# Patient Record
Sex: MALE | Race: BLACK OR AFRICAN AMERICAN | Employment: UNEMPLOYED | URBAN - METROPOLITAN AREA
[De-identification: names, ages, dates, MRNs, and addresses within clinical notes are randomized per-mention and may not be internally consistent; named-entity substitution may affect disease eponyms.]

---

## 2017-01-06 ENCOUNTER — HOSPITAL ENCOUNTER (EMERGENCY)
Age: 82
Discharge: HOME | End: 2017-01-06
Attending: EMERGENCY MEDICINE
Payer: MEDICARE

## 2017-01-06 ENCOUNTER — APPOINTMENT (OUTPATIENT)
Dept: GENERAL RADIOLOGY | Age: 82
End: 2017-01-06
Attending: EMERGENCY MEDICINE
Payer: MEDICARE

## 2017-01-06 ENCOUNTER — HOSPITAL ENCOUNTER (EMERGENCY)
Age: 82
Discharge: STILL ADMITTED OR ADMINISTRATIVE | End: 2017-01-06
Attending: EMERGENCY MEDICINE

## 2017-01-06 VITALS
SYSTOLIC BLOOD PRESSURE: 133 MMHG | HEART RATE: 101 BPM | HEIGHT: 61 IN | WEIGHT: 149.2 LBS | BODY MASS INDEX: 28.17 KG/M2 | DIASTOLIC BLOOD PRESSURE: 74 MMHG | TEMPERATURE: 98.4 F | OXYGEN SATURATION: 100 % | RESPIRATION RATE: 16 BRPM

## 2017-01-06 DIAGNOSIS — K59.00 CONSTIPATION, UNSPECIFIED CONSTIPATION TYPE: Primary | ICD-10-CM

## 2017-01-06 LAB
ALBUMIN SERPL BCP-MCNC: 3.5 G/DL (ref 3.5–5)
ALBUMIN/GLOB SERPL: 1 {RATIO} (ref 1.1–2.2)
ALP SERPL-CCNC: 51 U/L (ref 45–117)
ALT SERPL-CCNC: 26 U/L (ref 12–78)
AMPHET UR QL SCN: NEGATIVE
ANION GAP BLD CALC-SCNC: 9 MMOL/L (ref 5–15)
APPEARANCE UR: CLEAR
AST SERPL W P-5'-P-CCNC: 22 U/L (ref 15–37)
BACTERIA URNS QL MICRO: NEGATIVE /HPF
BARBITURATES UR QL SCN: NEGATIVE
BASOPHILS # BLD AUTO: 0.1 K/UL (ref 0–0.1)
BASOPHILS # BLD: 1 % (ref 0–1)
BENZODIAZ UR QL: NEGATIVE
BILIRUB SERPL-MCNC: 0.2 MG/DL (ref 0.2–1)
BILIRUB UR QL: NEGATIVE
BUN SERPL-MCNC: 34 MG/DL (ref 6–20)
BUN/CREAT SERPL: 33 (ref 12–20)
CALCIUM SERPL-MCNC: 8.7 MG/DL (ref 8.5–10.1)
CANNABINOIDS UR QL SCN: NEGATIVE
CHLORIDE SERPL-SCNC: 101 MMOL/L (ref 97–108)
CK MB CFR SERPL CALC: 2.8 % (ref 0–2.5)
CK MB SERPL-MCNC: 1.2 NG/ML (ref 5–25)
CK SERPL-CCNC: 43 U/L (ref 39–308)
CO2 SERPL-SCNC: 27 MMOL/L (ref 21–32)
COCAINE UR QL SCN: NEGATIVE
COLOR UR: ABNORMAL
CREAT SERPL-MCNC: 1.04 MG/DL (ref 0.7–1.3)
DRUG SCRN COMMENT,DRGCM: NORMAL
EOSINOPHIL # BLD: 0.4 K/UL (ref 0–0.4)
EOSINOPHIL NFR BLD: 7 % (ref 0–7)
EPITH CASTS URNS QL MICRO: ABNORMAL /LPF
ERYTHROCYTE [DISTWIDTH] IN BLOOD BY AUTOMATED COUNT: 13.1 % (ref 11.5–14.5)
ETHANOL SERPL-MCNC: 46 MG/DL
GLOBULIN SER CALC-MCNC: 3.6 G/DL (ref 2–4)
GLUCOSE BLD STRIP.AUTO-MCNC: 83 MG/DL (ref 65–100)
GLUCOSE SERPL-MCNC: 92 MG/DL (ref 65–100)
GLUCOSE UR STRIP.AUTO-MCNC: NEGATIVE MG/DL
HCT VFR BLD AUTO: 35.9 % (ref 36.6–50.3)
HGB BLD-MCNC: 12.1 G/DL (ref 12.1–17)
HGB UR QL STRIP: ABNORMAL
KETONES UR QL STRIP.AUTO: NEGATIVE MG/DL
LEUKOCYTE ESTERASE UR QL STRIP.AUTO: ABNORMAL
LYMPHOCYTES # BLD AUTO: 29 % (ref 12–49)
LYMPHOCYTES # BLD: 1.4 K/UL (ref 0.8–3.5)
MCH RBC QN AUTO: 30.4 PG (ref 26–34)
MCHC RBC AUTO-ENTMCNC: 33.7 G/DL (ref 30–36.5)
MCV RBC AUTO: 90.2 FL (ref 80–99)
METHADONE UR QL: NEGATIVE
MONOCYTES # BLD: 0.2 K/UL (ref 0–1)
MONOCYTES NFR BLD AUTO: 5 % (ref 5–13)
NEUTS SEG # BLD: 2.8 K/UL (ref 1.8–8)
NEUTS SEG NFR BLD AUTO: 58 % (ref 32–75)
NITRITE UR QL STRIP.AUTO: NEGATIVE
OPIATES UR QL: NEGATIVE
PCP UR QL: NEGATIVE
PH UR STRIP: 6.5 [PH] (ref 5–8)
PLATELET # BLD AUTO: 263 K/UL (ref 150–400)
POTASSIUM SERPL-SCNC: 5.2 MMOL/L (ref 3.5–5.1)
PROT SERPL-MCNC: 7.1 G/DL (ref 6.4–8.2)
PROT UR STRIP-MCNC: NEGATIVE MG/DL
RBC # BLD AUTO: 3.98 M/UL (ref 4.1–5.7)
RBC #/AREA URNS HPF: ABNORMAL /HPF (ref 0–5)
SERVICE CMNT-IMP: NORMAL
SODIUM SERPL-SCNC: 137 MMOL/L (ref 136–145)
SP GR UR REFRACTOMETRY: 1.01 (ref 1–1.03)
TROPONIN I SERPL-MCNC: <0.04 NG/ML
UA: UC IF INDICATED,UAUC: ABNORMAL
UROBILINOGEN UR QL STRIP.AUTO: 0.2 EU/DL (ref 0.2–1)
WBC # BLD AUTO: 4.8 K/UL (ref 4.1–11.1)
WBC URNS QL MICRO: ABNORMAL /HPF (ref 0–4)

## 2017-01-06 PROCEDURE — 82962 GLUCOSE BLOOD TEST: CPT

## 2017-01-06 PROCEDURE — 80307 DRUG TEST PRSMV CHEM ANLYZR: CPT | Performed by: EMERGENCY MEDICINE

## 2017-01-06 PROCEDURE — 85025 COMPLETE CBC W/AUTO DIFF WBC: CPT | Performed by: EMERGENCY MEDICINE

## 2017-01-06 PROCEDURE — 82550 ASSAY OF CK (CPK): CPT | Performed by: EMERGENCY MEDICINE

## 2017-01-06 PROCEDURE — 84484 ASSAY OF TROPONIN QUANT: CPT | Performed by: EMERGENCY MEDICINE

## 2017-01-06 PROCEDURE — 80053 COMPREHEN METABOLIC PANEL: CPT | Performed by: EMERGENCY MEDICINE

## 2017-01-06 PROCEDURE — 96361 HYDRATE IV INFUSION ADD-ON: CPT

## 2017-01-06 PROCEDURE — 96360 HYDRATION IV INFUSION INIT: CPT

## 2017-01-06 PROCEDURE — 81001 URINALYSIS AUTO W/SCOPE: CPT | Performed by: EMERGENCY MEDICINE

## 2017-01-06 PROCEDURE — 74011250636 HC RX REV CODE- 250/636: Performed by: EMERGENCY MEDICINE

## 2017-01-06 PROCEDURE — 93005 ELECTROCARDIOGRAM TRACING: CPT

## 2017-01-06 PROCEDURE — 74022 RADEX COMPL AQT ABD SERIES: CPT

## 2017-01-06 PROCEDURE — 99285 EMERGENCY DEPT VISIT HI MDM: CPT

## 2017-01-06 PROCEDURE — 36415 COLL VENOUS BLD VENIPUNCTURE: CPT | Performed by: EMERGENCY MEDICINE

## 2017-01-06 RX ORDER — POLYETHYLENE GLYCOL 3350 17 G/17G
17 POWDER, FOR SOLUTION ORAL DAILY
Qty: 289 G | Refills: 0 | Status: SHIPPED | OUTPATIENT
Start: 2017-01-06 | End: 2017-10-11

## 2017-01-06 RX ORDER — SODIUM CHLORIDE 0.9 % (FLUSH) 0.9 %
5-10 SYRINGE (ML) INJECTION EVERY 8 HOURS
Status: DISCONTINUED | OUTPATIENT
Start: 2017-01-06 | End: 2017-01-06 | Stop reason: HOSPADM

## 2017-01-06 RX ORDER — FACIAL-BODY WIPES
10 EACH TOPICAL DAILY
Qty: 12 SUPPOSITORY | Refills: 0 | Status: SHIPPED | OUTPATIENT
Start: 2017-01-06 | End: 2018-03-23

## 2017-01-06 RX ORDER — SODIUM CHLORIDE 0.9 % (FLUSH) 0.9 %
5-10 SYRINGE (ML) INJECTION AS NEEDED
Status: DISCONTINUED | OUTPATIENT
Start: 2017-01-06 | End: 2017-01-06 | Stop reason: HOSPADM

## 2017-01-06 RX ADMIN — SODIUM CHLORIDE 1000 ML: 900 INJECTION, SOLUTION INTRAVENOUS at 12:58

## 2017-01-06 NOTE — ED PROVIDER NOTES
Patient is a 80 y.o. male presenting with altered mental status and constipation. The history is provided by the patient and the EMS personnel. No  was used. Altered mental status      Constipation    Associated symptoms include constipation. Pertinent negatives include no dysuria, no abdominal distention, no fever, no vomiting and no diarrhea. Pt with no EHR in our system, brought in by EMS for complaints of constipation, felt to be altered. Tachycardic. History reviewed. No pertinent past medical history. History reviewed. No pertinent past surgical history. History reviewed. No pertinent family history. Social History     Social History    Marital status: N/A     Spouse name: N/A    Number of children: N/A    Years of education: N/A     Occupational History    Not on file. 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 Review of Systems   Unable to perform ROS: Mental status change   Constitutional: Negative for fever. Gastrointestinal: Positive for constipation. Negative for abdominal distention, diarrhea and vomiting. Genitourinary: Negative for dysuria. Vitals:    01/06/17 1228   BP: 124/62   Pulse: (!) 126   Resp: 19   Temp: 98.9 °F (37.2 °C)   Weight: 67.7 kg (149 lb 3.2 oz)   Height: 5' 1.32\" (1.558 m)            Physical Exam   Constitutional: He appears well-developed. No distress. Thin male confused, unknown baseline   HENT:   Head: Normocephalic and atraumatic. Nose: Nose normal.   Mouth/Throat: Oropharynx is clear and moist. No oropharyngeal exudate.    Eyes: Conjunctivae and EOM are normal. Right eye exhibits no discharge. Left eye exhibits no discharge. No scleral icterus. 2 mm   Neck: Normal range of motion. Neck supple. No JVD present. No tracheal deviation present. No thyromegaly present. Cardiovascular: Normal rate, regular rhythm and normal heart sounds. No murmur heard. Pulmonary/Chest: Effort normal and breath sounds normal. No stridor. No respiratory distress. He has no wheezes. He has no rales. Abdominal: Soft. Bowel sounds are normal. He exhibits no distension. There is tenderness. There is no rebound and no guarding. Active bowel sounds, tender LLQ/suprapubic; L inguinal hernia \"a long time\" per pt, nontender, nonreducible   Genitourinary: Rectum normal.   Genitourinary Comments: Hard stool in vault, no impaction   Musculoskeletal: Normal range of motion. Lymphadenopathy:     He has no cervical adenopathy. Neurological: He is alert. Skin: Skin is warm and dry. No rash noted. He is not diaphoretic. No erythema. No pallor. Psychiatric:   Awake, confused   Nursing note and vitals reviewed.        [facility]  ED Course       Procedures

## 2017-01-06 NOTE — DISCHARGE INSTRUCTIONS
Constipation: Care Instructions  Your Care Instructions  Constipation means that you have a hard time passing stools (bowel movements). People pass stools from 3 times a day to once every 3 days. What is normal for you may be different. Constipation may occur with pain in the rectum and cramping. The pain may get worse when you try to pass stools. Sometimes there are small amounts of bright red blood on toilet paper or the surface of stools. This is because of enlarged veins near the rectum (hemorrhoids). A few changes in your diet and lifestyle may help you avoid ongoing constipation. Your doctor may also prescribe medicine to help loosen your stool. Some medicines can cause constipation. These include pain medicines and antidepressants. Tell your doctor about all the medicines you take. Your doctor may want to make a medicine change to ease your symptoms. Follow-up care is a key part of your treatment and safety. Be sure to make and go to all appointments, and call your doctor if you are having problems. It's also a good idea to know your test results and keep a list of the medicines you take. How can you care for yourself at home? · Drink plenty of fluids, enough so that your urine is light yellow or clear like water. If you have kidney, heart, or liver disease and have to limit fluids, talk with your doctor before you increase the amount of fluids you drink. · Include high-fiber foods in your diet each day. These include fruits, vegetables, beans, and whole grains. · Get at least 30 minutes of exercise on most days of the week. Walking is a good choice. You also may want to do other activities, such as running, swimming, cycling, or playing tennis or team sports. · Take a fiber supplement, such as Citrucel or Metamucil, every day. Read and follow all instructions on the label. · Schedule time each day for a bowel movement. A daily routine may help.  Take your time having your bowel movement. · Support your feet with a small step stool when you sit on the toilet. This helps flex your hips and places your pelvis in a squatting position. · Your doctor may recommend an over-the-counter laxative to relieve your constipation. Examples are Milk of Magnesia and MiraLax. Read and follow all instructions on the label. Do not use laxatives on a long-term basis. When should you call for help? Call your doctor now or seek immediate medical care if:  · You have new or worse belly pain. · You have new or worse nausea or vomiting. · You have blood in your stools. Watch closely for changes in your health, and be sure to contact your doctor if:  · Your constipation is getting worse. · You do not get better as expected. Where can you learn more? Go to http://marta-kuldeep.info/. Enter [id number] in the search box to learn more about \"Constipation: Care Instructions. \"  Current as of: May 27, 2016  Content Version: 11.1  © 4272-8350 [employer]. Care instructions adapted under license by [email or web address] (which disclaims liability or warranty for this information). If you have questions about a medical condition or this instruction, always ask your healthcare professional. [address] any warranty or liability for your use of this information.

## 2017-01-06 NOTE — ED NOTES
Reviewed discharge instructions and follow up information with patient. Confirmed patient's return address after speaking with representative at [facility]. Arranged for patient to get a ride home with courtesy Ammy Booker.

## 2017-01-06 NOTE — ED NOTES
Patient (s)  given copy of dc instructions and 2 script(s). Patient(s)  verbalized understanding of instructions and script (s). Patient given a current medication reconciliation form and verbalized understanding of their medications. Patient (s) verbalized understanding of the importance of discussing medications with  his or her physician or clinic when they follow up. Patient alert and oriented and in no acute distress. Pt verbalizes pain scale of 4 out of 10. Patient discharged home ambulatory.

## 2017-01-08 LAB
ATRIAL RATE: 122 BPM
CALCULATED R AXIS, ECG10: 62 DEGREES
CALCULATED T AXIS, ECG11: 77 DEGREES
DIAGNOSIS, 93000: NORMAL
P-R INTERVAL, ECG05: 168 MS
Q-T INTERVAL, ECG07: 270 MS
QRS DURATION, ECG06: 68 MS
QTC CALCULATION (BEZET), ECG08: 384 MS
VENTRICULAR RATE, ECG03: 122 BPM

## 2017-01-15 RX ORDER — TAMSULOSIN HYDROCHLORIDE 0.4 MG/1
CAPSULE ORAL
Qty: 90 CAP | Refills: 0 | Status: SHIPPED | OUTPATIENT
Start: 2017-01-15 | End: 2017-04-13 | Stop reason: SDUPTHER

## 2017-04-11 ENCOUNTER — OFFICE VISIT (OUTPATIENT)
Dept: INTERNAL MEDICINE CLINIC | Age: 82
End: 2017-04-11

## 2017-04-11 VITALS
DIASTOLIC BLOOD PRESSURE: 67 MMHG | HEIGHT: 67 IN | HEART RATE: 58 BPM | SYSTOLIC BLOOD PRESSURE: 97 MMHG | BODY MASS INDEX: 19.32 KG/M2 | WEIGHT: 123.1 LBS | TEMPERATURE: 98.2 F | OXYGEN SATURATION: 91 % | RESPIRATION RATE: 18 BRPM

## 2017-04-11 DIAGNOSIS — K40.90 INGUINAL HERNIA OF LEFT SIDE WITHOUT OBSTRUCTION OR GANGRENE: Primary | ICD-10-CM

## 2017-04-11 DIAGNOSIS — F02.80 ALZHEIMER'S DISEASE OF OTHER ONSET: ICD-10-CM

## 2017-04-11 DIAGNOSIS — G30.8 ALZHEIMER'S DISEASE OF OTHER ONSET: ICD-10-CM

## 2017-04-11 DIAGNOSIS — R63.4 WEIGHT LOSS: ICD-10-CM

## 2017-04-11 RX ORDER — POTASSIUM CHLORIDE 750 MG/1
20 TABLET, FILM COATED, EXTENDED RELEASE ORAL 2 TIMES DAILY
Qty: 360 TAB | Refills: 3 | Status: SHIPPED | OUTPATIENT
Start: 2017-04-11 | End: 2018-11-29

## 2017-04-11 RX ORDER — ALBUTEROL SULFATE 0.83 MG/ML
2.5 SOLUTION RESPIRATORY (INHALATION)
Qty: 4 PACKAGE | Refills: 11 | Status: SHIPPED | OUTPATIENT
Start: 2017-04-11 | End: 2018-07-24 | Stop reason: SDUPTHER

## 2017-04-11 RX ORDER — LISINOPRIL 10 MG/1
10 TABLET ORAL DAILY
Qty: 90 TAB | Refills: 3 | Status: SHIPPED | OUTPATIENT
Start: 2017-04-11 | End: 2018-04-13 | Stop reason: SDUPTHER

## 2017-04-11 NOTE — PATIENT INSTRUCTIONS
Preventing Falls: Care Instructions  Your Care Instructions  Getting around your home safely can be a challenge if you have injuries or health problems that make it easy for you to fall. Loose rugs and furniture in walkways are among the dangers for many older people who have problems walking or who have poor eyesight. People who have conditions such as arthritis, osteoporosis, or dementia also have to be careful not to fall. You can make your home safer with a few simple measures. Follow-up care is a key part of your treatment and safety. Be sure to make and go to all appointments, and call your doctor if you are having problems. It's also a good idea to know your test results and keep a list of the medicines you take. How can you care for yourself at home? Taking care of yourself  · You may get dizzy if you do not drink enough water. To prevent dehydration, drink plenty of fluids, enough so that your urine is light yellow or clear like water. Choose water and other caffeine-free clear liquids. If you have kidney, heart, or liver disease and have to limit fluids, talk with your doctor before you increase the amount of fluids you drink. · Exercise regularly to improve your strength, muscle tone, and balance. Walk if you can. Swimming may be a good choice if you cannot walk easily. · Have your vision and hearing checked each year or any time you notice a change. If you have trouble seeing and hearing, you might not be able to avoid objects and could lose your balance. · Know the side effects of the medicines you take. Ask your doctor or pharmacist whether the medicines you take can affect your balance. Sleeping pills or sedatives can affect your balance. · Limit the amount of alcohol you drink. Alcohol can impair your balance and other senses. · Ask your doctor whether calluses or corns on your feet need to be removed.  If you wear loose-fitting shoes because of calluses or corns, you can lose your balance and fall. · Talk to your doctor if you have numbness in your feet. Preventing falls at home  · Remove raised doorway thresholds, throw rugs, and clutter. Repair loose carpet or raised areas in the floor. · Move furniture and electrical cords to keep them out of walking paths. · Use nonskid floor wax, and wipe up spills right away, especially on ceramic tile floors. · If you use a walker or cane, put rubber tips on it. If you use crutches, clean the bottoms of them regularly with an abrasive pad, such as steel wool. · Keep your house well lit, especially [place], and outside walkways. Use night-lights in areas such as hallways and bathrooms. Add extra light switches or use remote switches (such as switches that go on or off when you clap your hands) to make it easier to turn lights on if you have to get up during the night. · Install sturdy handrails on stairways. · Move items in your cabinets so that the things you use a lot are on the lower shelves (about waist level). · Keep a cordless phone and a flashlight with new batteries by your bed. If possible, put a phone in each of the main rooms of your house, or carry a cell phone in case you fall and cannot reach a phone. Or, you can wear a device around your neck or wrist. You push a button that sends a signal for help. · Wear low-heeled shoes that fit well and give your feet good support. Use footwear with nonskid soles. Check the heels and soles of your shoes for wear. Repair or replace worn heels or soles. · Do not wear socks without shoes on wood floors. · Walk on the grass when the sidewalks are slippery. If you live in an area that gets snow and ice in the winter, sprinkle salt on slippery steps and sidewalks. Preventing falls in the bath  · Install grab bars and nonskid mats inside and outside your shower or tub and near the toilet and sinks. · Use shower chairs and bath benches.   · Use a hand-held shower head that will allow you to sit while showering. · Get into a tub or shower by putting the weaker leg in first. Get out of a tub or shower with your strong side first.  · Repair loose toilet seats and consider installing a raised toilet seat to make getting on and off the toilet easier. · Keep your bathroom door unlocked while you are in the shower. Where can you learn more? Go to http://marta-kuldeep.info/. Enter [id number] [phone] in the search box to learn more about \"Preventing Falls: Care Instructions. \"  Current as of: August 4, 2016  Content Version: 11.2  © 7567-4427 [employer]. Care instructions adapted under license by [employer] (which disclaims liability or warranty for this information). If you have questions about a medical condition or this instruction, always ask your healthcare professional. [place] [address] any warranty or liability for your use of this information. Preventing Outdoor Falls: Care Instructions  Your Care Instructions  Worries about falls don't need to keep you indoors. Outdoor activities like walking have big benefits for your health. You will need to watch your step and learn a few safety measures. If you are worried about having a fall outdoors, ask your doctor about exercises, classes, or physical therapy that may help. You can learn ways to gain strength, flexibility, and balance. Ask if it might help to use a cane or walker. You can make your time outdoors safer with a few simple measures. Follow-up care is a key part of your treatment and safety. Be sure to make and go to all appointments, and call your doctor if you are having problems. It's also a good idea to know your test results and keep a list of the medicines you take. How can you prevent falls outdoors? · Wear shoes with firm soles and low heels. If you have to walk on an icy surface, use grippers that can be worn over your shoes in bad weather.   · Be extra careful if weather is bad. Walk on the grass when the sidewalks are slick. If you live in a place that gets snow and ice in the winter, sprinkle salt on slippery stairs and sidewalks. · Be careful getting on or off buses and trains or getting in and out of cars. If handrails are available, use them. · Be careful when you cross the street. Look for crosswalks or places where curb cuts or ramps are present. · Try not to hurry, especially if you are carrying something. · Be cautious in parking lots or garages. There may be curbs or changes in pavement, or the height of the pavement may vary. · Make sure to wear the correct eyeglasses, if you need them. Reading glasses or bifocals can make it harder to see hazards that might be in your way. · If you are walking outdoors for exercise, try to:  ¨ Walk in well-lighted, well-maintained areas. These include high school or college tracks, shopping malls, and public spaces. ¨ Walk with a partner. ¨ Watch out for cracked sidewalks, curbs, changes in the height of the pavement, exposed tree roots, and debris such as fallen leaves or branches. Where can you learn more? Go to http://marta-kuldeep.info/. Enter S926 in the search box to learn more about \"Preventing Outdoor Falls: Care Instructions. \"  Current as of: August 4, 2016  Content Version: 11.2  © 0716-6464 [employer]. Care instructions adapted under license by [employer] (which disclaims liability or warranty for this information). If you have questions about a medical condition or this instruction, always ask your healthcare professional. [place] [address] any warranty or liability for your use of this information. How to Get Up Safely After a Fall: Care Instructions  Your Care Instructions  If you have injuries, health problems, or other reasons that may make it easy for you to fall at home, it is a good idea to learn how to get up safely after a fall.  Learning how to get up correctly can help you avoid making an injury worse. Also, knowing what to do if you cannot get up can help you stay safe until help arrives. Follow-up care is a key part of your treatment and safety. Be sure to make and go to all appointments, and call your doctor if you are having problems. It's also a good idea to know your test results and keep a list of the medicines you take. How can you care for yourself after a fall? If you think you can get up  First lie still for a few minutes and think about how you feel. If your body feels okay and you think you can get up safely, follow the rest of the steps below:  1. Look for a chair or other piece of furniture that is close to you. 2. Roll onto your side and rest. Roll by turning your head in the direction you want to roll, move your shoulder and arm, then hip and leg in the same direction. 3. Lie still for a moment to let your blood pressure adjust.  4. Slowly push your upper body up, lift your head, and take a moment to rest.  5. Slowly get up on your hands and knees, and crawl to the chair or other stable piece of furniture. 6. Put your hands on the chair. 7. Move one foot forward, and place it flat on the floor. Your other leg should be bent with the knee on the floor. 8. Rise slowly, turn your body, and sit in the chair. Stay seated for a bit and think about how you feel. Call for help. Even if you feel okay, let someone know what happened to you. You might not know that you have a serious injury. If you cannot get up  1. If you think you are injured after a fall or you cannot get up, try not to panic. 2. Call out for help. 3. If you have a phone within reach or you have an emergency call device, use it to call for help. 4. If you do not have a phone within reach, try to slide yourself toward it. If you cannot get to the phone, try to slide toward a door or window or a place where you think you can be heard.   5. [place] or use an object to make noise so someone might hear you. 6. If you can reach something that you can use for a pillow, place it under your head. Try to stay warm by covering yourself with a blanket or clothing while you wait for help. When should you call for help? Call 911 anytime you think you may need emergency care. For example, call if:  · You passed out (lost consciousness). · You cannot get up after a fall. · You believe you have serious or life-threatening injuries. Call your doctor now or seek immediate medical care if:  · You have severe pain. · You think you may have passed out but are not sure. · You hit your head or think you may have hit your head but are not sure. · You think your medicines may have caused you to fall. Watch closely for changes in your health, and be sure to contact your doctor if:  · You have fallen, even if you think you are not hurt. Do not feel embarrassed to let your doctor know you have fallen. Your doctor may be able to adjust your medicines or provide other help so you can prevent future falls. Where can you learn more? Go to http://marta-kuldeep.info/. Enter H007 in the search box to learn more about \"How to Get Up Safely After a Fall: Care Instructions. \"  Current as of: August 4, 2016  Content Version: 11.2  © 4309-5787 [employer]. Care instructions adapted under license by [employer] (which disclaims liability or warranty for this information). If you have questions about a medical condition or this instruction, always ask your healthcare professional. [place] [address] any warranty or liability for your use of this information.

## 2017-04-11 NOTE — MR AVS SNAPSHOT
Visit Information Date & Time Provider Department Dept. Phone Encounter #  
 4/11/2017 10:30 AM Ismael Stanford, [address] [id number] Upcoming Health Maintenance Date Due  
 GLAUCOMA SCREENING Q2Y 12/5/1995 Pneumococcal 65+ Low/Medium Risk (2 of 2 - PCV13) 10/10/2017 MEDICARE YEARLY EXAM 10/11/2017 DTaP/Tdap/Td series (2 - Td) 10/10/2026 Allergies as of 4/11/2017  Review Complete On: 4/11/2017 By: Ismael Stanford MD  
 No Known Allergies Current Immunizations  Reviewed on 10/10/2016 Name Date Influenza High Dose Vaccine PF 10/10/2016, 11/5/2014 Pneumococcal Polysaccharide (PPSV-23) 10/10/2016 Tdap 10/10/2016 Not reviewed this visit You Were Diagnosed With   
  
 Codes Comments Inguinal hernia of left side without obstruction or gangrene    -  Primary ICD-10-CM: K40.90 ICD-9-CM: 550.90 Weight loss     ICD-10-CM: R63.4 ICD-9-CM: 783.21 Alzheimer's disease of other onset     ICD-10-CM: G30.8, F02.80 ICD-9-CM: 331.0 Vitals BP Pulse Temp Resp Height(growth percentile) Weight(growth percentile) 97/67 (BP 1 Location: Right arm, BP Patient Position: Sitting) (!) 58 98.2 °F (36.8 °C) (Oral) 18 5' 7\" (1.702 m) 123 lb 1.6 oz (55.8 kg) SpO2 BMI Smoking Status 91% 19.28 kg/m2 Unknown If Ever Smoked BMI and BSA Data Body Mass Index Body Surface Area  
 19.28 kg/m 2 1.62 m 2 Preferred Pharmacy Pharmacy Name Phone [facility] [address]  [place] [phone] Your Updated Medication List  
  
   
This list is accurate as of: 4/11/17 12:19 PM.  Always use your most recent med list.  
  
  
  
  
 albuterol 2.5 mg /3 mL (0.083 %) nebulizer solution Commonly known as:  PROVENTIL VENTOLIN  
3 mL by Nebulization route every four (4) hours as needed for Wheezing or Shortness of Breath. aspirin 81 mg tablet Take 81 mg by mouth.  
  
 bisacodyl 10 mg suppository Commonly known as:  DULCOLAX (BISACODYL) Insert 10 mg into rectum daily. ferrous gluconate 325 mg (37 mg iron) Tab Take 1 Tab by mouth two (2) times a day. lisinopril 10 mg tablet Commonly known as:  [place] Neve Take 1 Tab by mouth daily. polyethylene glycol 17 gram/dose powder Commonly known as:  Manuelito Mcfarlane Take 17 g by mouth daily. 1 tablespoon with 8 oz of water daily  
  
 potassium chloride SR 10 mEq tablet Commonly known as:  K-TAB Take 2 Tabs by mouth two (2) times a day. pravastatin 40 mg tablet Commonly known as:  PRAVACHOL  
TAKE 1 TABLET BY MOUTH EVERY NIGHT  
  
 tamsulosin 0.4 mg capsule Commonly known as:  FLOMAX TAKE ONE CAPSULE BY MOUTH EVERY DAY Prescriptions Sent to Pharmacy Refills  
 lisinopril (PRINIVIL, ZESTRIL) 10 mg tablet 3 Sig: Take 1 Tab by mouth daily. Class: Normal  
 Pharmacy: [address] Ph #: [phone] Route: Oral  
 potassium chloride SR (K-TAB) 10 mEq tablet 3 Sig: Take 2 Tabs by mouth two (2) times a day. Class: Normal  
 Pharmacy: [address] Ph #: [phone] Route: Oral  
 albuterol (PROVENTIL VENTOLIN) 2.5 mg /3 mL (0.083 %) nebulizer solution 11 Sig: 3 mL by Nebulization route every four (4) hours as needed for Wheezing or Shortness of Breath. Class: Normal  
 Pharmacy: [address] Ph #: [phone] Route: Nebulization Patient Instructions Preventing Falls: Care Instructions Your Care Instructions Getting around your home safely can be a challenge if you have injuries or health problems that make it easy for you to fall. Loose rugs and furniture in walkways are among the dangers for many older people who have problems walking or who have poor eyesight. People who have conditions such as arthritis, osteoporosis, or dementia also have to be careful not to fall. You can make your home safer with a few simple measures. Follow-up care is a key part of your treatment and safety. Be sure to make and go to all appointments, and call your doctor if you are having problems. It's also a good idea to know your test results and keep a list of the medicines you take. How can you care for yourself at home? Taking care of yourself · You may get dizzy if you do not drink enough water. To prevent dehydration, drink plenty of fluids, enough so that your urine is light yellow or clear like water. Choose water and other caffeine-free clear liquids. If you have kidney, heart, or liver disease and have to limit fluids, talk with your doctor before you increase the amount of fluids you drink. · Exercise regularly to improve your strength, muscle tone, and balance. Walk if you can. Swimming may be a good choice if you cannot walk easily. · Have your vision and hearing checked each year or any time you notice a change. If you have trouble seeing and hearing, you might not be able to avoid objects and could lose your balance. · Know the side effects of the medicines you take. Ask your doctor or pharmacist whether the medicines you take can affect your balance. Sleeping pills or sedatives can affect your balance. · Limit the amount of alcohol you drink. Alcohol can impair your balance and other senses. · Ask your doctor whether calluses or corns on your feet need to be removed. If you wear loose-fitting shoes because of calluses or corns, you can lose your balance and fall. · Talk to your doctor if you have numbness in your feet. Preventing falls at home · Remove raised doorway thresholds, throw rugs, and clutter. Repair loose carpet or raised areas in the floor. · Move furniture and electrical cords to keep them out of walking paths. · Use nonskid floor wax, and wipe up spills right away, especially on ceramic tile floors. · If you use a walker or cane, put rubber tips on it. If you use crutches, clean the bottoms of them regularly with an abrasive pad, such as steel wool. · Keep your house well lit, especially [place], and outside walkways. Use night-lights in areas such as hallways and bathrooms. Add extra light switches or use remote switches (such as switches that go on or off when you clap your hands) to make it easier to turn lights on if you have to get up during the night. · Install sturdy handrails on stairways. · Move items in your cabinets so that the things you use a lot are on the lower shelves (about waist level). · Keep a cordless phone and a flashlight with new batteries by your bed. If possible, put a phone in each of the main rooms of your house, or carry a cell phone in case you fall and cannot reach a phone. Or, you can wear a device around your neck or wrist. You push a button that sends a signal for help. · Wear low-heeled shoes that fit well and give your feet good support. Use footwear with nonskid soles. Check the heels and soles of your shoes for wear. Repair or replace worn heels or soles. · Do not wear socks without shoes on wood floors. · Walk on the grass when the sidewalks are slippery. If you live in an area that gets snow and ice in the winter, sprinkle salt on slippery steps and sidewalks. Preventing falls in the bath · Install grab bars and nonskid mats inside and outside your shower or tub and near the toilet and sinks. · Use shower chairs and bath benches. · Use a hand-held shower head that will allow you to sit while showering.  
· Get into a tub or shower by putting the weaker leg in first. Get out of a tub or shower with your strong side first. 
· Repair loose toilet seats and consider installing a raised toilet seat to make getting on and off the toilet easier. · Keep your bathroom door unlocked while you are in the shower. Where can you learn more? Go to http://marta-kuldeep.info/. Enter [id number] [id number] in the search box to learn more about \"Preventing Falls: Care Instructions. \" Current as of: August 4, 2016 Content Version: 11.2 © 2586-5114 [employer]. Care instructions adapted under license by [employer] (which disclaims liability or warranty for this information). If you have questions about a medical condition or this instruction, always ask your healthcare professional. [address] any warranty or liability for your use of this information. Preventing Outdoor Falls: Care Instructions Your Care Instructions Worries about falls don't need to keep you indoors. Outdoor activities like walking have big benefits for your health. You will need to watch your step and learn a few safety measures. If you are worried about having a fall outdoors, ask your doctor about exercises, classes, or physical therapy that may help. You can learn ways to gain strength, flexibility, and balance. Ask if it might help to use a cane or walker. You can make your time outdoors safer with a few simple measures. Follow-up care is a key part of your treatment and safety. Be sure to make and go to all appointments, and call your doctor if you are having problems. It's also a good idea to know your test results and keep a list of the medicines you take. How can you prevent falls outdoors? · Wear shoes with firm soles and low heels. If you have to walk on an icy surface, use grippers that can be worn over your shoes in bad weather. · Be extra careful if weather is bad.  Walk on the grass when the sidewalks are slick. If you live in a place that gets snow and ice in the winter, sprinkle salt on slippery stairs and sidewalks. · Be careful getting on or off buses and trains or getting in and out of cars. If handrails are available, use them. · Be careful when you cross the street. Look for crosswalks or places where curb cuts or ramps are present. · Try not to hurry, especially if you are carrying something. · Be cautious in parking lots or garages. There may be curbs or changes in pavement, or the height of the pavement may vary. · Make sure to wear the correct eyeglasses, if you need them. Reading glasses or bifocals can make it harder to see hazards that might be in your way. · If you are walking outdoors for exercise, try to: 
¨ Walk in well-lighted, well-maintained areas. These include high school or college tracks, shopping malls, and public spaces. ¨ Walk with a partner. ¨ Watch out for cracked sidewalks, curbs, changes in the height of the pavement, exposed tree roots, and debris such as fallen leaves or branches. Where can you learn more? Go to http://marta-kuldeep.info/. Enter C930 in the search box to learn more about \"Preventing Outdoor Falls: Care Instructions. \" Current as of: August 4, 2016 Content Version: 11.2 © 5003-8993 [employer]. Care instructions adapted under license by [employer] (which disclaims liability or warranty for this information). If you have questions about a medical condition or this instruction, always ask your healthcare professional. [place] [address] any warranty or liability for your use of this information. How to Get Up Safely After a Fall: Care Instructions Your Care Instructions If you have injuries, health problems, or other reasons that may make it easy for you to fall at home, it is a good idea to learn how to get up safely after a fall.  Learning how to get up correctly can help you avoid making an injury worse. Also, knowing what to do if you cannot get up can help you stay safe until help arrives. Follow-up care is a key part of your treatment and safety. Be sure to make and go to all appointments, and call your doctor if you are having problems. It's also a good idea to know your test results and keep a list of the medicines you take. How can you care for yourself after a fall? If you think you can get up First lie still for a few minutes and think about how you feel. If your body feels okay and you think you can get up safely, follow the rest of the steps below: 1. Look for a chair or other piece of furniture that is close to you. 2. Roll onto your side and rest. Roll by turning your head in the direction you want to roll, move your shoulder and arm, then hip and leg in the same direction. 3. Lie still for a moment to let your blood pressure adjust. 
4. Slowly push your upper body up, lift your head, and take a moment to rest. 
5. Slowly get up on your hands and knees, and crawl to the chair or other stable piece of furniture. 6. Put your hands on the chair. 7. Move one foot forward, and place it flat on the floor. Your other leg should be bent with the knee on the floor. 8. Rise slowly, turn your body, and sit in the chair. Stay seated for a bit and think about how you feel. Call for help. Even if you feel okay, let someone know what happened to you. You might not know that you have a serious injury. If you cannot get up 1. If you think you are injured after a fall or you cannot get up, try not to panic. 2. Call out for help. 3. If you have a phone within reach or you have an emergency call device, use it to call for help. 4. If you do not have a phone within reach, try to slide yourself toward it. If you cannot get to the phone, try to slide toward a door or window or a place where you think you can be heard. 5. [place] or use an object to make noise so someone might hear you. 6. If you can reach something that you can use for a pillow, place it under your head. Try to stay warm by covering yourself with a blanket or clothing while you wait for help. When should you call for help? Call 911 anytime you think you may need emergency care. For example, call if: 
· You passed out (lost consciousness). · You cannot get up after a fall. · You believe you have serious or life-threatening injuries. Call your doctor now or seek immediate medical care if: 
· You have severe pain. · You think you may have passed out but are not sure. · You hit your head or think you may have hit your head but are not sure. · You think your medicines may have caused you to fall. Watch closely for changes in your health, and be sure to contact your doctor if: 
· You have fallen, even if you think you are not hurt. Do not feel embarrassed to let your doctor know you have fallen. Your doctor may be able to adjust your medicines or provide other help so you can prevent future falls. Where can you learn more? Go to http://marta-kuldeep.info/. Enter S872 in the search box to learn more about \"How to Get Up Safely After a Fall: Care Instructions. \" Current as of: August 4, 2016 Content Version: 11.2 © 7103-3525 [employer]wise[employer]. Care instructions adapted under license by [employer] Editorial[employer] (which disclaims liability or warranty for this information). If you have questions about a medical condition or this instruction, always ask your healthcare professional. [place] [address] any warranty or liability for your use of this information. Introducing [facility]! Maco Ryder introduces [employer] patient portal. Now you can access parts of your medical record, email your doctor's office, and request medication refills online. 1. In your internet browser, go to [email or web address] 2. Click on the First Time User? Click Here link in the Sign In box. You will see the New Member Sign Up page. 3. Enter your Mitokyne Access Code exactly as it appears below. You will not need to use this code after youve completed the sign-up process. If you do not sign up before the expiration date, you must request a new code. · Mitokyne Access Code: [facility] Expires: 7/10/2017 12:19 PM 
 
4. Enter the last four digits of your Social Security Number (xxxx) and Date of Birth (mm/dd/yyyy) as indicated and click Submit. You will be taken to the next sign-up page. 5. Create a Mitokyne ID. This will be your Mitokyne login ID and cannot be changed, so think of one that is secure and easy to remember. 6. Create a What's in My Handbagt password. You can change your password at any time. 7. Enter your Password Reset Question and Answer. This can be used at a later time if you forget your password. 8. Enter your e-mail address. You will receive e-mail notification when new information is available in [address] 9. Click Sign Up. You can now view and download portions of your medical record. 10. Click the Download Summary menu link to download a portable copy of your medical information. If you have questions, please visit the Frequently Asked Questions section of the Mitokyne website. Remember, Mitokyne is NOT to be used for urgent needs. For medical emergencies, dial 911. Now available from your iPhone and Android! Please provide this summary of care documentation to your next provider. Your primary care clinician is listed as NONE. If you have any questions after today's visit, please call [phone].

## 2017-04-11 NOTE — PROGRESS NOTES
Jay Azul is a 80 y.o. male and presents with Follow-up (6 month follow-up) and Weight Loss (Only wants to drink)  . Family c/o decreased appetite. Pt just wants to drink juice, water, and ETOH. Family is unsure of the amount but pt has a h/o alcoholism. No pain to swallow or choking. No CP, SOB, or edema. Pt c/o knot in LLQ x 15 yrs but he has been c/o it lately. Dx'd w/ inguinal hernia but family thinks its size is unchanged. No nausea, vomiting, diarrhea, or constipation. Review of Systems  Constitutional: negative for fevers, chills, anorexia and weight loss  Eyes:   negative for visual disturbance and irritation  ENT:   negative for tinnitus,sore throat,nasal congestion,ear pains. hoarseness  Respiratory:  negative for cough, hemoptysis, dyspnea,wheezing  CV:   negative for chest pain, palpitations, lower extremity edema  GI:   negative for nausea, vomiting, diarrhea, abdominal pain,melena  Endo:               negative for polyuria,polydipsia,polyphagia,heat intolerance  Genitourinary: negative for frequency, dysuria and hematuria  Integument:  negative for rash and pruritus  Hematologic:  negative for easy bruising and gum/nose bleeding  Musculoskel: negative for myalgias, arthralgias, back pain, muscle weakness, joint pain  Neurological:  negative for headaches, dizziness, vertigo, memory problems and gait   Behavl/Psych: negative for feelings of anxiety, depression, mood changes    Past Medical History:   Diagnosis Date    Anemia     BPH (benign prostatic hypertrophy)     DEMENTIA     Gout     High cholesterol     Hypertension      Past Surgical History:   Procedure Laterality Date    HX HERNIA REPAIR  9-10-13    [facility] with [facility]-Dr. 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 90 Tab 3    potassium chloride SR (K-TAB) 10 mEq tablet Take 2 Tabs by mouth two (2) times a day. 360 Tab 3    albuterol (PROVENTIL VENTOLIN) 2.5 mg /3 mL (0.083 %) nebulizer solution 3 mL by Nebulization route every four (4) hours as needed for Wheezing or Shortness of Breath. 4 Package 11    polyethylene glycol (MIRALAX) 17 gram/dose powder Take 17 g by mouth daily. 1 tablespoon with 8 oz of water daily 289 g 0    pravastatin (PRAVACHOL) 40 mg tablet TAKE 1 TABLET BY MOUTH EVERY NIGHT 30 Tab 11    ferrous gluconate 325 mg (37 mg iron) tab Take 1 Tab by mouth two (2) times a day. 180 Tab 3    aspirin 81 mg tablet Take 81 mg by mouth.  tamsulosin (FLOMAX) 0.4 mg capsule TAKE ONE CAPSULE BY MOUTH EVERY DAY 90 Cap 1    bisacodyl (DULCOLAX, BISACODYL,) 10 mg suppository Insert 10 mg into rectum daily. 12 Suppository 0     No Known Allergies    Objective:  Visit Vitals    BP 97/67 (BP 1 Location: Right arm, BP Patient Position: Sitting)    Pulse (!) 58    Temp 98.2 °F (36.8 °C) (Oral)    Resp 18    Ht 5' 7\" (1.702 m)    Wt 123 lb 1.6 oz (55.8 kg)    SpO2 91%    BMI 19.28 kg/m2     Physical Exam:   General appearance - alert, well appearing, and in no distress  Mental status - alert, oriented to person, place, and time  Chest - clear to auscultation, no wheezes, rales or rhonchi, symmetric air entry   Heart - normal rate, regular rhythm, normal S1, S2, no murmurs, rubs, clicks or gallops   Abdomen - soft, nontender, nondistended  - L inguinal hernia. +reducible and nontender.    Lymph- no adenopathy palpable  Ext-peripheral pulses normal, no pedal edema, no clubbing or cyanosis  Skin-Warm and dry. no hyperpigmentation, vitiligo, or suspicious lesions  Neuro -alert, oriented, normal speech, no focal findings or movement disorder noted    Assessment/Plan:    ICD-10-CM ICD-9-CM    1. Inguinal hernia of left side without obstruction or gangrene K40.90 550.90    2. Weight loss R63.4 783.21    3. Alzheimer's disease of other onset G30.8 331.0     F02.80       Orders Placed This Encounter    lisinopril (PRINIVIL, ZESTRIL) 10 mg tablet     Sig: Take 1 Tab by mouth daily. Dispense:  90 Tab     Refill:  3    potassium chloride SR (K-TAB) 10 mEq tablet     Sig: Take 2 Tabs by mouth two (2) times a day. Dispense:  360 Tab     Refill:  3    albuterol (PROVENTIL VENTOLIN) 2.5 mg /3 mL (0.083 %) nebulizer solution     Sig: 3 mL by Nebulization route every four (4) hours as needed for Wheezing or Shortness of Breath. Dispense:  4 Package     Refill:  11     Inguinal hernia- upon further discussion it is clear the hernia has been evaluated before and due to pt's age and medical conditions it was decided not to pursue surgery for this.  I still concur with this decision  Dementia- stable  ETOH abuse- ongoing  Weight loss very mild    Follow-up Disposition: Not on File

## 2017-04-11 NOTE — PROGRESS NOTES
Pt here for   Chief Complaint   Patient presents with    Follow-up     6 month follow-up    Weight Loss     Only wants to drink     1. Have you been to the ER, urgent care clinic since your last visit? Hospitalized since your last visit? Yes When: [facility] Constipation 2 month    2. Have you seen or consulted any other health care providers outside of the [address] [place] [address] since your last visit? Include any pap smears or colon screening.  Yes When: [facility]    Pt C/o pain 10 of 10, denies taking any pain meds

## 2017-05-23 DIAGNOSIS — E78.00 PURE HYPERCHOLESTEROLEMIA: ICD-10-CM

## 2017-05-23 RX ORDER — PRAVASTATIN SODIUM 40 MG/1
TABLET ORAL
Qty: 90 TAB | Refills: 11 | Status: SHIPPED | OUTPATIENT
Start: 2017-05-23 | End: 2018-06-25

## 2017-07-20 ENCOUNTER — TELEPHONE (OUTPATIENT)
Dept: INTERNAL MEDICINE CLINIC | Age: 82
End: 2017-07-20

## 2017-07-20 RX ORDER — LANOLIN ALCOHOL/MO/W.PET/CERES
325 CREAM (GRAM) TOPICAL 2 TIMES DAILY WITH MEALS
Qty: 60 TAB | Refills: 11 | Status: SHIPPED | OUTPATIENT
Start: 2017-07-20

## 2017-07-20 NOTE — TELEPHONE ENCOUNTER
Pt's sister, MS. Beltrán Gearing stating pt needs refill on his iron pills. He has appt to see you in October.  Ms. Wild Done # [phone]

## 2017-10-11 ENCOUNTER — OFFICE VISIT (OUTPATIENT)
Dept: INTERNAL MEDICINE CLINIC | Age: 82
End: 2017-10-11

## 2017-10-11 VITALS
HEART RATE: 95 BPM | BODY MASS INDEX: 19.81 KG/M2 | HEIGHT: 67 IN | SYSTOLIC BLOOD PRESSURE: 106 MMHG | TEMPERATURE: 97 F | DIASTOLIC BLOOD PRESSURE: 67 MMHG | RESPIRATION RATE: 18 BRPM | WEIGHT: 126.2 LBS | OXYGEN SATURATION: 97 %

## 2017-10-11 DIAGNOSIS — Z13.228 SCREENING FOR ENDOCRINE, NUTRITIONAL, METABOLIC AND IMMUNITY DISORDER: ICD-10-CM

## 2017-10-11 DIAGNOSIS — K40.30 INCARCERATED INGUINAL HERNIA: ICD-10-CM

## 2017-10-11 DIAGNOSIS — N40.0 BENIGN PROSTATIC HYPERPLASIA WITHOUT LOWER URINARY TRACT SYMPTOMS: ICD-10-CM

## 2017-10-11 DIAGNOSIS — Z13.0 SCREENING FOR ENDOCRINE, NUTRITIONAL, METABOLIC AND IMMUNITY DISORDER: ICD-10-CM

## 2017-10-11 DIAGNOSIS — I10 ESSENTIAL HYPERTENSION: Primary | ICD-10-CM

## 2017-10-11 DIAGNOSIS — Z00.00 MEDICARE ANNUAL WELLNESS VISIT, SUBSEQUENT: ICD-10-CM

## 2017-10-11 DIAGNOSIS — Z13.21 SCREENING FOR ENDOCRINE, NUTRITIONAL, METABOLIC AND IMMUNITY DISORDER: ICD-10-CM

## 2017-10-11 DIAGNOSIS — Z13.5 GLAUCOMA SCREENING: ICD-10-CM

## 2017-10-11 DIAGNOSIS — Z13.31 DEPRESSION SCREENING: ICD-10-CM

## 2017-10-11 DIAGNOSIS — Z23 ENCOUNTER FOR IMMUNIZATION: ICD-10-CM

## 2017-10-11 DIAGNOSIS — E78.5 HYPERLIPIDEMIA LDL GOAL <100: ICD-10-CM

## 2017-10-11 DIAGNOSIS — Z13.29 SCREENING FOR ENDOCRINE, NUTRITIONAL, METABOLIC AND IMMUNITY DISORDER: ICD-10-CM

## 2017-10-11 DIAGNOSIS — Z71.89 ADVANCE CARE PLANNING: ICD-10-CM

## 2017-10-11 NOTE — ACP (ADVANCE CARE PLANNING)
Advanced care planning- discussed Advance directive, Medical POA, and life sustaining options. Given/Mailing honoring [employer] paper work and contact info for [facility] to complete paperwork in office. Advance Care Planning (ACP) Provider Conversation Snapshot    Date of ACP Conversation: 10/11/17  Persons included in Conversation:  Patient/family  Length of ACP Conversation in minutes:  5-10 minutes    Authorized Decision Maker (if patient is incapable of making informed decisions): This person is:   Healthcare Agent/Medical Power of  under Advance Directive  Steph Ambrose, sister  Nephew          For Patients with Decision Making Capacity:   Values/Goals: Exploration of values, goals, and preferences if recovery is not expected, even with continued medical treatment in the event of:  Severe, permanent brain injury  \"In these circumstances, what matters most to you? \"  Care focused more on comfort or quality of life.     Conversation Outcomes / Follow-Up Plan:   Recommended completion of Advance Directive form after review of ACP materials and conversation with prospective healthcare agent   Referral made for ACP follow-up assistance to:  Nurse navigator

## 2017-10-11 NOTE — PROGRESS NOTES
Benjie Mohamud is a 80 y.o. male and presents with Annual Wellness Visit (medicare) and Hernia (Non Specific) (pt states he has a hernia that is causing him a little pain)    Subjective:  Pt here to establish care with this provider, former patient of Dr. Dulce Maria Pandya. Also concerned for hernia in left groin. Worse today after walking and sitting for awhile. Usually helped by sitting at home with feet very elevated. Told in past unable to operate due to age and suspected tolerance of anesthesia. BPH Review:  He has no burning on urination,dysuria or frequency or dribbling reported. Awakens 2-3 times. Taking meds as prescribed. Hypertension Review:  The patient has hypertension  Diet and Lifestyle: generally does try to follow a  low sodium diet, exercises rarely  Home BP Monitoring: is not measured at home. Pertinent ROS: taking medications as instructed, no medication side effects noted. No TIA's, chest pain on exertion, dyspnea on exertion, or swelling of ankles. BP Readings from Last 3 Encounters:   10/11/17 106/67   04/11/17 97/67   01/06/17 133/74     Dyslipidemia Review:  Patient presents for evaluation of lipids. Compliance with treatment thus far has been good. Denies myalgias, slurring speech, unilateral weakness, and chest pain. A repeat fasting lipid profile was done/ordered. The patient does NOT use medications that may worsen dyslipidemias (corticosteroids, progestins, anabolic steroids, diuretics, beta-blockers, amiodarone, cyclosporine, olanzapine). The patient does NOT exercise regularly. The patient is not known to have coexisting coronary artery disease.  Taking Aspirin daily as prescribed/advised    Review of Systems  Constitutional: negative for fevers, chills, anorexia and weight loss  Respiratory:  negative for cough, hemoptysis, dyspnea, and wheezing  CV:   negative for chest pain, palpitations, and lower extremity edema  GI:   negative for nausea, vomiting, diarrhea, abdominal pain, and melena  Endo:               negative for polyuria,polydipsia,polyphagia, and heat intolerance  Genitourinary: negative for frequency, urgency, dysuria, retention, and hematuria  Integument:  negative for rash, ulcerations, and pruritus  Hematologic:  negative for easy bruising and bleeding  Musculoskel: negative for arthralgias, muscle weakness,and joint pain/swelling  Neurological:  negative for headaches, dizziness, vertigo,and gait problems  Behavl/Psych: negative for feelings of anxiety, depression, suicide, and mood changes    Past Medical History:   Diagnosis Date    Anemia     BPH (benign prostatic hypertrophy)     DEMENTIA     Gout     High cholesterol     Hypertension      Past Surgical History:   Procedure Laterality Date    HX HERNIA REPAIR  9-10-13    [facility]Dr. Holly Hassan     Social History     Social History    Marital status: SINGLE     Spouse name: N/A    Number of children: N/A    Years of education: N/A     Social History Main Topics    Smoking status: Unknown If Ever Smoked    Smokeless tobacco: Never Used    Alcohol use Yes      Comment: DIOR    Drug use: No    Sexual activity: No     Other Topics Concern    None     Social History Narrative    ** Merged History Encounter **          History reviewed. No pertinent family history. Current Outpatient Prescriptions   Medication Sig Dispense Refill    ferrous sulfate 325 mg (65 mg iron) tablet Take 1 Tab by mouth two (2) times daily (with meals). On an empty stomach with Vitamin C (like OJ) 60 Tab 11    pravastatin (PRAVACHOL) 40 mg tablet TAKE 1 TABLET BY MOUTH EVERY NIGHT 90 Tab 11    tamsulosin (FLOMAX) 0.4 mg capsule TAKE ONE CAPSULE BY MOUTH EVERY DAY 90 Cap 1    lisinopril (PRINIVIL, ZESTRIL) 10 mg tablet Take 1 Tab by mouth daily. 90 Tab 3    potassium chloride SR (K-TAB) 10 mEq tablet Take 2 Tabs by mouth two (2) times a day.  360 Tab 3    albuterol (PROVENTIL VENTOLIN) 2.5 mg /3 mL (0.083 %) nebulizer solution 3 mL by Nebulization route every four (4) hours as needed for Wheezing or Shortness of Breath. 4 Package 11    bisacodyl (DULCOLAX, BISACODYL,) 10 mg suppository Insert 10 mg into rectum daily. 12 Suppository 0    aspirin 81 mg tablet Take 81 mg by mouth.  polyethylene glycol (MIRALAX) 17 gram/dose powder Take 17 g by mouth daily. 1 tablespoon with 8 oz of water daily 289 g 0     No Known Allergies    Objective:  Visit Vitals    /67 (BP 1 Location: Right arm, BP Patient Position: Sitting)    Pulse 95    Temp 97 °F (36.1 °C) (Oral)    Resp 18    Ht 5' 7\" (1.702 m)    Wt 126 lb 3.2 oz (57.2 kg)    SpO2 97%    BMI 19.77 kg/m2     Wt Readings from Last 3 Encounters:   10/11/17 126 lb 3.2 oz (57.2 kg)   04/11/17 123 lb 1.6 oz (55.8 kg)   01/06/17 149 lb 3.2 oz (67.7 kg)     Physical Exam:   General appearance - alert, well appearing, and in no distress. Mental status - A/O x 2 (person and place), normal mood and affect. Requires repetition for commands. Neck -Supple ,normal CSP. FROM, non-tender. No significant adenopathy/thyromegaly. No JVD. Chest - CTA. Symmetric chest rise. No wheezing, rales or rhonchi. Heart - Normal rate, regular rhythm. Normal S1, S2. No MGR or clicks. Abdomen - Soft,non-distended. Normoactive BS in all quadrants. NT, no mass or HSM. Left groin incarcerated inguinal hernia, non-reducible. Fist sized. No compression shorts today. Ext- Radial, DP pulses, 2+ bilaterally. No pedal edema, clubbing, or cyanosis. Skin-Warm and dry. No hyperpigmentation, ulcerations, or suspicious lesions. Neuro - Normal speech, no focal findings or movement disorder. Normal strength, gait, and muscle tone.      Assessment of cognitive impairment: Alert and oriented x 4    Depression Screen:   PHQ over the last two weeks 10/11/2017   Little interest or pleasure in doing things Not at all   Feeling down, depressed or hopeless Not at all   Total Score PHQ 2 0       Fall Risk Assessment:    Fall Risk Assessment, last 12 mths 10/11/2017   Able to walk? Yes   Fall in past 12 months? No   Fall with injury? -   Number of falls in past 12 months -   Fall Risk Score -       Abuse Assessment: Patient NOT domesticly abuse (verbal, physical, and financial), lives ALONE with regular check in by nephew    Current Alcohol use: RARELY, but will partake if alcohol provided   reports that he drinks alcohol. Functional Ability:   Does the patient exhibit a steady gait? Yes   How long did it take the patient to get up and walk from a sitting position? 4 seconds   Is the patient self reliant?  (ie can do own laundry, meals, household chores) yes, but has assist by nephew at times     Does the patient handle his/her own medications? No, nephew ensures meds are taken     Does the patient handle his/her own money? No, nephew and sister manage mostly     Is the patients home safe (ie good lighting, handrails on stairs and bath, etc.)? Yes   Did you notice or did patient express any hearing difficulties? no   Did you notice of did patient express any vision difficulties?  no   Were distance and reading eye charts used? n/a     Advance Care Planning:   Patient was offered the opportunity to discuss advance care planning:  Yes   Does patient have an Advance Directive: No   If no, did you provide information and forms? yes     Health Maintenance   Topic Date Due    GLAUCOMA SCREENING Q2Y  12/05/1995    INFLUENZA AGE 9 TO ADULT  08/01/2017    Pneumococcal 65+ Low/Medium Risk (2 of 2 - PCV13) 10/10/2017    MEDICARE YEARLY EXAM  10/11/2017    DTaP/Tdap/Td series (2 - Td) 10/10/2026    ZOSTER VACCINE AGE 60>  Completed     Assessment/Plan:  The current medical regimen is effective;  continue present plan and medications. Deferred inguinal referral to due reported of inability to operate r/t age. No acute pain with hernia reported today.   Medication Side Effects and Warnings were discussed with patient: yes   Patient Labs were reviewed: yes  Patient Past Records were reviewed: yes    See below for other orders   Follow-up Disposition: Not on File    Pt has given consent verbally while in office for [employer]. ICD-10-CM ICD-9-CM    1. Essential hypertension I10 401.9    2. Incarcerated inguinal hernia K40.30 550.10    3. Hyperlipidemia LDL goal <100 E78.5 272.4 LIPID PANEL   4. Benign prostatic hyperplasia without lower urinary tract symptoms N40.0 600.00    5. Screening for endocrine, nutritional, metabolic and immunity disorder O94.46 H95.74 METABOLIC PANEL, COMPREHENSIVE    Z13.21  CBC WITH AUTOMATED DIFF    Z13.228  TSH 3RD GENERATION    Z13.0       Orders Placed This Encounter    METABOLIC PANEL, COMPREHENSIVE    LIPID PANEL    CBC WITH AUTOMATED DIFF    TSH 3RD GENERATION       Berenice Line expressed understanding of plan. An After Visit Summary was offered/printed and given to the patient.

## 2017-10-11 NOTE — PATIENT INSTRUCTIONS
Learning About Durable Power of  for Health Care  What is a durable power of  for health care? A durable power of  for health care is one type of the legal forms called advance directives. It lets you decide who you want to make treatment decisions for you if you cannot speak or decide for yourself. The person you choose is called your health care agent. Another type of advance directive is a living will. It lets you write down what kinds of treatment or life support you want or do not want. What should you think about when choosing a health care agent? Choose your health care agent carefully. This person may or may not be a family member. Talk to the person before you make your final decision. Make sure he or she is comfortable with this responsibility. It's a good idea to choose someone who:  · Is at least 25years old. · Knows you well and understands what makes life meaningful for you. · Understands your Caodaism and moral values. · Will do what you want, not what he or she wants. · Will be able to make difficult choices at a stressful time. · Will be able to refuse or stop treatment, if that is what you would want, even if you could die. · Will be firm and confident with health professionals if needed. · Will ask questions to get necessary information. · Lives near you or agrees to travel to you if needed. Your family may help you make medical decisions while you can still be part of that process. But it is important to choose one person to be your health care agent in case you are not able to make decisions for yourself. If you don't fill out the legal form and name a health care agent, the decisions your family can make may be limited. Who will make decisions for you if you do not have a health care agent? If you don't have a health care agent or a living will, your family members may disagree about your medical care.  And then some medical professionals who may not know you as well might have to make decisions for you. In some cases, a  makes the decisions. When you name a health care agent, it is very clear who has the power to make health decisions for you. How do you name a health care agent? You name your health care agent on a legal form. It is usually called a durable power of  for health care. Ask your hospital, state bar association, or office on aging where to find these forms. You must sign the form to make it legal. Some states require you to get the form notarized. This means that a person called a  watches you sign the form and then he or she signs the form. Some states also require that two or more witnesses sign the form. Be sure to tell your family members and doctors who your health care agent is. Keep your forms in a safe place. But make sure that your loved ones know where the forms are. This could be in your desk where you keep other important papers. Make sure your doctor has a copy of your forms. Where can you learn more? Go to http://marta-kuldeep.info/. Enter [id number] in the search box to learn more about \"Learning About Durable Power of  for [facility]. \"  Current as of: November 17, 2016  Content Version: 11.3  © 2515-0691 [employer]. Care instructions adapted under license by [employer] (which disclaims liability or warranty for this information). If you have questions about a medical condition or this instruction, always ask your healthcare professional. [place] [address] any warranty or liability for your use of this information. Advance Directives: Care Instructions  Your Care Instructions  An advance directive is a legal way to state your wishes at the end of your life. It tells your family and your doctor what to do if you can no longer say what you want. There are two main types of advance directives.  You can change them any time that your wishes change. · A living will tells your family and your doctor your wishes about life support and other treatment. · A durable power of  for health care lets you name a person to make treatment decisions for you when you can't speak for yourself. This person is called a health care agent. If you do not have an advance directive, decisions about your medical care may be made by a doctor or a  who doesn't know you. It may help to think of an advance directive as a gift to the people who care for you. If you have one, they won't have to make tough decisions by themselves. Follow-up care is a key part of your treatment and safety. Be sure to make and go to all appointments, and call your doctor if you are having problems. It's also a good idea to know your test results and keep a list of the medicines you take. How can you care for yourself at home? · Discuss your wishes with your loved ones and your doctor. This way, there are no surprises. · Many states have a unique form. Or you might use a universal form that has been approved by many states. This kind of form can sometimes be completed and stored online. Your electronic copy will then be available wherever you have a connection to the Internet. In most cases, doctors will respect your wishes even if you have a form from a different state. · You don't need a  to do an advance directive. But you may want to get legal advice. · Think about these questions when you prepare an advance directive:  ¨ Who do you want to make decisions about your medical care if you are not able to? Many people choose a family member or close friend. ¨ Do you know enough about life support methods that might be used? If not, talk to your doctor so you understand. ¨ What are you most afraid of that might happen? You might be afraid of having pain, losing your independence, or being kept alive by machines. ¨ Where would you prefer to die?  Choices include your home, a hospital, or a nursing home. ¨ Would you like to have information about hospice care to support you and your family? ¨ Do you want to donate organs when you die? ¨ Do you want certain Faith practices performed before you die? If so, put your wishes in the advance directive. · Read your advance directive every year, and make changes as needed. When should you call for help? Be sure to contact your doctor if you have any questions. Where can you learn more? Go to http://marta-kuldeep.info/. Enter R264 in the search box to learn more about \"Advance Directives: Care Instructions. \"  Current as of: November 17, 2016  Content Version: 11.3  © 2385-3605 [email or web address]. Care instructions adapted under license by [employer] (which disclaims liability or warranty for this information). If you have questions about a medical condition or this instruction, always ask your healthcare professional. [address] any warranty or liability for your use of this information.

## 2017-10-11 NOTE — PROGRESS NOTES
1. Have you been to the ER, urgent care clinic since your last visit? Hospitalized since your last visit? No    2. Have you seen or consulted any other health care providers outside of the [facility] since your last visit? Include any pap smears or colon screening.  No     Pt is here for   Chief Complaint   Patient presents with    Annual Wellness Visit     medicare    Hernia (Non Specific)     pt states he has a hernia that is causing him a little pain     Pt states pain level is a 2 hernia

## 2017-10-11 NOTE — MR AVS SNAPSHOT
Visit Information Date & Time Provider Department Dept. Phone Encounter #  
 10/11/2017 10:40 AM Mahad Parmar NP [address] [facility] [phone] [id number] Follow-up Instructions Return in about 6 months (around 4/11/2018) for 6 month f/u. Upcoming Health Maintenance Date Due Pneumococcal 65+ Low/Medium Risk (2 of 2 - PCV13) 11/10/2017* GLAUCOMA SCREENING Q2Y 12/10/2017* MEDICARE YEARLY EXAM 10/12/2018 DTaP/Tdap/Td series (2 - Td) 10/10/2026 *Topic was postponed. The date shown is not the original due date. Allergies as of 10/11/2017  Review Complete On: 10/11/2017 By: Austin Ballard. Bosher, LPN No Known Allergies Current Immunizations  Reviewed on 10/10/2016 Name Date Influenza High Dose Vaccine PF  Incomplete, 10/10/2016, 11/5/2014 Pneumococcal Polysaccharide (PPSV-23) 10/10/2016 Tdap 10/10/2016 Not reviewed this visit You Were Diagnosed With   
  
 Codes Comments Essential hypertension    -  Primary ICD-10-CM: I10 
ICD-9-CM: 401.9 Incarcerated inguinal hernia     ICD-10-CM: K40.30 ICD-9-CM: 550.10 Hyperlipidemia LDL goal <100     ICD-10-CM: E78.5 ICD-9-CM: 272.4 Benign prostatic hyperplasia without lower urinary tract symptoms     ICD-10-CM: N40.0 ICD-9-CM: 600.00 Screening for endocrine, nutritional, metabolic and immunity disorder     ICD-10-CM: Z13.29, Z13.21, Z13.228, Z13.0 ICD-9-CM: V77.99 Encounter for immunization     ICD-10-CM: Q77 ICD-9-CM: V03.89 Glaucoma screening     ICD-10-CM: Z13.5 ICD-9-CM: V80.1 Vitals BP Pulse Temp Resp Height(growth percentile) Weight(growth percentile) 106/67 (BP 1 Location: Right arm, BP Patient Position: Sitting) 95 97 °F (36.1 °C) (Oral) 18 5' 7\" (1.702 m) 126 lb 3.2 oz (57.2 kg) SpO2 BMI Smoking Status 97% 19.77 kg/m2 Unknown If Ever Smoked Vitals History BMI and BSA Data Body Mass Index Body Surface Area 19.77 kg/m 2 1.64 m 2 Preferred Pharmacy Pharmacy Name Phone [facility]  [place] [phone] Your Updated Medication List  
  
   
This list is accurate as of: 10/11/17 12:19 PM.  Always use your most recent med list.  
  
  
  
  
 albuterol 2.5 mg /3 mL (0.083 %) nebulizer solution Commonly known as:  PROVENTIL VENTOLIN  
3 mL by Nebulization route every four (4) hours as needed for Wheezing or Shortness of Breath. aspirin 81 mg tablet Take 81 mg by mouth.  
  
 bisacodyl 10 mg suppository Commonly known as:  DULCOLAX (BISACODYL) Insert 10 mg into rectum daily. ferrous sulfate 325 mg (65 mg iron) tablet Take 1 Tab by mouth two (2) times daily (with meals). On an empty stomach with Vitamin C (like OJ)  
  
 lisinopril 10 mg tablet Commonly known as:  Marge Opal Take 1 Tab by mouth daily. polyethylene glycol 17 gram/dose powder Commonly known as:  Dena Yadi Take 17 g by mouth daily. 1 tablespoon with 8 oz of water daily  
  
 potassium chloride SR 10 mEq tablet Commonly known as:  K-TAB Take 2 Tabs by mouth two (2) times a day. pravastatin 40 mg tablet Commonly known as:  PRAVACHOL  
TAKE 1 TABLET BY MOUTH EVERY NIGHT  
  
 tamsulosin 0.4 mg capsule Commonly known as:  FLOMAX TAKE ONE CAPSULE BY MOUTH EVERY DAY We Performed the Following CBC WITH AUTOMATED DIFF [07633 CPT(R)] INFLUENZA VIRUS VACCINE, HIGH DOSE SEASONAL, PRESERVATIVE FREE [02119 CPT(R)] LIPID PANEL [32243 CPT(R)] METABOLIC PANEL, COMPREHENSIVE [47363 CPT(R)] REFERRAL TO OPHTHALMOLOGY [REF57 Custom] TSH 3RD GENERATION [11382 CPT(R)] Follow-up Instructions Return in about 6 months (around 4/11/2018) for 6 month f/u. Referral Information  Referral ID Referred By Referred To  
  
 [id number] Roberto Ramos M.D.   
 [address] [facility], [address] Visits Status Start Date End Date 1 New Request 10/11/17 10/11/18 If your referral has a status of pending review or denied, additional information will be sent to support the outcome of this decision. Patient Instructions Learning About Durable Power of  for Health Care What is a durable power of  for health care? A durable power of  for health care is one type of the legal forms called advance directives. It lets you decide who you want to make treatment decisions for you if you cannot speak or decide for yourself. The person you choose is called your health care agent. Another type of advance directive is a living will. It lets you write down what kinds of treatment or life support you want or do not want. What should you think about when choosing a health care agent? Choose your health care agent carefully. This person may or may not be a family member. Talk to the person before you make your final decision. Make sure he or she is comfortable with this responsibility. It's a good idea to choose someone who: · Is at least 25years old. · Knows you well and understands what makes life meaningful for you. · Understands your [place] and moral values. · Will do what you want, not what he or she wants. · Will be able to make difficult choices at a stressful time. · Will be able to refuse or stop treatment, if that is what you would want, even if you could die. · Will be firm and confident with health professionals if needed. · Will ask questions to get necessary information. · Lives near you or agrees to travel to you if needed. Your family may help you make medical decisions while you can still be part of that process. But it is important to choose one person to be your health care agent in case you are not able to make decisions for yourself. If you don't fill out the legal form and name a health care agent, the decisions your family can make may be limited. Who will make decisions for you if you do not have a health care agent? If you don't have a health care agent or a living will, your family members may disagree about your medical care. And then some medical professionals who may not know you as well might have to make decisions for you. In some cases, a  makes the decisions. When you name a health care agent, it is very clear who has the power to make health decisions for you. How do you name a health care agent? You name your health care agent on a legal form. It is usually called a durable power of  for health care. Ask your hospital, state bar association, or office on aging where to find these forms. You must sign the form to make it legal. Some states require you to get the form notarized. This means that a person called a  watches you sign the form and then he or she signs the form. Some states also require that two or more witnesses sign the form. Be sure to tell your family members and doctors who your health care agent is. Keep your forms in a safe place. But make sure that your loved ones know where the forms are. This could be in your desk where you keep other important papers. Make sure your doctor has a copy of your forms. Where can you learn more? Go to http://marta-kuldeep.info/. Enter [id number] in the search box to learn more about \"Learning About Durable Power of  for [facility]. \" Current as of: November 17, 2016 Content Version: 11.3 © 3537-8182 [employer]. Care instructions adapted under license by [employer] (which disclaims liability or warranty for this information).  If you have questions about a medical condition or this instruction, always ask your healthcare professional. [employer] disclaims any warranty or liability for your use of this information. Advance Directives: Care Instructions Your Care Instructions An advance directive is a legal way to state your wishes at the end of your life. It tells your family and your doctor what to do if you can no longer say what you want. There are two main types of advance directives. You can change them any time that your wishes change. · A living will tells your family and your doctor your wishes about life support and other treatment. · A durable power of  for health care lets you name a person to make treatment decisions for you when you can't speak for yourself. This person is called a health care agent. If you do not have an advance directive, decisions about your medical care may be made by a doctor or a  who doesn't know you. It may help to think of an advance directive as a gift to the people who care for you. If you have one, they won't have to make tough decisions by themselves. Follow-up care is a key part of your treatment and safety. Be sure to make and go to all appointments, and call your doctor if you are having problems. It's also a good idea to know your test results and keep a list of the medicines you take. How can you care for yourself at home? · Discuss your wishes with your loved ones and your doctor. This way, there are no surprises. · Many states have a unique form. Or you might use a universal form that has been approved by many states. This kind of form can sometimes be completed and stored online. Your electronic copy will then be available wherever you have a connection to the Internet. In most cases, doctors will respect your wishes even if you have a form from a different state. · You don't need a  to do an advance directive. But you may want to get legal advice. · Think about these questions when you prepare an advance directive: ¨ Who do you want to make decisions about your medical care if you are not able to? Many people choose a family member or close friend. ¨ Do you know enough about life support methods that might be used? If not, talk to your doctor so you understand. ¨ What are you most afraid of that might happen? You might be afraid of having pain, losing your independence, or being kept alive by machines. ¨ Where would you prefer to die? Choices include your home, a hospital, or a nursing home. ¨ Would you like to have information about hospice care to support you and your family? ¨ Do you want to donate organs when you die? ¨ Do you want certain Ep[employer] practices performed before you die? If so, put your wishes in the advance directive. · Read your advance directive every year, and make changes as needed. When should you call for help? Be sure to contact your doctor if you have any questions. Where can you learn more? Go to http://marta-kuldeep.info/. Enter R264 in the search box to learn more about \"Advance Directives: Care Instructions. \" Current as of: November 17, 2016 Content Version: 11.3 © 4529-5585 [employer]. Care instructions adapted under license by [employer] (which disclaims liability or warranty for this information). If you have questions about a medical condition or this instruction, always ask your healthcare professional. [address] any warranty or liability for your use of this information. Introducing [facility]! [employer] introduces MtoV patient portal. Now you can access parts of your medical record, email your doctor's office, and request medication refills online. 1. In your internet browser, go to [email or web address] 2. Click on the First Time User? Click Here link in the Sign In box. You will see the New Member Sign Up page. 3. Enter your [employer] Access Code exactly as it appears below. You will not need to use this code after youve completed the sign-up process. If you do not sign up before the expiration date, you must request a new code. · [employer] Access Code: [email or web address] Expires: 12/5/2017 12:28 PM 
 
4. Enter the last four digits of your Social Security Number (xxxx) and Date of Birth (mm/dd/yyyy) as indicated and click Submit. You will be taken to the next sign-up page. 5. Create a [employer] ID. This will be your [employer] login ID and cannot be changed, so think of one that is secure and easy to remember. 6. Create a [employer] password. You can change your password at any time. 7. Enter your Password Reset Question and Answer. This can be used at a later time if you forget your password. 8. Enter your e-mail address. You will receive e-mail notification when new information is available in [address]. 9. Click Sign Up. You can now view and download portions of your medical record. 10. Click the Download Summary menu link to download a portable copy of your medical information. If you have questions, please visit the Frequently Asked Questions section of the [employer] website. Remember, [employer] is NOT to be used for urgent needs. For medical emergencies, dial 911. Now available from your iPhone and Android! Please provide this summary of care documentation to your next provider. Your primary care clinician is listed as JOE Chacon. If you have any questions after today's visit, please call [phone].

## 2017-10-12 LAB
ALBUMIN SERPL-MCNC: 4.3 G/DL (ref 3.5–4.7)
ALBUMIN/GLOB SERPL: 1.7 {RATIO} (ref 1.2–2.2)
ALP SERPL-CCNC: 56 IU/L (ref 39–117)
ALT SERPL-CCNC: 14 IU/L (ref 0–44)
AST SERPL-CCNC: 19 IU/L (ref 0–40)
BASOPHILS # BLD AUTO: 0 X10E3/UL (ref 0–0.2)
BASOPHILS NFR BLD AUTO: 1 %
BILIRUB SERPL-MCNC: 0.2 MG/DL (ref 0–1.2)
BUN SERPL-MCNC: 18 MG/DL (ref 8–27)
BUN/CREAT SERPL: 27 (ref 10–24)
CALCIUM SERPL-MCNC: 9.8 MG/DL (ref 8.6–10.2)
CHLORIDE SERPL-SCNC: 101 MMOL/L (ref 96–106)
CHOLEST SERPL-MCNC: 149 MG/DL (ref 100–199)
CO2 SERPL-SCNC: 25 MMOL/L (ref 18–29)
CREAT SERPL-MCNC: 0.66 MG/DL (ref 0.76–1.27)
EOSINOPHIL # BLD AUTO: 0.5 X10E3/UL (ref 0–0.4)
EOSINOPHIL NFR BLD AUTO: 8 %
ERYTHROCYTE [DISTWIDTH] IN BLOOD BY AUTOMATED COUNT: 14.4 % (ref 12.3–15.4)
GLOBULIN SER CALC-MCNC: 2.5 G/DL (ref 1.5–4.5)
GLUCOSE SERPL-MCNC: 101 MG/DL (ref 65–99)
HCT VFR BLD AUTO: 36.4 % (ref 37.5–51)
HDLC SERPL-MCNC: 53 MG/DL
HGB BLD-MCNC: 11.6 G/DL (ref 12.6–17.7)
IMM GRANULOCYTES # BLD: 0 X10E3/UL (ref 0–0.1)
IMM GRANULOCYTES NFR BLD: 0 %
INTERPRETATION, 910389: NORMAL
LDLC SERPL CALC-MCNC: 63 MG/DL (ref 0–99)
LYMPHOCYTES # BLD AUTO: 1.3 X10E3/UL (ref 0.7–3.1)
LYMPHOCYTES NFR BLD AUTO: 21 %
MCH RBC QN AUTO: 29.4 PG (ref 26.6–33)
MCHC RBC AUTO-ENTMCNC: 31.9 G/DL (ref 31.5–35.7)
MCV RBC AUTO: 92 FL (ref 79–97)
MONOCYTES # BLD AUTO: 0.6 X10E3/UL (ref 0.1–0.9)
MONOCYTES NFR BLD AUTO: 9 %
NEUTROPHILS # BLD AUTO: 3.7 X10E3/UL (ref 1.4–7)
NEUTROPHILS NFR BLD AUTO: 61 %
PLATELET # BLD AUTO: 266 X10E3/UL (ref 150–379)
POTASSIUM SERPL-SCNC: 4.9 MMOL/L (ref 3.5–5.2)
PROT SERPL-MCNC: 6.8 G/DL (ref 6–8.5)
RBC # BLD AUTO: 3.94 X10E6/UL (ref 4.14–5.8)
SODIUM SERPL-SCNC: 138 MMOL/L (ref 134–144)
TRIGL SERPL-MCNC: 167 MG/DL (ref 0–149)
TSH SERPL DL<=0.005 MIU/L-ACNC: 1.3 UIU/ML (ref 0.45–4.5)
VLDLC SERPL CALC-MCNC: 33 MG/DL (ref 5–40)
WBC # BLD AUTO: 6 X10E3/UL (ref 3.4–10.8)

## 2017-11-17 RX ORDER — TAMSULOSIN HYDROCHLORIDE 0.4 MG/1
CAPSULE ORAL
Qty: 90 CAP | Refills: 1 | Status: SHIPPED | OUTPATIENT
Start: 2017-11-17 | End: 2018-05-21 | Stop reason: SDUPTHER

## 2017-11-21 ENCOUNTER — TELEPHONE (OUTPATIENT)
Dept: INTERNAL MEDICINE CLINIC | Age: 82
End: 2017-11-21

## 2017-11-21 NOTE — TELEPHONE ENCOUNTER
Pt has appt to see Dr. Oracio Melgar and needs  [facility] ins referral.pt # [phone]

## 2017-12-01 ENCOUNTER — HOSPITAL ENCOUNTER (EMERGENCY)
Age: 82
Discharge: HOME | End: 2017-12-01
Attending: EMERGENCY MEDICINE
Payer: MEDICARE

## 2017-12-01 VITALS
SYSTOLIC BLOOD PRESSURE: 126 MMHG | OXYGEN SATURATION: 99 % | WEIGHT: 138.8 LBS | RESPIRATION RATE: 18 BRPM | DIASTOLIC BLOOD PRESSURE: 90 MMHG | HEART RATE: 105 BPM | TEMPERATURE: 97.4 F | HEIGHT: 66 IN | BODY MASS INDEX: 22.31 KG/M2

## 2017-12-01 DIAGNOSIS — K40.90 INGUINAL HERNIA OF LEFT SIDE WITHOUT OBSTRUCTION OR GANGRENE: Primary | ICD-10-CM

## 2017-12-01 LAB
ANION GAP SERPL CALC-SCNC: 7 MMOL/L (ref 5–15)
APPEARANCE UR: CLEAR
BACTERIA URNS QL MICRO: NEGATIVE /HPF
BASOPHILS # BLD: 0.1 K/UL (ref 0–0.1)
BASOPHILS NFR BLD: 2 % (ref 0–1)
BILIRUB UR QL: NEGATIVE
BUN SERPL-MCNC: 20 MG/DL (ref 6–20)
BUN/CREAT SERPL: 20 (ref 12–20)
CALCIUM SERPL-MCNC: 9.1 MG/DL (ref 8.5–10.1)
CHLORIDE SERPL-SCNC: 99 MMOL/L (ref 97–108)
CO2 SERPL-SCNC: 29 MMOL/L (ref 21–32)
COLOR UR: ABNORMAL
CREAT SERPL-MCNC: 1 MG/DL (ref 0.7–1.3)
EOSINOPHIL # BLD: 0.6 K/UL (ref 0–0.4)
EOSINOPHIL NFR BLD: 10 % (ref 0–7)
EPITH CASTS URNS QL MICRO: ABNORMAL /LPF
ERYTHROCYTE [DISTWIDTH] IN BLOOD BY AUTOMATED COUNT: 13 % (ref 11.5–14.5)
GLUCOSE SERPL-MCNC: 133 MG/DL (ref 65–100)
GLUCOSE UR STRIP.AUTO-MCNC: NEGATIVE MG/DL
HCT VFR BLD AUTO: 37.8 % (ref 36.6–50.3)
HGB BLD-MCNC: 12.6 G/DL (ref 12.1–17)
HGB UR QL STRIP: NEGATIVE
KETONES UR QL STRIP.AUTO: NEGATIVE MG/DL
LEUKOCYTE ESTERASE UR QL STRIP.AUTO: ABNORMAL
LYMPHOCYTES # BLD: 1.4 K/UL (ref 0.8–3.5)
LYMPHOCYTES NFR BLD: 22 % (ref 12–49)
MCH RBC QN AUTO: 30 PG (ref 26–34)
MCHC RBC AUTO-ENTMCNC: 33.3 G/DL (ref 30–36.5)
MCV RBC AUTO: 90 FL (ref 80–99)
MONOCYTES # BLD: 0.4 K/UL (ref 0–1)
MONOCYTES NFR BLD: 7 % (ref 5–13)
NEUTS SEG # BLD: 3.8 K/UL (ref 1.8–8)
NEUTS SEG NFR BLD: 59 % (ref 32–75)
NITRITE UR QL STRIP.AUTO: NEGATIVE
PH UR STRIP: 7.5 [PH] (ref 5–8)
PLATELET # BLD AUTO: 227 K/UL (ref 150–400)
POTASSIUM SERPL-SCNC: 4.2 MMOL/L (ref 3.5–5.1)
PROT UR STRIP-MCNC: NEGATIVE MG/DL
RBC # BLD AUTO: 4.2 M/UL (ref 4.1–5.7)
RBC #/AREA URNS HPF: ABNORMAL /HPF (ref 0–5)
SODIUM SERPL-SCNC: 135 MMOL/L (ref 136–145)
SP GR UR REFRACTOMETRY: 1.01 (ref 1–1.03)
UA: UC IF INDICATED,UAUC: ABNORMAL
UROBILINOGEN UR QL STRIP.AUTO: 0.2 EU/DL (ref 0.2–1)
WBC # BLD AUTO: 6.3 K/UL (ref 4.1–11.1)
WBC URNS QL MICRO: ABNORMAL /HPF (ref 0–4)

## 2017-12-01 PROCEDURE — 96361 HYDRATE IV INFUSION ADD-ON: CPT

## 2017-12-01 PROCEDURE — 99284 EMERGENCY DEPT VISIT MOD MDM: CPT

## 2017-12-01 PROCEDURE — 74011250636 HC RX REV CODE- 250/636: Performed by: EMERGENCY MEDICINE

## 2017-12-01 PROCEDURE — 85025 COMPLETE CBC W/AUTO DIFF WBC: CPT | Performed by: EMERGENCY MEDICINE

## 2017-12-01 PROCEDURE — 81001 URINALYSIS AUTO W/SCOPE: CPT | Performed by: EMERGENCY MEDICINE

## 2017-12-01 PROCEDURE — 96360 HYDRATION IV INFUSION INIT: CPT

## 2017-12-01 PROCEDURE — 36415 COLL VENOUS BLD VENIPUNCTURE: CPT | Performed by: EMERGENCY MEDICINE

## 2017-12-01 PROCEDURE — 80048 BASIC METABOLIC PNL TOTAL CA: CPT | Performed by: EMERGENCY MEDICINE

## 2017-12-01 RX ORDER — SODIUM CHLORIDE 0.9 % (FLUSH) 0.9 %
5-10 SYRINGE (ML) INJECTION EVERY 8 HOURS
Status: DISCONTINUED | OUTPATIENT
Start: 2017-12-01 | End: 2017-12-01 | Stop reason: HOSPADM

## 2017-12-01 RX ORDER — SODIUM CHLORIDE 0.9 % (FLUSH) 0.9 %
5-10 SYRINGE (ML) INJECTION AS NEEDED
Status: DISCONTINUED | OUTPATIENT
Start: 2017-12-01 | End: 2017-12-01 | Stop reason: HOSPADM

## 2017-12-01 RX ORDER — SODIUM CHLORIDE 9 MG/ML
150 INJECTION, SOLUTION INTRAVENOUS CONTINUOUS
Status: DISCONTINUED | OUTPATIENT
Start: 2017-12-01 | End: 2017-12-01 | Stop reason: HOSPADM

## 2017-12-01 RX ADMIN — SODIUM CHLORIDE 150 ML/HR: 900 INJECTION, SOLUTION INTRAVENOUS at 11:40

## 2017-12-01 NOTE — CONSULTS
Patient seen at request of Dr. Cardell Spurling. Shweta Brown is an 80 y.o. male who presents with a 2 day h/o left scrotal swelling. Mr. Emy Winters is known to have an incarcerated left inguinal hernia. (CT scan abdomen/pelvis without contrast - 7/2014) He reports no nausea or vomitting. No fevers or chills. Denies constipation, melena or diarrhea. Of note, Mr. Emy Winters is s/p [facility] repair with mesh in 9/2013 and reports no right groin pain or swelling. He has otherwise been in his usual state of health. Allergies - Review of patient's allergies indicates no known allergies. Meds - Reviewed. PMH -   Past Medical History:   Diagnosis Date    Anemia     BPH (benign prostatic hypertrophy)     DEMENTIA     Gout     High cholesterol     Hypertension      PSH -   Past Surgical History:   Procedure Laterality Date    HX HERNIA REPAIR  9-10-13    RIHR with mesh-[facility]-Dr. Brian Gongora     Fam Hx - No family history on file. 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 Visit Vitals    /90    Pulse (!) 105    Temp 97.4 °F (36.3 °C)    Resp 18    Ht 5' 6\" (1.676 m)    Wt 138 lb 12.8 oz (63 kg)    SpO2 99%    BMI 22.4 kg/m2     HEENT: Anicteric. Neck: Supple without Lymphadenopathy. Cor: RRR. Lungs: Clear to auscultation bilaterally. Abd: Soft. Non distended. Non tender. No associated rebound or guarding. Incarcerated indirect left inguinal hernia. No apparent recurrent direct or indirect RIH. Ext: No edema. Neuro: Grossly Non focal.   Skin: Well healed incision in right groin. Labs -   Recent Results (from the past 24 hour(s))   CBC WITH AUTOMATED DIFF    Collection Time: 12/01/17 11:17 AM   Result Value Ref Range    WBC 6.3 4.1 - 11.1 K/uL    RBC 4.20 4. 10 - 5.70 M/uL    HGB 12.6 12.1 - 17.0 g/dL    HCT 37.8 36.6 - 50.3 %    MCV 90.0 80.0 - 99.0 FL    MCH 30.0 26.0 - 34.0 PG    MCHC 33.3 30.0 - 36.5 g/dL    RDW 13.0 11.5 - 14.5 %    PLATELET 968 838 - 864 K/uL    NEUTROPHILS 59 32 - 75 %    LYMPHOCYTES 22 12 - 49 %    MONOCYTES 7 5 - 13 %    EOSINOPHILS 10 (H) 0 - 7 %    BASOPHILS 2 (H) 0 - 1 %    ABS. NEUTROPHILS 3.8 1.8 - 8.0 K/UL    ABS. LYMPHOCYTES 1.4 0.8 - 3.5 K/UL    ABS. MONOCYTES 0.4 0.0 - 1.0 K/UL    ABS. EOSINOPHILS 0.6 (H) 0.0 - 0.4 K/UL    ABS. BASOPHILS 0.1 0.0 - 0.1 K/UL   METABOLIC PANEL, BASIC    Collection Time: 12/01/17 11:17 AM   Result Value Ref Range    Sodium 135 (L) 136 - 145 mmol/L    Potassium 4.2 3.5 - 5.1 mmol/L    Chloride 99 97 - 108 mmol/L    CO2 29 21 - 32 mmol/L    Anion gap 7 5 - 15 mmol/L    Glucose 133 (H) 65 - 100 mg/dL    BUN 20 6 - 20 MG/DL    Creatinine 1.00 0.70 - 1.30 MG/DL    BUN/Creatinine ratio 20 12 - 20      GFR est AA >60 >60 ml/min/1.73m2    GFR est non-AA >60 >60 ml/min/1.73m2    Calcium 9.1 8.5 - 10.1 MG/DL   URINALYSIS W/ REFLEX CULTURE    Collection Time: 12/01/17  2:18 PM   Result Value Ref Range    Color YELLOW/STRAW      Appearance CLEAR CLEAR      Specific gravity 1.015 1.003 - 1.030      pH (UA) 7.5 5.0 - 8.0      Protein NEGATIVE  NEG mg/dL    Glucose NEGATIVE  NEG mg/dL    Ketone NEGATIVE  NEG mg/dL    Bilirubin NEGATIVE  NEG      Blood NEGATIVE  NEG      Urobilinogen 0.2 0.2 - 1.0 EU/dL    Nitrites NEGATIVE  NEG      Leukocyte Esterase SMALL (A) NEG      WBC 0-4 0 - 4 /hpf    RBC 0-5 0 - 5 /hpf    Epithelial cells FEW FEW /lpf    Bacteria NEGATIVE  NEG /hpf    UA:UC IF INDICATED CULTURE NOT INDICATED BY UA RESULT CNI       CT Scan from 7/2014 - Reviewed. Imp: Pt. Is a 80 y.o. male with an incarcerated LIH. Plan: 1. At this point in time, do not believe that there is an indication for urgent surgical intervention as there is no evidence of obstruction. 2. Will see as an outpatient to discuss left inguinal hernia repair.    3. Pain medication and anti-emetics as needed. 4. Can discharge from ER. 5. Return to ER if symptoms progress.

## 2017-12-01 NOTE — ED NOTES
Patient given copy of discharge instructions and 0 script(s). Patient given a current medication reconciliation form and verbalized understanding of their medications and importance of discussing medications at follow-up. Patient stable at discharge. Ambulatory out of ED with family member.

## 2017-12-01 NOTE — ED NOTES
Enmanuel Diop contact number - [phone]; visitor reports needing to \"step out\" but will return

## 2017-12-01 NOTE — DISCHARGE INSTRUCTIONS
Hernia: Care Instructions  Your Care Instructions    A hernia develops when tissue bulges through a weak spot in the wall of your belly. The groin area and the navel are common areas for a hernia. A hernia can also develop near the area of a surgery you had before. Pressure from lifting, straining, or coughing can tear the weak area, causing the hernia to bulge and be painful. If you cannot push a hernia back into place, the tissue may become trapped outside the belly wall. If the hernia gets twisted and loses its blood supply, it will swell and die. This is called a strangulated hernia. It usually causes a lot of pain. It needs treatment right away. Some hernias need to be repaired to prevent a strangulated hernia. If your hernia causes symptoms or is large, you may need surgery. Follow-up care is a key part of your treatment and safety. Be sure to make and go to all appointments, and call your doctor if you are having problems. It's also a good idea to know your test results and keep a list of the medicines you take. How can you care for yourself at home? · Take care when lifting heavy objects. · Stay at a healthy weight. · Do not smoke. Smoking can cause coughing, which can cause your hernia to bulge. If you need help quitting, talk to your doctor about stop-smoking programs and medicines. These can increase your chances of quitting for good. · Talk with your doctor before wearing a corset or truss for a hernia. These devices are not recommended for treating hernias and sometimes can do more harm than good. There may be certain situations when your doctor thinks a truss would work, but these are rare. When should you call for help? Call your doctor now or seek immediate medical care if:  ? · You have new or worse belly pain. ? · You are vomiting. ? · You cannot pass stools or gas. ? · You cannot push the hernia back into place with gentle pressure when you are lying down.    ? · The area over the hernia turns red or becomes tender. ? Watch closely for changes in your health, and be sure to contact your doctor if you have any problems. Where can you learn more? Go to http://marta-kuldeep.info/. Enter C129 in the search box to learn more about \"Hernia: Care Instructions. \"  Current as of: May 12, 2017  Content Version: 11.4  © 7161-3614 [employer]. Care instructions adapted under license by [facility] (which disclaims liability or warranty for this information). If you have questions about a medical condition or this instruction, always ask your healthcare professional. [address] any warranty or liability for your use of this information.

## 2017-12-01 NOTE — ED NOTES
Pt c/o swelling on left groin x 2 days,denies difficulty in voiding,hx of hernia. Emergency Department Nursing Plan of Care       The Nursing Plan of Care is developed from the Nursing assessment and Emergency Department Attending provider initial evaluation. The plan of care may be reviewed in the ED Provider note.     The Plan of Care was developed with the following considerations:   Patient / Family readiness to learn indicated by:verbalized understanding  Persons(s) to be included in education: patient  Barriers to Learning/Limitations:No    Signed     Rigo Barrios RN    12/1/2017   11:10 AM

## 2017-12-01 NOTE — ED PROVIDER NOTES
EMERGENCY DEPARTMENT HISTORY AND PHYSICAL EXAM      Date: 12/1/2017  Patient Name: Olivia Lunsford    History of Presenting Illness     Chief Complaint   Patient presents with    Swelling       History Provided By: Patient and Caregiver    HPI: Olivia Lunsford, 80 y.o. male with PMHx significant for inguinal hernia, presents ambulatory to the ED with cc of left inguinal swelling x 2 days. Pt explains that he has a Hx of a left inguinal hernia which has been unable to be repaired in the past. Pt reports that he is able to urinate without difficulty, and his last normal BM was yesterday evening. Pt specifically denies N/V.    PCP: Rama Wilkins NP    There are no other complaints, changes, or physical findings at this time. Current Facility-Administered Medications   Medication Dose Route Frequency Provider Last Rate Last Dose    sodium chloride (NS) flush 5-10 mL  5-10 mL IntraVENous Q8H Diane Smith MD        sodium chloride (NS) flush 5-10 mL  5-10 mL IntraVENous PRN Diane Smith MD        0.9% sodium chloride infusion  150 mL/hr IntraVENous CONTINUOUS Diane Smith  mL/hr at 12/01/17 1140 150 mL/hr at 12/01/17 1140     Current Outpatient Prescriptions   Medication Sig Dispense Refill    tamsulosin (FLOMAX) 0.4 mg capsule TAKE ONE CAPSULE BY MOUTH EVERY DAY 90 Cap 1    ferrous sulfate 325 mg (65 mg iron) tablet Take 1 Tab by mouth two (2) times daily (with meals). On an empty stomach with Vitamin C (like OJ) 60 Tab 11    pravastatin (PRAVACHOL) 40 mg tablet TAKE 1 TABLET BY MOUTH EVERY NIGHT 90 Tab 11    lisinopril (PRINIVIL, ZESTRIL) 10 mg tablet Take 1 Tab by mouth daily. 90 Tab 3    potassium chloride SR (K-TAB) 10 mEq tablet Take 2 Tabs by mouth two (2) times a day. 360 Tab 3    albuterol (PROVENTIL VENTOLIN) 2.5 mg /3 mL (0.083 %) nebulizer solution 3 mL by Nebulization route every four (4) hours as needed for Wheezing or Shortness of Breath.  4 Package 11    bisacodyl (DULCOLAX, BISACODYL,) 10 mg suppository Insert 10 mg into rectum daily. 12 Suppository 0    aspirin 81 mg tablet Take 81 mg by mouth. Past History     Past Medical History:  Past Medical History:   Diagnosis Date    Anemia     BPH (benign prostatic hypertrophy)     DEMENTIA     Gout     High cholesterol     Hypertension        Past Surgical History:  Past Surgical History:   Procedure Laterality Date    HX HERNIA REPAIR  9-10-13    [facility]-Dr. Emily Parks       Family History:  No family history on file. 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 HENT: Negative for sore throat and trouble swallowing. Eyes: Negative for photophobia and redness. Respiratory: Negative for cough and shortness of breath. Cardiovascular: Negative for chest pain and leg swelling. Gastrointestinal: Negative for abdominal pain, constipation, diarrhea, nausea and vomiting. Endocrine: Negative for polydipsia and polyuria. Genitourinary: Negative for dysuria and hematuria.        + inguinal swelling   Musculoskeletal: Negative for back pain and joint swelling. Skin: Negative for rash. Neurological: Negative for dizziness, syncope, weakness and headaches. Psychiatric/Behavioral: Negative for suicidal ideas. All other systems reviewed and are negative. Physical Exam   Physical Exam   Constitutional: He is oriented to person, place, and time. He appears well-developed and well-nourished. No distress. HENT:   Head: Normocephalic and atraumatic. Mouth/Throat: Oropharynx is clear and moist. No oropharyngeal exudate. Eyes: Conjunctivae and EOM are normal. Pupils are equal, round, and reactive to light. Left eye exhibits no discharge. Neck: Normal range of motion. Neck supple. No JVD present. Cardiovascular: Normal rate, regular rhythm, normal heart sounds and intact distal pulses. Pulmonary/Chest: Effort normal and breath sounds normal. No respiratory distress. He has no wheezes. Abdominal: Soft. Bowel sounds are normal. He exhibits no distension. There is no tenderness. There is no rebound and no guarding. Left inguinal hernia that is bulging and unable to be reduced   Musculoskeletal: Normal range of motion. He exhibits no edema or tenderness. Lymphadenopathy:     He has no cervical adenopathy. Neurological: He is alert and oriented to person, place, and time. He has normal reflexes. No cranial nerve deficit. Skin: Skin is warm and dry. No rash noted. Psychiatric: He has a normal mood and affect. His behavior is normal.   Nursing note and vitals reviewed. Diagnostic Study Results     Labs -     Recent Results (from the past 12 hour(s))   CBC WITH AUTOMATED DIFF    Collection Time: 12/01/17 11:17 AM   Result Value Ref Range    WBC 6.3 4.1 - 11.1 K/uL    RBC 4.20 4. 10 - 5.70 M/uL    HGB 12.6 12.1 - 17.0 g/dL    HCT 37.8 36.6 - 50.3 %    MCV 90.0 80.0 - 99.0 FL    MCH 30.0 26.0 - 34.0 PG    MCHC 33.3 30.0 - 36.5 g/dL    RDW 13.0 11.5 - 14.5 %    PLATELET 834 313 - 511 K/uL    NEUTROPHILS 59 32 - 75 %    LYMPHOCYTES 22 12 - 49 %    MONOCYTES 7 5 - 13 %    EOSINOPHILS 10 (H) 0 - 7 %    BASOPHILS 2 (H) 0 - 1 %    ABS. NEUTROPHILS 3.8 1.8 - 8.0 K/UL    ABS. LYMPHOCYTES 1.4 0.8 - 3.5 K/UL    ABS. MONOCYTES 0.4 0.0 - 1.0 K/UL    ABS. EOSINOPHILS 0.6 (H) 0.0 - 0.4 K/UL    ABS.  BASOPHILS 0.1 0.0 - 0.1 K/UL   METABOLIC PANEL, BASIC    Collection Time: 12/01/17 11:17 AM   Result Value Ref Range    Sodium 135 (L) 136 - 145 mmol/L    Potassium 4.2 3.5 - 5.1 mmol/L    Chloride 99 97 - 108 mmol/L    CO2 29 21 - 32 mmol/L    Anion gap 7 5 - 15 mmol/L    Glucose 133 (H) 65 - 100 mg/dL    BUN 20 6 - 20 MG/DL    Creatinine 1.00 0.70 - 1.30 MG/DL    BUN/Creatinine ratio 20 12 - 20      GFR est AA >60 >60 ml/min/1.73m2    GFR est non-AA >60 >60 ml/min/1.73m2    Calcium 9.1 8.5 - 10.1 MG/DL   URINALYSIS W/ REFLEX CULTURE    Collection Time: 12/01/17  2:18 PM   Result Value Ref Range    Color YELLOW/STRAW      Appearance CLEAR CLEAR      Specific gravity 1.015 1.003 - 1.030      pH (UA) 7.5 5.0 - 8.0      Protein NEGATIVE  NEG mg/dL    Glucose NEGATIVE  NEG mg/dL    Ketone NEGATIVE  NEG mg/dL    Bilirubin NEGATIVE  NEG      Blood NEGATIVE  NEG      Urobilinogen 0.2 0.2 - 1.0 EU/dL    Nitrites NEGATIVE  NEG      Leukocyte Esterase SMALL (A) NEG      WBC 0-4 0 - 4 /hpf    RBC 0-5 0 - 5 /hpf    Epithelial cells FEW FEW /lpf    Bacteria NEGATIVE  NEG /hpf    UA:UC IF INDICATED CULTURE NOT INDICATED BY UA RESULT CNI       Medical Decision Making   I am the first provider for this patient. I reviewed the vital signs, available nursing notes, past medical history, past surgical history, family history and social history. Vital Signs-Reviewed the patient's vital signs. Patient Vitals for the past 12 hrs:   Temp Pulse Resp BP SpO2   12/01/17 1500 - - - 126/90 99 %   12/01/17 1418 - - - 134/78 -   12/01/17 1102 97.4 °F (36.3 °C) (!) 105 18 135/80 96 %       Pulse Oximetry Analysis - 97% on RA    Records Reviewed: Nursing Notes and Old Medical Records    Provider Notes (Medical Decision Making):   DDx: incarcerated left inguinal hernia, SBO, ileus    ED Course:   Initial assessment performed. The patients presenting problems have been discussed, and they are in agreement with the care plan formulated and outlined with them. I have encouraged them to ask questions as they arise throughout their visit. CONSULT NOTE:  Lino De La Vega MD spoke with Cristian Marie MD  Specialty: General Surgery  Discussed patient's hx, disposition, and available diagnostic and imaging results. Reviewed care plans. Consultant agrees with plans as outlined.  Dr. Adri Miller agrees to come and evaluate the pt. Written by BRITNEY Verdugo, as dictated by Sherren Dyke, MD.    PROGRESS NOTE:  2:40 PM  Still waiting for Dr. Jaz Jarvis to evaluate the pt. Written by BRITNEY Verdugo, as dictated by Sherren Dyke, MD.  PROGRESS NOTE:  4:12 PM  Dr. Jaz Jarvis is evaluating the pt. Written by BRITNEY Verdugo, as dictated by Sherren Dyke, MD.  PROGRESS NOTE:  4:18 PM  Dr. Jaz Jarvis reports that the pt is stable for discharge. Written by BRITNEY Verdugo, as dictated by Sherren Dyke, MD.    Disposition:  4:19 PM  The patient has been re-evaluated and is ready for discharge. Reviewed available results with patient. Counseled patient on diagnosis and care plan. Patient has expressed understanding, and all questions have been answered. Patient agrees with plan and agrees to follow up as recommended, or return to the ED if their symptoms worsen. Discharge instructions have been provided and explained to the patient, along with reasons to return to the ED. PLAN:  1. Follow-up Information     Follow up With Details Comments Placido Molina MD In 1 week  [address]  [phone]          Return to ED if worse     Diagnosis     Clinical Impression:   1. Inguinal hernia of left side without obstruction or gangrene        Attestations: This note is prepared by Keslea Hansen, acting as Scribe for Sherren Dyke, MD.    Sherren Dyke, MD: The scribe's documentation has been prepared under my direction and personally reviewed by me in its entirety. I confirm that the note above accurately reflects all work, treatment, procedures, and medical decision making performed by me.

## 2018-02-28 ENCOUNTER — TELEPHONE (OUTPATIENT)
Dept: INTERNAL MEDICINE CLINIC | Age: 83
End: 2018-02-28

## 2018-02-28 NOTE — TELEPHONE ENCOUNTER
Cristela with [facility] will like to confirm a \"Certificate of Medical Neccesity for Nutritional Supplies\" faxed on 2/27/18 was received.  Best Contact [phone].

## 2018-03-01 NOTE — TELEPHONE ENCOUNTER
Home care delivered was called 3/1/18 and I spoke with Marina to let her know pt needs an office visit. She states they will reach out to the patient and his family members and let them know he needs an appointment.

## 2018-03-12 NOTE — ED TRIAGE NOTES
Pt presents via EMS from home with primary c/o constipation. Patient confused to name, place and time on EMS arrival.  No CBG on EMS arrival.    Patient does not follow commands and does not answer questions appropriately. Dressing: bandage

## 2018-03-23 ENCOUNTER — OFFICE VISIT (OUTPATIENT)
Dept: INTERNAL MEDICINE CLINIC | Age: 83
End: 2018-03-23

## 2018-03-23 VITALS
DIASTOLIC BLOOD PRESSURE: 69 MMHG | HEIGHT: 66 IN | RESPIRATION RATE: 18 BRPM | TEMPERATURE: 97.6 F | SYSTOLIC BLOOD PRESSURE: 101 MMHG | BODY MASS INDEX: 19.77 KG/M2 | OXYGEN SATURATION: 94 % | HEART RATE: 109 BPM | WEIGHT: 123 LBS

## 2018-03-23 DIAGNOSIS — Z23 ENCOUNTER FOR IMMUNIZATION: ICD-10-CM

## 2018-03-23 DIAGNOSIS — D50.8 IRON DEFICIENCY ANEMIA SECONDARY TO INADEQUATE DIETARY IRON INTAKE: ICD-10-CM

## 2018-03-23 DIAGNOSIS — R00.0 TACHYCARDIA: ICD-10-CM

## 2018-03-23 DIAGNOSIS — I10 ESSENTIAL HYPERTENSION: ICD-10-CM

## 2018-03-23 DIAGNOSIS — E44.1 MILD PROTEIN-CALORIE MALNUTRITION (HCC): Primary | ICD-10-CM

## 2018-03-23 NOTE — PROGRESS NOTES
Raina Alonso is a 80 y.o. male and presents with Hypertension (follow up) and Anorexia (pt states he hasnt had an appetite)    Subjective:  Family concerned since pt not really eating much. He will drink his BOOST several times a day, but not much water or food. Told he needed a visit to have the order for the supplements sent. BPH Review:  He has no burning on urination,dysuria or frequency or dribbling reported. Awakens 2-3 times. Taking meds as prescribed. Hypertension Review:  The patient has hypertension  Diet and Lifestyle: generally does try to follow a  low sodium diet, exercises rarely  Home BP Monitoring: is not measured at home. Pertinent ROS: taking medications as instructed, no medication side effects noted. No TIA's, chest pain on exertion, dyspnea on exertion, or swelling of ankles.    BP Readings from Last 3 Encounters:   03/23/18 101/69   12/01/17 126/90   10/11/17 106/67     Review of Systems  Constitutional: negative for fevers, chills, anorexia and weight loss  Respiratory:  negative for cough, hemoptysis, dyspnea, and wheezing  CV:   negative for chest pain, palpitations, and lower extremity edema  GI:   negative for nausea, vomiting, diarrhea, abdominal pain, and melena  Endo:               negative for polyuria,polydipsia,polyphagia, and heat intolerance  Genitourinary: negative for frequency, urgency, dysuria, retention, and hematuria  Integument:  negative for rash, ulcerations, and pruritus  Hematologic:  negative for easy bruising and bleeding  Musculoskel: negative for arthralgias, muscle weakness,and joint pain/swelling  Neurological:  negative for headaches, dizziness, vertigo,and gait problems  Behavl/Psych: negative for feelings of anxiety, depression, suicide, and mood changes    Past Medical History:   Diagnosis Date    Anemia     BPH (benign prostatic hypertrophy)     DEMENTIA     Gout     High cholesterol     Hypertension      Past Surgical History:   Procedure Laterality Date    HX HERNIA REPAIR  9-10-13    [facility] with [facility]-Dr. 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 No pertinent family history. Current Outpatient Prescriptions   Medication Sig Dispense Refill    food supplemt, lactose-reduced (BOOST HIGH PROTEIN) 0.06 gram- 1 kcal/mL liqd Drink 2-3 containers DAILY. 90 Container 11    pneumococcal 13 lary conj dip (PREVNAR-13) 0.5 mL syrg injection 0.5 mL by IntraMUSCular route once for 1 dose. 0.5 mL 0    tamsulosin (FLOMAX) 0.4 mg capsule TAKE ONE CAPSULE BY MOUTH EVERY DAY 90 Cap 1    ferrous sulfate 325 mg (65 mg iron) tablet Take 1 Tab by mouth two (2) times daily (with meals). On an empty stomach with Vitamin C (like OJ) (Patient taking differently: Take 325 mg by mouth Daily (before breakfast). On an empty stomach with Vitamin C (like OJ)) 60 Tab 11    pravastatin (PRAVACHOL) 40 mg tablet TAKE 1 TABLET BY MOUTH EVERY NIGHT 90 Tab 11    lisinopril (PRINIVIL, ZESTRIL) 10 mg tablet Take 1 Tab by mouth daily. 90 Tab 3    potassium chloride SR (K-TAB) 10 mEq tablet Take 2 Tabs by mouth two (2) times a day. (Patient taking differently: Take 20 mEq by mouth daily.) 360 Tab 3    albuterol (PROVENTIL VENTOLIN) 2.5 mg /3 mL (0.083 %) nebulizer solution 3 mL by Nebulization route every four (4) hours as needed for Wheezing or Shortness of Breath. 4 Package 11    aspirin 81 mg tablet Take 81 mg by mouth.        No Known Allergies    Objective:  Visit Vitals    /69 (BP 1 Location: Left arm, BP Patient Position: Sitting)    Pulse (!) 109    Temp 97.6 °F (36.4 °C) (Oral)    Resp 18    Ht 5' 6\" (1.676 m)    Wt 123 lb (55.8 kg)    SpO2 94%    BMI 19.85 kg/m2     Wt Readings from Last 3 Encounters:   03/23/18 123 lb (55.8 kg)   12/01/17 138 lb 12.8 oz (63 kg)   10/11/17 126 lb 3.2 oz (57.2 kg)     Physical Exam:   General appearance - alert, well appearing, and in no distress. Mental status - A/O x 2 (person and place), normal mood and affect. Responds appropriately. Neck -Supple ,normal CSP. FROM, non-tender. No significant adenopathy/thyromegaly. No JVD. Chest - CTA. Symmetric chest rise. No wheezing, rales or rhonchi. Heart - Tachycardic, regular rhythm. Normal S1, S2. No MGR or clicks. Abdomen - Soft, concave, non-distended. Normoactive BS in all quadrants. NT, no mass or HSM. Ext- Radial, DP pulses, 2+ bilaterally. No pedal edema, clubbing, or cyanosis. Skin-Warm and dry. No hyperpigmentation, ulcerations, or suspicious lesions. Neuro - Normal speech, no focal findings or movement disorder. Normal strength, gait, and muscle tone. Assessment/Plan:  New order for BOOST entered. Pt advised to [address], labs ordered since tachycardic today. May change or stop BP med next OV. Medication Side Effects and Warnings were discussed with patient: yes   Patient Labs were reviewed: yes  Patient Past Records were reviewed: yes    See below for other orders   Follow-up Disposition:  Return in about 3 months (around 6/23/2018) for wt check, tachycarida. HTN.      ICD-10-CM ICD-9-CM    1. Mild protein-calorie malnutrition (HCC) E44.1 263.1 food supplemt, lactose-reduced (BOOST HIGH PROTEIN) 0.06 gram- 1 kcal/mL liqd   2. Essential hypertension B45 701.1 METABOLIC PANEL, BASIC   3. Iron deficiency anemia secondary to inadequate dietary iron intake D50.8 280.1 HGB & HCT   4. Tachycardia R00.0 785.0 TSH 3RD GENERATION   5.  Encounter for immunization Z23 V03.89 pneumococcal 13 lary conj dip (PREVNAR-13) 0.5 mL syrg injection     Orders Placed This Encounter    HGB & HCT    METABOLIC PANEL, BASIC    TSH 3RD GENERATION    food supplemt, lactose-reduced (BOOST HIGH PROTEIN) 0.06 gram- 1 kcal/mL liqd     Sig: Drink 2-3 containers DAILY. Dispense:  90 Container     Refill:  11    pneumococcal 13 lary conj dip (PREVNAR-13) 0.5 mL syrg injection     Si.5 mL by IntraMUSCular route once for 1 dose. Dispense:  0.5 mL     Refill:  0       Emily Grammes expressed understanding of plan. An After Visit Summary was offered/printed and given to the patient.

## 2018-03-23 NOTE — MR AVS SNAPSHOT
Lupe Latif 
 
 
 [address] [facility] [address] [address] 
[phone] Patient: Alejo Garcia MRN: [id number] VGF:60/3/[id number] Visit Information Date & Time Provider Department Dept. Phone Encounter #  
 3/23/2018  9:20 AM Margarita Diaz NP [address] [facility] [phone] [id number] Follow-up Instructions Return in about 3 months (around 6/23/2018) for wt check, tachycarida. HTN. Upcoming Health Maintenance Date Due Pneumococcal 65+ Low/Medium Risk (2 of 2 - PCV13) 6/21/2018* MEDICARE YEARLY EXAM 10/12/2018 GLAUCOMA SCREENING Q2Y 1/23/2020 DTaP/Tdap/Td series (2 - Td) 10/10/2026 *Topic was postponed. The date shown is not the original due date. Allergies as of 3/23/2018  Review Complete On: 3/23/2018 By: Dixie Cabral. Bosher, LPN No Known Allergies Current Immunizations  Reviewed on 10/11/2017 Name Date Influenza High Dose Vaccine PF 10/11/2017, 10/10/2016, 11/5/2014 Pneumococcal Polysaccharide (PPSV-23) 10/10/2016 Tdap 10/10/2016 Not reviewed this visit You Were Diagnosed With   
  
 Codes Comments Mild protein-calorie malnutrition ([facility] [address]    -  Primary ICD-10-CM: E44.1 ICD-9-CM: 263.1 Essential hypertension     ICD-10-CM: I10 
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  
 19.85 kg/m 2 1.61 m 2  
  
  
 Preferred Pharmacy Pharmacy Name Phone [facility] [address]  [place] [phone] Your Updated Medication List  
  
   
This list is accurate as of 3/23/18 10:51 AM.  Always use your most recent med list.  
  
  
  
  
 albuterol 2.5 mg /3 mL (0.083 %) nebulizer solution Commonly known as:  PROVENTIL VENTOLIN  
3 mL by Nebulization route every four (4) hours as needed for Wheezing or Shortness of Breath. aspirin 81 mg tablet Take 81 mg by mouth. ferrous sulfate 325 mg (65 mg iron) tablet Take 1 Tab by mouth two (2) times daily (with meals). On an empty stomach with Vitamin C (like OJ)  
  
 food supplemt, lactose-reduced 0.06 gram- 1 kcal/mL Liqd Commonly known as:  BOOST HIGH PROTEIN Drink 2-3 containers DAILY. lisinopril 10 mg tablet Commonly known as:  Flores Jennifer Take 1 Tab by mouth daily. pneumococcal 13 lary conj dip 0.5 mL Syrg injection Commonly known as:  PREVNAR-13  
0.5 mL by IntraMUSCular route once for 1 dose. potassium chloride SR 10 mEq tablet Commonly known as:  K-TAB Take 2 Tabs by mouth two (2) times a day. pravastatin 40 mg tablet Commonly known as:  PRAVACHOL  
TAKE 1 TABLET BY MOUTH EVERY NIGHT  
  
 tamsulosin 0.4 mg capsule Commonly known as:  FLOMAX TAKE ONE CAPSULE BY MOUTH EVERY DAY Prescriptions Sent to Pharmacy Refills  
 food supplemt, lactose-reduced (BOOST HIGH PROTEIN) 0.06 gram- 1 kcal/mL liqd 11 Sig: Drink 2-3 containers DAILY. Class: Normal  
 Pharmacy: [facility] [address] [place] [address] AT [address] [place] [address] Ph #: [phone]  
 pneumococcal 13 lary conj dip (PREVNAR-13) 0.5 mL syrg injection 0 Si.5 mL by IntraMUSCular route once for 1 dose.   
 Class: Normal  
 Pharmacy: [facility] [address] Anuj HÉCTOR AT [address] [place] [address] #: [phone] Route: IntraMUSCular We Performed the Following HGB & HCT [52515 CPT(R)] METABOLIC PANEL, BASIC [42200 CPT(R)] TSH 3RD GENERATION [78311 CPT(R)] Follow-up Instructions Return in about 3 months (around 6/23/2018) for wt check, tachycarida. HTN. Patient Instructions Be sure to stay hydrated, aiming to drink at least 8 glasses or 4 BOTTLES of water daily. Eat at least THREE TIMES a day. You may try ENSURE/BOOST/GLUCERNA, up to three times a day to supplement meals. You may also try the PROTEIN SHAKES with snacks. Need to eat at least 8835-7781 CALORIES a day. Iron Deficiency Anemia: Care Instructions Your Care Instructions Anemia means that you do not have enough red blood cells. Red blood cells carry oxygen around your body. When you have anemia, it can make you pale, weak, and tired. Many things can cause anemia. The most common cause is loss of blood. This can happen if you have heavy menstrual periods. It can also happen if you have bleeding in your stomach or bowel. You can also get anemia if you don't have enough iron in your diet or if it's hard for your body to absorb iron. In some cases, pregnancy causes anemia. That's because a pregnant woman needs more iron. Your doctor may do more tests to find the cause of your anemia. If a disease or other health problem is causing it, your doctor will treat that problem. It's important to follow up with your doctor to make sure that your iron level returns to normal. 
Follow-up care is a key part of your treatment and safety. Be sure to make and go to all appointments, and call your doctor if you are having problems. It's also a good idea to know your test results and keep a list of the medicines you take. How can you care for yourself at home? · If your doctor recommended iron pills, take them as directed. ¨ Try to take the pills on an empty stomach. You can do this about 1 hour before or 2 hours after meals. But you may need to take iron with food to avoid an upset stomach. ¨ Do not take antacids or drink milk or anything with caffeine within 2 hours of when you take your iron. They can keep your body from absorbing the iron well. ¨ Vitamin C helps your body absorb iron. You may want to take iron pills with a glass of orange juice or some other food high in vitamin C. 
¨ Iron pills may cause stomach problems. These include heartburn, nausea, diarrhea, constipation, and cramps. It can help to drink plenty of fluids and include fruits, vegetables, and fiber in your diet. ¨ It's normal for iron pills to make your stool a greenish or grayish black. But internal bleeding can also cause dark stool. So it's important to tell your doctor about any color changes. ¨ Call your doctor if you think you are having a problem with your iron pills. Even after you start to feel better, it will take several months for your body to build up its supply of iron. ¨ If you miss a pill, don't take a double dose. ¨ Keep iron pills out of the reach of small children. Too much iron can be very dangerous. · Eat foods with a lot of iron. These include red meat, shellfish, poultry, and eggs. They also include beans, raisins, whole-grain bread, and leafy green vegetables. · Steam your vegetables. This is the best way to prepare them if you want to get as much iron as possible. · Be safe with medicines. Do not take nonsteroidal anti-inflammatory pain relievers unless your doctor tells you to. These include aspirin, naproxen (Aleve), and ibuprofen (Advil, Motrin). · Liquid iron can stain your teeth. But you can mix it with water or juice and drink it with a straw. Then it won't get on your teeth. When should you call for help? Call 911 anytime you think you may need emergency care. For example, call if: ? · You passed out (lost consciousness). ?Call your doctor now or seek immediate medical care if: 
? · You are short of breath. ? · You are dizzy or light-headed, or you feel like you may faint. ? · You have new or worse bleeding. ? Watch closely for changes in your health, and be sure to contact your doctor if: 
? · You feel weaker or more tired than usual.  
? · You do not get better as expected. Where can you learn more? Go to http://marta-kuldeep.info/. Enter F190 in the search box to learn more about \"Iron Deficiency Anemia: Care Instructions. \" Current as of: October 13, 2016 Content Version: 11.4 © 9398-3189 [employer]. Care instructions adapted under license by [employer] (which disclaims liability or warranty for this information). If you have questions about a medical condition or this instruction, always ask your healthcare professional. [place] [address] any warranty or liability for your use of this information. Iron-Rich Diet: Care Instructions Your Care Instructions Your body needs iron to make hemoglobin. Hemoglobin is a substance in red blood cells that carries oxygen from the lungs to cells all through your body. If you do not get enough iron, your body makes fewer and smaller red blood cells. As a result, your body's cells may not get enough oxygen. Adult men need 8 milligrams of iron a day; adult women need 18 milligrams of iron a day. After menopause, women need 8 milligrams of iron a day. A pregnant woman needs 27 milligrams of iron a day. Infants and young children have higher iron needs relative to their size than other age groups. People who have lost blood because of ulcers or heavy menstrual periods may become very low in iron and may develop anemia. Most people can get the iron their bodies need by eating enough of certain iron-rich foods.  Your doctor may recommend that you take an iron supplement along with eating an iron-rich diet. Follow-up care is a key part of your treatment and safety. Be sure to make and go to all appointments, and call your doctor if you are having problems. It's also a good idea to know your test results and keep a list of the medicines you take. How can you care for yourself at home? · Make iron-rich foods a part of your daily diet. Iron-rich foods include: ¨ All meats, such as chicken, beef, lamb, pork, fish, and shellfish. Liver is especially high in iron. ¨ Leafy green vegetables. ¨ Raisins, peas, beans, lentils, barley, and eggs. ¨ Iron-fortified breakfast cereals. · Eat foods with vitamin C along with iron-rich foods. Vitamin C helps you absorb more iron from food. Drink a glass of orange juice or another citrus juice with your food. · Eat meat and vegetables or grains together. The iron in meat helps your body absorb the iron in other foods. Where can you learn more? Go to http://marta-kuldeep.info/. Enter [id number] [id number] in the search box to learn more about \"Iron-Rich Diet: Care Instructions. \" Current as of: May 12, 2017 Content Version: 11.4 © 7560-0576 Healthwise, [employer]. Care instructions adapted under license by [employer] (which disclaims liability or warranty for this information). If you have questions about a medical condition or this instruction, always ask your healthcare professional. [place] [address] any warranty or liability for your use of this information. Introducing [facility]! [employer] introduces [employer] patient portal. Now you can access parts of your medical record, email your doctor's office, and request medication refills online. 1. In your internet browser, go to [email or web address] 2. Click on the First Time User? Click Here link in the Sign In box. You will see the New Member Sign Up page. 3. Enter your [employer] Access Code exactly as it appears below. You will not need to use this code after youve completed the sign-up process. If you do not sign up before the expiration date, you must request a new code. · [employer] Access Code: [email or web address] Expires: 5/31/2018  4:53 PM 
 
4. Enter the last four digits of your Social Security Number (xxxx) and Date of Birth (mm/dd/yyyy) as indicated and click Submit. You will be taken to the next sign-up page. 5. Create a [employer] ID. This will be your [employer] login ID and cannot be changed, so think of one that is secure and easy to remember. 6. Create a [employer] password. You can change your password at any time. 7. Enter your Password Reset Question and Answer. This can be used at a later time if you forget your password. 8. Enter your e-mail address. You will receive e-mail notification when new information is available in [address]. 9. Click Sign Up. You can now view and download portions of your medical record. 10. Click the Download Summary menu link to download a portable copy of your medical information. If you have questions, please visit the Frequently Asked Questions section of the [employer] website. Remember, [employer] is NOT to be used for urgent needs. For medical emergencies, dial 911. Now available from your iPhone and Android! Please provide this summary of care documentation to your next provider. Your primary care clinician is listed as JOE Rodriguez. If you have any questions after today's visit, please call [phone].

## 2018-03-23 NOTE — PATIENT INSTRUCTIONS
Be sure to stay hydrated, aiming to drink at least 8 glasses or 4 BOTTLES of water daily. Eat at least THREE TIMES a day. You may try ENSURE/BOOST/GLUCERNA, up to three times a day to supplement meals. You may also try the PROTEIN SHAKES with snacks. Need to eat at least 8343-1705 CALORIES a day. Iron Deficiency Anemia: Care Instructions  Your Care Instructions    Anemia means that you do not have enough red blood cells. Red blood cells carry oxygen around your body. When you have anemia, it can make you pale, weak, and tired. Many things can cause anemia. The most common cause is loss of blood. This can happen if you have heavy menstrual periods. It can also happen if you have bleeding in your stomach or bowel. You can also get anemia if you don't have enough iron in your diet or if it's hard for your body to absorb iron. In some cases, pregnancy causes anemia. That's because a pregnant woman needs more iron. Your doctor may do more tests to find the cause of your anemia. If a disease or other health problem is causing it, your doctor will treat that problem. It's important to follow up with your doctor to make sure that your iron level returns to normal.  Follow-up care is a key part of your treatment and safety. Be sure to make and go to all appointments, and call your doctor if you are having problems. It's also a good idea to know your test results and keep a list of the medicines you take. How can you care for yourself at home? · If your doctor recommended iron pills, take them as directed. ¨ Try to take the pills on an empty stomach. You can do this about 1 hour before or 2 hours after meals. But you may need to take iron with food to avoid an upset stomach. ¨ Do not take antacids or drink milk or anything with caffeine within 2 hours of when you take your iron. They can keep your body from absorbing the iron well. ¨ Vitamin C helps your body absorb iron.  You may want to take iron pills with a glass of orange juice or some other food high in vitamin C.  ¨ Iron pills may cause stomach problems. These include heartburn, nausea, diarrhea, constipation, and cramps. It can help to drink plenty of fluids and include fruits, vegetables, and fiber in your diet. ¨ It's normal for iron pills to make your stool a greenish or grayish black. But internal bleeding can also cause dark stool. So it's important to tell your doctor about any color changes. ¨ Call your doctor if you think you are having a problem with your iron pills. Even after you start to feel better, it will take several months for your body to build up its supply of iron. ¨ If you miss a pill, don't take a double dose. ¨ Keep iron pills out of the reach of small children. Too much iron can be very dangerous. · Eat foods with a lot of iron. These include red meat, shellfish, poultry, and eggs. They also include beans, raisins, whole-grain bread, and leafy green vegetables. · Steam your vegetables. This is the best way to prepare them if you want to get as much iron as possible. · Be safe with medicines. Do not take nonsteroidal anti-inflammatory pain relievers unless your doctor tells you to. These include aspirin, naproxen (Aleve), and ibuprofen (Advil, Motrin). · Liquid iron can stain your teeth. But you can mix it with water or juice and drink it with a straw. Then it won't get on your teeth. When should you call for help? Call 911 anytime you think you may need emergency care. For example, call if:  ? · You passed out (lost consciousness). ?Call your doctor now or seek immediate medical care if:  ? · You are short of breath. ? · You are dizzy or light-headed, or you feel like you may faint. ? · You have new or worse bleeding. ? Watch closely for changes in your health, and be sure to contact your doctor if:  ? · You feel weaker or more tired than usual.   ? · You do not get better as expected. Where can you learn more?   Go to http://marta-kuldeep.info/. Enter D694 in the search box to learn more about \"Iron Deficiency Anemia: Care Instructions. \"  Current as of: October 13, 2016  Content Version: 11.4  © 2007-1543 [employer]. Care instructions adapted under license by [employer] (which disclaims liability or warranty for this information). If you have questions about a medical condition or this instruction, always ask your healthcare professional. [address] any warranty or liability for your use of this information. Iron-Rich Diet: Care Instructions  Your Care Instructions    Your body needs iron to make hemoglobin. Hemoglobin is a substance in red blood cells that carries oxygen from the lungs to cells all through your body. If you do not get enough iron, your body makes fewer and smaller red blood cells. As a result, your body's cells may not get enough oxygen. Adult men need 8 milligrams of iron a day; adult women need 18 milligrams of iron a day. After menopause, women need 8 milligrams of iron a day. A pregnant woman needs 27 milligrams of iron a day. Infants and young children have higher iron needs relative to their size than other age groups. People who have lost blood because of ulcers or heavy menstrual periods may become very low in iron and may develop anemia. Most people can get the iron their bodies need by eating enough of certain iron-rich foods. Your doctor may recommend that you take an iron supplement along with eating an iron-rich diet. Follow-up care is a key part of your treatment and safety. Be sure to make and go to all appointments, and call your doctor if you are having problems. It's also a good idea to know your test results and keep a list of the medicines you take. How can you care for yourself at home? · Make iron-rich foods a part of your daily diet.  Iron-rich foods include:  ¨ All meats, such as chicken, beef, lamb, pork, fish, and shellfish. Liver is especially high in iron. ¨ Leafy green vegetables. ¨ Raisins, peas, beans, lentils, barley, and eggs. ¨ Iron-fortified breakfast cereals. · Eat foods with vitamin C along with iron-rich foods. Vitamin C helps you absorb more iron from food. Drink a glass of orange juice or another citrus juice with your food. · Eat meat and vegetables or grains together. The iron in meat helps your body absorb the iron in other foods. Where can you learn more? Go to http://marta-kuldeep.info/. Enter [id number] in the search box to learn more about \"Iron-Rich Diet: Care Instructions. \"  Current as of: May 12, 2017  Content Version: 11.4  © 1468-0509 Healthwise, [employer]. Care instructions adapted under license by [employer] (which disclaims liability or warranty for this information). If you have questions about a medical condition or this instruction, always ask your healthcare professional. [place] [address] any warranty or liability for your use of this information.

## 2018-03-23 NOTE — PROGRESS NOTES
Pt is here for   Chief Complaint   Patient presents with    Hypertension     follow up    Anorexia     pt states he hasnt had an appetite     Pt denies pain at this time. 1. Have you been to the ER, urgent care clinic since your last visit? Hospitalized since your last visit? No    2. Have you seen or consulted any other health care providers outside of the [address] [place] [address] since your last visit? Include any pap smears or colon screening.  No

## 2018-03-24 LAB
BUN SERPL-MCNC: 20 MG/DL (ref 8–27)
BUN/CREAT SERPL: 22 (ref 10–24)
CALCIUM SERPL-MCNC: 9.6 MG/DL (ref 8.6–10.2)
CHLORIDE SERPL-SCNC: 95 MMOL/L (ref 96–106)
CO2 SERPL-SCNC: 25 MMOL/L (ref 18–29)
CREAT SERPL-MCNC: 0.92 MG/DL (ref 0.76–1.27)
GFR SERPLBLD CREATININE-BSD FMLA CKD-EPI: 75 ML/MIN/1.73
GFR SERPLBLD CREATININE-BSD FMLA CKD-EPI: 86 ML/MIN/1.73
GLUCOSE SERPL-MCNC: 108 MG/DL (ref 65–99)
HCT VFR BLD AUTO: 38 % (ref 37.5–51)
HGB BLD-MCNC: 12.6 G/DL (ref 13–17.7)
POTASSIUM SERPL-SCNC: 4.8 MMOL/L (ref 3.5–5.2)
SODIUM SERPL-SCNC: 134 MMOL/L (ref 134–144)
TSH SERPL DL<=0.005 MIU/L-ACNC: 2.31 UIU/ML (ref 0.45–4.5)

## 2018-03-30 ENCOUNTER — TELEPHONE (OUTPATIENT)
Dept: INTERNAL MEDICINE CLINIC | Age: 83
End: 2018-03-30

## 2018-03-30 NOTE — TELEPHONE ENCOUNTER
Lola, from home care, inquiring on the status of a Certificate of medical necessity of nutrition . That was faxed back incomplete.        Best contact number [phone]   Fax number [phone]

## 2018-03-31 NOTE — TELEPHONE ENCOUNTER
In what way? All the boxes were checked today and signed, not sure what they feel was left off. They need to elaborate.

## 2018-04-02 NOTE — TELEPHONE ENCOUNTER
Miguel Heads from [employer] called and stated that in section 2 they need sole source to be checked off in order for the insurance company to cover the supplies

## 2018-04-15 RX ORDER — LISINOPRIL 10 MG/1
10 TABLET ORAL DAILY
Qty: 90 TAB | Refills: 3 | Status: SHIPPED | OUTPATIENT
Start: 2018-04-15 | End: 2018-06-25

## 2018-05-22 RX ORDER — TAMSULOSIN HYDROCHLORIDE 0.4 MG/1
CAPSULE ORAL
Qty: 90 CAP | Refills: 0 | Status: SHIPPED | OUTPATIENT
Start: 2018-05-22

## 2018-06-25 ENCOUNTER — OFFICE VISIT (OUTPATIENT)
Dept: INTERNAL MEDICINE CLINIC | Age: 83
End: 2018-06-25

## 2018-06-25 VITALS
SYSTOLIC BLOOD PRESSURE: 100 MMHG | HEART RATE: 84 BPM | RESPIRATION RATE: 18 BRPM | HEIGHT: 66 IN | DIASTOLIC BLOOD PRESSURE: 60 MMHG | TEMPERATURE: 98.1 F | WEIGHT: 118.4 LBS | OXYGEN SATURATION: 97 % | BODY MASS INDEX: 19.03 KG/M2

## 2018-06-25 DIAGNOSIS — N40.0 BENIGN PROSTATIC HYPERPLASIA WITHOUT LOWER URINARY TRACT SYMPTOMS: ICD-10-CM

## 2018-06-25 DIAGNOSIS — K40.30 INCARCERATED INGUINAL HERNIA: ICD-10-CM

## 2018-06-25 DIAGNOSIS — I10 ESSENTIAL HYPERTENSION: Primary | ICD-10-CM

## 2018-06-25 DIAGNOSIS — K40.30 INCARCERATED INGUINAL HERNIA: Primary | ICD-10-CM

## 2018-06-25 DIAGNOSIS — K40.91 UNILATERAL RECURRENT INGUINAL HERNIA WITHOUT OBSTRUCTION OR GANGRENE: ICD-10-CM

## 2018-06-25 DIAGNOSIS — E78.00 HIGH CHOLESTEROL: ICD-10-CM

## 2018-06-25 DIAGNOSIS — R63.0 DECREASED APPETITE: ICD-10-CM

## 2018-06-25 NOTE — MR AVS SNAPSHOT
[address] [place] [address] [id number] 
[phone] Patient: Yann Marcelo MRN: [id number] Visit Information Date & Time Provider Department Dept. Phone Encounter #  
 6/25/2018 10:00 AM Rossi Sweeney NP [facility] at [phone] [id number] Follow-up Instructions Return in about 4 weeks (around 7/23/2018) for med stop. Upcoming Health Maintenance Date Due Pneumococcal 65+ Low/Medium Risk (2 of 2 - PCV13) 10/10/2017 Influenza Age 5 to Adult 8/1/2018 GLAUCOMA SCREENING Q2Y 1/23/2020 DTaP/Tdap/Td series (2 - Td) 10/10/2026 Allergies as of 6/25/2018  Review Complete On: 6/25/2018 By: Juliane Gonzales LPN No Known Allergies Current Immunizations  Reviewed on 10/11/2017 Name Date Influenza High Dose Vaccine PF 10/11/2017, 10/10/2016, 11/5/2014 Pneumococcal Polysaccharide (PPSV-23) 10/10/2016 Tdap 10/10/2016 Not reviewed this visit You Were Diagnosed With   
  
 Codes Comments Essential hypertension    -  Primary ICD-10-CM: I10 
ICD-9-CM: 401.9 Unilateral recurrent inguinal hernia without obstruction or gangrene     ICD-10-CM: K40.91 
ICD-9-CM: 550.91 Decreased appetite     ICD-10-CM: R63.0 ICD-9-CM: 783.0 High cholesterol     ICD-10-CM: E78.00 ICD-9-CM: 272.0 Benign prostatic hyperplasia without lower urinary tract symptoms     ICD-10-CM: N40.0 ICD-9-CM: 600.00 Vitals BP Pulse Temp Resp Height(growth percentile) Weight(growth percentile) 100/60 (BP 1 Location: Left arm, BP Patient Position: Sitting) 84 98.1 °F (36.7 °C) (Oral) 18 5' 6\" (1.676 m) 118 lb 6.4 oz (53.7 kg) SpO2 BMI Smoking Status 97% 19.11 kg/m2 Unknown If Ever Smoked Vitals History BMI and BSA Data Body Mass Index Body Surface Area  
 19.11 kg/m 2 1.58 m 2 Preferred Pharmacy Pharmacy Name Phone [facility] [address]  [place] [facility] [place] [facility] [phone] Your Updated Medication List  
  
   
This list is accurate as of 6/25/18 10:57 AM.  Always use your most recent med list.  
  
  
  
  
 albuterol 2.5 mg /3 mL (0.083 %) nebulizer solution Commonly known as:  PROVENTIL VENTOLIN  
3 mL by Nebulization route every four (4) hours as needed for Wheezing or Shortness of Breath. aspirin 81 mg tablet Take 81 mg by mouth. ferrous sulfate 325 mg (65 mg iron) tablet Take 1 Tab by mouth two (2) times daily (with meals). On an empty stomach with Vitamin C (like OJ)  
  
 food supplemt, lactose-reduced 0.06 gram- 1 kcal/mL Liqd Commonly known as:  BOOST HIGH PROTEIN Drink 2-3 containers DAILY. potassium chloride SR 10 mEq tablet Commonly known as:  K-TAB Take 2 Tabs by mouth two (2) times a day. tamsulosin 0.4 mg capsule Commonly known as:  FLOMAX TAKE 1 CAPSULE BY MOUTH EVERY DAY Follow-up Instructions Return in about 4 weeks (around 7/23/2018) for med stop. Patient Instructions Please STOP Lisinopril and Pravastatin TODAY. Also be sure to try to EAT at least TWO SOLID meals daily, ENSURING that you only drink your BOOST after trying to eat first.  
 
Be sure to stay hydrated, aiming to drink at least 8 glasses or 4 BOTTLES of water daily. Hernia: Care Instructions Your Care Instructions A hernia develops when tissue bulges through a weak spot in the wall of your belly. The groin area and the navel are common areas for a hernia. A hernia can also develop near the area of a surgery you had before. Pressure from lifting, straining, or coughing can tear the weak area, causing the hernia to bulge and be painful. If you cannot push a hernia back into place, the tissue may become trapped outside the belly wall.  If the hernia gets twisted and loses its blood supply, it will swell and die. This is called a strangulated hernia. It usually causes a lot of pain. It needs treatment right away. Some hernias need to be repaired to prevent a strangulated hernia. If your hernia causes symptoms or is large, you may need surgery. Follow-up care is a key part of your treatment and safety. Be sure to make and go to all appointments, and call your doctor if you are having problems. It's also a good idea to know your test results and keep a list of the medicines you take. How can you care for yourself at home? · Take care when lifting heavy objects. · Stay at a healthy weight. · Do not smoke. Smoking can cause coughing, which can cause your hernia to bulge. If you need help quitting, talk to your doctor about stop-smoking programs and medicines. These can increase your chances of quitting for good. · Talk with your doctor before wearing a corset or truss for a hernia. These devices are not recommended for treating hernias and sometimes can do more harm than good. There may be certain situations when your doctor thinks a truss would work, but these are rare. When should you call for help? Call your doctor now or seek immediate medical care if: 
? · You have new or worse belly pain. ? · You are vomiting. ? · You cannot pass stools or gas. ? · You cannot push the hernia back into place with gentle pressure when you are lying down. ? · The area over the hernia turns red or becomes tender. ? Watch closely for changes in your health, and be sure to contact your doctor if you have any problems. Where can you learn more? Go to http://marta-kuldeep.info/. Enter C129 in the search box to learn more about \"Hernia: Care Instructions. \" Current as of: May 12, 2017 Content Version: 11.4 © 7386-0137 [employer].  Care instructions adapted under license by [employer] (which disclaims liability or warranty for this information). If you have questions about a medical condition or this instruction, always ask your healthcare professional. [address] any warranty or liability for your use of this information. Helping A Person With Dementia: Care Instructions Your Care Instructions Dementia is a loss of mental skills that affects daily life. It is different from mild memory loss that occurs with aging. Dementia can cause problems with memory, thinking clearly, and planning. It is different for everyone. But it usually gets worse slowly. Some people who have dementia can function well for a long time. But at some point it may become hard for the person to care for himself or herself. It can be upsetting to learn that a loved one has this condition. You may be afraid and worried about what will happen. You may wonder how you will care for the person. There is no cure for dementia. But medicine may be able to slow memory loss and improve thinking for a while. Other medicines may help with sleep, depression, and behavior changes. Dementia is different for everyone. In some cases, people can function well for a long time. You can help your loved one by making his or her home life easier and safer. You also need to take care of yourself. Caregiving can be stressful. But support is available to help you and give you a break when you need it. The Alzheimer's Association offers good information and support. If you are caring for someone with dementia, you can help make life safer and more comfortable. You can also help your loved one make decisions about future care. You may also want to bring up legal and financial issues. These are hard but important conversations to have. Follow-up care is a key part of your loved one's treatment and safety. Be sure to make and go to all appointments, and call your doctor if your loved one is having problems.  It's also a good idea to know your loved one's test results and keep a list of the medicines he or she takes. How can you care for your loved one at home? Taking care of the person · If the person takes medicine for dementia, help him or her take it exactly as prescribed. Call the doctor if you notice any problems with the medicine. · Make a list of the person's medicines. Review it with all of his or her doctors. · Help the person eat a balanced diet. Serve plenty of whole grains, fruits, and vegetables every day. If the person is not hungry at mealtimes, give snacks at midmorning and in the afternoon. Offer drinks such as Boost, Ensure, or Sustacal if the person is losing weight. · Encourage exercise. Walking and other activities may slow the decline of mental ability. Help the person stay active mentally with reading, crossword puzzles, or other hobbies. · Take steps to help if the person is sundowning. This is the restless behavior and trouble with sleeping that may occur in late afternoon and at night. Try not to let the person nap during the day. Offer a glass of warm milk or caffeine-free tea before bedtime. · Develop a routine. Your loved one will feel less frustrated or confused with a clear, simple plan of what to do every day. · Be patient. A task may take the person longer than it used to. · For as long as he or she is able, allow your loved one to make decisions about activities, food, clothing, and other choices. Let him or her be independent, even if tasks take more time or are not done perfectly. Tailor tasks to the person's abilities. For example, if cooking is no longer safe, ask for other help. Your loved one can help set the table, or make simple dishes such as a salad. When the person needs help, offer it gently. Staying safe · Make your home (or your loved one's home) safe. Tack down rugs, and put no-slip tape in the tub.  Install handrails, and put safety switches on stoves and appliances. Keep rooms free of clutter. Make sure walkways around furniture are clear. Do not move furniture around, because the person may become confused. · Use locks on doors and cupboards. Lock up knives, scissors, medicines, cleaning supplies, and other dangerous things. · Do not let the person drive or cook if he or she can't do it safely. A person with dementia should not drive unless he or she is able to pass an on-road driving test. Your state 's license bureau can do a driving test if there is any question. · Get medical alert jewelry for the person so that you can be contacted if he or she wanders away. If possible, provide a safe place for wandering, such as an enclosed yard or garden. Taking care of yourself · Ask your doctor about support groups and other resources in your area. · Take care of your health. Be sure to eat healthy foods and get enough rest and exercise. · Take time for yourself. Respite services provide someone to stay with the person for a short time while you get out of the house for a few hours. · Make time for an activity that you enjoy. Read, listen to music, paint, do crafts, or play an instrument, even if it's only for a few minutes a day. · Spend time with family, friends, and others in your support system. When should you call for help? Call 911 anytime you think the person may need emergency care. For example, call if: 
? · The person who has dementia wanders away and you can't find him or her. ? · The person who has dementia is seriously injured. ?Call the doctor now or seek immediate medical care if: 
? · The person suddenly sees things that are not there (hallucinates). ? · The person has a sudden change in his or her behavior. ? Watch closely for changes in the person's health, and be sure to contact the doctor if: 
? · The person has symptoms that could cause injury. ? · The person has problems with his or her medicine. ? · You need more information to care for a person with dementia. ? · You need respite care so you can take a break. Where can you learn more? Go to http://marta-kuldeep.info/. Enter I605 in the search box to learn more about \"Helping A Person With Dementia: Care Instructions. \" Current as of: May 12, 2017 Content Version: 11.4 © 5948-4127 [employer]. Care instructions adapted under license by [employer] (which disclaims liability or warranty for this information). If you have questions about a medical condition or this instruction, always ask your healthcare professional. [address] any warranty or liability for your use of this information. Try taking PROBIOTICS 10 billion units daily for GI health. Magnesium 400 mg one to two times daily to help boost appetite may help also. Introducing [facility]! Blake Murphy introduces Pharmacopeia patient portal. Now you can access parts of your medical record, email your doctor's office, and request medication refills online. 1. In your internet browser, go to [email or web address] 2. Click on the First Time User? Click Here link in the Sign In box. You will see the New Member Sign Up page. 3. Enter your Pharmacopeia Access Code exactly as it appears below. You will not need to use this code after youve completed the sign-up process. If you do not sign up before the expiration date, you must request a new code. · Pharmacopeia Access Code: [email or web address] Expires: 9/23/2018 10:57 AM 
 
4. Enter the last four digits of your Social Security Number (xxxx) and Date of Birth (mm/dd/yyyy) as indicated and click Submit. You will be taken to the next sign-up page. 5. Create a Pharmacopeia ID. This will be your Pharmacopeia login ID and cannot be changed, so think of one that is secure and easy to remember. 6. Create a Pharmacopeia password. You can change your password at any time. 7. Enter your Password Reset Question and Answer. This can be used at a later time if you forget your password. 8. Enter your e-mail address. You will receive e-mail notification when new information is available in [address] 9. Click Sign Up. You can now view and download portions of your medical record. 10. Click the Download Summary menu link to download a portable copy of your medical information. If you have questions, please visit the Frequently Asked Questions section of the RealLifeConnect website. Remember, RealLifeConnect is NOT to be used for urgent needs. For medical emergencies, dial 911. Now available from your iPhone and Android! Please provide this summary of care documentation to your next provider. Your primary care clinician is listed as JOE Whitaker. If you have any questions after today's visit, please call [phone].

## 2018-06-25 NOTE — PROGRESS NOTES
Oli De Leon is a 80 y.o. male and presents with Follow-up (htn, tachycardia weight check) and Inguinal Hernia (pt states left side)    Subjective:  Pt presents with family. Still concerned for his eating, not eating solid meals, only juice, water, and boost most days. Nephew reports he will report in the morning that he does NOT want to eat, as to not bother his hernia. When bothersome, he will lie in bed with legs extended with relief of hernia discomfort. Not daily occurrence however. No report of emesis reported, some diarrhea treated with stool softener. Hypertension Review:  The patient has hypertension  Diet and Lifestyle: generally does try to follow a  low sodium diet, exercises rarely  Home BP Monitoring: is not measured at home. Pertinent ROS: taking medications as instructed, but family would like to know if he can stop since BP so low. no medication side effects noted. No TIA's, chest pain on exertion, dyspnea on exertion, or swelling of ankles.    BP Readings from Last 3 Encounters:   06/25/18 100/60   03/23/18 101/69   12/01/17 126/90     Review of Systems  Constitutional: negative for fevers, chills, anorexia and weight loss  Respiratory:  negative for cough, hemoptysis, dyspnea, and wheezing  CV:   negative for chest pain, palpitations, and lower extremity edema  GI:   negative for nausea, vomiting, diarrhea, abdominal pain, and melena  Endo:               negative for polyuria,polydipsia,polyphagia, and heat intolerance  Genitourinary: negative for frequency, urgency, dysuria, retention, and hematuria  Integument:  negative for rash, ulcerations, and pruritus  Hematologic:  negative for easy bruising and bleeding  Musculoskel: negative for arthralgias, muscle weakness,and joint pain/swelling  Neurological:  negative for headaches, dizziness, vertigo,and gait problems  Behavl/Psych: negative for feelings of anxiety, depression, suicide, and mood changes    Past Medical History:   Diagnosis Date    Anemia     BPH (benign prostatic hypertrophy)     DEMENTIA     Gout     High cholesterol     Hypertension      Past Surgical History:   Procedure Laterality Date    HX HERNIA REPAIR  9-10-13    [facility]Dr. 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 No pertinent family history. Current Outpatient Prescriptions   Medication Sig Dispense Refill    tamsulosin (FLOMAX) 0.4 mg capsule TAKE 1 CAPSULE BY MOUTH EVERY DAY 90 Cap 0    food supplemt, lactose-reduced (BOOST HIGH PROTEIN) 0.06 gram- 1 kcal/mL liqd Drink 2-3 containers DAILY. 90 Container 11    ferrous sulfate 325 mg (65 mg iron) tablet Take 1 Tab by mouth two (2) times daily (with meals). On an empty stomach with Vitamin C (like OJ) (Patient taking differently: Take 325 mg by mouth Daily (before breakfast). On an empty stomach with Vitamin C (like OJ)) 60 Tab 11    potassium chloride SR (K-TAB) 10 mEq tablet Take 2 Tabs by mouth two (2) times a day. (Patient taking differently: Take 20 mEq by mouth daily.) 360 Tab 3    albuterol (PROVENTIL VENTOLIN) 2.5 mg /3 mL (0.083 %) nebulizer solution 3 mL by Nebulization route every four (4) hours as needed for Wheezing or Shortness of Breath. 4 Package 11    aspirin 81 mg tablet Take 81 mg by mouth.        No Known Allergies    Objective:  Visit Vitals    /60 (BP 1 Location: Left arm, BP Patient Position: Sitting)    Pulse 84    Temp 98.1 °F (36.7 °C) (Oral)    Resp 18    Ht 5' 6\" (1.676 m)    Wt 118 lb 6.4 oz (53.7 kg)    SpO2 97%    BMI 19.11 kg/m2     Wt Readings from Last 3 Encounters:   06/25/18 118 lb 6.4 oz (53.7 kg)   03/23/18 123 lb (55.8 kg)   12/01/17 138 lb 12.8 oz (63 kg)     Physical Exam:   General appearance - alert, well appearing, and in no distress. Mental status - A/O x 2 (person and place), normal mood and affect. Responds appropriately. Neck -Supple ,normal CSP. FROM, non-tender. No significant adenopathy/thyromegaly. No JVD. Chest - CTA. Symmetric chest rise. No wheezing, rales or rhonchi. Heart - Tachycardic, regular rhythm. Normal S1, S2. No MGR or clicks. Abdomen - Soft, non-distended. Normoactive BS in all quadrants. NT, no mass or HSM. With family in room assessed left groin, non-reducible left inguinal hernia (palm-sized). Ext- Radial, DP pulses, 2+ bilaterally. No pedal edema, clubbing, or cyanosis. Skin-Warm and dry. No hyperpigmentation, ulcerations, or suspicious lesions. Neuro - Normal speech, no focal findings or movement disorder. Normal strength, gait, and muscle tone. Assessment/Plan:  Advised to EAT or attempt to eat BEFORE drinking BOOST daily. Continued WATER ingestion. Use of Mag advised. Stopped lisinopril and pravastatin. Sent msg to surgeon to see if referral warranted for lg inguinal hernia. Medication Side Effects and Warnings were discussed with patient: yes   Patient Labs were reviewed: yes  Patient Past Records were reviewed: yes    See below for other orders   Follow-up Disposition:  Return in about 4 weeks (around 7/23/2018) for med stop. ICD-10-CM ICD-9-CM    1. Essential hypertension I10 401.9    2. Unilateral recurrent inguinal hernia without obstruction or gangrene K40.91 550.91    3. Decreased appetite R63.0 783.0    4. High cholesterol E78.00 272.0    5. Benign prostatic hyperplasia without lower urinary tract symptoms N40.0 600.00    6. Incarcerated inguinal hernia K40.30 550.10      No orders of the defined types were placed in this encounter. Tru Funk expressed understanding of plan.  An After Visit Summary was offered/printed and given to the patient.

## 2018-06-25 NOTE — PROGRESS NOTES
Pt is here for   Chief Complaint   Patient presents with    Follow-up     htn, tachycardia weight check    Inguinal Hernia     pt states left side     Pt denies pain at this time     1. Have you been to the ER, urgent care clinic since your last visit? Hospitalized since your last visit? No    2. Have you seen or consulted any other health care providers outside of the [facility] since your last visit? Include any pap smears or colon screening.  No

## 2018-06-25 NOTE — Clinical Note
Hello, good morning  I have this elderly patient who has had a left incarcerated inguinal hernia for the past several years. It is intermittently uncomfortable, but of course non-reducible. Do you feel considering his age, he is someone you should evaluate for treatment? His hernia is sized about the palm of your hand.

## 2018-06-25 NOTE — PATIENT INSTRUCTIONS
Please STOP Lisinopril and Pravastatin TODAY. Also be sure to try to EAT at least TWO SOLID meals daily, ENSURING that you only drink your BOOST after trying to eat first.     Be sure to stay hydrated, aiming to drink at least 8 glasses or 4 BOTTLES of water daily. Hernia: Care Instructions  Your Care Instructions    A hernia develops when tissue bulges through a weak spot in the wall of your belly. The groin area and the navel are common areas for a hernia. A hernia can also develop near the area of a surgery you had before. Pressure from lifting, straining, or coughing can tear the weak area, causing the hernia to bulge and be painful. If you cannot push a hernia back into place, the tissue may become trapped outside the belly wall. If the hernia gets twisted and loses its blood supply, it will swell and die. This is called a strangulated hernia. It usually causes a lot of pain. It needs treatment right away. Some hernias need to be repaired to prevent a strangulated hernia. If your hernia causes symptoms or is large, you may need surgery. Follow-up care is a key part of your treatment and safety. Be sure to make and go to all appointments, and call your doctor if you are having problems. It's also a good idea to know your test results and keep a list of the medicines you take. How can you care for yourself at home? · Take care when lifting heavy objects. · Stay at a healthy weight. · Do not smoke. Smoking can cause coughing, which can cause your hernia to bulge. If you need help quitting, talk to your doctor about stop-smoking programs and medicines. These can increase your chances of quitting for good. · Talk with your doctor before wearing a corset or truss for a hernia. These devices are not recommended for treating hernias and sometimes can do more harm than good. There may be certain situations when your doctor thinks a truss would work, but these are rare.   When should you call for help?  Call your doctor now or seek immediate medical care if:  ? · You have new or worse belly pain. ? · You are vomiting. ? · You cannot pass stools or gas. ? · You cannot push the hernia back into place with gentle pressure when you are lying down. ? · The area over the hernia turns red or becomes tender. ? Watch closely for changes in your health, and be sure to contact your doctor if you have any problems. Where can you learn more? Go to http://marta-kuldeep.info/. Enter C129 in the search box to learn more about \"Hernia: Care Instructions. \"  Current as of: May 12, 2017  Content Version: 11.4  © 7824-2666 [employer]. Care instructions adapted under license by Cynny (which disclaims liability or warranty for this information). If you have questions about a medical condition or this instruction, always ask your healthcare professional. [place] [address] any warranty or liability for your use of this information. Helping A Person With Dementia: Care Instructions  Your Care Instructions    Dementia is a loss of mental skills that affects daily life. It is different from mild memory loss that occurs with aging. Dementia can cause problems with memory, thinking clearly, and planning. It is different for everyone. But it usually gets worse slowly. Some people who have dementia can function well for a long time. But at some point it may become hard for the person to care for himself or herself. It can be upsetting to learn that a loved one has this condition. You may be afraid and worried about what will happen. You may wonder how you will care for the person. There is no cure for dementia. But medicine may be able to slow memory loss and improve thinking for a while. Other medicines may help with sleep, depression, and behavior changes. Dementia is different for everyone. In some cases, people can function well for a long time.  You can help your loved one by making his or her home life easier and safer. You also need to take care of yourself. Caregiving can be stressful. But support is available to help you and give you a break when you need it. The Alzheimer's Association offers good information and support. If you are caring for someone with dementia, you can help make life safer and more comfortable. You can also help your loved one make decisions about future care. You may also want to bring up legal and financial issues. These are hard but important conversations to have. Follow-up care is a key part of your loved one's treatment and safety. Be sure to make and go to all appointments, and call your doctor if your loved one is having problems. It's also a good idea to know your loved one's test results and keep a list of the medicines he or she takes. How can you care for your loved one at home? Taking care of the person  · If the person takes medicine for dementia, help him or her take it exactly as prescribed. Call the doctor if you notice any problems with the medicine. · Make a list of the person's medicines. Review it with all of his or her doctors. · Help the person eat a balanced diet. Serve plenty of whole grains, fruits, and vegetables every day. If the person is not hungry at mealtimes, give snacks at midmorning and in the afternoon. Offer drinks such as Boost, Ensure, or Sustacal if the person is losing weight. · Encourage exercise. Walking and other activities may slow the decline of mental ability. Help the person stay active mentally with reading, crossword puzzles, or other hobbies. · Take steps to help if the person is sundowning. This is the restless behavior and trouble with sleeping that may occur in late afternoon and at night. Try not to let the person nap during the day. Offer a glass of warm milk or caffeine-free tea before bedtime. · Develop a routine.  Your loved one will feel less frustrated or confused with a clear, simple plan of what to do every day. · Be patient. A task may take the person longer than it used to. · For as long as he or she is able, allow your loved one to make decisions about activities, food, clothing, and other choices. Let him or her be independent, even if tasks take more time or are not done perfectly. Tailor tasks to the person's abilities. For example, if cooking is no longer safe, ask for other help. Your loved one can help set the table, or make simple dishes such as a salad. When the person needs help, offer it gently. Staying safe  · Make your home (or your loved one's home) safe. Tack down rugs, and put no-slip tape in the tub. Install handrails, and put safety switches on stoves and appliances. Keep rooms free of clutter. Make sure walkways around furniture are clear. Do not move furniture around, because the person may become confused. · Use locks on doors and cupboards. Lock up knives, scissors, medicines, cleaning supplies, and other dangerous things. · Do not let the person drive or cook if he or she can't do it safely. A person with dementia should not drive unless he or she is able to pass an on-road driving test. Your state 's license bureau can do a driving test if there is any question. · Get medical alert jewelry for the person so that you can be contacted if he or she wanders away. If possible, provide a safe place for wandering, such as an enclosed yard or garden. Taking care of yourself  · Ask your doctor about support groups and other resources in your area. · Take care of your health. Be sure to eat healthy foods and get enough rest and exercise. · Take time for yourself. Respite services provide someone to stay with the person for a short time while you get out of the house for a few hours. · Make time for an activity that you enjoy. Read, listen to music, paint, do crafts, or play an instrument, even if it's only for a few minutes a day.   · Spend time with family, friends, and others in your support system. When should you call for help? Call 911 anytime you think the person may need emergency care. For example, call if:  ? · The person who has dementia wanders away and you can't find him or her. ? · The person who has dementia is seriously injured. ?Call the doctor now or seek immediate medical care if:  ? · The person suddenly sees things that are not there (hallucinates). ? · The person has a sudden change in his or her behavior. ? Watch closely for changes in the person's health, and be sure to contact the doctor if:  ? · The person has symptoms that could cause injury. ? · The person has problems with his or her medicine. ? · You need more information to care for a person with dementia. ? · You need respite care so you can take a break. Where can you learn more? Go to http://marta-kuldeep.info/. Enter A958 in the search box to learn more about \"Helping A Person With Dementia: Care Instructions. \"  Current as of: May 12, 2017  Content Version: 11.4  © 0205-9003 [employer]. Care instructions adapted under license by youwho (which disclaims liability or warranty for this information). If you have questions about a medical condition or this instruction, always ask your healthcare professional. [address] any warranty or liability for your use of this information. Try taking PROBIOTICS 10 billion units daily for GI health. Magnesium 400 mg one to two times daily to help boost appetite may help also.

## 2018-07-11 ENCOUNTER — OFFICE VISIT (OUTPATIENT)
Dept: SURGERY | Age: 83
End: 2018-07-11

## 2018-07-11 VITALS
DIASTOLIC BLOOD PRESSURE: 71 MMHG | RESPIRATION RATE: 16 BRPM | OXYGEN SATURATION: 93 % | HEIGHT: 66 IN | HEART RATE: 99 BPM | BODY MASS INDEX: 18.96 KG/M2 | WEIGHT: 118 LBS | TEMPERATURE: 96 F | SYSTOLIC BLOOD PRESSURE: 122 MMHG

## 2018-07-11 DIAGNOSIS — K40.90 LEFT INGUINAL HERNIA: Primary | ICD-10-CM

## 2018-07-11 RX ORDER — LISINOPRIL 10 MG/1
TABLET ORAL
Refills: 3 | COMMUNITY
Start: 2018-04-15 | End: 2018-07-23

## 2018-07-11 RX ORDER — PRAVASTATIN SODIUM 40 MG/1
TABLET ORAL
Refills: 11 | COMMUNITY
Start: 2018-05-21 | End: 2018-07-23

## 2018-07-11 NOTE — LETTER
7/11/2018 11:29 AM 
 
Mr. Alejandra Goodell [address] [facility] [address] [facility] 7 [address] Surgery Instruction Sheet You have been scheduled for surgery on 7/31/18 at 7:30am at [facility] [address] Please report to the Surgery Center at 5:45am, this is approximately 2 hours prior to your surgery time. The Surgery Center is located on the [address] [place] [address] Street side of the hospital, just next to the Emergency Room. Reserved parking is available and  parking if lot is full. You will need to have a Pre-op Visit prior to your surgery. Report to the Surgery Center on 7/24/18 at 11:00am.  Bring a list of medications and your insurance cards with you. You may eat/drink prior to this visit. Call your physician immediately if you notice a change in your health between the time you saw your physician and the day of surgery. If you take a blood thinner, please let us know. Call your ordering Doctor to make sure you can stop taking it prior to your surgery. STOP YOUR ASPIRIN 2 DAYS PRIOR TO SURGERY. DO NOT TAKE  IBUPROFEN, ADVIL, MOTRIN, ALEVE, EXCEDRIN, BC POWDER, GOODIES, FISH OIL OR ANY MEDICATION CONTAINING ASPIRIN 5 DAYS PRIOR TO YOUR SURGERY. MAY TAKE TYLENOL. Eat a light dinner the evening before your surgery. DO NOT EAT OR DRINK ANYTHING AFTER MIDNIGHT THE NIGHT BEFORE YOUR SURGERY. This includes water, chewing gum, lifesavers, etc.  The Pre op nurse will check with you about any medication that you may need to take the morning of surgery. Shower with a new bar of anti-bacterial soap (Dial, Safeguard) or solution given to you by Pre-op, the night before surgery. Do not use lotion, powder, deodorant on the skin after showering.   Wear loose, comfortable clothing the day of surgery and bring a container to store your contacts, eyeglasses, dentures, hearing aid, etc.  Do not bring money, valuables, jewelry, etc. to the hospital.   
 
 If you are having outpatient surgery, someone must come with you the morning of surgery to drive you home. You can not drive for 24 hours after any anesthesia. Sometimes it is necessary to stay overnight and leave the next morning. This is still considered outpatient for most insurance deductibles. Someone will still need to drive you home. If you have questions or concerns, please feel free to call /Julien at [phone]. If you need to cancel your surgery, please call as soon as possible. Sincerely, Monserrat Jacobs MD

## 2018-07-11 NOTE — MR AVS SNAPSHOT
[address], [address] [facility], [address] [facility] 
[phone] Patient: Pasha Alegre MRN: [id number] QPE:[id number] Visit Information Date & Time Provider Department Dept. Phone Encounter #  
 7/11/2018 10:40 AM Radha Shoemaker MD [facility] [id number] Your Appointments 7/23/2018 11:20 AM  
ROUTINE CARE with Rosemary Rodriguez, LUCÍA  
[address] [facility]) Appt Note: med stop [address] [facility]  
[phone]  
  
   
 [address] [place]  
  
    
 8/13/2018  1:30 PM  
POST OP 10 MIN with Radha Shoemaker MD  
[facility] Dr. Harpal Toribio [facility] ([facility]) Appt Note: Post/op Open LIHR with mesh on 7/31/18  
 [address] [place] [address], [address] [facility], [address] [phone]  
[address], [address] [facility], [address] [facility] [address] [phone] Upcoming Health Maintenance Date Due Pneumococcal 65+ Low/Medium Risk (2 of 2 - PCV13) 10/10/2017 Influenza Age 5 to Adult 8/1/2018 GLAUCOMA SCREENING Q2Y 1/23/2020 DTaP/Tdap/Td series (2 - Td) 10/10/2026 Allergies as of 7/11/2018  Review Complete On: 7/11/2018 By: Radha Shoemaker MD  
 No Known Allergies Current Immunizations  Reviewed on 10/11/2017 Name Date Influenza High Dose Vaccine PF 10/11/2017, 10/10/2016, 11/5/2014 Pneumococcal Polysaccharide (PPSV-23) 10/10/2016 Tdap 10/10/2016 Not reviewed this visit Vitals BP Pulse Temp Resp Height(growth percentile) Weight(growth percentile) 122/71 (BP 1 Location: Right arm, BP Patient Position: Sitting) 99 96 °F (35.6 °C) (Oral) 16 5' 6\" (1.676 m) 118 lb (53.5 kg) SpO2 BMI Smoking Status 93% 19.05 kg/m2 Unknown If Ever Smoked BMI and BSA Data Body Mass Index Body Surface Area  19.05 kg/m 2 1.58 m 2  
  
  
 Preferred Pharmacy Pharmacy Name Phone [facility] [address] [facility]  [place] [phone] Your Updated Medication List  
  
   
This list is accurate as of 7/11/18 11:35 AM.  Always use your most recent med list.  
  
  
  
  
 albuterol 2.5 mg /3 mL (0.083 %) nebulizer solution Commonly known as:  PROVENTIL VENTOLIN  
3 mL by Nebulization route every four (4) hours as needed for Wheezing or Shortness of Breath. aspirin 81 mg tablet Take 81 mg by mouth. ferrous sulfate 325 mg (65 mg iron) tablet Take 1 Tab by mouth two (2) times daily (with meals). On an empty stomach with Vitamin C (like OJ)  
  
 food supplemt, lactose-reduced 0.06 gram- 1 kcal/mL Liqd Commonly known as:  BOOST HIGH PROTEIN Drink 2-3 containers DAILY. lisinopril 10 mg tablet Commonly known as:  Osorio Edman TK 1 T PO D  
  
 potassium chloride SR 10 mEq tablet Commonly known as:  K-TAB Take 2 Tabs by mouth two (2) times a day. pravastatin 40 mg tablet Commonly known as:  PRAVACHOL TK 1 T PO Q NIGHT  
  
 tamsulosin 0.4 mg capsule Commonly known as:  FLOMAX TAKE 1 CAPSULE BY MOUTH EVERY DAY To-Do List   
 07/24/2018 11:00 AM  
  Appointment with CHLOE PAVON ROOM P3 at [place] [address] ([phone]) Introducing [facility] SERVICES! [employer] introduces [employer] patient portal. Now you can access parts of your medical record, email your doctor's office, and request medication refills online. 1. In your internet browser, go to [email or web address] 2. Click on the First Time User? Click Here link in the Sign In box. You will see the New Member Sign Up page. 3. Enter your [employer] Access Code exactly as it appears below. You will not need to use this code after youve completed the sign-up process.  If you do not sign up before the expiration date, you must request a new code. · [employer] Access Code: [email or web address] Expires: 9/23/2018 10:57 AM 
 
4. Enter the last four digits of your Social Security Number (xxxx) and Date of Birth (mm/dd/yyyy) as indicated and click Submit. You will be taken to the next sign-up page. 5. Create a [employer] ID. This will be your [employer] login ID and cannot be changed, so think of one that is secure and easy to remember. 6. Create a [employer] password. You can change your password at any time. 7. Enter your Password Reset Question and Answer. This can be used at a later time if you forget your password. 8. Enter your e-mail address. You will receive e-mail notification when new information is available in [address] 9. Click Sign Up. You can now view and download portions of your medical record. 10. Click the Download Summary menu link to download a portable copy of your medical information. If you have questions, please visit the Frequently Asked Questions section of the [employer] website. Remember, [employer] is NOT to be used for urgent needs. For medical emergencies, dial 911. Now available from your iPhone and Android! Please provide this summary of care documentation to your next provider. Your primary care clinician is listed as JOE Nichols. If you have any questions after today's visit, please call [phone].

## 2018-07-11 NOTE — PROGRESS NOTES
1. Have you been to the ER, urgent care clinic since your last visit? Hospitalized since your last visit? New patient    2. Have you seen or consulted any other health care providers outside of the [address] [place] [address] since your last visit? Include any pap smears or colon screening.  New patient

## 2018-07-11 NOTE — PROGRESS NOTES
HISTORY OF PRESENT ILLNESS 
Juan Manuel Keating is a 80 y.o. male. HPI Comments:  
+dementia. Answering simple question, some of the history filled in by his sister (I operated on his sister in 2014) C/o left groin and scrotal pain 
+bulge left groin Had emergent right IHR at [facility] in 2013 Eloisa PO and boost to try and gain wt 
 
 
 
____________________________________________________________________________ Patient presents with: 
Possible Hernia: Patient seen at the request of Dr. Landon De La Vega to evaluate possible inguinal hernia. /71 (BP 1 Location: Right arm, BP Patient Position: Sitting)  Pulse 99  Temp 96 °F (35.6 °C) (Oral)   Resp 16  Ht 5' 6\" (1.676 m)  Wt 53.5 kg (118 lb)  SpO2 93%  BMI 19.05 kg/m2 Past Medical History: 
No date: Anemia No date: BPH (benign prostatic hypertrophy) No date: DEMENTIA No date: Gout No date: High cholesterol No date: Hypertension Past Surgical History: 
9-10-13: HX HERNIA REPAIR Comment: RIHR with mesh-[facility]-Dr. Tom Foy Social History Marital status: SINGLE              Spouse name:                    
  Years of education:                 Number of children:            
 
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           
  Sexual activity: No                
 
Social History Narrative ** Merged History Encounter ** Review of patient's family history indicates: 
  Stroke                         Mother Stroke                         Sister Diabetes                       Sister Hypertension                   Sister Heart Disease                  Brother Hypertension                   Brother Current Outpatient Prescriptions: 
lisinopril (PRINIVIL, ZESTRIL) 10 mg tablet, TK 1 T PO D 
pravastatin (PRAVACHOL) 40 mg tablet, TK 1 T PO Q NIGHT 
tamsulosin (FLOMAX) 0.4 mg capsule, TAKE 1 CAPSULE BY MOUTH EVERY DAY 
food supplemt, lactose-reduced (BOOST HIGH PROTEIN) 0.06 gram- 1 kcal/mL liqd, Drink 2-3 containers DAILY. ferrous sulfate 325 mg (65 mg iron) tablet, Take 1 Tab by mouth two (2) times daily (with meals). On an empty stomach with Vitamin C (like OJ) (Patient taking differently: Take 325 mg by mouth Daily (before breakfast). On an empty stomach with Vitamin C (like OJ)) potassium chloride SR (K-TAB) 10 mEq tablet, Take 2 Tabs by mouth two (2) times a day. (Patient taking differently: Take 20 mEq by mouth daily.) 
albuterol (PROVENTIL VENTOLIN) 2.5 mg /3 mL (0.083 %) nebulizer solution, 3 mL by Nebulization route every four (4) hours as needed for Wheezing or Shortness of Breath. aspirin 81 mg tablet, Take 81 mg by mouth. No current facility-administered medications for this visit. Allergies: No Known Allergies 
_____________________________________________________________________________ Possible Hernia The history is provided by the patient and relative. This is a chronic problem. The current episode started more than 1 week ago. The problem occurs daily. The problem has been gradually worsening. Pertinent negatives include no chest pain, no abdominal pain, no headaches and no shortness of breath. The symptoms are aggravated by exertion. The symptoms are relieved by rest. The treatment provided no relief. Review of Systems Constitutional: Negative for chills, fever and weight loss. HENT: Negative for ear pain. Eyes: Negative for pain. Respiratory: Negative for shortness of breath. Cardiovascular: Negative for chest pain. Gastrointestinal: Negative for abdominal pain and blood in stool. Genitourinary: Negative for hematuria. Musculoskeletal: Negative for joint pain. Skin: Negative for rash. Neurological: Negative for dizziness, focal weakness, seizures and headaches.   
Endo/Heme/Allergies: Does not bruise/bleed easily. Psychiatric/Behavioral: The patient does not have insomnia. Physical Exam  
Constitutional: He is oriented to person, place, and time. He appears well-developed and well-nourished. No distress. HENT:  
Head: Normocephalic and atraumatic. Mouth/Throat: No oropharyngeal exudate. Eyes: Pupils are equal, round, and reactive to light. Neck: Normal range of motion. No tracheal deviation present. Cardiovascular: Normal rate, regular rhythm and normal heart sounds. No murmur heard. Pulmonary/Chest: Effort normal and breath sounds normal. No respiratory distress. He has no wheezes. Abdominal: Soft. Bowel sounds are normal. He exhibits no distension and no mass. There is no tenderness. There is no rebound and no guarding. A hernia is present. Hernia confirmed positive in the left inguinal area. Genitourinary: Musculoskeletal: Normal range of motion. He exhibits no edema or tenderness. Lymphadenopathy:  
  He has no cervical adenopathy. Left: No inguinal adenopathy present. Neurological: He is alert and oriented to person, place, and time. Skin: Skin is warm. No rash noted. He is not diaphoretic. No erythema. Psychiatric: He has a normal mood and affect. His behavior is normal.  
 
 
ASSESSMENT and PLAN 
  ICD-10-CM ICD-9-CM 1. Left inguinal hernia K40.90 550.90 I have recommended to Arpan Henry that we proceed with surgery I had an extensive discussion with him regarding the risks, benefits, and alternatives of proceeding with an open left Inguinal Hernia Repair with Mesh. Risks, benefits, and alternatives were discussed including the risk of anesthesia, bleeding, infection, including mesh infection, chronic orchialgia, neuralgia, other pain syndromes, testicular ischemia, injury to bowel, and recurrence were discussed. I reviewed with Arpan Henry his increased risk of bleeding secondary to Asprin use.   I have asked him to discontinue the Asprin for 2 days prior to surgery to reduce this risk. He is in agreement to proceed. All questions were answered. Neon Coffey will undergo preoperative evaluation and will proceed provided he is medically stable. Thank you for this consult.

## 2018-07-23 ENCOUNTER — OFFICE VISIT (OUTPATIENT)
Dept: INTERNAL MEDICINE CLINIC | Age: 83
End: 2018-07-23

## 2018-07-23 ENCOUNTER — HOSPITAL ENCOUNTER (OUTPATIENT)
Dept: GENERAL RADIOLOGY | Age: 83
Discharge: HOME | End: 2018-07-23
Payer: MEDICARE

## 2018-07-23 VITALS
SYSTOLIC BLOOD PRESSURE: 126 MMHG | TEMPERATURE: 97.7 F | WEIGHT: 120.4 LBS | DIASTOLIC BLOOD PRESSURE: 83 MMHG | RESPIRATION RATE: 16 BRPM | BODY MASS INDEX: 19.35 KG/M2 | OXYGEN SATURATION: 95 % | HEIGHT: 66 IN | HEART RATE: 107 BPM

## 2018-07-23 DIAGNOSIS — K40.30 INCARCERATED INGUINAL HERNIA: ICD-10-CM

## 2018-07-23 DIAGNOSIS — Z51.89 ENCOUNTER FOR MEDICATION ADJUSTMENT: ICD-10-CM

## 2018-07-23 DIAGNOSIS — R09.89 RHONCHI: ICD-10-CM

## 2018-07-23 DIAGNOSIS — I10 ESSENTIAL HYPERTENSION: Primary | ICD-10-CM

## 2018-07-23 DIAGNOSIS — R54 FRAILTY: ICD-10-CM

## 2018-07-23 PROCEDURE — 71046 X-RAY EXAM CHEST 2 VIEWS: CPT

## 2018-07-23 NOTE — PROGRESS NOTES
Chief Complaint   Patient presents with    Cholesterol Problem     f/u on medication     1. Have you been to the ER, urgent care clinic since your last visit? Hospitalized since your last visit? no    2. Have you seen or consulted any other health care providers outside of the [address] [place] [address] since your last visit? Include any pap smears or colon screening. No  Fall Risk Assessment, last 12 mths 7/23/2018   Able to walk? Yes   Fall in past 12 months? No   Fall with injury? -   Number of falls in past 12 months -   Fall Risk Score -     Abuse Screening Questionnaire 7/23/2018   Do you ever feel afraid of your partner? N   Are you in a relationship with someone who physically or mentally threatens you? N   Is it safe for you to go home? (No Data)     Pt's sister has some concerns about pt being left alone at home.

## 2018-07-23 NOTE — MR AVS SNAPSHOT
[address] [address] 
[phone] Patient: Louisa Mosley MRN: [id number] SIC:[id number] Visit Information Date & Time Provider Department Dept. Phone Encounter #  
 7/23/2018 11:20 AM Brábara Duran NP [address] [facility] [phone] [id number] Follow-up Instructions Return in about 12 weeks (around 10/15/2018) for chol, BP f/u, hernia repair. Your Appointments 8/13/2018  1:30 PM  
POST OP 10 MIN with Lucrecia Muñoz MD  
[facility] Dr. Harpal Toribio [facility] ([address] [facility]) Appt Note: Post/op Open LIHR with mesh on 7/31/18  
 [address] [facility] [address], [address] [facility], [address] [address]  
[address], [address] [facility], [address] [facility] [address] [phone] Upcoming Health Maintenance Date Due Pneumococcal 65+ Low/Medium Risk (2 of 2 - PCV13) 10/10/2017 Influenza Age 5 to Adult 8/1/2018 GLAUCOMA SCREENING Q2Y 1/23/2020 DTaP/Tdap/Td series (2 - Td) 10/10/2026 Allergies as of 7/23/2018  Review Complete On: 7/23/2018 By: Pearl Foote LPN No Known Allergies Current Immunizations  Reviewed on 10/11/2017 Name Date Influenza High Dose Vaccine PF 10/11/2017, 10/10/2016, 11/5/2014 Pneumococcal Conjugate (PCV-13) 4/3/2018 Pneumococcal Polysaccharide (PPSV-23) 10/10/2016 Tdap 10/10/2016 Zoster Vaccine, Live 6/27/2018, 4/3/2018 Not reviewed this visit You Were Diagnosed With   
  
 Codes Comments Incarcerated inguinal hernia    -  Primary ICD-10-CM: K40.30 ICD-9-CM: 550.10 Essential hypertension     ICD-10-CM: I10 
ICD-9-CM: 401.9 Encounter for medication adjustment     ICD-10-CM: Z51.89 ICD-9-CM: V58.83 Frailty     ICD-10-CM: R54 
ICD-9-CM: 478 BMI less than 19,adult     ICD-10-CM: Z68.1 ICD-9-CM: V85.0 Vitals BP Pulse Temp Resp Height(growth percentile) Weight(growth percentile) 126/83 (BP 1 Location: Right arm, BP Patient Position: Sitting) (!) 107 97.7 °F (36.5 °C) (Oral) 16 5' 6\" (1.676 m) 120 lb 6.4 oz (54.6 kg) SpO2 BMI Smoking Status 95% 19.43 kg/m2 Unknown If Ever Smoked BMI and BSA Data Body Mass Index Body Surface Area  
 19.43 kg/m 2 1.59 m 2 Preferred Pharmacy Pharmacy Name Phone [facility]  [place] [phone] Your Updated Medication List  
  
   
This list is accurate as of 7/23/18 11:40 AM.  Always use your most recent med list.  
  
  
  
  
 albuterol 2.5 mg /3 mL (0.083 %) nebulizer solution Commonly known as:  PROVENTIL VENTOLIN  
3 mL by Nebulization route every four (4) hours as needed for Wheezing or Shortness of Breath. aspirin 81 mg tablet Take 81 mg by mouth. ferrous sulfate 325 mg (65 mg iron) tablet Take 1 Tab by mouth two (2) times daily (with meals). On an empty stomach with Vitamin C (like OJ)  
  
 food supplemt, lactose-reduced 0.06 gram- 1 kcal/mL Liqd Commonly known as:  BOOST HIGH PROTEIN Drink 2-3 containers DAILY. potassium chloride SR 10 mEq tablet Commonly known as:  K-TAB Take 2 Tabs by mouth two (2) times a day. tamsulosin 0.4 mg capsule Commonly known as:  FLOMAX TAKE 1 CAPSULE BY MOUTH EVERY DAY We Performed the Following [address] [place] [address] Comments:  
 Please evaluate patient for home safety evaluation, care aide eval.  
  
Follow-up Instructions Return in about 12 weeks (around 10/15/2018) for chol, BP f/u, hernia repair. To-Do List   
 07/24/2018 11:00 AM  
  Appointment with [facility] ROOM P3 at [place] [address] ([phone]) Referral Information Referral ID Referred By Referred To  
  
 [id number] [facility]   
   [address] [place] [address] Aashish Almodovar, [address] Phone: [phone] Fax: [phone] Visits Status Start Date End Date 1 New Request 7/23/18 7/23/19 If your referral has a status of pending review or denied, additional information will be sent to support the outcome of this decision. Patient Instructions Hernia Repair: Before Your Surgery What is hernia repair surgery? A hernia occurs when a weak spot in your belly muscles allows a piece of your intestines or the tissue around them to poke through. This can cause a bulge in the area. It can also cause pain. But you may not feel anything. The hernia may be in your groin. Or it may be near your belly button. In some cases, it's in a scar from an earlier surgery. A doctor can fix a hernia through a cut (incision) made near it. This is called open surgery. Or the doctor may make some very small cuts and use a thin, lighted scope and small tools. This is laparoscopic surgery. If your hernia is bulging, the bulge is pushed back into place. The doctor then sews the healthy tissue back together. Often a piece of material is used to patch the weak spot. Open surgery will leave a longer scar. Laparoscopic surgery leaves a few small scars. The scars will fade with time. You may need to take 1 to 2 weeks off from work. This depends on the type of work you do and how you feel. Follow-up care is a key part of your treatment and safety. Be sure to make and go to all appointments, and call your doctor if you are having problems. It's also a good idea to know your test results and keep a list of the medicines you take. What happens before surgery? 
 Surgery can be stressful. This information will help you understand what you can expect. And it will help you safely prepare for surgery. 
 Preparing for surgery 
  · Understand exactly what surgery is planned, along with the risks, benefits, and other options.   
 · Tell your doctors ALL the medicines, vitamins, supplements, and herbal remedies you take. Some of these can increase the risk of bleeding or interact with anesthesia.  
  · If you take blood thinners, such as warfarin (Coumadin), clopidogrel (Plavix), or aspirin, be sure to talk to your doctor. He or she will tell you if you should stop taking these medicines before your surgery. Make sure that you understand exactly what your doctor wants you to do.  
  · Your doctor will tell you which medicines to take or stop before your surgery. You may need to stop taking certain medicines a week or more before surgery. So talk to your doctor as soon as you can.  
  · If you have an advance directive, let your doctor know. It may include a living will and a durable power of  for health care. Bring a copy to the hospital. If you don't have one, you may want to prepare one. It lets your doctor and loved ones know your health care wishes. Doctors advise that everyone prepare these papers before any type of surgery or procedure. What happens on the day of surgery? · Follow the instructions exactly about when to stop eating and drinking. If you don't, your surgery may be canceled. If your doctor told you to take your medicines on the day of surgery, take them with only a sip of water.  
  · Take a bath or shower before you come in for your surgery. Do not apply lotions, perfumes, deodorants, or nail polish.  
  · Do not shave the surgical site yourself.  
  · Take off all jewelry and piercings. And take out contact lenses, if you wear them.  
 At the hospital or surgery center · Bring a picture ID.  
  · The area for surgery is often marked to make sure there are no errors.  
  · You will be kept comfortable and safe by your anesthesia provider. You will be asleep during the surgery.  
  · The surgery will take about 30 minutes to 2 hours. This depends on the size of the hernia and where it is. Going home · Be sure you have someone to drive you home.  Anesthesia and pain medicine make it unsafe for you to drive.  
  · You will be given more specific instructions about recovering from your surgery. They will cover things like diet, wound care, follow-up care, driving, and getting back to your normal routine. When should you call your doctor? · You have questions or concerns.  
  · You don't understand how to prepare for your surgery.  
  · You become ill before the surgery (such as fever, flu, or a cold).  
  · You need to reschedule or have changed your mind about having the surgery. Where can you learn more? Go to http://marta-kuldeep.info/. Enter H135 in the search box to learn more about \"Hernia Repair: Before Your Surgery. \" Current as of: May 12, 2017 Content Version: 11.7 © 3618-2999 [employer]. Care instructions adapted under license by [employer] (which disclaims liability or warranty for this information). If you have questions about a medical condition or this instruction, always ask your healthcare professional. [place] [address] any warranty or liability for your use of this information. Hernia Repair: What to Expect at Home Your Recovery You are likely to have pain for the next few days. You may also feel like you have the flu, and you may have a low fever and feel tired and nauseated. This is common. You should feel better after a few days and will probably feel much better in 7 days. For several weeks you may feel twinges or pulling in the hernia repair when you move. You may have some bruising on the scrotum and along the penis. This is normal. Men will need to wear a jockstrap or briefs, not boxers, for scrotal support for several days after a groin (inguinal) hernia repair. Friday bicycle shorts may provide good support. This care sheet gives you a general idea about how long it will take for you to recover. But each person recovers at a different pace.  Follow the steps below to get better as quickly as possible. How can you care for yourself at home? Activity 
  · Rest when you feel tired. Getting enough sleep will help you recover.  
  · Try to walk each day. Start by walking a little more than you did the day before. Bit by bit, increase the amount you walk. Walking boosts blood flow and helps prevent pneumonia and constipation.  
  · Avoid strenuous activities, such as biking, jogging, weight lifting, or aerobic exercise, until your doctor says it is okay.  
  · Avoid lifting anything that would make you strain. This may include heavy grocery bags and milk containers, a heavy briefcase or backpack, cat litter or dog food bags, a vacuum , or a child.  
  · You may drive when you are no longer taking pain medicine and can quickly move your foot from the gas pedal to the brake. You must also be able to sit comfortably for a long period of time, even if you do not plan to go far. You might get caught in traffic.  
  · Most people are able to return to work within 1 to 2 weeks after surgery.  
  · You may shower 24 to 48 hours after surgery, if your doctor okays it. Pat the cut (incision) dry. Do not take a bath for the first 2 weeks, or until your doctor tells you it is okay.  
  · Your doctor will tell you when you can have sex again. Diet 
  · You can eat your normal diet. If your stomach is upset, try bland, low-fat foods like plain rice, broiled chicken, toast, and yogurt.  
  · Drink plenty of fluids (unless your doctor tells you not to).  
  · You may notice that your bowel movements are not regular right after your surgery. This is common. Avoid constipation and straining with bowel movements. You may want to take a fiber supplement every day. If you have not had a bowel movement after a couple of days, ask your doctor about taking a mild laxative. Medicines 
  · Your doctor will tell you if and when you can restart your medicines. He or she will also give you instructions about taking any new medicines.  
  · If you take blood thinners, such as warfarin (Coumadin), clopidogrel (Plavix), or aspirin, be sure to talk to your doctor. He or she will tell you if and when to start taking those medicines again. Make sure that you understand exactly what your doctor wants you to do.  
  · Be safe with medicines. Take pain medicines exactly as directed. ¨ If the doctor gave you a prescription medicine for pain, take it as prescribed. ¨ If you are not taking a prescription pain medicine, take an over-the-counter medicine such as acetaminophen (Tylenol), ibuprofen (Advil, Motrin), or naproxen (Aleve). Read and follow all instructions on the label. ¨ Do not take two or more pain medicines at the same time unless the doctor told you to. Many pain medicines have acetaminophen, which is Tylenol. Too much acetaminophen (Tylenol) can be harmful.  
  · If your doctor prescribed antibiotics, take them as directed. Do not stop taking them just because you feel better. You need to take the full course of antibiotics.  
  · If you think your pain medicine is making you sick to your stomach: 
¨ Take your medicine after meals (unless your doctor has told you not to). ¨ Ask your doctor for a different pain medicine. Incision care 
  · If you have strips of tape on the cut (incision) the doctor made, leave the tape on for a week or until it falls off.  
  · If you have staples closing the cut, you will need to visit your doctor in 1 to 2 weeks to have them removed.  
  · Wash the area daily with warm, soapy water and pat it dry. Follow-up care is a key part of your treatment and safety. Be sure to make and go to all appointments, and call your doctor if you are having problems. It's also a good idea to know your test results and keep a list of the medicines you take. When should you call for help? Call 911 anytime you think you may need emergency care. For example, call if: 
  · You passed out (lost consciousness).  
  · You are short of breath.  
 Call your doctor now or seek immediate medical care if: 
  · You have pain that does not get better after you take pain medicine.  
  · You are sick to your stomach and cannot keep fluids down.  
  · You have signs of a blood clot in your leg (called a deep vein thrombosis), such as: 
¨ Pain in your calf, back of the knee, thigh, or groin. ¨ Redness and swelling in your leg or groin.  
  · You cannot pass stools or gas.  
  · Bright red blood has soaked through the bandage over your incision.  
  · You have loose stitches, or your incision comes open.  
  · You have signs of infection, such as: 
¨ Increased pain, swelling, warmth, or redness. ¨ Red streaks leading from the incision. ¨ Pus draining from the incision. ¨ A fever.  
 Watch closely for any changes in your health, and be sure to contact your doctor if you have any problems. Where can you learn more? Go to http://marta-kuldeep.info/. Enter D342 in the search box to learn more about \"Hernia Repair: What to Expect at Home. \" Current as of: May 12, 2017 Content Version: 11.7 © 5224-1548 [employer]. Care instructions adapted under license by [employer] (which disclaims liability or warranty for this information). If you have questions about a medical condition or this instruction, always ask your healthcare professional. [place] [address] any warranty or liability for your use of this information. Introducing [facility]! [employer] introduces StepOne patient portal. Now you can access parts of your medical record, email your doctor's office, and request medication refills online. 1. In your internet browser, go to [email or web address] 2. Click on the First Time User? Click Here link in the Sign In box. You will see the New Member Sign Up page. 3. Enter your [employer] Access Code exactly as it appears below. You will not need to use this code after youve completed the sign-up process. If you do not sign up before the expiration date, you must request a new code. · [employer] Access Code: [email or web address] Expires: 9/23/2018 10:57 AM 
 
4. Enter the last four digits of your Social Security Number (xxxx) and Date of Birth (mm/dd/yyyy) as indicated and click Submit. You will be taken to the next sign-up page. 5. Create a [employer] ID. This will be your [employer] login ID and cannot be changed, so think of one that is secure and easy to remember. 6. Create a [employer] password. You can change your password at any time. 7. Enter your Password Reset Question and Answer. This can be used at a later time if you forget your password. 8. Enter your e-mail address. You will receive e-mail notification when new information is available in [address] 9. Click Sign Up. You can now view and download portions of your medical record. 10. Click the Download Summary menu link to download a portable copy of your medical information. If you have questions, please visit the Frequently Asked Questions section of the [employer] website. Remember, [employer] is NOT to be used for urgent needs. For medical emergencies, dial 911. Now available from your iPhone and Android! Please provide this summary of care documentation to your next provider. Your primary care clinician is listed as JOE Gabriel. If you have any questions after today's visit, please call [phone].

## 2018-07-23 NOTE — PROGRESS NOTES
Louisa Mosley is a 80 y.o. male and presents with Hypertension (f/u on medication)    Subjective:  Pt presents with family (sister and nephew) to f/u med stop, but concerned with upcoming surgery on 7/31 with DR. Librado Paul for hernia repair and transporting him on time. Pt does NOT wish to come to her home and sleep, she fears he will eat and not awaken in time. Would like to know if he can go in the night before. She is also concerned for when he returns home, she feels he needs more assistance in the home than his nephew is able to provide in the few hours he is there during the week. [place]. Hypertension Review:  The patient has hypertension  Diet and Lifestyle: generally does try to follow a  low sodium diet, exercises rarely  Home BP Monitoring: is not measured at home. Pertinent ROS: NOT taking lisinopril as instructed, tolerating med stop well. No TIA's, chest pain on exertion, dyspnea on exertion, or swelling of ankles. BP Readings from Last 3 Encounters:   07/23/18 126/83   07/11/18 122/71   06/25/18 100/60     Review of Systems  Constitutional: negative for fevers, chills, anorexia and weight loss  Respiratory:  negative for cough, hemoptysis, dyspnea, and wheezing  CV:   negative for chest pain, palpitations, and lower extremity edema  GI:   negative for nausea, vomiting, diarrhea, abdominal pain, and melena  Endo:               negative for polyuria,polydipsia,polyphagia, and heat intolerance  Genitourinary: negative for frequency, urgency, dysuria, retention, and hematuria  Integument:  + long toenails, occ. Foot discomfort. Dermatitis to testicles with assessment by Dr. Librado Paul, resolving with use of OTC ointment.  negative for ulcerations, and pruritus  Hematologic:  negative for easy bruising and bleeding  Musculoskel: negative for arthralgias, muscle weakness,and joint pain/swelling  Neurological:  negative for headaches, dizziness, vertigo,and gait problems  Behavl/Psych: negative for feelings of anxiety, depression, suicide, and mood changes    Past Medical History:   Diagnosis Date    Anemia     BPH (benign prostatic hypertrophy)     DEMENTIA     Gout     High cholesterol     Hypertension      Past Surgical History:   Procedure Laterality Date    HX HERNIA REPAIR  9-10-13    [facility]Dr. Phoebe Chan     Social History     Social History    Marital status: SINGLE     Spouse name: N/A    Number of children: N/A    Years of education: N/A     Social History Main Topics    Smoking status: Unknown If Ever Smoked    Smokeless tobacco: Never Used    Alcohol use Yes      Comment: DIOR    Drug use: No    Sexual activity: No     Other Topics Concern    None     Social History Narrative    ** Merged History Encounter **          Family History   Problem Relation Age of Onset    Stroke Mother     Stroke Sister     Diabetes Sister     Hypertension Sister     Heart Disease Brother     Hypertension Brother      Current Outpatient Prescriptions   Medication Sig Dispense Refill    tamsulosin (FLOMAX) 0.4 mg capsule TAKE 1 CAPSULE BY MOUTH EVERY DAY 90 Cap 0    food supplemt, lactose-reduced (BOOST HIGH PROTEIN) 0.06 gram- 1 kcal/mL liqd Drink 2-3 containers DAILY. 90 Container 11    ferrous sulfate 325 mg (65 mg iron) tablet Take 1 Tab by mouth two (2) times daily (with meals). On an empty stomach with Vitamin C (like OJ) (Patient taking differently: Take 325 mg by mouth Daily (before breakfast). On an empty stomach with Vitamin C (like OJ)) 60 Tab 11    potassium chloride SR (K-TAB) 10 mEq tablet Take 2 Tabs by mouth two (2) times a day. (Patient taking differently: Take 20 mEq by mouth daily.) 360 Tab 3    aspirin 81 mg tablet Take 81 mg by mouth.  albuterol (PROVENTIL VENTOLIN) 2.5 mg /3 mL (0.083 %) nebulizer solution 3 mL by Nebulization route every four (4) hours as needed for Wheezing or Shortness of Breath.  4 Package 11     No Known Allergies    Objective:  Visit Vitals    /83 (BP 1 Location: Right arm, BP Patient Position: Sitting)    Pulse (!) 107    Temp 97.7 °F (36.5 °C) (Oral)    Resp 16    Ht 5' 6\" (1.676 m)    Wt 120 lb 6.4 oz (54.6 kg)    SpO2 95%    BMI 19.43 kg/m2     Wt Readings from Last 3 Encounters:   07/23/18 120 lb 6.4 oz (54.6 kg)   07/11/18 118 lb (53.5 kg)   06/25/18 118 lb 6.4 oz (53.7 kg)     Physical Exam:   General appearance - alert, well appearing, and in no distress. Mental status - A/O x 2 (person and place), normal mood and affect. Responds appropriately. Neck -Supple ,normal CSP. FROM, non-tender. No significant adenopathy/thyromegaly. No JVD. Chest - Rhonchi throughout all lung fields. Symmetric chest rise. No wheezing or rales. + weak cough. Heart - Tachycardic, regular rhythm. Normal S1, S2. No MGR or clicks. Abdomen - Soft, non-distended. Normoactive BS in all quadrants. NT, no mass or HSM. Ext- Radial, DP pulses, 2+ bilaterally. No pedal edema, clubbing, or cyanosis. Skin-Warm and dry. No hyperpigmentation, ulcerations, or suspicious lesions. Neuro - Normal speech, no focal findings or movement disorder. Normal strength, gait, and muscle tone. Assessment/Plan:  CXR pre-op to r/o PNA. Advised pt to use nebulizer if needed. BP maintaining well since med stop, will reassess with lipid profile in October. Proceed with surgery as planned otherwise. Home health referral placed for home safety eval and need for home care aide. Medication Side Effects and Warnings were discussed with patient: yes   Patient Labs were reviewed: yes  Patient Past Records were reviewed: yes    See below for other orders   Follow-up Disposition:  Return in about 12 weeks (around 10/15/2018) for chol, BP f/u, hernia repair. ICD-10-CM ICD-9-CM    1. Incarcerated inguinal hernia K40.30 550.10    2. Essential hypertension I10 401.9    3. Encounter for medication adjustment Z51.89 V58.83    4.  Frailty R54 799 REFERRAL TO HOME HEALTH   5. BMI less than 19,adult Z68.1 V85.0 REFERRAL TO HOME HEALTH   6. Rhonchi R09.89 786.7 XR CHEST PA LAT     Orders Placed This Encounter    XR CHEST PA LAT     Standing Status:   Future     Number of Occurrences:   1     Standing Expiration Date:   8/24/2019     Order Specific Question:   Reason for Exam     Answer:   rhonchi, cough. pre-op     Order Specific Question:   Is Patient Allergic to Contrast Dye? Answer:   No    REFERRAL TO HOME HEALTH     Referral Priority:   Routine     Referral Type:   Home Health Evaluation     Referral Reason:   Continuity of Wallace[address] 906 expressed understanding of plan. An After Visit Summary was offered/printed and given to the patient.

## 2018-07-23 NOTE — PATIENT INSTRUCTIONS
Hernia Repair: Before Your Surgery  What is hernia repair surgery? A hernia occurs when a weak spot in your belly muscles allows a piece of your intestines or the tissue around them to poke through. This can cause a bulge in the area. It can also cause pain. But you may not feel anything. The hernia may be in your groin. Or it may be near your belly button. In some cases, it's in a scar from an earlier surgery. A doctor can fix a hernia through a cut (incision) made near it. This is called open surgery. Or the doctor may make some very small cuts and use a thin, lighted scope and small tools. This is laparoscopic surgery. If your hernia is bulging, the bulge is pushed back into place. The doctor then sews the healthy tissue back together. Often a piece of material is used to patch the weak spot. Open surgery will leave a longer scar. Laparoscopic surgery leaves a few small scars. The scars will fade with time. You may need to take 1 to 2 weeks off from work. This depends on the type of work you do and how you feel. Follow-up care is a key part of your treatment and safety. Be sure to make and go to all appointments, and call your doctor if you are having problems. It's also a good idea to know your test results and keep a list of the medicines you take. What happens before surgery?   Surgery can be stressful. This information will help you understand what you can expect. And it will help you safely prepare for surgery.   Preparing for surgery    · Understand exactly what surgery is planned, along with the risks, benefits, and other options. · Tell your doctors ALL the medicines, vitamins, supplements, and herbal remedies you take. Some of these can increase the risk of bleeding or interact with anesthesia.     · If you take blood thinners, such as warfarin (Coumadin), clopidogrel (Plavix), or aspirin, be sure to talk to your doctor.  He or she will tell you if you should stop taking these medicines before your surgery. Make sure that you understand exactly what your doctor wants you to do.     · Your doctor will tell you which medicines to take or stop before your surgery. You may need to stop taking certain medicines a week or more before surgery. So talk to your doctor as soon as you can.     · If you have an advance directive, let your doctor know. It may include a living will and a durable power of  for health care. Bring a copy to the hospital. If you don't have one, you may want to prepare one. It lets your doctor and loved ones know your health care wishes. Doctors advise that everyone prepare these papers before any type of surgery or procedure. What happens on the day of surgery? · Follow the instructions exactly about when to stop eating and drinking. If you don't, your surgery may be canceled. If your doctor told you to take your medicines on the day of surgery, take them with only a sip of water.     · Take a bath or shower before you come in for your surgery. Do not apply lotions, perfumes, deodorants, or nail polish.     · Do not shave the surgical site yourself.     · Take off all jewelry and piercings. And take out contact lenses, if you wear them.    At the hospital or surgery center   · Bring a picture ID.     · The area for surgery is often marked to make sure there are no errors.     · You will be kept comfortable and safe by your anesthesia provider. You will be asleep during the surgery.     · The surgery will take about 30 minutes to 2 hours. This depends on the size of the hernia and where it is. Going home   · Be sure you have someone to drive you home. Anesthesia and pain medicine make it unsafe for you to drive.     · You will be given more specific instructions about recovering from your surgery. They will cover things like diet, wound care, follow-up care, driving, and getting back to your normal routine. When should you call your doctor?    · You have questions or concerns.     · You don't understand how to prepare for your surgery.     · You become ill before the surgery (such as fever, flu, or a cold).     · You need to reschedule or have changed your mind about having the surgery. Where can you learn more? Go to http://marta-kuldeep.info/. Enter X483 in the search box to learn more about \"Hernia Repair: Before Your Surgery. \"  Current as of: May 12, 2017  Content Version: 11.7  © 7353-2012 [employer]. Care instructions adapted under license by [employer] (which disclaims liability or warranty for this information). If you have questions about a medical condition or this instruction, always ask your healthcare professional. [address] any warranty or liability for your use of this information. Hernia Repair: What to Expect at [address] are likely to have pain for the next few days. You may also feel like you have the flu, and you may have a low fever and feel tired and nauseated. This is common. You should feel better after a few days and will probably feel much better in 7 days. For several weeks you may feel twinges or pulling in the hernia repair when you move. You may have some bruising on the scrotum and along the penis. This is normal. Men will need to wear a jockstrap or briefs, not boxers, for scrotal support for several days after a groin (inguinal) hernia repair. Spandex bicycle shorts may provide good support. This care sheet gives you a general idea about how long it will take for you to recover. But each person recovers at a different pace. Follow the steps below to get better as quickly as possible. How can you care for yourself at home? Activity    · Rest when you feel tired. Getting enough sleep will help you recover.     · Try to walk each day. Start by walking a little more than you did the day before. Bit by bit, increase the amount you walk.  Walking boosts blood flow and helps prevent pneumonia and constipation.     · Avoid strenuous activities, such as biking, jogging, weight lifting, or aerobic exercise, until your doctor says it is okay.     · Avoid lifting anything that would make you strain. This may include heavy grocery bags and milk containers, a heavy briefcase or backpack, cat litter or dog food bags, a vacuum , or a child.     · You may drive when you are no longer taking pain medicine and can quickly move your foot from the gas pedal to the brake. You must also be able to sit comfortably for a long period of time, even if you do not plan to go far. You might get caught in traffic.     · Most people are able to return to work within 1 to 2 weeks after surgery.     · You may shower 24 to 48 hours after surgery, if your doctor okays it. Pat the cut (incision) dry. Do not take a bath for the first 2 weeks, or until your doctor tells you it is okay.     · Your doctor will tell you when you can have sex again. Diet    · You can eat your normal diet. If your stomach is upset, try bland, low-fat foods like plain rice, broiled chicken, toast, and yogurt.     · Drink plenty of fluids (unless your doctor tells you not to).     · You may notice that your bowel movements are not regular right after your surgery. This is common. Avoid constipation and straining with bowel movements. You may want to take a fiber supplement every day. If you have not had a bowel movement after a couple of days, ask your doctor about taking a mild laxative. Medicines    · Your doctor will tell you if and when you can restart your medicines. He or she will also give you instructions about taking any new medicines.     · If you take blood thinners, such as warfarin (Coumadin), clopidogrel (Plavix), or aspirin, be sure to talk to your doctor. He or she will tell you if and when to start taking those medicines again.  Make sure that you understand exactly what your doctor wants you to do.     · Be safe with medicines. Take pain medicines exactly as directed. ¨ If the doctor gave you a prescription medicine for pain, take it as prescribed. ¨ If you are not taking a prescription pain medicine, take an over-the-counter medicine such as acetaminophen (Tylenol), ibuprofen (Advil, Motrin), or naproxen (Aleve). Read and follow all instructions on the label. ¨ Do not take two or more pain medicines at the same time unless the doctor told you to. Many pain medicines have acetaminophen, which is Tylenol. Too much acetaminophen (Tylenol) can be harmful.     · If your doctor prescribed antibiotics, take them as directed. Do not stop taking them just because you feel better. You need to take the full course of antibiotics.     · If you think your pain medicine is making you sick to your stomach:  ¨ Take your medicine after meals (unless your doctor has told you not to). ¨ Ask your doctor for a different pain medicine. Incision care    · If you have strips of tape on the cut (incision) the doctor made, leave the tape on for a week or until it falls off.     · If you have staples closing the cut, you will need to visit your doctor in 1 to 2 weeks to have them removed.     · Wash the area daily with warm, soapy water and pat it dry. Follow-up care is a key part of your treatment and safety. Be sure to make and go to all appointments, and call your doctor if you are having problems. It's also a good idea to know your test results and keep a list of the medicines you take. When should you call for help? Call 911 anytime you think you may need emergency care.  For example, call if:    · You passed out (lost consciousness).     · You are short of breath.    Call your doctor now or seek immediate medical care if:    · You have pain that does not get better after you take pain medicine.     · You are sick to your stomach and cannot keep fluids down.     · You have signs of a blood clot in your leg (called a deep vein thrombosis), such as:  ¨ Pain in your calf, back of the knee, thigh, or groin. ¨ Redness and swelling in your leg or groin.     · You cannot pass stools or gas.     · Bright red blood has soaked through the bandage over your incision.     · You have loose stitches, or your incision comes open.     · You have signs of infection, such as:  ¨ Increased pain, swelling, warmth, or redness. ¨ Red streaks leading from the incision. ¨ Pus draining from the incision. ¨ A fever.    Watch closely for any changes in your health, and be sure to contact your doctor if you have any problems. Where can you learn more? Go to http://marta-kuldeep.info/. Enter A541 in the search box to learn more about \"Hernia Repair: What to Expect at Home. \"  Current as of: May 12, 2017  Content Version: 11.7  © 4091-9547 [employer]. Care instructions adapted under license by [employer] (which disclaims liability or warranty for this information). If you have questions about a medical condition or this instruction, always ask your healthcare professional. [place] [address] any warranty or liability for your use of this information.

## 2018-07-24 ENCOUNTER — HOSPITAL ENCOUNTER (OUTPATIENT)
Dept: PREADMISSION TESTING | Age: 83
Discharge: HOME | End: 2018-07-24
Attending: SURGERY
Payer: MEDICARE

## 2018-07-24 VITALS
HEIGHT: 66 IN | SYSTOLIC BLOOD PRESSURE: 133 MMHG | OXYGEN SATURATION: 96 % | HEART RATE: 84 BPM | WEIGHT: 120.59 LBS | RESPIRATION RATE: 16 BRPM | BODY MASS INDEX: 19.38 KG/M2 | TEMPERATURE: 97.6 F | DIASTOLIC BLOOD PRESSURE: 77 MMHG

## 2018-07-24 LAB
ALBUMIN SERPL-MCNC: 3.5 G/DL (ref 3.5–5)
ALBUMIN/GLOB SERPL: 1 {RATIO} (ref 1.1–2.2)
ALP SERPL-CCNC: 61 U/L (ref 45–117)
ALT SERPL-CCNC: 22 U/L (ref 12–78)
ANION GAP SERPL CALC-SCNC: 5 MMOL/L (ref 5–15)
APPEARANCE UR: CLEAR
AST SERPL-CCNC: 21 U/L (ref 15–37)
ATRIAL RATE: 73 BPM
BACTERIA URNS QL MICRO: NEGATIVE /HPF
BASOPHILS # BLD: 0.1 K/UL (ref 0–0.1)
BASOPHILS NFR BLD: 1 % (ref 0–1)
BILIRUB SERPL-MCNC: 0.4 MG/DL (ref 0.2–1)
BILIRUB UR QL: NEGATIVE
BUN SERPL-MCNC: 19 MG/DL (ref 6–20)
BUN/CREAT SERPL: 26 (ref 12–20)
CALCIUM SERPL-MCNC: 9 MG/DL (ref 8.5–10.1)
CALCULATED P AXIS, ECG09: 85 DEGREES
CALCULATED R AXIS, ECG10: 72 DEGREES
CALCULATED T AXIS, ECG11: 82 DEGREES
CHLORIDE SERPL-SCNC: 100 MMOL/L (ref 97–108)
CO2 SERPL-SCNC: 28 MMOL/L (ref 21–32)
COLOR UR: ABNORMAL
CREAT SERPL-MCNC: 0.73 MG/DL (ref 0.7–1.3)
DIAGNOSIS, 93000: NORMAL
DIFFERENTIAL METHOD BLD: ABNORMAL
EOSINOPHIL # BLD: 0.8 K/UL (ref 0–0.4)
EOSINOPHIL NFR BLD: 13 % (ref 0–7)
EPITH CASTS URNS QL MICRO: ABNORMAL /LPF
ERYTHROCYTE [DISTWIDTH] IN BLOOD BY AUTOMATED COUNT: 13.3 % (ref 11.5–14.5)
GLOBULIN SER CALC-MCNC: 3.5 G/DL (ref 2–4)
GLUCOSE SERPL-MCNC: 91 MG/DL (ref 65–100)
GLUCOSE UR STRIP.AUTO-MCNC: NEGATIVE MG/DL
HCT VFR BLD AUTO: 37.5 % (ref 36.6–50.3)
HGB BLD-MCNC: 12 G/DL (ref 12.1–17)
HGB UR QL STRIP: NEGATIVE
HYALINE CASTS URNS QL MICRO: ABNORMAL /LPF (ref 0–5)
IMM GRANULOCYTES # BLD: 0 K/UL (ref 0–0.04)
IMM GRANULOCYTES NFR BLD AUTO: 1 % (ref 0–0.5)
KETONES UR QL STRIP.AUTO: NEGATIVE MG/DL
LEUKOCYTE ESTERASE UR QL STRIP.AUTO: ABNORMAL
LYMPHOCYTES # BLD: 1.3 K/UL (ref 0.8–3.5)
LYMPHOCYTES NFR BLD: 22 % (ref 12–49)
MCH RBC QN AUTO: 29.8 PG (ref 26–34)
MCHC RBC AUTO-ENTMCNC: 32 G/DL (ref 30–36.5)
MCV RBC AUTO: 93.1 FL (ref 80–99)
MONOCYTES # BLD: 0.5 K/UL (ref 0–1)
MONOCYTES NFR BLD: 9 % (ref 5–13)
NEUTS SEG # BLD: 3.3 K/UL (ref 1.8–8)
NEUTS SEG NFR BLD: 55 % (ref 32–75)
NITRITE UR QL STRIP.AUTO: NEGATIVE
NRBC # BLD: 0 K/UL (ref 0–0.01)
NRBC BLD-RTO: 0 PER 100 WBC
P-R INTERVAL, ECG05: 224 MS
PH UR STRIP: 7 [PH] (ref 5–8)
PLATELET # BLD AUTO: 268 K/UL (ref 150–400)
PMV BLD AUTO: 10 FL (ref 8.9–12.9)
POTASSIUM SERPL-SCNC: 4.5 MMOL/L (ref 3.5–5.1)
PROT SERPL-MCNC: 7 G/DL (ref 6.4–8.2)
PROT UR STRIP-MCNC: NEGATIVE MG/DL
Q-T INTERVAL, ECG07: 374 MS
QRS DURATION, ECG06: 64 MS
QTC CALCULATION (BEZET), ECG08: 412 MS
RBC # BLD AUTO: 4.03 M/UL (ref 4.1–5.7)
RBC #/AREA URNS HPF: ABNORMAL /HPF (ref 0–5)
SODIUM SERPL-SCNC: 133 MMOL/L (ref 136–145)
SP GR UR REFRACTOMETRY: 1.02 (ref 1–1.03)
UA: UC IF INDICATED,UAUC: ABNORMAL
UROBILINOGEN UR QL STRIP.AUTO: 0.2 EU/DL (ref 0.2–1)
VENTRICULAR RATE, ECG03: 73 BPM
WBC # BLD AUTO: 6 K/UL (ref 4.1–11.1)
WBC URNS QL MICRO: ABNORMAL /HPF (ref 0–4)

## 2018-07-24 PROCEDURE — 81001 URINALYSIS AUTO W/SCOPE: CPT | Performed by: SURGERY

## 2018-07-24 PROCEDURE — 80053 COMPREHEN METABOLIC PANEL: CPT | Performed by: SURGERY

## 2018-07-24 PROCEDURE — 85025 COMPLETE CBC W/AUTO DIFF WBC: CPT | Performed by: SURGERY

## 2018-07-24 PROCEDURE — 87086 URINE CULTURE/COLONY COUNT: CPT | Performed by: SURGERY

## 2018-07-24 PROCEDURE — 36415 COLL VENOUS BLD VENIPUNCTURE: CPT | Performed by: SURGERY

## 2018-07-24 PROCEDURE — 93005 ELECTROCARDIOGRAM TRACING: CPT

## 2018-07-24 RX ORDER — CEFAZOLIN SODIUM 1 G/3ML
2 INJECTION, POWDER, FOR SOLUTION INTRAMUSCULAR; INTRAVENOUS ONCE
Status: CANCELLED | OUTPATIENT
Start: 2018-07-31 | End: 2018-07-31

## 2018-07-24 RX ORDER — SODIUM CHLORIDE, SODIUM LACTATE, POTASSIUM CHLORIDE, CALCIUM CHLORIDE 600; 310; 30; 20 MG/100ML; MG/100ML; MG/100ML; MG/100ML
25 INJECTION, SOLUTION INTRAVENOUS CONTINUOUS
Status: CANCELLED | OUTPATIENT
Start: 2018-07-31

## 2018-07-24 RX ORDER — CEFAZOLIN SODIUM 1 G/3ML
2 INJECTION, POWDER, FOR SOLUTION INTRAMUSCULAR; INTRAVENOUS ONCE
Status: DISCONTINUED | OUTPATIENT
Start: 2018-07-31 | End: 2018-07-24 | Stop reason: CLARIF

## 2018-07-24 NOTE — PERIOP NOTES
[facility] Preoperative Instructions Surgery Date 07/31/18          Time of Arrival 8639 1. On the day of your surgery, please report to the Surgical Services Registration Desk and sign in at your designated time. The Surgery Center is located to the right of the Emergency Room. 2. You must have someone with you to drive you home. You should not drive a car for 24 hours following surgery. Please make arrangements for a friend or family member to stay with you for the first 24 hours after your surgery. 3. Do not have anything to eat or drink (including water, gum, mints, coffee, juice) after midnight ?07/30/18? Jorden Shillings ? This may not apply to medications prescribed by your physician. ?(Please note below the special instructions with medications to take the morning of your procedure.) 4. We recommend you do not drink any alcoholic beverages for 24 hours before and after your surgery. 5. Contact your surgeons office for instructions on the following medications: non-steroidal anti-inflammatory drugs (i.e. Advil, Aleve), vitamins, and supplements. (Some surgeons will want you to stop these medications prior to surgery and others may allow you to take them) **If you are currently taking Plavix, Coumadin, Aspirin and/or other blood-thinning agents, contact your surgeon for instructions. ** Your surgeon will partner with the physician prescribing these medications to determine if it is safe to stop or if you need to continue taking. Please do not stop taking these medications without instructions from your surgeon 6. Wear comfortable clothes. Wear glasses instead of contacts. Do not bring any money or jewelry. Please bring picture ID, insurance card, and any prearranged co-payment or hospital payment. Do not wear make-up, particularly mascara the morning of your surgery. Do not wear nail polish, particularly if you are having foot /hand surgery.   Wear your hair loose or down, no ponytails, buns, omar pins or clips. All body piercings must be removed. Please shower with antibacterial soap for three consecutive days before and on the morning of surgery, but do not apply any lotions, powders or deodorants after the shower on the day of surgery. Please use a fresh towels after each shower. Please sleep in clean clothes and change bed linens the night before surgery. Please do not shave for 48 hours prior to surgery. Shaving of the face is acceptable. 7. You should understand that if you do not follow these instructions your surgery may be cancelled. If your physical condition changes (I.e. fever, cold or flu) please contact your surgeon as soon as possible. 8. It is important that you be on time. If a situation occurs where you may be late, please call [phone] (OR Holding Area). 9. If you have any questions and or problems, please call [phone] (Pre-admission Testing). 10. Your surgery time may be subject to change. You will receive a phone call the evening prior if your time changes. 11.  If having outpatient surgery, you must have someone to drive you here, stay with you during the duration of your stay, and to drive you home at time of discharge. 12.   In an effort to improve the efficiency, privacy, and safety for all of our Pre-op patients visitors are not allowed in the Holding area. Once you arrive and are registered your family/visitors will be asked to remain in the waiting room. The Pre-op staff will get you from the Surgical Waiting Area and will explain to you and your family/visitors that the Pre-op phase is beginning. The staff will answer any questions and provide instructions for tracking of the patient, by use of the existing tracking number and color-coded status board in the waiting room.   At this time the staff will also ask for your designated spokesperson information in the event that the physician or staff need to provide an update or obtain any pertinent information. The designated spokesperson will be notified if the physician needs to speak to family during the pre-operative phase. If at any time your family/visitors has questions or concerns they may approach the volunteer desk in the waiting area for assistance. Special Instructions: MEDICATIONS TO TAKE THE MORNING OF SURGERY WITH A SIP OF WATER:albuterol nebulizer if needed. I understand a pre-operative phone call will be made to verify my surgery time. In the event that I am not available, I give permission for a message to be left on my answering service and/or with another person? Yes [phone] Geraldine-sister 
 
 
 
 ___________________      __________   _________ 
  (Signature of Patient)             (Witness)                (Date and Time)

## 2018-07-24 NOTE — PERIOP NOTES
Talked with patients sister, Geraldine, about whether patient was able to get new nebulizer machine. She said that [employer] had it in stock, they were just going to call pts. PCP to get authorization so they could submit it through his insurance. Told her that I would call her back later in the week to check on the status.

## 2018-07-25 LAB
BACTERIA SPEC CULT: NORMAL
CC UR VC: NORMAL
SERVICE CMNT-IMP: NORMAL

## 2018-07-26 RX ORDER — NEBULIZER AND COMPRESSOR
1 EACH MISCELLANEOUS AS NEEDED
Qty: 1 EACH | Refills: 0 | Status: SHIPPED | OUTPATIENT
Start: 2018-07-26 | End: 2019-04-30

## 2018-07-27 ENCOUNTER — TELEPHONE (OUTPATIENT)
Dept: INTERNAL MEDICINE CLINIC | Age: 83
End: 2018-07-27

## 2018-07-27 NOTE — TELEPHONE ENCOUNTER
Yes, I reviewed lab, but not much to do with his sodium level. This is not a recurrent issue, so they can repeat and give IVF pre-op to increase if the surgeon feels this is necessary. However 2 pts below normal was not overly concerning to me, he is unlikely to have any adverse reactions or surgical complications with these lab values.

## 2018-07-27 NOTE — PERIOP NOTES
Geraldine, pts sister, called back and talked with Isaura Tao RN and said that pt got his nebulizer.

## 2018-07-27 NOTE — PERIOP NOTES
LM for pts. PCP, Chastity Mancia NP, about whether she has had a chance to review sodium level and whether she has heard from patient about needing a new nebulizer machine? Phone number provided.

## 2018-07-27 NOTE — TELEPHONE ENCOUNTER
Ryan Augustine form [facility] ([phone]) called wanting to know if you had a chance to look at patient labs, if you was going to do something about his Sodium results, patient is due to had surg, and his nebulizer  machine is broken

## 2018-07-27 NOTE — PERIOP NOTES
JERED Rodriguez at Dr Eve Gil office to talk about NA level of 133 and what PCP said. Wanted to see if Dr Librado Paul wanted POC chem 8 for DOS or not.

## 2018-07-27 NOTE — TELEPHONE ENCOUNTER
Called and spoke w/ Elmer Dorado about Nahomy Gee's advise and recommendations. Elmer Dorado stated that she will inform surgeon.

## 2018-07-27 NOTE — PERIOP NOTES
LM for patients sister, Matthew Vallejo, to find out whether or not she was able to get her brother a new nebulizer machine. Phone number provided.

## 2018-07-27 NOTE — PERIOP NOTES
pts PCP office called back and said that CMP was reviewed and they are not concerned about sodium level. If we want to repeat DOS, they are fine with that.

## 2018-07-31 ENCOUNTER — ANESTHESIA (OUTPATIENT)
Dept: SURGERY | Age: 83
DRG: 351 | End: 2018-07-31
Payer: MEDICARE

## 2018-07-31 ENCOUNTER — HOSPITAL ENCOUNTER (INPATIENT)
Age: 83
LOS: 6 days | Discharge: NURSING, REHAB OR LONG-TERM CARE | DRG: 351 | End: 2018-08-08
Attending: SURGERY | Admitting: SURGERY
Payer: MEDICARE

## 2018-07-31 ENCOUNTER — ANESTHESIA EVENT (OUTPATIENT)
Dept: SURGERY | Age: 83
DRG: 351 | End: 2018-07-31
Payer: MEDICARE

## 2018-07-31 PROBLEM — K40.90 INGUINAL HERNIA: Status: ACTIVE | Noted: 2018-07-31

## 2018-07-31 LAB
ANION GAP BLD CALC-SCNC: 15 MMOL/L (ref 10–20)
APPEARANCE UR: CLEAR
BACTERIA URNS QL MICRO: NEGATIVE /HPF
BILIRUB UR QL: NEGATIVE
BUN BLD-MCNC: 23 MG/DL (ref 9–20)
CA-I BLD-MCNC: 1.15 MMOL/L (ref 1.12–1.32)
CHLORIDE BLD-SCNC: 96 MMOL/L (ref 98–107)
CO2 BLD-SCNC: 28 MMOL/L (ref 21–32)
COLOR UR: ABNORMAL
CREAT BLD-MCNC: 0.6 MG/DL (ref 0.6–1.3)
EPITH CASTS URNS QL MICRO: ABNORMAL /LPF
GLUCOSE BLD-MCNC: 121 MG/DL (ref 65–100)
GLUCOSE UR STRIP.AUTO-MCNC: NEGATIVE MG/DL
HCT VFR BLD CALC: 41 % (ref 36.6–50.3)
HGB UR QL STRIP: NEGATIVE
KETONES UR QL STRIP.AUTO: NEGATIVE MG/DL
LEUKOCYTE ESTERASE UR QL STRIP.AUTO: ABNORMAL
NITRITE UR QL STRIP.AUTO: NEGATIVE
PH UR STRIP: 6.5 [PH] (ref 5–8)
POTASSIUM BLD-SCNC: 4.5 MMOL/L (ref 3.5–5.1)
PROT UR STRIP-MCNC: NEGATIVE MG/DL
RBC #/AREA URNS HPF: ABNORMAL /HPF (ref 0–5)
SERVICE CMNT-IMP: ABNORMAL
SODIUM BLD-SCNC: 134 MMOL/L (ref 136–145)
SP GR UR REFRACTOMETRY: 1.02 (ref 1–1.03)
UA: UC IF INDICATED,UAUC: ABNORMAL
UROBILINOGEN UR QL STRIP.AUTO: 0.2 EU/DL (ref 0.2–1)
WBC URNS QL MICRO: ABNORMAL /HPF (ref 0–4)

## 2018-07-31 PROCEDURE — 77030018673: Performed by: SURGERY

## 2018-07-31 PROCEDURE — 74011250637 HC RX REV CODE- 250/637: Performed by: SURGERY

## 2018-07-31 PROCEDURE — 74011000250 HC RX REV CODE- 250

## 2018-07-31 PROCEDURE — 74011250636 HC RX REV CODE- 250/636: Performed by: SURGERY

## 2018-07-31 PROCEDURE — 81001 URINALYSIS AUTO W/SCOPE: CPT | Performed by: SURGERY

## 2018-07-31 PROCEDURE — 0YU60JZ SUPPLEMENT LEFT INGUINAL REGION WITH SYNTHETIC SUBSTITUTE, OPEN APPROACH: ICD-10-PCS | Performed by: SURGERY

## 2018-07-31 PROCEDURE — 74011000250 HC RX REV CODE- 250: Performed by: SURGERY

## 2018-07-31 PROCEDURE — 94760 N-INVAS EAR/PLS OXIMETRY 1: CPT

## 2018-07-31 PROCEDURE — 74011250636 HC RX REV CODE- 250/636

## 2018-07-31 PROCEDURE — 77030033138 HC SUT PGA STRATFX J&J -B: Performed by: SURGERY

## 2018-07-31 PROCEDURE — 77030032490 HC SLV COMPR SCD KNE COVD -B: Performed by: SURGERY

## 2018-07-31 PROCEDURE — 77030008684 HC TU ET CUF COVD -B: Performed by: NURSE ANESTHETIST, CERTIFIED REGISTERED

## 2018-07-31 PROCEDURE — 77010033678 HC OXYGEN DAILY

## 2018-07-31 PROCEDURE — 77030011640 HC PAD GRND REM COVD -A: Performed by: SURGERY

## 2018-07-31 PROCEDURE — 74011000250 HC RX REV CODE- 250: Performed by: ANESTHESIOLOGY

## 2018-07-31 PROCEDURE — 76210000006 HC OR PH I REC 0.5 TO 1 HR: Performed by: SURGERY

## 2018-07-31 PROCEDURE — 80047 BASIC METABLC PNL IONIZED CA: CPT

## 2018-07-31 PROCEDURE — 77030003029 HC SUT VCRL J&J -B: Performed by: SURGERY

## 2018-07-31 PROCEDURE — C1781 MESH (IMPLANTABLE): HCPCS | Performed by: SURGERY

## 2018-07-31 PROCEDURE — 87086 URINE CULTURE/COLONY COUNT: CPT | Performed by: SURGERY

## 2018-07-31 PROCEDURE — 77030020782 HC GWN BAIR PAWS FLX 3M -B

## 2018-07-31 PROCEDURE — 77030019908 HC STETH ESOPH SIMS -A: Performed by: NURSE ANESTHETIST, CERTIFIED REGISTERED

## 2018-07-31 PROCEDURE — 77030026438 HC STYL ET INTUB CARD -A: Performed by: NURSE ANESTHETIST, CERTIFIED REGISTERED

## 2018-07-31 PROCEDURE — 77030012406 HC DRN WND PENRS BARD -A: Performed by: SURGERY

## 2018-07-31 PROCEDURE — 99218 HC RM OBSERVATION: CPT

## 2018-07-31 PROCEDURE — 76010000153 HC OR TIME 1.5 TO 2 HR: Performed by: SURGERY

## 2018-07-31 PROCEDURE — 88302 TISSUE EXAM BY PATHOLOGIST: CPT | Performed by: SURGERY

## 2018-07-31 PROCEDURE — 76060000034 HC ANESTHESIA 1.5 TO 2 HR: Performed by: SURGERY

## 2018-07-31 PROCEDURE — 77030039266 HC ADH SKN EXOFIN S2SG -A: Performed by: SURGERY

## 2018-07-31 PROCEDURE — 77030002996 HC SUT SLK J&J -A: Performed by: SURGERY

## 2018-07-31 DEVICE — IMPLANTABLE DEVICE: Type: IMPLANTABLE DEVICE | Site: INGUINAL | Status: FUNCTIONAL

## 2018-07-31 RX ORDER — PROPOFOL 10 MG/ML
INJECTION, EMULSION INTRAVENOUS AS NEEDED
Status: DISCONTINUED | OUTPATIENT
Start: 2018-07-31 | End: 2018-07-31 | Stop reason: HOSPADM

## 2018-07-31 RX ORDER — SODIUM CHLORIDE 0.9 % (FLUSH) 0.9 %
5-10 SYRINGE (ML) INJECTION EVERY 8 HOURS
Status: DISCONTINUED | OUTPATIENT
Start: 2018-07-31 | End: 2018-07-31 | Stop reason: HOSPADM

## 2018-07-31 RX ORDER — BUPIVACAINE HYDROCHLORIDE AND EPINEPHRINE 5; 5 MG/ML; UG/ML
INJECTION, SOLUTION EPIDURAL; INTRACAUDAL; PERINEURAL AS NEEDED
Status: DISCONTINUED | OUTPATIENT
Start: 2018-07-31 | End: 2018-07-31 | Stop reason: HOSPADM

## 2018-07-31 RX ORDER — CEFAZOLIN SODIUM/WATER 2 G/20 ML
2 SYRINGE (ML) INTRAVENOUS ONCE
Status: COMPLETED | OUTPATIENT
Start: 2018-07-31 | End: 2018-07-31

## 2018-07-31 RX ORDER — ROCURONIUM BROMIDE 10 MG/ML
INJECTION, SOLUTION INTRAVENOUS AS NEEDED
Status: DISCONTINUED | OUTPATIENT
Start: 2018-07-31 | End: 2018-07-31 | Stop reason: HOSPADM

## 2018-07-31 RX ORDER — ALBUTEROL SULFATE 0.83 MG/ML
2.5 SOLUTION RESPIRATORY (INHALATION)
Status: DISCONTINUED | OUTPATIENT
Start: 2018-07-31 | End: 2018-08-08 | Stop reason: HOSPADM

## 2018-07-31 RX ORDER — ONDANSETRON 2 MG/ML
4 INJECTION INTRAMUSCULAR; INTRAVENOUS
Status: DISCONTINUED | OUTPATIENT
Start: 2018-07-31 | End: 2018-08-08 | Stop reason: HOSPADM

## 2018-07-31 RX ORDER — DOCUSATE SODIUM 100 MG/1
100 CAPSULE, LIQUID FILLED ORAL 2 TIMES DAILY
Status: DISCONTINUED | OUTPATIENT
Start: 2018-07-31 | End: 2018-08-08 | Stop reason: HOSPADM

## 2018-07-31 RX ORDER — FENTANYL CITRATE 50 UG/ML
25 INJECTION, SOLUTION INTRAMUSCULAR; INTRAVENOUS
Status: DISCONTINUED | OUTPATIENT
Start: 2018-07-31 | End: 2018-07-31 | Stop reason: HOSPADM

## 2018-07-31 RX ORDER — ACETAMINOPHEN 325 MG/1
650 TABLET ORAL
Status: DISCONTINUED | OUTPATIENT
Start: 2018-07-31 | End: 2018-08-08 | Stop reason: HOSPADM

## 2018-07-31 RX ORDER — ONDANSETRON 2 MG/ML
4 INJECTION INTRAMUSCULAR; INTRAVENOUS AS NEEDED
Status: DISCONTINUED | OUTPATIENT
Start: 2018-07-31 | End: 2018-07-31 | Stop reason: HOSPADM

## 2018-07-31 RX ORDER — SODIUM CHLORIDE, SODIUM LACTATE, POTASSIUM CHLORIDE, CALCIUM CHLORIDE 600; 310; 30; 20 MG/100ML; MG/100ML; MG/100ML; MG/100ML
25 INJECTION, SOLUTION INTRAVENOUS CONTINUOUS
Status: DISCONTINUED | OUTPATIENT
Start: 2018-07-31 | End: 2018-07-31 | Stop reason: HOSPADM

## 2018-07-31 RX ORDER — DIPHENHYDRAMINE HYDROCHLORIDE 50 MG/ML
12.5 INJECTION, SOLUTION INTRAMUSCULAR; INTRAVENOUS AS NEEDED
Status: DISCONTINUED | OUTPATIENT
Start: 2018-07-31 | End: 2018-07-31 | Stop reason: HOSPADM

## 2018-07-31 RX ORDER — TAMSULOSIN HYDROCHLORIDE 0.4 MG/1
0.4 CAPSULE ORAL DAILY
Status: DISCONTINUED | OUTPATIENT
Start: 2018-07-31 | End: 2018-08-08 | Stop reason: HOSPADM

## 2018-07-31 RX ORDER — SODIUM CHLORIDE 0.9 % (FLUSH) 0.9 %
5-10 SYRINGE (ML) INJECTION AS NEEDED
Status: DISCONTINUED | OUTPATIENT
Start: 2018-07-31 | End: 2018-08-08 | Stop reason: HOSPADM

## 2018-07-31 RX ORDER — SODIUM CHLORIDE 0.9 % (FLUSH) 0.9 %
5-10 SYRINGE (ML) INJECTION AS NEEDED
Status: DISCONTINUED | OUTPATIENT
Start: 2018-07-31 | End: 2018-07-31 | Stop reason: HOSPADM

## 2018-07-31 RX ORDER — PHENYLEPHRINE HCL IN 0.9% NACL 0.4MG/10ML
SYRINGE (ML) INTRAVENOUS AS NEEDED
Status: DISCONTINUED | OUTPATIENT
Start: 2018-07-31 | End: 2018-07-31 | Stop reason: HOSPADM

## 2018-07-31 RX ORDER — MORPHINE SULFATE 10 MG/ML
2 INJECTION, SOLUTION INTRAMUSCULAR; INTRAVENOUS
Status: DISCONTINUED | OUTPATIENT
Start: 2018-07-31 | End: 2018-07-31 | Stop reason: HOSPADM

## 2018-07-31 RX ORDER — HYDROMORPHONE HYDROCHLORIDE 1 MG/ML
.2-.5 INJECTION, SOLUTION INTRAMUSCULAR; INTRAVENOUS; SUBCUTANEOUS
Status: DISCONTINUED | OUTPATIENT
Start: 2018-07-31 | End: 2018-07-31 | Stop reason: HOSPADM

## 2018-07-31 RX ORDER — GLYCOPYRROLATE 0.2 MG/ML
INJECTION INTRAMUSCULAR; INTRAVENOUS AS NEEDED
Status: DISCONTINUED | OUTPATIENT
Start: 2018-07-31 | End: 2018-07-31 | Stop reason: HOSPADM

## 2018-07-31 RX ORDER — NEOSTIGMINE METHYLSULFATE 1 MG/ML
INJECTION INTRAVENOUS AS NEEDED
Status: DISCONTINUED | OUTPATIENT
Start: 2018-07-31 | End: 2018-07-31 | Stop reason: HOSPADM

## 2018-07-31 RX ORDER — LIDOCAINE HYDROCHLORIDE 20 MG/ML
INJECTION, SOLUTION EPIDURAL; INFILTRATION; INTRACAUDAL; PERINEURAL AS NEEDED
Status: DISCONTINUED | OUTPATIENT
Start: 2018-07-31 | End: 2018-07-31 | Stop reason: HOSPADM

## 2018-07-31 RX ORDER — SODIUM CHLORIDE, SODIUM LACTATE, POTASSIUM CHLORIDE, CALCIUM CHLORIDE 600; 310; 30; 20 MG/100ML; MG/100ML; MG/100ML; MG/100ML
INJECTION, SOLUTION INTRAVENOUS
Status: DISCONTINUED | OUTPATIENT
Start: 2018-07-31 | End: 2018-07-31 | Stop reason: HOSPADM

## 2018-07-31 RX ORDER — ONDANSETRON 2 MG/ML
INJECTION INTRAMUSCULAR; INTRAVENOUS AS NEEDED
Status: DISCONTINUED | OUTPATIENT
Start: 2018-07-31 | End: 2018-07-31 | Stop reason: HOSPADM

## 2018-07-31 RX ORDER — SODIUM CHLORIDE 0.9 % (FLUSH) 0.9 %
5-10 SYRINGE (ML) INJECTION EVERY 8 HOURS
Status: DISCONTINUED | OUTPATIENT
Start: 2018-07-31 | End: 2018-08-08 | Stop reason: HOSPADM

## 2018-07-31 RX ORDER — DIPHENHYDRAMINE HYDROCHLORIDE 50 MG/ML
12.5 INJECTION, SOLUTION INTRAMUSCULAR; INTRAVENOUS
Status: ACTIVE | OUTPATIENT
Start: 2018-07-31 | End: 2018-08-01

## 2018-07-31 RX ORDER — GABAPENTIN 300 MG/1
300 CAPSULE ORAL 2 TIMES DAILY
Status: DISCONTINUED | OUTPATIENT
Start: 2018-07-31 | End: 2018-08-01

## 2018-07-31 RX ORDER — FENTANYL CITRATE 50 UG/ML
INJECTION, SOLUTION INTRAMUSCULAR; INTRAVENOUS AS NEEDED
Status: DISCONTINUED | OUTPATIENT
Start: 2018-07-31 | End: 2018-07-31 | Stop reason: HOSPADM

## 2018-07-31 RX ORDER — SUCCINYLCHOLINE CHLORIDE 20 MG/ML
INJECTION INTRAMUSCULAR; INTRAVENOUS AS NEEDED
Status: DISCONTINUED | OUTPATIENT
Start: 2018-07-31 | End: 2018-07-31 | Stop reason: HOSPADM

## 2018-07-31 RX ORDER — HYDROCODONE BITARTRATE AND ACETAMINOPHEN 5; 325 MG/1; MG/1
1-2 TABLET ORAL
Status: DISCONTINUED | OUTPATIENT
Start: 2018-07-31 | End: 2018-08-03

## 2018-07-31 RX ORDER — HYDROMORPHONE HYDROCHLORIDE 2 MG/ML
.5-1 INJECTION, SOLUTION INTRAMUSCULAR; INTRAVENOUS; SUBCUTANEOUS
Status: DISCONTINUED | OUTPATIENT
Start: 2018-07-31 | End: 2018-08-03

## 2018-07-31 RX ORDER — ALBUTEROL SULFATE 0.83 MG/ML
2.5 SOLUTION RESPIRATORY (INHALATION)
Status: COMPLETED | OUTPATIENT
Start: 2018-07-31 | End: 2018-07-31

## 2018-07-31 RX ADMIN — Medication 10 ML: at 20:10

## 2018-07-31 RX ADMIN — FENTANYL CITRATE 25 MCG: 50 INJECTION, SOLUTION INTRAMUSCULAR; INTRAVENOUS at 07:32

## 2018-07-31 RX ADMIN — Medication 2 G: at 07:40

## 2018-07-31 RX ADMIN — SUCCINYLCHOLINE CHLORIDE 80 MG: 20 INJECTION INTRAMUSCULAR; INTRAVENOUS at 07:37

## 2018-07-31 RX ADMIN — GABAPENTIN 300 MG: 300 CAPSULE ORAL at 14:26

## 2018-07-31 RX ADMIN — HYDROCODONE BITARTRATE AND ACETAMINOPHEN 1 TABLET: 5; 325 TABLET ORAL at 20:09

## 2018-07-31 RX ADMIN — ALBUTEROL SULFATE 2.5 MG: 2.5 SOLUTION RESPIRATORY (INHALATION) at 07:20

## 2018-07-31 RX ADMIN — ALBUTEROL SULFATE 2.5 MG: 2.5 SOLUTION RESPIRATORY (INHALATION) at 10:18

## 2018-07-31 RX ADMIN — Medication 80 MCG: at 07:47

## 2018-07-31 RX ADMIN — Medication 80 MCG: at 08:03

## 2018-07-31 RX ADMIN — ONDANSETRON 4 MG: 2 INJECTION INTRAMUSCULAR; INTRAVENOUS at 08:22

## 2018-07-31 RX ADMIN — Medication 80 MCG: at 07:46

## 2018-07-31 RX ADMIN — Medication 120 MCG: at 07:51

## 2018-07-31 RX ADMIN — Medication 10 ML: at 14:27

## 2018-07-31 RX ADMIN — Medication 80 MCG: at 08:21

## 2018-07-31 RX ADMIN — Medication 120 MCG: at 07:49

## 2018-07-31 RX ADMIN — Medication 80 MCG: at 08:15

## 2018-07-31 RX ADMIN — Medication 80 MCG: at 08:09

## 2018-07-31 RX ADMIN — Medication 80 MCG: at 07:44

## 2018-07-31 RX ADMIN — Medication 80 MCG: at 07:42

## 2018-07-31 RX ADMIN — NEOSTIGMINE METHYLSULFATE 3 MG: 1 INJECTION INTRAVENOUS at 08:51

## 2018-07-31 RX ADMIN — ROCURONIUM BROMIDE 20 MG: 10 INJECTION, SOLUTION INTRAVENOUS at 08:01

## 2018-07-31 RX ADMIN — PROPOFOL 120 MG: 10 INJECTION, EMULSION INTRAVENOUS at 07:37

## 2018-07-31 RX ADMIN — GLYCOPYRROLATE 0.4 MG: 0.2 INJECTION INTRAMUSCULAR; INTRAVENOUS at 08:51

## 2018-07-31 RX ADMIN — Medication 80 MCG: at 07:39

## 2018-07-31 RX ADMIN — LIDOCAINE HYDROCHLORIDE 40 MG: 20 INJECTION, SOLUTION EPIDURAL; INFILTRATION; INTRACAUDAL; PERINEURAL at 07:37

## 2018-07-31 RX ADMIN — PROPOFOL 20 MG: 10 INJECTION, EMULSION INTRAVENOUS at 07:48

## 2018-07-31 RX ADMIN — FENTANYL CITRATE 50 MCG: 50 INJECTION, SOLUTION INTRAMUSCULAR; INTRAVENOUS at 07:59

## 2018-07-31 RX ADMIN — FENTANYL CITRATE 25 MCG: 50 INJECTION, SOLUTION INTRAMUSCULAR; INTRAVENOUS at 07:49

## 2018-07-31 RX ADMIN — DOCUSATE SODIUM 100 MG: 100 CAPSULE, LIQUID FILLED ORAL at 18:29

## 2018-07-31 RX ADMIN — Medication 80 MCG: at 07:40

## 2018-07-31 RX ADMIN — PROPOFOL 50 MG: 10 INJECTION, EMULSION INTRAVENOUS at 08:01

## 2018-07-31 RX ADMIN — Medication 80 MCG: at 08:19

## 2018-07-31 RX ADMIN — PROPOFOL 40 MG: 10 INJECTION, EMULSION INTRAVENOUS at 07:50

## 2018-07-31 RX ADMIN — SODIUM CHLORIDE, SODIUM LACTATE, POTASSIUM CHLORIDE, CALCIUM CHLORIDE: 600; 310; 30; 20 INJECTION, SOLUTION INTRAVENOUS at 07:30

## 2018-07-31 RX ADMIN — Medication 80 MCG: at 07:52

## 2018-07-31 RX ADMIN — Medication 240 MCG: at 08:12

## 2018-07-31 RX ADMIN — PROPOFOL 20 MG: 10 INJECTION, EMULSION INTRAVENOUS at 07:51

## 2018-07-31 RX ADMIN — Medication 80 MCG: at 08:17

## 2018-07-31 RX ADMIN — Medication 80 MCG: at 07:50

## 2018-07-31 RX ADMIN — Medication 120 MCG: at 07:59

## 2018-07-31 RX ADMIN — Medication 80 MCG: at 08:01

## 2018-07-31 NOTE — PROGRESS NOTES
Nurse requested sister to come back with patient to assist with answering preop questions. Patient is A&Ox2 with confusion and is a poor historian.

## 2018-07-31 NOTE — H&P (VIEW-ONLY)
HISTORY OF PRESENT ILLNESS 
Myra Gonzales is a 80 y.o. male. HPI Comments:  
+dementia. Answering simple question, some of the history filled in by his sister (I operated on his sister in 2014) C/o left groin and scrotal pain 
+bulge left groin Had emergent right IHR at [facility] in 2013 Eloisa PO and boost to try and gain wt 
 
 
 
____________________________________________________________________________ Patient presents with: 
Possible Hernia: Patient seen at the request of Dr. Adelia Nguyen to evaluate possible inguinal hernia. /71 (BP 1 Location: Right arm, BP Patient Position: Sitting)  Pulse 99  Temp 96 °F (35.6 °C) (Oral)   Resp 16  Ht 5' 6\" (1.676 m)  Wt 53.5 kg (118 lb)  SpO2 93%  BMI 19.05 kg/m2 Past Medical History: 
No date: Anemia No date: BPH (benign prostatic hypertrophy) No date: DEMENTIA No date: Gout No date: High cholesterol No date: Hypertension Past Surgical History: 
9-10-13: HX HERNIA REPAIR Comment: RIHR with mesh-[facility]-Dr. Catherine Hirsch Social History Marital status: SINGLE              Spouse name:                    
  Years of education:                 Number of children:            
 
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           
  Sexual activity: No                
 
Social History Narrative ** Merged History Encounter ** Review of patient's family history indicates: 
  Stroke                         Mother Stroke                         Sister Diabetes                       Sister Hypertension                   Sister Heart Disease                  Brother Hypertension                   Brother Current Outpatient Prescriptions: 
lisinopril (PRINIVIL, ZESTRIL) 10 mg tablet, TK 1 T PO D 
pravastatin (PRAVACHOL) 40 mg tablet, TK 1 T PO Q NIGHT 
tamsulosin (FLOMAX) 0.4 mg capsule, TAKE 1 CAPSULE BY MOUTH EVERY DAY 
food supplemt, lactose-reduced (BOOST HIGH PROTEIN) 0.06 gram- 1 kcal/mL liqd, Drink 2-3 containers DAILY. ferrous sulfate 325 mg (65 mg iron) tablet, Take 1 Tab by mouth two (2) times daily (with meals). On an empty stomach with Vitamin C (like OJ) (Patient taking differently: Take 325 mg by mouth Daily (before breakfast). On an empty stomach with Vitamin C (like OJ)) potassium chloride SR (K-TAB) 10 mEq tablet, Take 2 Tabs by mouth two (2) times a day. (Patient taking differently: Take 20 mEq by mouth daily.) 
albuterol (PROVENTIL VENTOLIN) 2.5 mg /3 mL (0.083 %) nebulizer solution, 3 mL by Nebulization route every four (4) hours as needed for Wheezing or Shortness of Breath. aspirin 81 mg tablet, Take 81 mg by mouth. No current facility-administered medications for this visit. Allergies: No Known Allergies 
_____________________________________________________________________________ Possible Hernia The history is provided by the patient and relative. This is a chronic problem. The current episode started more than 1 week ago. The problem occurs daily. The problem has been gradually worsening. Pertinent negatives include no chest pain, no abdominal pain, no headaches and no shortness of breath. The symptoms are aggravated by exertion. The symptoms are relieved by rest. The treatment provided no relief. Review of Systems Constitutional: Negative for chills, fever and weight loss. HENT: Negative for ear pain. Eyes: Negative for pain. Respiratory: Negative for shortness of breath. Cardiovascular: Negative for chest pain. Gastrointestinal: Negative for abdominal pain and blood in stool. Genitourinary: Negative for hematuria. Musculoskeletal: Negative for joint pain. Skin: Negative for rash. Neurological: Negative for dizziness, focal weakness, seizures and headaches.   
Endo/Heme/Allergies: Does not bruise/bleed easily. Psychiatric/Behavioral: The patient does not have insomnia. Physical Exam  
Constitutional: He is oriented to person, place, and time. He appears well-developed and well-nourished. No distress. HENT:  
Head: Normocephalic and atraumatic. Mouth/Throat: No oropharyngeal exudate. Eyes: Pupils are equal, round, and reactive to light. Neck: Normal range of motion. No tracheal deviation present. Cardiovascular: Normal rate, regular rhythm and normal heart sounds. No murmur heard. Pulmonary/Chest: Effort normal and breath sounds normal. No respiratory distress. He has no wheezes. Abdominal: Soft. Bowel sounds are normal. He exhibits no distension and no mass. There is no tenderness. There is no rebound and no guarding. A hernia is present. Hernia confirmed positive in the left inguinal area. Genitourinary: Musculoskeletal: Normal range of motion. He exhibits no edema or tenderness. Lymphadenopathy:  
  He has no cervical adenopathy. Left: No inguinal adenopathy present. Neurological: He is alert and oriented to person, place, and time. Skin: Skin is warm. No rash noted. He is not diaphoretic. No erythema. Psychiatric: He has a normal mood and affect. His behavior is normal.  
 
 
ASSESSMENT and PLAN 
  ICD-10-CM ICD-9-CM 1. Left inguinal hernia K40.90 550.90 I have recommended to Moose Willis that we proceed with surgery I had an extensive discussion with him regarding the risks, benefits, and alternatives of proceeding with an open left Inguinal Hernia Repair with Mesh. Risks, benefits, and alternatives were discussed including the risk of anesthesia, bleeding, infection, including mesh infection, chronic orchialgia, neuralgia, other pain syndromes, testicular ischemia, injury to bowel, and recurrence were discussed. I reviewed with Moose Willis his increased risk of bleeding secondary to Asprin use.   I have asked him to discontinue the Asprin for 2 days prior to surgery to reduce this risk. He is in agreement to proceed. All questions were answered. Selena Hebert will undergo preoperative evaluation and will proceed provided he is medically stable. Thank you for this consult.

## 2018-07-31 NOTE — OP NOTES
[facility]  OPERATIVE REPORT    Nadia Caraballo  MR#: [id number]  : 1930  ACCOUNT #: [de-identified]   DATE OF SERVICE: 2018    PREOPERATIVE DIAGNOSIS:  Left inguinal hernia. POSTOPERATIVE DIAGNOSIS:  Left inguinal hernia. PROCEDURE PERFORMED:  Open left inguinal hernia repair with mesh. SURGEON:  Matthew King MD         ANESTHESIA:  General.    ESTIMATED BLOOD LOSS:  Minimal.    FINDINGS:  The patient was noted to have a very large direct hernia sac containing a loop of colon, containing hard stool. After carefully enlarging the internal ring, I was able to reduce the sac contents. Excess sac was excised. Repair was undertaken with biosynthetic mesh. COMPLICATIONS:  None. SPECIMENS REMOVED:  Hernia sac. IMPLANTS:  yes    INDICATION:  The patient is an 49-year-old gentleman with a symptomatic left inguinal hernia. He is being brought to the operating room today for repair. Risks and benefits were discussed with the patient and his family, consent form and was placed on the chart. PROCEDURE:  After satisfactory induction of general anesthesia, the patient was kept in supine position. The patient's left groin was prepped and draped sterilely. After using local anesthetic, an oblique incision was made over the external ring. Incision was carried down through the skin and soft tissue layers using cautery. The external fascia was quite attenuated down towards the pubis, with the hernia sac essentially presenting itself in the subQ. I was able to get distal to the sac and isolate the cord structures with a Penrose drain, the direct sac was carefully opened. It was noted to contain colon as noted above. The colon was incarcerated, but viable. I extended my incision on the external fascia, and carefully split the muscle to enlarge the internal ring, which allowed me to reduce the colon.   Once this was successfully accomplished, a 2-0 silk pursestring was used to ligate the excess sac and it was amputated and sent to Pathology. Because of the distorted anatomy and the external fascia being fused to the conjoined tendon, and this being a direct hernia through an enlarged external ring, I felt this would best be repaired over a biosynthetic mesh in plug configuration. I was able to insert the mesh holding the sac in reduction, and with the tissues that were available, I essentially did a modified Bassini repair proximally down to the pubic tubercle. Laterally, the canal actually looked intact, and I was able to separate the external fascia off the conjoined tendon, enough that allowed me to return the cord structures back to their normal position and close the external fascia with additional Ethibond sutures. This allowed me to recreate the inguinal canal and allow for a successful repair. Additional local anesthetic was injected. The Camper's and Angel's fascia were both approximated with interrupted 3-0 Vicryls, followed by closure of skin with subcuticular Monocryl and Dermabond. The patient remained stable during our presence in the operating room.       Lorri Brunner, MD ANG / JOSE  D: 07/31/2018 13:15     T: 07/31/2018 13:56  JOB #: [id number]

## 2018-07-31 NOTE — IP AVS SNAPSHOT
[address] [facility] 
[phone] Patient: Myra Gonzales MRN: [id number] About your hospitalization You were admitted on:  July 31, 2018 You last received care in the:  \[facility]\"" 2 GENERAL SURGERY You were discharged on:  August 8, 2018 Why you were hospitalized Your primary diagnosis was:  Not on File Your diagnoses also included:  Inguinal Hernia, Urinary Retention Follow-up Information Follow up With Details Comments Contact Info [address] [facility])   [address] [facility] 
[phone] Leti Davey NP   [facility] [address] [facility] 7 [address] 
[phone] Your Scheduled Appointments Monday August 13, 2018  1:30 PM EDT  
POST OP 10 MIN with Ricci Ochoa MD  
[facility] Dr. Harpal Toribio [facility] ([address] [place] [address]) [address] [place] [address], [address] [facility]  
[phone] Discharge Orders None A check felisa indicates which time of day the medication should be taken. My Medications START taking these medications Instructions Each Dose to Equal  
 Morning Noon Evening Bedtime  
 docusate sodium 100 mg capsule Commonly known as:  Bulmaro Aceves Your last dose was: Your next dose is: Take 1 Cap by mouth two (2) times a day for 7 days. Stop taking if you have diarrhea  
 100 mg CHANGE how you take these medications Instructions Each Dose to Equal  
 Morning Noon Evening Bedtime  
 ferrous sulfate 325 mg (65 mg iron) tablet What changed:   
- when to take this 
- additional instructions Your last dose was: Your next dose is: Take 1 Tab by mouth two (2) times daily (with meals). On an empty stomach with Vitamin C (like OJ)  325 mg  
    
   
   
   
  
 potassium chloride SR 10 mEq tablet Commonly known as:  K-TAB What changed:  when to take this Your last dose was: Your next dose is: Take 2 Tabs by mouth two (2) times a day. 20 mEq CONTINUE taking these medications Instructions Each Dose to Equal  
 Morning Noon Evening Bedtime  
 albuterol 2.5 mg /3 mL (0.083 %) nebulizer solution Commonly known as:  PROVENTIL VENTOLIN Your last dose was: Your next dose is:    
   
   
 3 mL by Nebulization route every four (4) hours as needed for Wheezing or Shortness of Breath. 2.5 mg  
    
   
   
   
  
 aspirin 81 mg tablet Your last dose was: Your next dose is: Take 81 mg by mouth daily. 81 mg  
    
   
   
   
  
 food supplemt, lactose-reduced 0.06 gram- 1 kcal/mL Liqd Commonly known as:  BOOST HIGH PROTEIN Your last dose was: Your next dose is:    
   
   
 Drink 2-3 containers DAILY. ibuprofen 200 mg tablet Commonly known as:  MOTRIN Your last dose was: Your next dose is: Take 2 Tabs by mouth every six (6) hours as needed for Pain. 400 mg Nebulizer & Compressor machine Your last dose was: Your next dose is:    
   
   
 1 Each by Does Not Apply route as needed for Cough (wheezing). Use nebulizer every 4-6 hours as needed for shortness of breath or wheezing 1 Each  
    
   
   
   
  
 tamsulosin 0.4 mg capsule Commonly known as:  FLOMAX Your last dose was: Your next dose is: TAKE 1 CAPSULE BY MOUTH EVERY DAY Where to Get Your Medications These [facility] [address] [place] [address] Ghassan Jimenez AT [address] Patel [address], [address] [facility] Hours:  24-hours Phone:  [phone] docusate sodium 100 mg capsule Discharge Instructions Dr. Sang Sutherland Discharge Instructions for:  Mitzi Martinez MRN: [id number] : 1930 Admitted: 2018  Discharged: 8/3/2018 What to do at [facility] Recommended diet: Resume your usual diet DISCHARGE INFORMATION Patient: Mitzi Martinez MRN: [id number]  SSN: [id number] YOB: 1930  Age: 80 y.o. Sex: male Followup Plan:   
[address] [facility] Urology Office: [facility] [phone] Urinary Catheter ([place]) · You are being discharged with a urinary catheter. It should continuously drain any urine from the bladder and you will not urinate. The larger bag (overnight bag) allows you to drain it less often, but is less mobile. A smaller bag (leg bag) can be attached to your leg for increased mobility, and concealment. · Keep the urinary drainage bag below the level of the bladder. Make sure that the urinary drainage bag does not drag and pull on the catheter. Drain it before the bag becomes full. · The catheter can irritate the urethral opening, and you can place topical antibiotic (Neoporin, Double/Triple antibiotic ointment) to facilitate lubrication. · The catheter can be irritating to the bladder, and it is not uncommon to have a false sense of the urge to urinate. This is worse when the catheter/tubing is not supported on the leg with some slack. If you are experience sudden onset of pain with the urge to urinate then you re having bladder spasms. Sometimes urine can leak around the catheter as the spasms pinches it off. Medications can be helpful if this is occuring frequently. · Small amounts of blood are not uncommon as the catheter rubs. Note that this amount of blood can appear significant but is in reality very little.  Hydration allows for dilution of the blood in the urine and avoids problems. If your catheter/tubing is becoming blocked by blood clots, or your catheter is not draining you should call your doctor immediately. Recommended activity: Lift less than 10 lbs for 4 weeks. Follow-up with Dr. Roberto Hill in 2-3 weeks. Call [phone] for an appointment. Follow-up with [facility] as above Inguinal Hernia Repair: What to Expect at Home Your Recovery You are likely to have pain for the next few days. You may also feel like you have the flu, and you may have a low fever and feel tired and nauseated. This is common. You should feel better after a few days and will probably feel much better in 7 days. For several weeks you may feel twinges or pulling in the hernia repair when you move. You may have some bruising on the scrotum and along the penis. This is normal. 
 
Men will need to wear a jockstrap or briefs, not boxers, for scrotal support for several days after a groin (inguinal) hernia repair. Sazneo bicycle shorts may provide good support. This care sheet gives you a general idea about how long it will take for you to recover. But each person recovers at a different pace. Follow the steps below to get better as quickly as possible. How can you care for yourself at home? Activity Rest when you feel tired. Getting enough sleep will help you recover. You can try to sleep with your head up in a recliner chair if that is more comfortable. Try to walk each day. Start by walking a little more than you did the day before. Bit by bit, increase the amount you walk. Walking boosts blood flow and helps prevent pneumonia and constipation. Put ice or a cold pack on the area of your hernia repair for 10 to 15 minutes at a time. Try to do this every 1 to 2 hours for the first 24 hours (when you are awake) or until the swelling goes down. Put a thin cloth between the ice and your skin.  
Avoid strenuous activities, such as biking, jogging, weight lifting, or aerobic exercise, until your doctor says it is okay. Avoid lifting anything that would make you strain. This may include heavy grocery bags and milk containers, a heavy briefcase or backpack, cat litter or dog food bags, a child, or a vacuum . You may drive when you are no longer taking pain medicine and can quickly move your foot from the gas pedal to the brake. You must also be able to sit comfortably for a long period of time. Most people are able to return to work within 1 to 2 weeks after surgery. You may shower. Pat the cut (incision) dry. Do not take a bath for 2 weeks. You can have sex again when it feels comfortable to do so. Diet You can eat your normal diet. If your stomach is upset, try bland, low-fat foods like plain rice, broiled chicken, toast, and yogurt. Drink plenty of fluids (unless your doctor tells you not to). You may notice that your bowel movements are not regular right after your surgery. This is common. Avoid constipation and straining with bowel movements. You may want to take a fiber supplement every day. If you have not had a bowel movement after a couple of days, ask your doctor about taking a mild laxative. Medicines Take pain medicines exactly as directed. If the doctor gave you a prescription medicine for pain, take it as prescribed. If you are not taking a prescription pain medicine, take an over-the-counter medicine such as acetaminophen (Tylenol), ibuprofen (Advil, Motrin), or naproxen (Aleve). Read and follow all instructions on the label. Do not take two or more pain medicines at the same time unless the doctor told you to. Many pain medicines have acetaminophen, which is Tylenol. Too much acetaminophen (Tylenol) can be harmful. If your doctor prescribed antibiotics, take them as directed. Do not stop taking them just because you feel better. You need to take the full course of antibiotics. If you think your pain medicine is making you sick to your stomach: Take your medicine after meals (unless your doctor has told you not to). Ask your doctor for a different pain medicine. Incision care If you have strips of tape on the cut (incision) the doctor made, leave the tape on for a week or until it falls off. If you have staples closing the cut, you will need to visit your doctor in 1 to 2 weeks to have them removed. Wash the area daily with warm, soapy water and pat it dry. Follow-up care is a key part of your treatment and safety. Be sure to make and go to all appointments, and call your doctor if you are having problems. Its also a good idea to know your test results and keep a list of the medicines you take. When should you call for help? Call 911 anytime you think you may need emergency care. For example, call if: You pass out (lose consciousness). You have sudden chest pain and shortness of breath, or you cough up blood. You have severe pain in your belly. Call your doctor now or seek immediate medical care if: 
You are sick to your stomach and cannot keep fluids down. You have signs of a blood clot, such as: 
Pain in your calf, back of the knee, thigh, or groin. Redness and swelling in your leg or groin. You have trouble passing urine or stool, especially if you have mild pain or swelling in your lower belly. You have hot flashes, sweating, flushing, or a fast or pounding heartbeat. Bright red blood has soaked through the bandage over your incision. Watch closely for any changes in your health, and be sure to contact your doctor if: 
Your swelling is getting worse. Your swelling is not going down. [employer] Announcement We are excited to announce that we are making your provider's discharge notes available to you in [employer].   You will see these notes when they are completed and signed by the physician that discharged you from your recent hospital stay. If you have any questions or concerns about any information you see in [employer], please call the Health Information Department where you were seen or reach out to your Primary Care Provider for more information about your plan of care. Introducing \[facility]\"" & HEALTH SERVICES! Luly Tang introduces [employer] patient portal. Now you can access parts of your medical record, email your doctor's office, and request medication refills online. 1. In your internet browser, go to [email or web address] 2. Click on the First Time User? Click Here link in the Sign In box. You will see the New Member Sign Up page. 3. Enter your [employer] Access Code exactly as it appears below. You will not need to use this code after youve completed the sign-up process. If you do not sign up before the expiration date, you must request a new code. · [employer] Access Code: [email or web address] Expires: 9/23/2018 10:57 AM 
 
4. Enter the last four digits of your Social Security Number (xxxx) and Date of Birth (mm/dd/yyyy) as indicated and click Submit. You will be taken to the next sign-up page. 5. Create a [employer] ID. This will be your [employer] login ID and cannot be changed, so think of one that is secure and easy to remember. 6. Create a [employer] password. You can change your password at any time. 7. Enter your Password Reset Question and Answer. This can be used at a later time if you forget your password. 8. Enter your e-mail address. You will receive e-mail notification when new information is available in [address] 9. Click Sign Up. You can now view and download portions of your medical record. 10. Click the Download Summary menu link to download a portable copy of your medical information. If you have questions, please visit the Frequently Asked Questions section of the [employer] website. Remember, [employer] is NOT to be used for urgent needs. For medical emergencies, dial 911. Now available from your iPhone and Android! Introducing Ariel Cameron As a [facility] Heriberto patient, I wanted to make you aware of our electronic visit tool called Ariel Cameron. [employer] 24/7 allows you to connect within minutes with a medical provider 24 hours a day, seven days a week via a mobile device or tablet or logging into a secure website from your computer. You can access Ariel Cameron from anywhere in the [place]. A virtual visit might be right for you when you have a simple condition and feel like you just dont want to get out of bed, or cant get away from work for an appointment, when your regular [facility]i Heriberto provider is not available (evenings, weekends or holidays), or when youre out of town and need minor care. Electronic visits cost only $49 and if the [employer] 24/7 provider determines a prescription is needed to treat your condition, one can be electronically transmitted to a nearby pharmacy*. Please take a moment to enroll today if you have not already done so. The enrollment process is free and takes just a few minutes. To enroll, please download the [employer] 24/7 roxane to your tablet or phone, or visit [email or web address] to enroll on your computer. And, as an [address] [place] [address] patient with a [employer] account, the results of your visits will be scanned into your electronic medical record and your primary care provider will be able to view the scanned results. We urge you to continue to see your regular K[facility]i Heriberto provider for your ongoing medical care. And while your primary care provider may not be the one available when you seek a Ariel Cameron virtual visit, the peace of mind you get from getting a real diagnosis real time can be priceless. For more information on Ariel Cameron, view our Frequently Asked Questions (FAQs) at [email or web address]. Sincerely, 
 
Gloria Delcid MD 
Chief Medical Officer [employer] *:  certain medications cannot be prescribed via Ariel Cameron Unresulted Labs-Please follow up with your PCP about these lab tests Order Current Status CULTURE, URINE In process Providers Seen During Your Hospitalization Provider Specialty Primary office phone Marli Hilario MD General Surgery [phone] Your Primary Care Physician (PCP) Primary Care Physician Office Phone Office Fax Marcus Garza I [phone] You are allergic to the following No active allergies Recent Documentation Weight BMI Smoking Status 52.1 kg 18.54 kg/m2 Unknown If Ever Smoked Emergency Contacts Name Discharge Info Relation Home Work Mobile Geraldine Cabrera DISCHARGE CAREGIVER [3] Other Relative [6] [phone] Patient Belongings The following personal items are in your possession at time of discharge: 
  Dental Appliances: None  Visual Aid: None      Home Medications: None   Jewelry: None  Clothing: At bedside    Other Valuables: None Please provide this summary of care documentation to your next provider. Signatures-by signing, you are acknowledging that this After Visit Summary has been reviewed with you and you have received a copy. Patient Signature:  ____________________________________________________________ Date:  ____________________________________________________________  
  
Cynthia Biggs Provider Signature:  ____________________________________________________________ Date:  ____________________________________________________________

## 2018-07-31 NOTE — BRIEF OP NOTE
BRIEF OPERATIVE NOTE Date of Procedure: 7/31/2018 Preoperative Diagnosis: LEFT INGUINAL HERNIA Postoperative Diagnosis: LEFT INGUINAL HERNIA Procedure(s): OPEN LEFT INGUIINAL HERNIA REPAIR WITH MESH Surgeon(s) and Role: 
   * Ricci Ochoa MD - Primary Surgical Staff: 
Circ-1: Ginger Armendariz RN Registered Nurse First Assistant: Abril Balderrama RN Scrub Tech-1: Yevgeniy Baldwin Scrub RN - Intern: India Cash RN Event Time In Incision Start 6754 Incision Close 3075 Anesthesia: General  
Estimated Blood Loss: min Specimens:  
ID Type Source Tests Collected by Time Destination 1 : hernia sac Preservative Inguinal  Ricci Ochoa MD 7/31/2018 8301 Pathology Findings: very large sac containing a loop of colon. Biosynthetic mesh used for repair. Complications: none Implants:  
Implant Name Type Inv. Item Serial No.  Lot No. LRB No. Used Action MESH PLUG MYCROMESH+ [id number] --  - SNA   MESH PLUG MYCROMESH+ [id number] --  NA  [employer] [id number] Left 1 Implanted

## 2018-07-31 NOTE — INTERVAL H&P NOTE
H&P Update: 
Kayode Qiu was seen and examined. Site marked. History and physical has been reviewed. The patient has been examined.  There have been no significant clinical changes since the completion of the originally dated History and Physical.

## 2018-07-31 NOTE — ANESTHESIA PREPROCEDURE EVALUATION
Anesthetic History No history of anesthetic complications Review of Systems / Medical History Patient summary reviewed, nursing notes reviewed and pertinent labs reviewed Pulmonary Asthma Neuro/Psych Dementia Cardiovascular Hypertension Exercise tolerance: >4 METS 
  
GI/Hepatic/Renal 
Within defined limits Endo/Other Within defined limits Other Findings Comments: Anemia Gout BPH Physical Exam 
 
Airway Mallampati: II 
TM Distance: 4 - 6 cm Neck ROM: normal range of motion Mouth opening: Normal 
 
 Cardiovascular Regular rate and rhythm,  S1 and S2 normal,  no murmur, click, rub, or gallop Dental 
 
Dentition: Edentulous Pulmonary Wheezes:bilateral 
 
 
 
 Abdominal 
GI exam deferred Other Findings Anesthetic Plan ASA: 2 Anesthesia type: general 
 
 
 
 
 
Anesthetic plan and risks discussed with: Patient

## 2018-07-31 NOTE — PERIOP NOTES
TRANSFER - OUT REPORT: 
 
Verbal report given to Nicho Castañeda RN on Reina Max  being transferred to 2132(unit) for routine post - op Report consisted of patients Situation, Background, Assessment and  
Recommendations(SBAR). Information from the following report(s) SBAR, OR Summary, Procedure Summary, Intake/Output, MAR and Cardiac Rhythm NSR was reviewed with the receiving nurse. Opportunity for questions and clarification was provided. Patient transported with: 
 O2 @ 2 liters Tech

## 2018-07-31 NOTE — PERIOP NOTES
09:47 Room assignment pending, patient's sister Geraldine received post op update. 11:30 assigned room being cleaned, patient's sister Geraldine received update

## 2018-07-31 NOTE — PERIOP NOTES
Handoff Report from Operating Room to PACU Report received from Pat Olivarez and Alexis Izaguirre CRNA regarding Alejandra Goodell. Surgeon(s): 
Mushtaq Whaley MD  And Procedure(s) (LRB): OPEN LEFT INGUIINAL HERNIA REPAIR WITH MESH (Left)  confirmed  
with allergies and dressings discussed. Anesthesia type, drugs, patient history, complications, estimated blood loss, vital signs, intake and output, and last pain medication and reversal medications were reviewed.

## 2018-07-31 NOTE — IP AVS SNAPSHOT
[address] [facility] 
[phone] Patient: Myra Gonzales MRN: [id number] [facility]:[birth year] A check felisa indicates which time of day the medication should be taken. My Medications START taking these medications Instructions Each Dose to Equal  
 Morning Noon Evening Bedtime  
 docusate sodium 100 mg capsule Commonly known as:  Bulmaro Aceves Your last dose was: Your next dose is: Take 1 Cap by mouth two (2) times a day for 7 days. Stop taking if you have diarrhea  
 100 mg CHANGE how you take these medications Instructions Each Dose to Equal  
 Morning Noon Evening Bedtime  
 ferrous sulfate 325 mg (65 mg iron) tablet What changed:   
- when to take this 
- additional instructions Your last dose was: Your next dose is: Take 1 Tab by mouth two (2) times daily (with meals). On an empty stomach with Vitamin C (like OJ) 325 mg  
    
   
   
   
  
 potassium chloride SR 10 mEq tablet Commonly known as:  K-TAB What changed:  when to take this Your last dose was: Your next dose is: Take 2 Tabs by mouth two (2) times a day. 20 mEq CONTINUE taking these medications Instructions Each Dose to Equal  
 Morning Noon Evening Bedtime  
 albuterol 2.5 mg /3 mL (0.083 %) nebulizer solution Commonly known as:  PROVENTIL VENTOLIN Your last dose was: Your next dose is:    
   
   
 3 mL by Nebulization route every four (4) hours as needed for Wheezing or Shortness of Breath. 2.5 mg  
    
   
   
   
  
 aspirin 81 mg tablet Your last dose was: Your next dose is: Take 81 mg by mouth daily. 81 mg  
    
   
   
   
  
 food supplemt, lactose-reduced 0.06 gram- 1 kcal/mL Liqd Commonly known as:  BOOST HIGH PROTEIN Your last dose was: Your next dose is:    
   
   
 Drink 2-3 containers DAILY. ibuprofen 200 mg tablet Commonly known as:  MOTRIN Your last dose was: Your next dose is: Take 2 Tabs by mouth every six (6) hours as needed for Pain. 400 mg Nebulizer & Compressor machine Your last dose was: Your next dose is:    
   
   
 1 Each by Does Not Apply route as needed for Cough (wheezing). Use nebulizer every 4-6 hours as needed for shortness of breath or wheezing 1 Each  
    
   
   
   
  
 tamsulosin 0.4 mg capsule Commonly known as:  FLOMAX Your last dose was: Your next dose is: TAKE 1 CAPSULE BY MOUTH EVERY DAY Where to Get Your Medications These medications were sent to [address] [facility], [address] [place] [address] AT [address], [address] [place] [address] Hours:  24-hours Phone:  [phone]  
  docusate sodium 100 mg capsule

## 2018-08-01 LAB
ANION GAP SERPL CALC-SCNC: 3 MMOL/L (ref 5–15)
BACTERIA SPEC CULT: ABNORMAL
BUN SERPL-MCNC: 16 MG/DL (ref 6–20)
BUN/CREAT SERPL: 18 (ref 12–20)
CALCIUM SERPL-MCNC: 8.1 MG/DL (ref 8.5–10.1)
CC UR VC: ABNORMAL
CHLORIDE SERPL-SCNC: 99 MMOL/L (ref 97–108)
CO2 SERPL-SCNC: 30 MMOL/L (ref 21–32)
CREAT SERPL-MCNC: 0.91 MG/DL (ref 0.7–1.3)
GLUCOSE SERPL-MCNC: 116 MG/DL (ref 65–100)
HCT VFR BLD AUTO: 36.3 % (ref 36.6–50.3)
HGB BLD-MCNC: 11.4 G/DL (ref 12.1–17)
POTASSIUM SERPL-SCNC: 4.5 MMOL/L (ref 3.5–5.1)
SERVICE CMNT-IMP: ABNORMAL
SODIUM SERPL-SCNC: 132 MMOL/L (ref 136–145)

## 2018-08-01 PROCEDURE — 94760 N-INVAS EAR/PLS OXIMETRY 1: CPT

## 2018-08-01 PROCEDURE — 85018 HEMOGLOBIN: CPT | Performed by: SURGERY

## 2018-08-01 PROCEDURE — 94640 AIRWAY INHALATION TREATMENT: CPT

## 2018-08-01 PROCEDURE — 74011250636 HC RX REV CODE- 250/636: Performed by: SURGERY

## 2018-08-01 PROCEDURE — 74011000250 HC RX REV CODE- 250: Performed by: SURGERY

## 2018-08-01 PROCEDURE — 74011250637 HC RX REV CODE- 250/637: Performed by: SURGERY

## 2018-08-01 PROCEDURE — 77030029684 HC NEB SM VOL KT MONA -A

## 2018-08-01 PROCEDURE — 77010033678 HC OXYGEN DAILY

## 2018-08-01 PROCEDURE — 99218 HC RM OBSERVATION: CPT

## 2018-08-01 PROCEDURE — 80048 BASIC METABOLIC PNL TOTAL CA: CPT | Performed by: SURGERY

## 2018-08-01 PROCEDURE — 36415 COLL VENOUS BLD VENIPUNCTURE: CPT | Performed by: SURGERY

## 2018-08-01 RX ORDER — SODIUM CHLORIDE 9 MG/ML
100 INJECTION, SOLUTION INTRAVENOUS CONTINUOUS
Status: DISCONTINUED | OUTPATIENT
Start: 2018-08-01 | End: 2018-08-03

## 2018-08-01 RX ADMIN — Medication 10 ML: at 06:34

## 2018-08-01 RX ADMIN — ALBUTEROL SULFATE 2.5 MG: 2.5 SOLUTION RESPIRATORY (INHALATION) at 19:37

## 2018-08-01 RX ADMIN — SODIUM CHLORIDE 100 ML/HR: 900 INJECTION, SOLUTION INTRAVENOUS at 10:05

## 2018-08-01 RX ADMIN — HYDROCODONE BITARTRATE AND ACETAMINOPHEN 1 TABLET: 5; 325 TABLET ORAL at 18:39

## 2018-08-01 RX ADMIN — DOCUSATE SODIUM 100 MG: 100 CAPSULE, LIQUID FILLED ORAL at 18:39

## 2018-08-01 RX ADMIN — GABAPENTIN 300 MG: 300 CAPSULE ORAL at 04:03

## 2018-08-01 RX ADMIN — TAMSULOSIN HYDROCHLORIDE 0.4 MG: 0.4 CAPSULE ORAL at 09:16

## 2018-08-01 RX ADMIN — DOCUSATE SODIUM 100 MG: 100 CAPSULE, LIQUID FILLED ORAL at 09:16

## 2018-08-01 RX ADMIN — HYDROCODONE BITARTRATE AND ACETAMINOPHEN 1 TABLET: 5; 325 TABLET ORAL at 14:50

## 2018-08-01 RX ADMIN — Medication 10 ML: at 23:51

## 2018-08-01 NOTE — PROGRESS NOTES
Nutrition Assessment: 
 
RECOMMENDATIONS:  
Continue Regular diet RD to add Ensure TID DIETITIANS INTERVENTIONS/PLAN:  
Continue diet as tolerated Add PO supplements Monitor appetite/PO intake ASSESSMENT:  
Pt admitted with L inguinal hernia. PMH: dementia, HTN. Chart reviewed for low BMI. Pt sitting up in the chair awake and alert but confused. He is very cachectic. Per RN flowsheet's he consumed 75% of his dinner tray. He consumed ~50% breakfast tray (still in the room at time of my visit). Apparently he drinks Boost at home (per H&P). No family in or caregivers in the room to speak with regarding his intake at home or recent wt loss. Labs reviewed, WNL. Will add Ensure TID to ensure kcal and protein needs are met. SUBJECTIVE/OBJECTIVE:  
Pt confused Diet Order: Regular 
% Eaten:  Patient Vitals for the past 72 hrs: 
 % Diet Eaten  
07/31/18 1949 75 % Pertinent Medications:colace; Solomon@[employer]). Chemistries: 
Lab Results Component Value Date/Time Sodium 132 (L) 08/01/2018 04:07 AM  
 Potassium 4.5 08/01/2018 04:07 AM  
 Chloride 99 08/01/2018 04:07 AM  
 CO2 30 08/01/2018 04:07 AM  
 Anion gap 3 (L) 08/01/2018 04:07 AM  
 Glucose 116 (H) 08/01/2018 04:07 AM  
 BUN 16 08/01/2018 04:07 AM  
 Creatinine 0.91 08/01/2018 04:07 AM  
 BUN/Creatinine ratio 18 08/01/2018 04:07 AM  
 GFR est AA >60 08/01/2018 04:07 AM  
 GFR est non-AA >60 08/01/2018 04:07 AM  
 Calcium 8.1 (L) 08/01/2018 04:07 AM  
 AST (SGOT) 21 07/24/2018 11:20 AM  
 Alk. phosphatase 61 07/24/2018 11:20 AM  
 Protein, total 7.0 07/24/2018 11:20 AM  
 Albumin 3.5 07/24/2018 11:20 AM  
 Globulin 3.5 07/24/2018 11:20 AM  
 A-G Ratio 1.0 (L) 07/24/2018 11:20 AM  
 ALT (SGPT) 22 07/24/2018 11:20 AM  
  
Anthropometrics: Height:   Weight: 54kg  []bed scale/standing (7/24)   []stated   []unknown IBW (%IBW):   ( ) UBW (%UBW):   (  %) BMI: Body mass index is 18.54 kg/(m^2).     This BMI is indicative of: [x]Underweight   []Normal   []Overweight   [] Obesity   [] Extreme Obesity (BMI>40) Estimated Nutrition Needs (Based on): 1765 Kcals/day (MSJ 1165 x 1.3 (+250 for wt gain) ) , 66 g (1.2gPro/kg) Protein Carbohydrate: At Least 130 g/day  Fluids: 1800 mL/day Last BM: 7/31   [x]Active     []Hyperactive  []Hypoactive       [] Absent   BS Skin:    [] Intact   [x] Incision  [] Breakdown   [] DTI   [] Tears/Excoriation/Abrasion  []Edema [] Other: Wt Readings from Last 30 Encounters:  
07/31/18 52.1 kg (114 lb 13.8 oz)  
07/24/18 54.7 kg (120 lb 9.5 oz)  
07/23/18 54.6 kg (120 lb 6.4 oz) 07/11/18 53.5 kg (118 lb)  
06/25/18 53.7 kg (118 lb 6.4 oz) 03/23/18 55.8 kg (123 lb) 12/01/17 63 kg (138 lb 12.8 oz) 10/11/17 57.2 kg (126 lb 3.2 oz) 04/11/17 55.8 kg (123 lb 1.6 oz) 01/06/17 67.7 kg (149 lb 3.2 oz) 10/10/16 57.7 kg (127 lb 4.8 oz) 04/27/16 53.7 kg (118 lb 4.8 oz) 06/01/15 62.4 kg (137 lb 9.6 oz) 12/03/14 60.4 kg (133 lb 3.2 oz) 11/05/14 55.3 kg (122 lb)  
07/29/14 54.4 kg (120 lb)  
03/20/14 59.9 kg (132 lb)  
03/12/14 70.3 kg (155 lb)  
09/24/13 65.8 kg (145 lb)  
09/13/13 65.8 kg (145 lb)  
09/10/13 65.8 kg (145 lb)  
06/18/13 59 kg (130 lb)  
01/25/12 65.8 kg (145 lb)  
11/28/11 61.7 kg (136 lb)  
09/14/11 59.9 kg (132 lb)  
07/20/11 71.2 kg (157 lb) 05/26/10 72.6 kg (160 lb) NUTRITION DIAGNOSES:  
Problem:  Inadequate protein-energy intake Etiology: related to dementia Signs/Symptoms: as evidenced by BMI 18.5, cachexia. NUTRITION INTERVENTIONS: 
Meals/Snacks: General/healthful diet   Supplements: Commercial supplement GOAL:  
Pt will consume >70% of meals/supplements in 2-4 days. Cultural, Mandaeism, or Ethnic Dietary Needs: None LEARNING NEEDS (Diet, Food/Nutrient-Drug Interaction):  
 [x] None Identified 
 [] Identified and Education Provided/Documented 
 [] Identified and Pt declined/was not appropriate  [x] Interdisciplinary Care Plan Reviewed/Documented  
 [x] Participated in Discharge Planning: Regular diet + PO supplement TID [] Interdisciplinary Rounds NUTRITION RISK:  
 [x] High              [] Moderate           []  Low  []  Minimal/Uncompromised PT SEEN FOR:  
 []  MD Consult: []Calorie Count []Diabetic Diet Education []Diet Education []Electrolyte Management []General Nutrition Management and Supplements []Management of Tube Feeding []TPN Recommendations []  RN Referral:  []MST score >=2 
   []Enteral/Parenteral Nutrition PTA []Pregnant: Gestational DM or Multigestation  
[x]  Low BMI []  Re-Screen  
[]  LOS []  NPO/clears x 5 days []  New TF/TPN Jael Javed RD, [facility] Pager [phone] Weekend Pager [phone]

## 2018-08-01 NOTE — PROGRESS NOTES
Admit Date: 2018 POD 1 Day Post-Op Status/Post:  Procedure(s): OPEN LEFT INGUIINAL HERNIA REPAIR WITH MESH Assessment:  
 
Active Problems: 
  Inguinal hernia (2018) Plan/Recommendations/Medical Decision Making: Pt without complaints 
chidi PO At baseline mental status No record of him being out of bed or attempt to ween O2 Needs to do those things before going home Post-op care today and home tomorrow. ------------------------------------------------------------------------------------------------------------------- Subjective:  
 
Patient has no new complaints. no nausea Objective:  
 
Blood pressure 107/72, pulse (!) 110, temperature 98.4 °F (36.9 °C), resp. rate 18, weight 52.1 kg (114 lb 13.8 oz), SpO2 98 %. Temp (24hrs), Av.2 °F (36.8 °C), Min:97.2 °F (36.2 °C), Max:98.6 °F (37 °C) Physical Exam: Abdominal exam: soft 
non-distended 
appropriatly tender. Wound: clean, dry, no drainage Labs:  
Recent Results (from the past 24 hour(s)) METABOLIC PANEL, BASIC Collection Time: 18  4:07 AM  
Result Value Ref Range Sodium 132 (L) 136 - 145 mmol/L Potassium 4.5 3.5 - 5.1 mmol/L Chloride 99 97 - 108 mmol/L  
 CO2 30 21 - 32 mmol/L Anion gap 3 (L) 5 - 15 mmol/L Glucose 116 (H) 65 - 100 mg/dL BUN 16 6 - 20 MG/DL Creatinine 0.91 0.70 - 1.30 MG/DL  
 BUN/Creatinine ratio 18 12 - 20 GFR est AA >60 >60 ml/min/1.73m2 GFR est non-AA >60 >60 ml/min/1.73m2 Calcium 8.1 (L) 8.5 - 10.1 MG/DL  
HGB & HCT Collection Time: 18  4:07 AM  
Result Value Ref Range HGB 11.4 (L) 12.1 - 17.0 g/dL HCT 36.3 (L) 36.6 - 50.3 % Data Review

## 2018-08-01 NOTE — PROGRESS NOTES
Patient resting in bed with no complaints this morning. HR tachycardic with -120. /72. Mews score 2.  
 
1200 IV fluids initiated and O2 weaned to room air. Maintaining 93% on room air. 1800 Patient short of breath with audible wheeze - respiratory contacted for PRN breathing treatment. Patient requiring oxygen at this time - 95% on 3L nasal cannula 1830 Patient complains of stomach pain. See MAR for intervention.

## 2018-08-02 PROBLEM — R33.9 URINARY RETENTION: Status: ACTIVE | Noted: 2018-08-02

## 2018-08-02 LAB
ANION GAP SERPL CALC-SCNC: 7 MMOL/L (ref 5–15)
BASOPHILS # BLD: 0 K/UL (ref 0–0.1)
BASOPHILS NFR BLD: 0 % (ref 0–1)
BUN SERPL-MCNC: 22 MG/DL (ref 6–20)
BUN/CREAT SERPL: 16 (ref 12–20)
CALCIUM SERPL-MCNC: 8.9 MG/DL (ref 8.5–10.1)
CHLORIDE SERPL-SCNC: 100 MMOL/L (ref 97–108)
CO2 SERPL-SCNC: 28 MMOL/L (ref 21–32)
CREAT SERPL-MCNC: 1.4 MG/DL (ref 0.7–1.3)
DIFFERENTIAL METHOD BLD: ABNORMAL
EOSINOPHIL # BLD: 0.1 K/UL (ref 0–0.4)
EOSINOPHIL NFR BLD: 1 % (ref 0–7)
ERYTHROCYTE [DISTWIDTH] IN BLOOD BY AUTOMATED COUNT: 13.3 % (ref 11.5–14.5)
GLUCOSE SERPL-MCNC: 149 MG/DL (ref 65–100)
HCT VFR BLD AUTO: 37.5 % (ref 36.6–50.3)
HGB BLD-MCNC: 11.9 G/DL (ref 12.1–17)
IMM GRANULOCYTES # BLD: 0 K/UL (ref 0–0.04)
IMM GRANULOCYTES NFR BLD AUTO: 0 % (ref 0–0.5)
LYMPHOCYTES # BLD: 0.4 K/UL (ref 0.8–3.5)
LYMPHOCYTES NFR BLD: 3 % (ref 12–49)
MCH RBC QN AUTO: 29.7 PG (ref 26–34)
MCHC RBC AUTO-ENTMCNC: 31.7 G/DL (ref 30–36.5)
MCV RBC AUTO: 93.5 FL (ref 80–99)
MONOCYTES # BLD: 0.8 K/UL (ref 0–1)
MONOCYTES NFR BLD: 7 % (ref 5–13)
NEUTS SEG # BLD: 10.4 K/UL (ref 1.8–8)
NEUTS SEG NFR BLD: 89 % (ref 32–75)
NRBC # BLD: 0 K/UL (ref 0–0.01)
NRBC BLD-RTO: 0 PER 100 WBC
PLATELET # BLD AUTO: 244 K/UL (ref 150–400)
PMV BLD AUTO: 10.1 FL (ref 8.9–12.9)
POTASSIUM SERPL-SCNC: 4 MMOL/L (ref 3.5–5.1)
RBC # BLD AUTO: 4.01 M/UL (ref 4.1–5.7)
RBC MORPH BLD: ABNORMAL
SODIUM SERPL-SCNC: 135 MMOL/L (ref 136–145)
WBC # BLD AUTO: 11.7 K/UL (ref 4.1–11.1)

## 2018-08-02 PROCEDURE — 80048 BASIC METABOLIC PNL TOTAL CA: CPT | Performed by: SURGERY

## 2018-08-02 PROCEDURE — 74011250637 HC RX REV CODE- 250/637: Performed by: SURGERY

## 2018-08-02 PROCEDURE — 94760 N-INVAS EAR/PLS OXIMETRY 1: CPT

## 2018-08-02 PROCEDURE — 36415 COLL VENOUS BLD VENIPUNCTURE: CPT | Performed by: SURGERY

## 2018-08-02 PROCEDURE — 77010033678 HC OXYGEN DAILY

## 2018-08-02 PROCEDURE — 99218 HC RM OBSERVATION: CPT

## 2018-08-02 PROCEDURE — 85025 COMPLETE CBC W/AUTO DIFF WBC: CPT | Performed by: SURGERY

## 2018-08-02 PROCEDURE — 65270000029 HC RM PRIVATE

## 2018-08-02 PROCEDURE — 51702 INSERT TEMP BLADDER CATH: CPT

## 2018-08-02 PROCEDURE — 74011250636 HC RX REV CODE- 250/636: Performed by: SURGERY

## 2018-08-02 RX ORDER — LEVOFLOXACIN 5 MG/ML
750 INJECTION, SOLUTION INTRAVENOUS
Status: DISCONTINUED | OUTPATIENT
Start: 2018-08-02 | End: 2018-08-03

## 2018-08-02 RX ORDER — METRONIDAZOLE 500 MG/100ML
500 INJECTION, SOLUTION INTRAVENOUS EVERY 12 HOURS
Status: DISCONTINUED | OUTPATIENT
Start: 2018-08-02 | End: 2018-08-06

## 2018-08-02 RX ADMIN — DOCUSATE SODIUM 100 MG: 100 CAPSULE, LIQUID FILLED ORAL at 18:07

## 2018-08-02 RX ADMIN — ACETAMINOPHEN 650 MG: 325 TABLET ORAL at 08:19

## 2018-08-02 RX ADMIN — TAMSULOSIN HYDROCHLORIDE 0.4 MG: 0.4 CAPSULE ORAL at 08:19

## 2018-08-02 RX ADMIN — METRONIDAZOLE 500 MG: 500 INJECTION, SOLUTION INTRAVENOUS at 22:07

## 2018-08-02 RX ADMIN — LEVOFLOXACIN 750 MG: 5 INJECTION, SOLUTION INTRAVENOUS at 12:48

## 2018-08-02 RX ADMIN — Medication 10 ML: at 13:25

## 2018-08-02 RX ADMIN — DOCUSATE SODIUM 100 MG: 100 CAPSULE, LIQUID FILLED ORAL at 08:19

## 2018-08-02 RX ADMIN — Medication 10 ML: at 22:07

## 2018-08-02 RX ADMIN — METRONIDAZOLE 500 MG: 500 INJECTION, SOLUTION INTRAVENOUS at 11:07

## 2018-08-02 RX ADMIN — SODIUM CHLORIDE 100 ML/HR: 900 INJECTION, SOLUTION INTRAVENOUS at 13:25

## 2018-08-02 NOTE — INTERDISCIPLINARY ROUNDS
Interdisciplinary Rounds were completed on this patient. Rounds included nursing, clinical care leader, pharmacy, and case management. Plan for the day incentive, wan care, up in chair Plan for the stay:continue current [id number] [address] Activity: up in chair Anticipated discharge date: TBD

## 2018-08-02 NOTE — PROGRESS NOTES
Admit Date: 2018 POD 2 Days Post-Op Status/Post:  Procedure(s): OPEN LEFT INGUIINAL HERNIA REPAIR WITH MESH Assessment:  
 
Active Problems: 
  Inguinal hernia (2018) Plan/Recommendations/Medical Decision Making:  
 
Was difficult to assess his progress yesterday as no attempt had been made to get him out of bed or ween his o2 Apparently an attempt was made to wean him overnight without success IS however is still in wrapper. This increase the patient's risk of pneumonia. Will be easier for the patient to use once it is unwrapped. Get him OOB No urine outpt recorded overnight and rise in creatinine. I suspect urinary retention. Will place a wan IVFs held due to access. Will replace IV and resume fluids Check CBC 
empiric abxs Change to inpatient status 
 
 
------------------------------------------------------------------------------------------------------------------- Subjective:  
 
Patient has no new complaints. no nausea Positive small BM Objective:  
 
Blood pressure 142/76, pulse (!) 116, temperature 98.2 °F (36.8 °C), resp. rate 19, weight 52.1 kg (114 lb 13.8 oz), SpO2 100 %. Temp (24hrs), Av.4 °F (36.9 °C), Min:98.2 °F (36.8 °C), Max:98.8 °F (37.1 °C) Physical Exam: Abdominal exam: soft Lower abdominal distension, appears uncomfortable. appropriatly tender. Wound: clean, dry, no drainage Labs:  
Recent Results (from the past 24 hour(s)) METABOLIC PANEL, BASIC Collection Time: 18  4:12 AM  
Result Value Ref Range Sodium 135 (L) 136 - 145 mmol/L Potassium 4.0 3.5 - 5.1 mmol/L Chloride 100 97 - 108 mmol/L  
 CO2 28 21 - 32 mmol/L Anion gap 7 5 - 15 mmol/L Glucose 149 (H) 65 - 100 mg/dL BUN 22 (H) 6 - 20 MG/DL Creatinine 1.40 (H) 0.70 - 1.30 MG/DL  
 BUN/Creatinine ratio 16 12 - 20 GFR est AA 58 (L) >60 ml/min/1.73m2 GFR est non-AA 48 (L) >60 ml/min/1.73m2 Calcium 8.9 8.5 - 10.1 MG/DL Data Review

## 2018-08-02 NOTE — PROGRESS NOTES
Spiritual Care Partner Volunteer visited patient in  [id number] on 8/2/18. Documented by: Chaplain Peralta MDiv, MACE 
[phone] PRAALEXANDER ([id number])

## 2018-08-03 LAB
ANION GAP SERPL CALC-SCNC: 6 MMOL/L (ref 5–15)
BUN SERPL-MCNC: 21 MG/DL (ref 6–20)
BUN/CREAT SERPL: 28 (ref 12–20)
CALCIUM SERPL-MCNC: 8.3 MG/DL (ref 8.5–10.1)
CHLORIDE SERPL-SCNC: 106 MMOL/L (ref 97–108)
CO2 SERPL-SCNC: 26 MMOL/L (ref 21–32)
CREAT SERPL-MCNC: 0.76 MG/DL (ref 0.7–1.3)
ERYTHROCYTE [DISTWIDTH] IN BLOOD BY AUTOMATED COUNT: 13.5 % (ref 11.5–14.5)
GLUCOSE SERPL-MCNC: 105 MG/DL (ref 65–100)
HCT VFR BLD AUTO: 31.6 % (ref 36.6–50.3)
HGB BLD-MCNC: 10.1 G/DL (ref 12.1–17)
MCH RBC QN AUTO: 30 PG (ref 26–34)
MCHC RBC AUTO-ENTMCNC: 32 G/DL (ref 30–36.5)
MCV RBC AUTO: 93.8 FL (ref 80–99)
NRBC # BLD: 0 K/UL (ref 0–0.01)
NRBC BLD-RTO: 0 PER 100 WBC
PLATELET # BLD AUTO: 249 K/UL (ref 150–400)
PMV BLD AUTO: 10.5 FL (ref 8.9–12.9)
POTASSIUM SERPL-SCNC: 4.3 MMOL/L (ref 3.5–5.1)
RBC # BLD AUTO: 3.37 M/UL (ref 4.1–5.7)
SODIUM SERPL-SCNC: 138 MMOL/L (ref 136–145)
WBC # BLD AUTO: 9.2 K/UL (ref 4.1–11.1)

## 2018-08-03 PROCEDURE — 85027 COMPLETE CBC AUTOMATED: CPT | Performed by: SURGERY

## 2018-08-03 PROCEDURE — 74011250637 HC RX REV CODE- 250/637: Performed by: SURGERY

## 2018-08-03 PROCEDURE — 94640 AIRWAY INHALATION TREATMENT: CPT

## 2018-08-03 PROCEDURE — 65270000029 HC RM PRIVATE

## 2018-08-03 PROCEDURE — 97116 GAIT TRAINING THERAPY: CPT | Performed by: PHYSICAL THERAPIST

## 2018-08-03 PROCEDURE — 74011000250 HC RX REV CODE- 250: Performed by: SURGERY

## 2018-08-03 PROCEDURE — 97535 SELF CARE MNGMENT TRAINING: CPT

## 2018-08-03 PROCEDURE — 74011250636 HC RX REV CODE- 250/636: Performed by: SURGERY

## 2018-08-03 PROCEDURE — 97161 PT EVAL LOW COMPLEX 20 MIN: CPT | Performed by: PHYSICAL THERAPIST

## 2018-08-03 PROCEDURE — 36415 COLL VENOUS BLD VENIPUNCTURE: CPT | Performed by: SURGERY

## 2018-08-03 PROCEDURE — 94760 N-INVAS EAR/PLS OXIMETRY 1: CPT

## 2018-08-03 PROCEDURE — 51798 US URINE CAPACITY MEASURE: CPT

## 2018-08-03 PROCEDURE — 97165 OT EVAL LOW COMPLEX 30 MIN: CPT

## 2018-08-03 PROCEDURE — 80048 BASIC METABOLIC PNL TOTAL CA: CPT | Performed by: SURGERY

## 2018-08-03 RX ORDER — IBUPROFEN 400 MG/1
400 TABLET ORAL
Status: DISCONTINUED | OUTPATIENT
Start: 2018-08-03 | End: 2018-08-08 | Stop reason: HOSPADM

## 2018-08-03 RX ORDER — LEVOFLOXACIN 5 MG/ML
750 INJECTION, SOLUTION INTRAVENOUS EVERY 24 HOURS
Status: DISCONTINUED | OUTPATIENT
Start: 2018-08-03 | End: 2018-08-06

## 2018-08-03 RX ORDER — DOCUSATE SODIUM 100 MG/1
100 CAPSULE, LIQUID FILLED ORAL 2 TIMES DAILY
Qty: 30 CAP | Refills: 2 | Status: SHIPPED | OUTPATIENT
Start: 2018-08-03 | End: 2018-08-10

## 2018-08-03 RX ORDER — IBUPROFEN 200 MG
400 TABLET ORAL
Status: SHIPPED | COMMUNITY
Start: 2018-08-03 | End: 2018-12-06

## 2018-08-03 RX ORDER — FACIAL-BODY WIPES
10 EACH TOPICAL DAILY
Status: DISCONTINUED | OUTPATIENT
Start: 2018-08-03 | End: 2018-08-08 | Stop reason: HOSPADM

## 2018-08-03 RX ORDER — OXYCODONE HYDROCHLORIDE 5 MG/1
5 TABLET ORAL
Status: DISCONTINUED | OUTPATIENT
Start: 2018-08-03 | End: 2018-08-08 | Stop reason: HOSPADM

## 2018-08-03 RX ADMIN — Medication 10 ML: at 13:41

## 2018-08-03 RX ADMIN — DOCUSATE SODIUM 100 MG: 100 CAPSULE, LIQUID FILLED ORAL at 09:47

## 2018-08-03 RX ADMIN — SODIUM CHLORIDE 100 ML/HR: 900 INJECTION, SOLUTION INTRAVENOUS at 02:24

## 2018-08-03 RX ADMIN — ALBUTEROL SULFATE 2.5 MG: 2.5 SOLUTION RESPIRATORY (INHALATION) at 10:13

## 2018-08-03 RX ADMIN — TAMSULOSIN HYDROCHLORIDE 0.4 MG: 0.4 CAPSULE ORAL at 09:47

## 2018-08-03 RX ADMIN — DOCUSATE SODIUM 100 MG: 100 CAPSULE, LIQUID FILLED ORAL at 17:40

## 2018-08-03 RX ADMIN — METRONIDAZOLE 500 MG: 500 INJECTION, SOLUTION INTRAVENOUS at 20:18

## 2018-08-03 RX ADMIN — BISACODYL 10 MG: 10 SUPPOSITORY RECTAL at 09:57

## 2018-08-03 RX ADMIN — Medication 10 ML: at 20:18

## 2018-08-03 RX ADMIN — LEVOFLOXACIN 750 MG: 5 INJECTION, SOLUTION INTRAVENOUS at 13:38

## 2018-08-03 RX ADMIN — METRONIDAZOLE 500 MG: 500 INJECTION, SOLUTION INTRAVENOUS at 09:45

## 2018-08-03 RX ADMIN — Medication 10 ML: at 05:20

## 2018-08-03 NOTE — PROGRESS NOTES
Spoke with Dr. Juan Carlos Portillo RN to D/C wan but instill 300ml of saline into bladder before D/C. Performed task on patient. D/C'd wan. Patient attempted to urine x 5 between 1015a-1215p. Patient appears to be uncomfortable. Bladder scanned patient and he had 504ml in bladder. Spoke with Dr. Juan Carlos Portillo, will go ahead and replace wan. Wan inserted using sterile technique.

## 2018-08-03 NOTE — DISCHARGE INSTRUCTIONS
Dr. Evan Tobias Discharge Instructions for:  Enmanuel East    MRN: [id number] : 1930    Admitted: 2018  Discharged: 8/3/2018       What to do at [address]: Resume your usual diet                            DISCHARGE INFORMATION    Patient: Enmanuel East MRN: [id number]  SSN: [id number]    YOB: 1930  Age: 80 y.o. Sex: male          Followup Plan:    [facility] Urology           Office: [facility] [phone]               Urinary Catheter ([place])  · You are being discharged with a urinary catheter. It should continuously drain any urine from the bladder and you will not urinate. The larger bag (overnight bag) allows you to drain it less often, but is less mobile. A smaller bag (leg bag) can be attached to your leg for increased mobility, and concealment. · Keep the urinary drainage bag below the level of the bladder. Make sure that the urinary drainage bag does not drag and pull on the catheter. Drain it before the bag becomes full. · The catheter can irritate the urethral opening, and you can place topical antibiotic (Neoporin, Double/Triple antibiotic ointment) to facilitate lubrication. · The catheter can be irritating to the bladder, and it is not uncommon to have a false sense of the urge to urinate. This is worse when the catheter/tubing is not supported on the leg with some slack. If you are experience sudden onset of pain with the urge to urinate then you re having bladder spasms. Sometimes urine can leak around the catheter as the spasms pinches it off. Medications can be helpful if this is occuring frequently. · Small amounts of blood are not uncommon as the catheter rubs. Note that this amount of blood can appear significant but is in reality very little. Hydration allows for dilution of the blood in the urine and avoids problems.  If your catheter/tubing is becoming blocked by blood clots, or your catheter is not draining you should call your doctor immediately. Recommended activity: Lift less than 10 lbs for 4 weeks. Follow-up with Dr. Chloe Rodriguez in 2-3 weeks. Call [phone] for an appointment. Follow-up with [facility] as above      Inguinal Hernia Repair: What to Expect at [address] are likely to have pain for the next few days. You may also feel like you have the flu, and you may have a low fever and feel tired and nauseated. This is common. You should feel better after a few days and will probably feel much better in 7 days. For several weeks you may feel twinges or pulling in the hernia repair when you move. You may have some bruising on the scrotum and along the penis. This is normal.    Men will need to wear a jockstrap or briefs, not boxers, for scrotal support for several days after a groin (inguinal) hernia repair. Dispopdex bicycle shorts may provide good support. This care sheet gives you a general idea about how long it will take for you to recover. But each person recovers at a different pace. Follow the steps below to get better as quickly as possible. How can you care for yourself at home? Activity  Rest when you feel tired. Getting enough sleep will help you recover. You can try to sleep with your head up in a recliner chair if that is more comfortable. Try to walk each day. Start by walking a little more than you did the day before. Bit by bit, increase the amount you walk. Walking boosts blood flow and helps prevent pneumonia and constipation. Put ice or a cold pack on the area of your hernia repair for 10 to 15 minutes at a time. Try to do this every 1 to 2 hours for the first 24 hours (when you are awake) or until the swelling goes down. Put a thin cloth between the ice and your skin. Avoid strenuous activities, such as biking, jogging, weight lifting, or aerobic exercise, until your doctor says it is okay. Avoid lifting anything that would make you strain.  This may include heavy grocery bags and milk containers, a heavy briefcase or backpack, cat litter or dog food bags, a child, or a vacuum . You may drive when you are no longer taking pain medicine and can quickly move your foot from the gas pedal to the brake. You must also be able to sit comfortably for a long period of time. Most people are able to return to work within 1 to 2 weeks after surgery. You may shower. Pat the cut (incision) dry. Do not take a bath for 2 weeks. You can have sex again when it feels comfortable to do so. Diet  You can eat your normal diet. If your stomach is upset, try bland, low-fat foods like plain rice, broiled chicken, toast, and yogurt. Drink plenty of fluids (unless your doctor tells you not to). You may notice that your bowel movements are not regular right after your surgery. This is common. Avoid constipation and straining with bowel movements. You may want to take a fiber supplement every day. If you have not had a bowel movement after a couple of days, ask your doctor about taking a mild laxative. Medicines  Take pain medicines exactly as directed. If the doctor gave you a prescription medicine for pain, take it as prescribed. If you are not taking a prescription pain medicine, take an over-the-counter medicine such as acetaminophen (Tylenol), ibuprofen (Advil, Motrin), or naproxen (Aleve). Read and follow all instructions on the label. Do not take two or more pain medicines at the same time unless the doctor told you to. Many pain medicines have acetaminophen, which is Tylenol. Too much acetaminophen (Tylenol) can be harmful. If your doctor prescribed antibiotics, take them as directed. Do not stop taking them just because you feel better. You need to take the full course of antibiotics. If you think your pain medicine is making you sick to your stomach: Take your medicine after meals (unless your doctor has told you not to).   Ask your doctor for a different pain medicine. Incision care  If you have strips of tape on the cut (incision) the doctor made, leave the tape on for a week or until it falls off. If you have staples closing the cut, you will need to visit your doctor in 1 to 2 weeks to have them removed. Wash the area daily with warm, soapy water and pat it dry. Follow-up care is a key part of your treatment and safety. Be sure to make and go to all appointments, and call your doctor if you are having problems. Its also a good idea to know your test results and keep a list of the medicines you take. When should you call for help? Call 911 anytime you think you may need emergency care. For example, call if:  You pass out (lose consciousness). You have sudden chest pain and shortness of breath, or you cough up blood. You have severe pain in your belly. Call your doctor now or seek immediate medical care if:  You are sick to your stomach and cannot keep fluids down. You have signs of a blood clot, such as:  Pain in your calf, back of the knee, thigh, or groin. Redness and swelling in your leg or groin. You have trouble passing urine or stool, especially if you have mild pain or swelling in your lower belly. You have hot flashes, sweating, flushing, or a fast or pounding heartbeat. Bright red blood has soaked through the bandage over your incision. Watch closely for any changes in your health, and be sure to contact your doctor if:  Your swelling is getting worse. Your swelling is not going down.

## 2018-08-03 NOTE — PROGRESS NOTES
Problem: Self Care Deficits Care Plan (Adult) Goal: *Acute Goals and Plan of Care (Insert Text) Occupational Therapy Goals Initiated 8/3/2018 1. Patient will perform standing ADLs at sink with supervision/set-up within 7 day(s). 2.  Patient will perform upper body dressing and lower body dressing with supervision/set-up within 7 day(s). 3.  Patient will perform bathing with supervision/set-up within 7 day(s). 4.  Patient will perform toilet transfers with supervision/set-up within 7 day(s). 5.  Patient will perform all aspects of toileting with supervision/set-up within 7 day(s). Occupational Therapy EVALUATION Patient: Adam Saldana (49 y.o. male) Date: 8/3/2018 Primary Diagnosis: LEFT INGUINAL HERNIA Inguinal hernia Urinary retention Procedure(s) (LRB): OPEN LEFT INGUIINAL HERNIA REPAIR WITH MESH (Left) 3 Days Post-Op Precautions:   Fall ASSESSMENT : 
Based on the objective data described below, the patient presents with decreased insight into safety and deficits, impaired cognition, risk for falls , decreased balance and need for A with ADLs s/p L inguinal hernia repair POD 3. Pt required in bed, supervision for supine to sit, CGA for sit to stand. Pt mildly unsteady without AD, with one LOB to R upon exiting bathroom. He is needing constant CGA for balance and safety and may benefit from RW next session. Pt is needing CGA to mod A for ADLs 2* balance deficits and L groin pain. Pt disoriented to time and year and may benefit from [facility] as pt lives home alone. Recommend SNF rehab and likely transition to an assisted facility for constant supervision/safety. Patient will benefit from skilled intervention to address the above impairments. Patients rehabilitation potential is considered to be Fair Factors which may influence rehabilitation potential include:  
[]             None noted [x]             Mental ability/status []             Medical condition []             Home/family situation and support systems [x]             Safety awareness []             Pain tolerance/management 
[]             Other: PLAN : 
Recommendations and Planned Interventions: 
[x]               Self Care Training                  [x]        Therapeutic Activities [x]               Functional Mobility Training    []        Cognitive Retraining 
[x]               Therapeutic Exercises           [x]        Endurance Activities [x]               Balance Training                   []        Neuromuscular Re-Education []               Visual/Perceptual Training     [x]   Home Safety Training 
[x]               Patient Education                 [x]        Family Training/Education []               Other (comment): Frequency/Duration: Patient will be followed by occupational therapy 4 times a week to address goals. Discharge Recommendations: Placido Cesar Further Equipment Recommendations for Discharge: TBD SUBJECTIVE:  
Patient stated It's Rhodelia.  (when asked who the president was) OBJECTIVE DATA SUMMARY:  
HISTORY:  
Past Medical History:  
Diagnosis Date  Anemia  BPH (benign prostatic hypertrophy)  DEMENTIA  Gout  High cholesterol 06/18 taken off meds  Hypertension 6/18 was taken off BP meds  Left inguinal hernia 07/2018 Past Surgical History:  
Procedure Laterality Date  HX HERNIA REPAIR  9-10-13 [facility] with mesh-[facility]-Dr. Cuong Gale  
 
 
Prior Level of Function/Environment/Context: lives at [facility] in [place]. Ambulated without device, does not drive and independent with ADLs. Pt's sister gets him groceries Occupations in which the patient is/was successful, what are the barriers preventing that success:  
Performance Patterns (routines, roles, habits, and rituals):  
Personal Interests and/or values:  
Expanded or extensive additional review of patient history:  
 
Home Situation Home Environment: Apartment (senior high rise: [address]) # Steps to Enter: 0 One/Two Story Residence: One story Interior Rails: Both Lift Chair Available: No 
Living Alone: Yes Support Systems: Family member(s) Patient Expects to be Discharged to[de-identified] [id number] Current DME Used/Available at Home: Cane, straight, Nebulizer Hand dominance: Right EXAMINATION OF PERFORMANCE DEFICITS: 
Cognitive/Behavioral Status: 
Neurologic State: Alert Orientation Level: Oriented to person;Oriented to place; Disoriented to time Cognition: Follows commands; Impulsive Perception: Appears intact Perseveration: No perseveration noted Safety/Judgement: Awareness of environment;Decreased awareness of need for safety;Decreased insight into deficits Skin: L inguinal repair intact Edema: L groin post hernia Hearing: Auditory Auditory Impairment: None Vision/Perceptual:   
   intact Range of Motion: 
 
AROM: Generally decreased, functional 
  
  
  
  
  
  
  
 
Strength: 
 
Strength: Generally decreased, functional 
  
  
  
  
 
Coordination: 
  
Fine Motor Skills-Upper: Left Intact; Right Intact Gross Motor Skills-Upper: Left Intact; Right Intact Tone & Sensation: 
 
  
  
  
  
  
  
  
  
 
Balance: 
Sitting: Intact Standing: Impaired Standing - Static: Constant support;Good Standing - Dynamic : Fair Functional Mobility and Transfers for ADLs: 
Bed Mobility: 
Supine to Sit: Supervision Sit to Supine: Supervision Transfers: 
Sit to Stand: Contact guard assistance Stand to Sit: Contact guard assistance ADL Assessment: 
Feeding: Independent Oral Facial Hygiene/Grooming: Contact guard assistance Bathing: Minimum assistance (standing balance) Upper Body Dressing: Minimum assistance Lower Body Dressing: Minimum assistance Toileting: Contact guard assistance ADL Intervention and task modifications: 
  
 
  Pt educated on role of OT and required verbal cues for safety and proper hand placement during ADLs/functional transfers. Cognitive Retraining Safety/Judgement: Awareness of environment;Decreased awareness of need for safety;Decreased insight into deficits Functional Measure: 
Barthel Index: 
 
Bathin Bladder: 0 Bowels: 10 
Groomin Dressin Feeding: 10 Mobility: 10 Stairs: 0 Toilet Use: 5 Transfer (Bed to Chair and Back): 10 Total: 55 Barthel and G-code impairment scale: 
Percentage of impairment CH 
0% CI 
1-19% CJ 
20-39% CK 
40-59% CL 
60-79% CM 
80-99% CN 
100% Barthel Score 0-100 100 99-80 79-60 59-40 20-39 1-19 
 0 Barthel Score 0-20 20 17-19 13-16 9-12 5-8 1-4 0 The Barthel ADL Index: Guidelines 1. The index should be used as a record of what a patient does, not as a record of what a patient could do. 2. The main aim is to establish degree of independence from any help, physical or verbal, however minor and for whatever reason. 3. The need for supervision renders the patient not independent. 4. A patient's performance should be established using the best available evidence. Asking the patient, friends/relatives and nurses are the usual sources, but direct observation and common sense are also important. However direct testing is not needed. 5. Usually the patient's performance over the preceding 24-48 hours is important, but occasionally longer periods will be relevant. 6. Middle categories imply that the patient supplies over 50 per cent of the effort. 7. Use of aids to be independent is allowed. Ruben Mast., Barthel, D.W. (3686). Functional evaluation: the Barthel Index. [facility] (14)2. JHONY Leach, Radha Helton., Fleiciano French., Amparo Blake, [address] [place] [address] Isabel (). Measuring the change indisability after inpatient rehabilitation; comparison of the responsiveness of the Barthel Index and Functional [place] Measure. Journal of Neurology, Neurosurgery, and Psychiatry, 66(4), 891-683.  
Javan Langley ARIELLE, BAILEY Hammond, & Elizabeth Caraballo M.A. (2004.) Assessment of post-stroke quality of life in cost-effectiveness studies: The usefulness of the Barthel Index and the EuroQoL-5D. [facility], 13, 734-95 G codes: In compliance with CMSs Claims Based Outcome Reporting, the following G-code set was chosen for this patient based on their primary functional limitation being treated: The outcome measure chosen to determine the severity of the functional limitation was the barthel index with a score of 55/100 which was correlated with the impairment scale. ? Self Care:  
  - CURRENT STATUS: CK - 40%-59% impaired, limited or restricted  - GOAL STATUS: CJ - 20%-39% impaired, limited or restricted  - D/C STATUS:  ---------------To be determined--------------- Occupational Therapy Evaluation Charge Determination History Examination Decision-Making LOW Complexity : Brief history review  LOW Complexity : 1-3 performance deficits relating to physical, cognitive , or psychosocial skils that result in activity limitations and / or participation restrictions  LOW Complexity : No comorbidities that affect functional and no verbal or physical assistance needed to complete eval tasks Based on the above components, the patient evaluation is determined to be of the following complexity level: LOW Pain: 
Pain Scale 1: Numeric (0 - 10) Pain Intensity 1: 0 Activity Tolerance:  
90% on RA,  post activity Please refer to the flowsheet for vital signs taken during this treatment. After treatment:  
[] Patient left in no apparent distress sitting up in chair 
[x] Patient left in no apparent distress in bed 
[x] Call bell left within reach [x] Nursing notified 
[] Caregiver present [x] Bed alarm activated COMMUNICATION/EDUCATION:  
The patients plan of care was discussed with: Physical Therapist and Registered Nurse. 
[] Home safety education was provided and the patient/caregiver indicated understanding. [x] Patient/family have participated as able in goal setting and plan of care. [] Patient/family agree to work toward stated goals and plan of care. [] Patient understands intent and goals of therapy, but is neutral about his/her participation. [] Patient is unable to participate in goal setting and plan of care. This patients plan of care is appropriate for delegation to [facility]. Thank you for this referral. 
Shahana Bowen OT Time Calculation: 20 mins

## 2018-08-03 NOTE — PROGRESS NOTES
Bedside and Verbal shift change report given to Jarrod (oncoming nurse) by Benson Reese (offgoing nurse). Report included the following information SBAR, Kardex, MAR and Recent Results.

## 2018-08-03 NOTE — PROGRESS NOTES
Admit Date: 2018 POD 3 Days Post-Op Status/Post:  Procedure(s): OPEN LEFT INGUIINAL HERNIA REPAIR WITH MESH Assessment:  
 
Active Problems: 
  Inguinal hernia (2018) Urinary retention (2018) Cachexia and Underweight as evidenced by by BMI @ 18.5, weight @ 114lbs in male pt who with Dementia, requiring RD consult, who has recommended TID Supplements, monitoring of pt twila ly meals, weights. Plan/Recommendations/Medical Decision Making: Much better after placing wan Voiding trial this morning. If fails, will need to be discharged with leg bag and urology f/u 
 
PT/OT consult pending for discharge needs Possible discharge this afternoon. ------------------------------------------------------------------------------------------------------------------- Subjective:  
 
Patient has no new complaints. no nausea Positive Flatus Positive Bowel Movement Objective:  
 
Blood pressure 116/82, pulse (!) 107, temperature 97.3 °F (36.3 °C), resp. rate 16, weight 52.1 kg (114 lb 13.8 oz), SpO2 95 %. Temp (24hrs), Av.1 °F (36.7 °C), Min:97.3 °F (36.3 °C), Max:99.1 °F (37.3 °C) Physical Exam: Abdominal exam: soft 
non-distended 
appropriatly tender. Urine had some sediment, now clear. Wound: clean, dry, no drainage Labs:  
Recent Results (from the past 24 hour(s)) CBC W/O DIFF Collection Time: 18  5:27 AM  
Result Value Ref Range WBC 9.2 4.1 - 11.1 K/uL  
 RBC 3.37 (L) 4.10 - 5.70 M/uL  
 HGB 10.1 (L) 12.1 - 17.0 g/dL HCT 31.6 (L) 36.6 - 50.3 % MCV 93.8 80.0 - 99.0 FL  
 MCH 30.0 26.0 - 34.0 PG  
 MCHC 32.0 30.0 - 36.5 g/dL  
 RDW 13.5 11.5 - 14.5 % PLATELET 939 118 - 199 K/uL MPV 10.5 8.9 - 12.9 FL  
 NRBC 0.0 0  WBC ABSOLUTE NRBC 0.00 0.00 - 0.01 K/uL METABOLIC PANEL, BASIC Collection Time: 18  5:27 AM  
Result Value Ref Range Sodium 138 136 - 145 mmol/L Potassium 4.3 3.5 - 5.1 mmol/L  Chloride 106 97 - 108 mmol/L  
 CO2 26 21 - 32 mmol/L Anion gap 6 5 - 15 mmol/L Glucose 105 (H) 65 - 100 mg/dL BUN 21 (H) 6 - 20 MG/DL Creatinine 0.76 0.70 - 1.30 MG/DL  
 BUN/Creatinine ratio 28 (H) 12 - 20 GFR est AA >60 >60 ml/min/1.73m2 GFR est non-AA >60 >60 ml/min/1.73m2 Calcium 8.3 (L) 8.5 - 10.1 MG/DL Data Review

## 2018-08-03 NOTE — PROGRESS NOTES
Spoke with Dr. Deanna Dahl regarding patients blood tinged urine. MD aware and ok with output. Will continue to monitor.

## 2018-08-03 NOTE — PROGRESS NOTES
Pt is an 80 y.o.  male, admitted for left inguinal hernia. CM spoke with pt's sister: Lizet Villar ([phone]). Pt is known to live alone in a senior citizen community: [facility] (no steps into main entrance). Needs limited assistance with ADLs, and does not drive. Pt is active with PCP: seen July 2018 and uses [facility] (Kevon Rios). Pt has DME at home: cane. No reportes of HH/SNF. Pt is known to have a private duty aide that comes to his home Mon-Friday. Pt's sister is interested for pt to go to SNF. Pt family fears that pt is weak and will need strength before he returns back home. Pt is currently waiting to be seen by therapy, will possibly d/c on 8/6/18. Pt's sister will be coming to [facility] to visit pt. CM has informed pt's sister was informed that CM has left SNF list in pt's room. CM is currently waiting for pt's sister to review list, and select placement. CM will send referral to SNF once selected. Reason for Admission:   left inguinal hernia. RRAT Score:    12 Plan for utilizing home health:    Family prefers snf Likelihood of Readmission: LOW Transition of Care Plan:   SNF Care Management Interventions PCP Verified by CM: Yes Mode of Transport at Discharge: Other (see comment) Transition of Care Consult (CM Consult): Discharge Planning Discharge Durable Medical Equipment: No 
Physical Therapy Consult: Yes Occupational Therapy Consult: Yes Speech Therapy Consult: No 
Current Support Network: Lives Alone, Own Home Confirm Follow Up Transport: Family Plan discussed with Pt/Family/Caregiver: Yes Discharge Location Discharge Placement: Home WILLIE Borden  
[phone]

## 2018-08-03 NOTE — CDMP QUERY
Please clarify if this patient is (was) being treated/managed for:  
 
=> Cachexia and Underweight as evidenced by by BMI @ 18.5, weight @ 114lbs in male pt who with Dementia, requiring RD consult, who has recommended TID Supplements, monitoring of pt daily meals, weights. 
 
=> Other explanation of clinical findings 
=> Clinically Undetermined (no explanation for clinical findings) The medical record reflects the following clinical findings, treatment, and risk factors. Risk Factors:   Elderly 80 yr old male with dementia, confusion related to Dementia Clinical Indicators:  Decreased po intake in pt with Demenita. BMI @ 18.5 Treatment:  RD consulted, Recommending TID daily supplements Please clarify and document your clinical opinion in the progress notes and discharge summary including the definitive and/or presumptive diagnosis, (suspected or probable), related to the above clinical findings. Please include clinical findings supporting your diagnosis. Thank you DAVIS McgarryN, RN, [address] [facility] 
[phone]

## 2018-08-04 PROCEDURE — 74011250637 HC RX REV CODE- 250/637: Performed by: SURGERY

## 2018-08-04 PROCEDURE — 94760 N-INVAS EAR/PLS OXIMETRY 1: CPT

## 2018-08-04 PROCEDURE — 74011250636 HC RX REV CODE- 250/636: Performed by: SURGERY

## 2018-08-04 PROCEDURE — 65270000029 HC RM PRIVATE

## 2018-08-04 RX ADMIN — DOCUSATE SODIUM 100 MG: 100 CAPSULE, LIQUID FILLED ORAL at 17:54

## 2018-08-04 RX ADMIN — METRONIDAZOLE 500 MG: 500 INJECTION, SOLUTION INTRAVENOUS at 21:30

## 2018-08-04 RX ADMIN — BISACODYL 10 MG: 10 SUPPOSITORY RECTAL at 08:48

## 2018-08-04 RX ADMIN — LEVOFLOXACIN 750 MG: 5 INJECTION, SOLUTION INTRAVENOUS at 14:13

## 2018-08-04 RX ADMIN — DOCUSATE SODIUM 100 MG: 100 CAPSULE, LIQUID FILLED ORAL at 08:48

## 2018-08-04 RX ADMIN — TAMSULOSIN HYDROCHLORIDE 0.4 MG: 0.4 CAPSULE ORAL at 08:48

## 2018-08-04 RX ADMIN — Medication 10 ML: at 14:14

## 2018-08-04 RX ADMIN — METRONIDAZOLE 500 MG: 500 INJECTION, SOLUTION INTRAVENOUS at 08:48

## 2018-08-04 RX ADMIN — Medication 10 ML: at 22:20

## 2018-08-04 RX ADMIN — Medication 10 ML: at 05:13

## 2018-08-04 NOTE — PROGRESS NOTES
Call from pts sister Elle Taylor who states that her first choice for pts SNF is [facility].

## 2018-08-04 NOTE — PROGRESS NOTES
General Surgery End of Shift Nursing Note Bedside shift change report given to *** (oncoming nurse) by Sharyle Pickerel RN (offgoing nurse). Report included the following information SBAR, Kardex, OR Summary, Procedure Summary, Intake/Output, MAR, Accordion, Recent Results and Med Rec Status. Shift worked:   7a-7p Summary of shift:  Uneventful shift, pt with good po intake. Cristela UO. Plan for SNF on Mon. Pts sister in contact with [facility] regarding choice for [facility]. Issues for physician to address:   none Number times ambulated in hallway past shift: 0 Number of times OOB to chair past shift: 2 Pain Management: 
Current medication: none Patient states pain is manageable on current pain medication: YES 
 
GI: 
 
Current diet:  DIET REGULAR 
DIET NUTRITIONAL SUPPLEMENTS No; Breakfast, Lunch, Dinner; CyActive Tolerating current diet: YES Passing flatus: YES Last Bowel Movement: yesterday Respiratory: 
 
Incentive Spirometer at bedside: YES Patient instructed on use: YES Patient Safety: 
 
Falls Score: 3 Bed Alarm On? Yes Sitter?  No 
 
Zee Peralta RN

## 2018-08-04 NOTE — PROGRESS NOTES
CM contacted pt's sister: Geraldine, via telephone regarding pt's d/c needs and plans. CM was informed that pt's sister has selected [facility], to assist with pt's needs. CM informed pt's sister of [facility] document *signed* placed in chart. CM will send referral, via cclink to SNF. CM is currently waiting for auth. Blayne Ramirez, MSW  
[phone]

## 2018-08-04 NOTE — PROGRESS NOTES
Admit Date: 2018 POD 4 Days Post-Op Procedure:  Procedure(s): OPEN LEFT INGUIINAL HERNIA REPAIR WITH MESH Subjective:  
 
Patient has no new complaints. Objective:  
 
Blood pressure 113/68, pulse (!) 105, temperature 98.1 °F (36.7 °C), resp. rate 18, weight 114 lb 13.8 oz (52.1 kg), SpO2 96 %. Temp (24hrs), Av.1 °F (36.7 °C), Min:97.6 °F (36.4 °C), Max:98.5 °F (36.9 °C) Physical Exam:  GENERAL: alert, cooperative, no distress, appears stated age, confused, LUNG: nl effort, HEART: regular rate and rhythm, ABDOMEN: +BS  Has wan, EXTREMITIES:  extremities normal, atraumatic, no cyanosis or edema Labs: No results found for this or any previous visit (from the past 24 hour(s)). Data Review reviewed  I & O Assessment:  
 
Active Problems: 
  Inguinal hernia (2018) Urinary retention (2018) Plan/Recommendations/Medical Decision Making:  
 
Continue present treatment ? SNF in next few days Zahra Gonzales MD, [address] [facility]

## 2018-08-05 PROCEDURE — 74011250636 HC RX REV CODE- 250/636: Performed by: SURGERY

## 2018-08-05 PROCEDURE — 74011250637 HC RX REV CODE- 250/637: Performed by: SURGERY

## 2018-08-05 PROCEDURE — 94760 N-INVAS EAR/PLS OXIMETRY 1: CPT

## 2018-08-05 PROCEDURE — 65270000029 HC RM PRIVATE

## 2018-08-05 RX ADMIN — DOCUSATE SODIUM 100 MG: 100 CAPSULE, LIQUID FILLED ORAL at 10:00

## 2018-08-05 RX ADMIN — Medication 10 ML: at 06:38

## 2018-08-05 RX ADMIN — TAMSULOSIN HYDROCHLORIDE 0.4 MG: 0.4 CAPSULE ORAL at 10:00

## 2018-08-05 RX ADMIN — METRONIDAZOLE 500 MG: 500 INJECTION, SOLUTION INTRAVENOUS at 10:00

## 2018-08-05 RX ADMIN — LEVOFLOXACIN 750 MG: 5 INJECTION, SOLUTION INTRAVENOUS at 14:15

## 2018-08-05 RX ADMIN — BISACODYL 10 MG: 10 SUPPOSITORY RECTAL at 10:00

## 2018-08-05 RX ADMIN — METRONIDAZOLE 500 MG: 500 INJECTION, SOLUTION INTRAVENOUS at 21:02

## 2018-08-05 RX ADMIN — DOCUSATE SODIUM 100 MG: 100 CAPSULE, LIQUID FILLED ORAL at 17:47

## 2018-08-05 RX ADMIN — Medication 10 ML: at 21:02

## 2018-08-05 RX ADMIN — Medication 10 ML: at 14:15

## 2018-08-05 NOTE — PROGRESS NOTES
General Surgery End of Shift Nursing Note Bedside shift change report given to Tracee Walker RN (oncoming nurse) by Luis Francisco RN (offgoing nurse). Report included the following information SBAR, Kardex, Intake/Output, MAR and Recent Results. Shift worked:   7p-7a Summary of shift:    Pt slept most of the shift. Had a small bm on the bedside commode. [place] catheter care provided. Pt offered no c/o. Uneventful night. Issues for physician to address:   N/A Number times ambulated in hallway past shift: 0 Number of times OOB to chair past shift: 1 Pain Management: 
Current medication: Tylenol Patient states pain is manageable on current pain medication: No request for pain medication this shift. GI: 
 
Current diet:  DIET REGULAR 
DIET NUTRITIONAL SUPPLEMENTS No; Breakfast, Lunch, Dinner; Ensure [employer] Tolerating current diet: YES Passing flatus: NO 
Last Bowel Movement: yesterday Respiratory: 
 
Incentive Spirometer at bedside: YES Patient instructed on use: YES Patient Safety: 
 
Falls Score: 3 Bed Alarm On? Yes Sitter?  No 
 
Eston Canavan, RN

## 2018-08-05 NOTE — PROGRESS NOTES
Surgery      Postop hernia, doing OK    Awaiting placement at [facility]    Dr Arpan Sanches returns in AM to resume management.     Cont present R x      Jose Manuel Burdick MD, [facility]  ED [facility]

## 2018-08-06 LAB
ANION GAP SERPL CALC-SCNC: 8 MMOL/L (ref 5–15)
BUN SERPL-MCNC: 21 MG/DL (ref 6–20)
BUN/CREAT SERPL: 27 (ref 12–20)
CALCIUM SERPL-MCNC: 8.4 MG/DL (ref 8.5–10.1)
CHLORIDE SERPL-SCNC: 101 MMOL/L (ref 97–108)
CO2 SERPL-SCNC: 26 MMOL/L (ref 21–32)
CREAT SERPL-MCNC: 0.77 MG/DL (ref 0.7–1.3)
GLUCOSE SERPL-MCNC: 174 MG/DL (ref 65–100)
POTASSIUM SERPL-SCNC: 4.1 MMOL/L (ref 3.5–5.1)
SODIUM SERPL-SCNC: 135 MMOL/L (ref 136–145)

## 2018-08-06 PROCEDURE — 74011250637 HC RX REV CODE- 250/637: Performed by: SURGERY

## 2018-08-06 PROCEDURE — 36415 COLL VENOUS BLD VENIPUNCTURE: CPT | Performed by: SURGERY

## 2018-08-06 PROCEDURE — 94760 N-INVAS EAR/PLS OXIMETRY 1: CPT

## 2018-08-06 PROCEDURE — 80048 BASIC METABOLIC PNL TOTAL CA: CPT | Performed by: SURGERY

## 2018-08-06 PROCEDURE — 93005 ELECTROCARDIOGRAM TRACING: CPT

## 2018-08-06 PROCEDURE — 74011250636 HC RX REV CODE- 250/636: Performed by: HOSPITALIST

## 2018-08-06 PROCEDURE — 65270000029 HC RM PRIVATE

## 2018-08-06 PROCEDURE — 97116 GAIT TRAINING THERAPY: CPT

## 2018-08-06 PROCEDURE — 74011250636 HC RX REV CODE- 250/636: Performed by: SURGERY

## 2018-08-06 RX ORDER — ENOXAPARIN SODIUM 100 MG/ML
30 INJECTION SUBCUTANEOUS EVERY 24 HOURS
Status: DISCONTINUED | OUTPATIENT
Start: 2018-08-06 | End: 2018-08-08 | Stop reason: HOSPADM

## 2018-08-06 RX ADMIN — DOCUSATE SODIUM 100 MG: 100 CAPSULE, LIQUID FILLED ORAL at 09:56

## 2018-08-06 RX ADMIN — Medication 10 ML: at 18:19

## 2018-08-06 RX ADMIN — BISACODYL 10 MG: 10 SUPPOSITORY RECTAL at 11:35

## 2018-08-06 RX ADMIN — TAMSULOSIN HYDROCHLORIDE 0.4 MG: 0.4 CAPSULE ORAL at 09:56

## 2018-08-06 RX ADMIN — OXYCODONE HYDROCHLORIDE 5 MG: 5 TABLET ORAL at 22:22

## 2018-08-06 RX ADMIN — ACETAMINOPHEN 650 MG: 325 TABLET ORAL at 04:27

## 2018-08-06 RX ADMIN — SODIUM CHLORIDE 1000 ML: 900 INJECTION, SOLUTION INTRAVENOUS at 06:02

## 2018-08-06 RX ADMIN — METRONIDAZOLE 500 MG: 500 INJECTION, SOLUTION INTRAVENOUS at 09:56

## 2018-08-06 RX ADMIN — DOCUSATE SODIUM 100 MG: 100 CAPSULE, LIQUID FILLED ORAL at 18:19

## 2018-08-06 RX ADMIN — ENOXAPARIN SODIUM 30 MG: 30 INJECTION SUBCUTANEOUS at 21:00

## 2018-08-06 RX ADMIN — Medication 10 ML: at 06:02

## 2018-08-06 RX ADMIN — Medication 10 ML: at 22:22

## 2018-08-06 NOTE — PROGRESS NOTES
05:41 - paged Dr. Duane Church regarding pt's MEWS being a 4.     05:55 - dr. Lucy Herr called back. Reviewed all of pt's VS and labs. Discussed current meds and that pt is not on continuous IV fluids. New orders for 1L of NS at 75cc/hr noted.

## 2018-08-06 NOTE — PROGRESS NOTES
Pharmacy  Enoxaparin (Lovenox®) Monitoring/Dosing      Indication: DVT prophylaxis     Current Dose: Enoxaparin 30 mg subcutaneously every 24 hours    Creatinine Clearance (mL/min): ~ 50 ml/min      Labs:  Recent Labs      08/06/18   0409   CREA  0.77     Wt Readings from Last 1 Encounters:   07/31/18 52.1 kg (114 lb 13.8 oz)     Ht Readings from Last 1 Encounters:   07/24/18 167.6 cm (66\")       Impression/Plan:   · 40 mg daily ordered, dose reduced to 30 mg daily due to low patient weight (< 60 kg)     Thanks,  Andrew Trevino, [facility]

## 2018-08-06 NOTE — PROGRESS NOTES
CM informed that pt has been accepted to [facility], however, they are currently working on getting auth from insurance provider. CM was informed that she will receive a call if accepted by insurance company.     WILLIE Gutierres CM  [phone]

## 2018-08-06 NOTE — PROGRESS NOTES
Problem: Mobility Impaired (Adult and Pediatric)  Goal: *Acute Goals and Plan of Care (Insert Text)  Physical Therapy Goals  Initiated 8/3/2018  1. Patient will move from supine to sit and sit to supine  in bed with modified independence within 7 day(s). 2.  Patient will transfer from bed to chair and chair to bed with modified independence using the least restrictive device within 7 day(s). 3.  Patient will perform sit to stand with modified independence within 7 day(s). 4.  Patient will ambulate with modified independence for 200 feet with the least restrictive device within 7 day(s). physical Therapy TREATMENT  Patient: Reina Max (41 y.o. male)  Date: 8/6/2018  Diagnosis: LEFT INGUINAL HERNIA  Inguinal hernia  Urinary retention <principal problem not specified>  Procedure(s) (LRB):  OPEN LEFT INGUIINAL HERNIA REPAIR WITH MESH (Left) 6 Days Post-Op  Precautions: Fall  Chart, physical therapy assessment, plan of care and goals were reviewed. ASSESSMENT:  Pt was received in supine and cleared by nursing to mobilize. He needed encouragement for OOB mobility but did agree. Bed mobility was performed with supervision overall and additional time to come to the EOB. Sit<>stand was performed with CGA and additional time. Noted initial posterior LOB which he needed min to recover from. Ambulated into the arambula with min A and holding onto IV pole for 250ft. Noted a-fib with HR from 140s to 110s and increased RR. He was returned to the room and sitting up in the chair. Oxygen saturation 93% and . Nursing made aware of HR. Continue to recommend SNF at discharge. Progression toward goals:  []    Improving appropriately and progressing toward goals  [x]    Improving slowly and progressing toward goals  []    Not making progress toward goals and plan of care will be adjusted     PLAN:  Patient continues to benefit from skilled intervention to address the above impairments.   Continue treatment per established plan of care. Discharge Recommendations:  [facility] Cesar  Further Equipment Recommendations for Discharge:  TBD     SUBJECTIVE:   Patient stated oh yes maam.    OBJECTIVE DATA SUMMARY:   Critical Behavior:  Neurologic State: Alert  Orientation Level: Oriented to person, Oriented to place, Oriented to situation  Cognition: Follows commands  Safety/Judgement: Awareness of environment, Decreased awareness of need for safety, Decreased insight into deficits  Functional Mobility Training:  Bed Mobility:     Supine to Sit: Supervision; Additional time              Transfers:  Sit to Stand: Contact guard assistance; Additional time  Stand to Sit: Contact guard assistance                             Balance:  Sitting: Intact  Standing: Impaired  Standing - Static: Fair  Standing - Dynamic : Fair  Ambulation/Gait Training:  Distance (ft): 250 Feet (ft)  Assistive Device: Gait belt (holdingo onto IV pole)  Ambulation - Level of Assistance: Minimal assistance        Gait Abnormalities: Decreased step clearance; Path deviations        Base of Support: Narrowed     Speed/Johanna: Pace decreased (<100 feet/min); Shuffled  Step Length: Left shortened;Right shortened             Pain:  Pain Scale 1: Numeric (0 - 10)  Pain Intensity 1: 0  Pain Location 1: Arm;Hand  Pain Orientation 1: Left; Lower     Pain Intervention(s) 1: Medication (see MAR)  Activity Tolerance:   A-fib HR 140s  Please refer to the flowsheet for vital signs taken during this treatment.   After treatment:   [x]    Patient left in no apparent distress sitting up in chair  []    Patient left in no apparent distress in bed  [x]    Call bell left within reach  [x]    Nursing notified  []    Caregiver present  [x]    Bed alarm activated    COMMUNICATION/COLLABORATION:   The patients plan of care was discussed with: Registered Nurse    Suhas Wilde PT, DPT   Time Calculation: 24 mins

## 2018-08-06 NOTE — PROGRESS NOTES
General Surgery End of Shift Nursing Note    Bedside shift change report given to [address] [place] [address] (oncoming nurse) by Nella Rogers RN (offgoing nurse). Report included the following information SBAR. Shift worked:   7am-7pm   Summary of shift:    Patient has been resting quietly. Blood is in wan bag. Dr. Finesse Hough is aware. Patient is going home with wan. Issues for physician to address:       Number times ambulated in hallway past shift: 1    Number of times OOB to chair past shift: 1    Pain Management:  Current medication:   Patient states pain is manageable on current pain medication: YES    GI:    Current diet:  DIET REGULAR  DIET NUTRITIONAL SUPPLEMENTS No; Breakfast, Lunch, Dinner; Ensure [place] current diet: YES  Passing flatus: YES  Last Bowel Movement: today   Appearance: Hard    Respiratory:    Incentive Spirometer at bedside: YES  Patient instructed on use: YES    Patient Safety:    Falls Score: 4  Bed Alarm On? Yes  Sitter?  Not applicable    Nella Rogers

## 2018-08-07 ENCOUNTER — APPOINTMENT (OUTPATIENT)
Dept: GENERAL RADIOLOGY | Age: 83
DRG: 351 | End: 2018-08-07
Attending: SURGERY
Payer: MEDICARE

## 2018-08-07 LAB
ANION GAP SERPL CALC-SCNC: 8 MMOL/L (ref 5–15)
APPEARANCE UR: ABNORMAL
ATRIAL RATE: 111 BPM
BACTERIA URNS QL MICRO: NEGATIVE /HPF
BILIRUB UR QL: NEGATIVE
BUN SERPL-MCNC: 15 MG/DL (ref 6–20)
BUN/CREAT SERPL: 24 (ref 12–20)
CALCIUM SERPL-MCNC: 8.3 MG/DL (ref 8.5–10.1)
CALCULATED P AXIS, ECG09: 91 DEGREES
CALCULATED R AXIS, ECG10: 112 DEGREES
CALCULATED T AXIS, ECG11: 112 DEGREES
CHLORIDE SERPL-SCNC: 103 MMOL/L (ref 97–108)
CO2 SERPL-SCNC: 26 MMOL/L (ref 21–32)
COLOR UR: ABNORMAL
CREAT SERPL-MCNC: 0.63 MG/DL (ref 0.7–1.3)
DIAGNOSIS, 93000: NORMAL
EPITH CASTS URNS QL MICRO: ABNORMAL /LPF
ERYTHROCYTE [DISTWIDTH] IN BLOOD BY AUTOMATED COUNT: 13.9 % (ref 11.5–14.5)
GLUCOSE SERPL-MCNC: 111 MG/DL (ref 65–100)
GLUCOSE UR STRIP.AUTO-MCNC: NEGATIVE MG/DL
HCT VFR BLD AUTO: 31.7 % (ref 36.6–50.3)
HGB BLD-MCNC: 10.3 G/DL (ref 12.1–17)
HGB UR QL STRIP: ABNORMAL
HYALINE CASTS URNS QL MICRO: ABNORMAL /LPF (ref 0–5)
KETONES UR QL STRIP.AUTO: NEGATIVE MG/DL
LEUKOCYTE ESTERASE UR QL STRIP.AUTO: ABNORMAL
MCH RBC QN AUTO: 29.6 PG (ref 26–34)
MCHC RBC AUTO-ENTMCNC: 32.5 G/DL (ref 30–36.5)
MCV RBC AUTO: 91.1 FL (ref 80–99)
NITRITE UR QL STRIP.AUTO: NEGATIVE
NRBC # BLD: 0 K/UL (ref 0–0.01)
NRBC BLD-RTO: 0 PER 100 WBC
P-R INTERVAL, ECG05: 218 MS
PH UR STRIP: 6.5 [PH] (ref 5–8)
PLATELET # BLD AUTO: 293 K/UL (ref 150–400)
PMV BLD AUTO: 9.6 FL (ref 8.9–12.9)
POTASSIUM SERPL-SCNC: 4.1 MMOL/L (ref 3.5–5.1)
PROT UR STRIP-MCNC: 30 MG/DL
Q-T INTERVAL, ECG07: 302 MS
QRS DURATION, ECG06: 66 MS
QTC CALCULATION (BEZET), ECG08: 410 MS
RBC # BLD AUTO: 3.48 M/UL (ref 4.1–5.7)
RBC #/AREA URNS HPF: >100 /HPF (ref 0–5)
SODIUM SERPL-SCNC: 137 MMOL/L (ref 136–145)
SP GR UR REFRACTOMETRY: 1.01 (ref 1–1.03)
UA: UC IF INDICATED,UAUC: ABNORMAL
UROBILINOGEN UR QL STRIP.AUTO: 0.2 EU/DL (ref 0.2–1)
VENTRICULAR RATE, ECG03: 111 BPM
WBC # BLD AUTO: 7 K/UL (ref 4.1–11.1)
WBC URNS QL MICRO: ABNORMAL /HPF (ref 0–4)

## 2018-08-07 PROCEDURE — 74011250636 HC RX REV CODE- 250/636: Performed by: SURGERY

## 2018-08-07 PROCEDURE — 74011250637 HC RX REV CODE- 250/637: Performed by: SURGERY

## 2018-08-07 PROCEDURE — 81001 URINALYSIS AUTO W/SCOPE: CPT | Performed by: SURGERY

## 2018-08-07 PROCEDURE — 36415 COLL VENOUS BLD VENIPUNCTURE: CPT | Performed by: SURGERY

## 2018-08-07 PROCEDURE — 84550 ASSAY OF BLOOD/URIC ACID: CPT | Performed by: PHYSICIAN ASSISTANT

## 2018-08-07 PROCEDURE — 65270000029 HC RM PRIVATE

## 2018-08-07 PROCEDURE — 0RJP3ZZ INSPECTION OF LEFT WRIST JOINT, PERCUTANEOUS APPROACH: ICD-10-PCS | Performed by: ORTHOPAEDIC SURGERY

## 2018-08-07 PROCEDURE — 93971 EXTREMITY STUDY: CPT

## 2018-08-07 PROCEDURE — 80048 BASIC METABOLIC PNL TOTAL CA: CPT | Performed by: SURGERY

## 2018-08-07 PROCEDURE — 73130 X-RAY EXAM OF HAND: CPT

## 2018-08-07 PROCEDURE — 85027 COMPLETE CBC AUTOMATED: CPT | Performed by: SURGERY

## 2018-08-07 PROCEDURE — 73100 X-RAY EXAM OF WRIST: CPT

## 2018-08-07 PROCEDURE — 87086 URINE CULTURE/COLONY COUNT: CPT | Performed by: SURGERY

## 2018-08-07 RX ADMIN — TAMSULOSIN HYDROCHLORIDE 0.4 MG: 0.4 CAPSULE ORAL at 09:24

## 2018-08-07 RX ADMIN — DOCUSATE SODIUM 100 MG: 100 CAPSULE, LIQUID FILLED ORAL at 18:10

## 2018-08-07 RX ADMIN — OXYCODONE HYDROCHLORIDE 5 MG: 5 TABLET ORAL at 08:41

## 2018-08-07 RX ADMIN — DOCUSATE SODIUM 100 MG: 100 CAPSULE, LIQUID FILLED ORAL at 09:24

## 2018-08-07 RX ADMIN — ENOXAPARIN SODIUM 30 MG: 30 INJECTION SUBCUTANEOUS at 22:20

## 2018-08-07 RX ADMIN — BISACODYL 10 MG: 10 SUPPOSITORY RECTAL at 09:24

## 2018-08-07 RX ADMIN — IBUPROFEN 400 MG: 400 TABLET ORAL at 08:12

## 2018-08-07 RX ADMIN — OXYCODONE HYDROCHLORIDE 5 MG: 5 TABLET ORAL at 16:26

## 2018-08-07 RX ADMIN — Medication 10 ML: at 22:20

## 2018-08-07 RX ADMIN — Medication 10 ML: at 05:05

## 2018-08-07 RX ADMIN — OXYCODONE HYDROCHLORIDE 5 MG: 5 TABLET ORAL at 22:54

## 2018-08-07 NOTE — PROCEDURES
[facility]  *** FINAL REPORT ***    Name: Rex Roland  MRN: [id number]    Inpatient  : 05 Dec 1930  HIS Order #: [id number]  [id number] [facility] Visit #: [id number]  Date: 07 Aug 2018    TYPE OF TEST: Peripheral Venous Testing    REASON FOR TEST  Limb swelling    Left Arm:-  Deep venous thrombosis:           No  Superficial venous thrombosis:    Yes      INTERPRETATION/FINDINGS  PROCEDURE:  Venous duplex examination using B-mode, color flow and  spectral Doppler of the upper extremity veins. Left arm :  1. Deep vein(s) visualized include the internal jugular, subclavian,  axillary, brachial, radial and ulnar veins. 2. No evidence of deep venous thrombosis detected in the veins  visualized. 3. No evidence of deep vein thrombosis in the contralateral subclavian   vein. 4. Superficial vein(s) visualized include the basilic (upper arm),  basilic (forearm), cephalic (upper arm) and cephalic (forearm) veins. 5. Acute superficial venous thrombosis identified in the cephalic  (forearm) vein. ADDITIONAL COMMENTS    I have personally reviewed the data relevant to the interpretation of  this  study. TECHNOLOGIST: Maria Del Rosario Ruffin. EMRE Mora, RDMS  Signed: 2018 10:53 AM    PHYSICIAN: Osiel Patino.  Bella Romeo MD  Signed: 2018 04:07 PM

## 2018-08-07 NOTE — PROGRESS NOTES
Admit Date: 2018  POD 7 Days Post-Op  Status/Post:  Procedure(s):  OPEN LEFT INGUIINAL HERNIA REPAIR WITH MESH    Assessment:     Active Problems:    Inguinal hernia (2018)      Urinary retention (2018)        Plan/Recommendations/Medical Decision Making:     No surgical issues. Several issues preventing us from sending him out    Tachycardia/HTN currently stable     Report worsening blood and clots from wan this morning  H/H stable  appreciate note from Dr. Juanita Alarcon. Plan for outpatient f/u with urology for urinary retention    Left arm swelling. New. Had doppler to r/o DVT, superficial venous thrombus present but, swelling seems to originate on dorsum of the hand and very tender with inability to move fingers. Currently with good cap refill and strong radial pulse. Will start with xrays. Discussed with [employer]. Ortho consult written. Discussed issues with Dr. Dl Gong. Hospitalists will see only if other issues arise.    -------------------------------------------------------------------------------------------------------------------      Subjective:     Patient has no new complaints. MS at baseline. Responding to simple questions. Objective:     Blood pressure 110/72, pulse (!) 101, temperature 99 °F (37.2 °C), resp. rate 17, weight 52.1 kg (114 lb 13.8 oz), SpO2 96 %. Temp (24hrs), Av.4 °F (36.9 °C), Min:98 °F (36.7 °C), Max:99 °F (37.2 °C)      Physical Exam:   Abdominal exam: soft  non-distended  Non- tender.   Wound: clean, dry, no drainage       Labs:   Recent Results (from the past 24 hour(s))   CBC W/O DIFF    Collection Time: 18  4:57 AM   Result Value Ref Range    WBC 7.0 4.1 - 11.1 K/uL    RBC 3.48 (L) 4.10 - 5.70 M/uL    HGB 10.3 (L) 12.1 - 17.0 g/dL    HCT 31.7 (L) 36.6 - 50.3 %    MCV 91.1 80.0 - 99.0 FL    MCH 29.6 26.0 - 34.0 PG    MCHC 32.5 30.0 - 36.5 g/dL    RDW 13.9 11.5 - 14.5 %    PLATELET 515 726 - 140 K/uL    MPV 9.6 8.9 - 12.9 FL    NRBC 0.0 0 PER 100 WBC    ABSOLUTE NRBC 0.00 0.00 - 0.19 K/uL   METABOLIC PANEL, BASIC    Collection Time: 08/07/18  4:57 AM   Result Value Ref Range    Sodium 137 136 - 145 mmol/L    Potassium 4.1 3.5 - 5.1 mmol/L    Chloride 103 97 - 108 mmol/L    CO2 26 21 - 32 mmol/L    Anion gap 8 5 - 15 mmol/L    Glucose 111 (H) 65 - 100 mg/dL    BUN 15 6 - 20 MG/DL    Creatinine 0.63 (L) 0.70 - 1.30 MG/DL    BUN/Creatinine ratio 24 (H) 12 - 20      GFR est AA >60 >60 ml/min/1.73m2    GFR est non-AA >60 >60 ml/min/1.73m2    Calcium 8.3 (L) 8.5 - 10.1 MG/DL   URINALYSIS W/ REFLEX CULTURE    Collection Time: 08/07/18 12:30 PM   Result Value Ref Range    Color YELLOW/STRAW      Appearance CLOUDY (A) CLEAR      Specific gravity 1.011 1.003 - 1.030      pH (UA) 6.5 5.0 - 8.0      Protein 30 (A) NEG mg/dL    Glucose NEGATIVE  NEG mg/dL    Ketone NEGATIVE  NEG mg/dL    Bilirubin NEGATIVE  NEG      Blood LARGE (A) NEG      Urobilinogen 0.2 0.2 - 1.0 EU/dL    Nitrites NEGATIVE  NEG      Leukocyte Esterase SMALL (A) NEG      WBC 5-10 0 - 4 /hpf    RBC >100 (H) 0 - 5 /hpf    Epithelial cells FEW FEW /lpf    Bacteria NEGATIVE  NEG /hpf    UA:UC IF INDICATED URINE CULTURE ORDERED (A) CNI      Hyaline cast 0-2 0 - 5 /lpf       Data Review

## 2018-08-07 NOTE — CONSULTS
ORTHOPAEDIC CONSULT NOTE    Subjective:     Date of Consultation:  August 7, 2018      Arpan Henry is a 80 y.o. male who is being seen for left hand/wrist pain and swelling. Onset/progression over the last 24hours. RN states soreness yesterday but NOT swollen. Pt w/dementia- not able to contribute to HPI/PMH. Sister at bedside say NO HX of gout.  -admitted 7/31 for inguinal hernia. Patient Active Problem List    Diagnosis Date Noted    Urinary retention 08/02/2018    Inguinal hernia 07/31/2018    BMI less than 19,adult 07/23/2018    Left inguinal hernia 07/11/2018    Benign prostatic hyperplasia without lower urinary tract symptoms 10/11/2017    Hypertension     Benign prostatic hyperplasia     Gout     High cholesterol     Incarcerated inguinal hernia 09/14/2013     Family History   Problem Relation Age of Onset    Stroke Mother     Stroke Sister     Diabetes Sister     Hypertension Sister     Heart Disease Brother     Hypertension Brother       Social History   Substance Use Topics    Smoking status: Unknown If Ever Smoked    Smokeless tobacco: Never Used    Alcohol use Yes      Comment: occas     Past Medical History:   Diagnosis Date    Anemia     BPH (benign prostatic hypertrophy)     DEMENTIA     Gout     High cholesterol     06/18 taken off meds    Hypertension     6/18 was taken off BP meds    Left inguinal hernia 07/2018      Past Surgical History:   Procedure Laterality Date    HX HERNIA REPAIR  9-10-13    [facility] with mesh-[facility]-Dr. Guido Dahl      Prior to Admission medications    Medication Sig Start Date End Date Taking? Authorizing Provider   docusate sodium (COLACE) 100 mg capsule Take 1 Cap by mouth two (2) times a day for 7 days. Stop taking if you have diarrhea 8/3/18 8/10/18 Yes Rm Sevilla MD   ibuprofen (MOTRIN) 200 mg tablet Take 2 Tabs by mouth every six (6) hours as needed for Pain.  8/3/18  Yes Rm Sevilla MD   tamsulosin (FLOMAX) 0.4 mg capsule TAKE 1 CAPSULE BY MOUTH EVERY DAY 5/22/18  Yes Roslyn Amador NP   Nebulizer & Compressor machine 1 Each by Does Not Apply route as needed for Cough (wheezing). Use nebulizer every 4-6 hours as needed for shortness of breath or wheezing 7/26/18   Roslyn Amador NP   albuterol (PROVENTIL VENTOLIN) 2.5 mg /3 mL (0.083 %) nebulizer solution 3 mL by Nebulization route every four (4) hours as needed for Wheezing or Shortness of Breath. 7/24/18   Roslyn Amador NP   food supplemt, lactose-reduced (BOOST HIGH PROTEIN) 0.06 gram- 1 kcal/mL liqd Drink 2-3 containers DAILY. 3/23/18   Roslyn Amador NP   ferrous sulfate 325 mg (65 mg iron) tablet Take 1 Tab by mouth two (2) times daily (with meals). On an empty stomach with Vitamin C (like OJ)  Patient taking differently: Take 325 mg by mouth Daily (before breakfast). On an empty stomach with Vitamin C (like OJ) 7/20/17   Roslyn Amador NP   potassium chloride SR (K-TAB) 10 mEq tablet Take 2 Tabs by mouth two (2) times a day. Patient taking differently: Take 20 mEq by mouth daily. 4/11/17   Surinder Gayle MD   aspirin 81 mg tablet Take 81 mg by mouth daily.     Barry Priest MD     Current Facility-Administered Medications   Medication Dose Route Frequency    enoxaparin (LOVENOX) injection 30 mg  30 mg SubCUTAneous Q24H    bisacodyl (DULCOLAX) suppository 10 mg  10 mg Rectal DAILY    oxyCODONE IR (ROXICODONE) tablet 5 mg  5 mg Oral Q4H PRN    ibuprofen (MOTRIN) tablet 400 mg  400 mg Oral Q6H PRN    albuterol (PROVENTIL VENTOLIN) nebulizer solution 2.5 mg  2.5 mg Nebulization Q4H PRN    tamsulosin (FLOMAX) capsule 0.4 mg  0.4 mg Oral DAILY    sodium chloride (NS) flush 5-10 mL  5-10 mL IntraVENous Q8H    sodium chloride (NS) flush 5-10 mL  5-10 mL IntraVENous PRN    acetaminophen (TYLENOL) tablet 650 mg  650 mg Oral Q6H PRN    ondansetron (ZOFRAN) injection 4 mg  4 mg IntraVENous Q4H PRN    docusate sodium (COLACE) capsule 100 mg  100 mg Oral BID    nitroglycerin (NITROBID) 2 % ointment 1 Inch  1 Inch Topical Q6H PRN      No Known Allergies     Review of Systems:  A comprehensive review of systems was negative except for that written in the HPI. Mental Status: medium dementia    Objective:     Patient Vitals for the past 8 hrs:   BP Temp Pulse Resp SpO2   18 1054 110/72 99 °F (37.2 °C) (!) 101 17 96 %     Temp (24hrs), Av.4 °F (36.9 °C), Min:98 °F (36.7 °C), Max:99 °F (37.2 °C)      Gen: Well-developed,  in no acute distress   HEENT: Pink conjunctivae, hearing intact to voice, moist mucous membranes   Neck: Supple  Resp: No respiratory distress   Card: tachy, palpable distal pulse-equal bilaterally, birsk cap refill all distal digits   Abd: Soft, non-distended  Musc: left hand/wrist with sig swelling over the dorsal and volar aspect, limited A/P ROM of the digits guarded and sig painfull  Skin: No skin breakdown noted. Skin warm, pink, dry  Neuro: Cranial nerves are grossly intact, no focal motor weakness, follows commands appropriately   Psych: Poor insight,  Alert              Imaging Review:   XR WRIST LT AP/LAT  INDICATION:   wrist swelling and pain  COMPARISON: None. FINDINGS: 3 views of the left wrist were obtained. Osteopenia. 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 There is moderate first CMC degenerative disease and  mild triscaphe degenerative disease. Chondrocalcinosis of the TFCC. Vascular  calcifications. No evidence of acute fracture. There is mild diffuse soft tissue  swelling about the wrist.  IMPRESSION:    1. Mild diffuse soft tissue swelling about the wrist. No acute fracture. 2. Moderate first CMC degenerative disease. Chondrocalcinosis. 3. Osteopenia. 4. Scapholunate coalition.     Labs:   Recent Results (from the past 24 hour(s))   CBC W/O DIFF    Collection Time: 18  4:57 AM   Result Value Ref Range    WBC 7.0 4.1 - 11.1 K/uL    RBC 3.48 (L) 4.10 - 5.70 M/uL    HGB 10.3 (L) 12.1 - 17.0 g/dL    HCT 31.7 (L) 36.6 - 50.3 %    MCV 91.1 80.0 - 99.0 FL    MCH 29.6 26.0 - 34.0 PG    MCHC 32.5 30.0 - 36.5 g/dL    RDW 13.9 11.5 - 14.5 %    PLATELET 572 783 - 482 K/uL    MPV 9.6 8.9 - 12.9 FL    NRBC 0.0 0  WBC    ABSOLUTE NRBC 0.00 0.00 - 6.36 K/uL   METABOLIC PANEL, BASIC    Collection Time: 08/07/18  4:57 AM   Result Value Ref Range    Sodium 137 136 - 145 mmol/L    Potassium 4.1 3.5 - 5.1 mmol/L    Chloride 103 97 - 108 mmol/L    CO2 26 21 - 32 mmol/L    Anion gap 8 5 - 15 mmol/L    Glucose 111 (H) 65 - 100 mg/dL    BUN 15 6 - 20 MG/DL    Creatinine 0.63 (L) 0.70 - 1.30 MG/DL    BUN/Creatinine ratio 24 (H) 12 - 20      GFR est AA >60 >60 ml/min/1.73m2    GFR est non-AA >60 >60 ml/min/1.73m2    Calcium 8.3 (L) 8.5 - 10.1 MG/DL   URINALYSIS W/ REFLEX CULTURE    Collection Time: 08/07/18 12:30 PM   Result Value Ref Range    Color YELLOW/STRAW      Appearance CLOUDY (A) CLEAR      Specific gravity 1.011 1.003 - 1.030      pH (UA) 6.5 5.0 - 8.0      Protein 30 (A) NEG mg/dL    Glucose NEGATIVE  NEG mg/dL    Ketone NEGATIVE  NEG mg/dL    Bilirubin NEGATIVE  NEG      Blood LARGE (A) NEG      Urobilinogen 0.2 0.2 - 1.0 EU/dL    Nitrites NEGATIVE  NEG      Leukocyte Esterase SMALL (A) NEG      WBC 5-10 0 - 4 /hpf    RBC >100 (H) 0 - 5 /hpf    Epithelial cells FEW FEW /lpf    Bacteria NEGATIVE  NEG /hpf    UA:UC IF INDICATED URINE CULTURE ORDERED (A) CNI      Hyaline cast 0-2 0 - 5 /lpf         Impression:     Patient Active Problem List    Diagnosis Date Noted    Urinary retention 08/02/2018    Inguinal hernia 07/31/2018    BMI less than 19,adult 07/23/2018    Left inguinal hernia 07/11/2018    Benign prostatic hyperplasia without lower urinary tract symptoms 10/11/2017    Hypertension     Benign prostatic hyperplasia     Gout     High cholesterol     Incarcerated inguinal hernia 09/14/2013     Active Problems:    Inguinal hernia (7/31/2018)      Urinary retention (8/2/2018)        Plan:   -  Afebrile   -  WBC count 7.0  -  US of the LUE positive for Acute superficial venous thrombosis (US tech confirms NO fluid collection noted)    -  ICE , aggressive elevation  -  Uric acid ordered  -  Consider steroid taper      Dr. Rolando Quiñones aware and agrees with plan as above.         Lisa Sanots PA-C  [employer]

## 2018-08-07 NOTE — PROGRESS NOTES
Chart reviewed  and visit attempted pt off floor for procedure. Will continue to follow. X2 visit attempted pt still off floor for procedure.

## 2018-08-07 NOTE — PROGRESS NOTES
Courtesy Note - no charge    Pre-inguinal hernia repair + mesh patient denies voiding difficulty/gross hematuria  Post-op retention, urine is Vaughn-aid colored/thin hematuria w/o clots. Hematuria most likely BPH/cath related over bladder injury. Hematuria is mild, and I do not expect it to be a problem  Continuing Flomax, bridging to outpatient void trials in our office. Contact information has been added to e-discharge materials for outpatient followup. Follow-up Appointments   Procedures    FOLLOW UP VISIT Appointment in: [address]  Urology - [phone]     [facility] - [phone]     Standing Status:   Standing     Number of Occurrences:   1     Order Specific Question:   Appointment in     Answer:    One Week

## 2018-08-07 NOTE — PROGRESS NOTES
General Surgery End of Shift Nursing Note    Bedside shift change report given to Irais Bryant (oncoming nurse) by Jovana Guzman(offgoing nurse). Report included the following information SBAR. Shift worked:  7am-7pm   Summary of shift:    Patient is resting quietly. He has some swelling in his left hand. Spoke with Dr. Levar Chandler about patient. He said to remove IV and he was going to order venous dopplers. Xray of hand showed osteopenia, osteoarthritis, degenerative disease. And get urology involved for the clots coming out of his wan. Urology ordered a 1 time flush of  Wan. Issues for physician to address:        Number times ambulated in hallway past shift: 0    Number of times OOB to chair past shift: 0    Pain Management:  Current medication: Oxycodone  Patient states pain is manageable on current pain medication: YES    GI:    Current diet:  DIET REGULAR  DIET NUTRITIONAL SUPPLEMENTS No; Breakfast, Lunch, Dinner; Ensure [place] current diet: YES  Passing flatus: YES  Last Bowel Movement: today   Appearance: He only had one bm today. It looked like mucous. He had not gone is insertion of suppository. Respiratory:    Incentive Spirometer at bedside: YES  Patient instructed on use: YES    Patient Safety:    Falls Score: 4  Bed Alarm On? Yes  Sitter?  Not applicable    [facility] [place]

## 2018-08-07 NOTE — PROGRESS NOTES
General Surgery End of Shift Nursing Note    Bedside shift change report given to [address] (oncoming nurse) by Ruby Mejia RN (offgoing nurse). Report included the following information SBAR, Kardex, Intake/Output, MAR and Recent Results. Shift worked:   7p-7a   Summary of shift:    Pt c/o \"abdominal discomfort\". Robledo catheter not draining. Cleared large blood clot. Pt \"dumped\" approx. 600 ccs of urine   Issues for physician to address:  N/A     Number times ambulated in hallway past shift: 0    Number of times OOB to chair past shift: 2    Pain Management:  Current medication: Roxicodone  Patient states pain is manageable on current pain medication: YES    GI:    Current diet:  DIET REGULAR  DIET NUTRITIONAL SUPPLEMENTS No; Breakfast, Lunch, Dinner; Ensure [place] current diet: YES  Passing flatus: YES  Last Bowel Movement: yesterday    Respiratory:    Incentive Spirometer at bedside: YES  Patient instructed on use: YES    Patient Safety:    Falls Score: 4  Bed Alarm On? Yes  Sitter?  No    Joaquin Issa RN

## 2018-08-07 NOTE — PROGRESS NOTES
[id number]  Preliminary Report:  Venous Duplex Arm    Left arm venous duplex was performed. Cephalic Vein at antecubital fossa/forearm segment is Non-Compressible with Absent Doppler signals, suggesting Occlusive venous obstruction of the aforementioned vessel(s). Final report to follow.

## 2018-08-08 VITALS
BODY MASS INDEX: 18.54 KG/M2 | WEIGHT: 114.86 LBS | SYSTOLIC BLOOD PRESSURE: 115 MMHG | HEART RATE: 113 BPM | TEMPERATURE: 98 F | RESPIRATION RATE: 18 BRPM | DIASTOLIC BLOOD PRESSURE: 69 MMHG | OXYGEN SATURATION: 95 %

## 2018-08-08 LAB
BACTERIA SPEC CULT: NORMAL
CC UR VC: NORMAL
SERVICE CMNT-IMP: NORMAL
URATE SERPL-MCNC: 3.8 MG/DL (ref 3.5–7.2)

## 2018-08-08 PROCEDURE — 94760 N-INVAS EAR/PLS OXIMETRY 1: CPT

## 2018-08-08 PROCEDURE — 74011250636 HC RX REV CODE- 250/636: Performed by: ORTHOPAEDIC SURGERY

## 2018-08-08 PROCEDURE — 74011250637 HC RX REV CODE- 250/637: Performed by: SURGERY

## 2018-08-08 RX ORDER — LIDOCAINE HYDROCHLORIDE 10 MG/ML
4 INJECTION INFILTRATION; PERINEURAL ONCE
Status: DISCONTINUED | OUTPATIENT
Start: 2018-08-08 | End: 2018-08-08

## 2018-08-08 RX ORDER — LIDOCAINE HYDROCHLORIDE 10 MG/ML
4 INJECTION, SOLUTION EPIDURAL; INFILTRATION; INTRACAUDAL; PERINEURAL ONCE
Status: COMPLETED | OUTPATIENT
Start: 2018-08-08 | End: 2018-08-08

## 2018-08-08 RX ADMIN — OXYCODONE HYDROCHLORIDE 5 MG: 5 TABLET ORAL at 10:54

## 2018-08-08 RX ADMIN — OXYCODONE HYDROCHLORIDE 5 MG: 5 TABLET ORAL at 05:42

## 2018-08-08 RX ADMIN — LIDOCAINE HYDROCHLORIDE 4 ML: 10 INJECTION, SOLUTION EPIDURAL; INFILTRATION; INTRACAUDAL; PERINEURAL at 14:46

## 2018-08-08 RX ADMIN — TAMSULOSIN HYDROCHLORIDE 0.4 MG: 0.4 CAPSULE ORAL at 10:17

## 2018-08-08 RX ADMIN — BISACODYL 10 MG: 10 SUPPOSITORY RECTAL at 10:17

## 2018-08-08 RX ADMIN — Medication 10 ML: at 05:42

## 2018-08-08 RX ADMIN — DOCUSATE SODIUM 100 MG: 100 CAPSULE, LIQUID FILLED ORAL at 10:17

## 2018-08-08 RX ADMIN — IBUPROFEN 400 MG: 400 TABLET ORAL at 04:39

## 2018-08-08 NOTE — PROGRESS NOTES
Anjum Jones RN report from [address] up time Broderick Lugo to take out IV. Will pull Robledo.

## 2018-08-08 NOTE — PROGRESS NOTES
Pulled Wan. Scant dried blood on penis.  Cleaned    [employer] RN at [facility] - if no urine output prior to discharge at 11am, possibly need to place wan

## 2018-08-08 NOTE — PROGRESS NOTES
Pt will transition to SNF[facility] on today, at 11:00am, via [facility]. CM contacted pt's sister: Geraldine, via telephone. Geraldine reported that she will be meeting pt at [facility] to sign paper work for pt. CM has given pt's nurse call report number. Care Management Interventions  PCP Verified by CM:  Yes  Mode of Transport at Discharge: BLS ([facility] at 6)  Transition of Care Consult (CM Consult): SNF ([facility] )  Partner SNF: Yes  Discharge Durable Medical Equipment: No  Physical Therapy Consult: Yes  Occupational Therapy Consult: Yes  Speech Therapy Consult: No  Current Support Network: Lives Alone, Own Home  Confirm Follow Up Transport: Family  Plan discussed with Pt/Family/Caregiver: Yes  Freedom of Choice Offered: Yes  Discharge Location  Discharge Placement: Roxanne Jalloh, MSW   [phone]

## 2018-08-08 NOTE — DISCHARGE SUMMARY
Physician Discharge Summary       Patient ID:  Adam Saldana  [id number]   16 y.o.  12/5/1930    Admit Date: 7/31/2018    Discharge Date: 8/8/2018    Admission Diagnoses: LEFT INGUINAL HERNIA;Inguinal hernia;Urinary retention    Discharge Diagnoses: Active Problems:    Inguinal hernia (7/31/2018)      Urinary retention (8/2/2018)     Cachexia and Underweight as evidenced by by BMI @ 18.5, weight @ 114lbs in male pt who with Dementia, requiring RD consult, who has recommended TID Supplements, monitoring of pt twila ly meals, weights. Left arm superficial venous thrombus     Discharge Condition: Good    Last Procedure: Procedure(s):  OPEN LEFT [address] Course:   Hospital course was complicated by lack of early ambulation, cachexia, superficial venous thrombus, and urinary retention, which added 7 days to the patient's length of stay. On discharge, Adam Saldana is tolerating a diet, and pain is controlled      Consults: Orthopedics and Urology    Significant Diagnostic Studies:  Normal left wrist and hand XR  superficial venous thrombus on doppler    Disposition: SNF    Patient Instructions:   Current Discharge Medication List      START taking these medications    Details   docusate sodium (COLACE) 100 mg capsule Take 1 Cap by mouth two (2) times a day for 7 days. Stop taking if you have diarrhea  Qty: 30 Cap, Refills: 2         CONTINUE these medications which have NOT CHANGED    Details   ibuprofen (MOTRIN) 200 mg tablet Take 2 Tabs by mouth every six (6) hours as needed for Pain.      tamsulosin (FLOMAX) 0.4 mg capsule TAKE 1 CAPSULE BY MOUTH EVERY DAY  Qty: 90 Cap, Refills: 0      Nebulizer & Compressor machine 1 Each by Does Not Apply route as needed for Cough (wheezing).  Use nebulizer every 4-6 hours as needed for shortness of breath or wheezing  Qty: 1 Each, Refills: 0      albuterol (PROVENTIL VENTOLIN) 2.5 mg /3 mL (0.083 %) nebulizer solution 3 mL by Nebulization route every four (4) hours as needed for Wheezing or Shortness of Breath. Qty: 30 Each, Refills: 2      food supplemt, lactose-reduced (BOOST HIGH PROTEIN) 0.06 gram- 1 kcal/mL liqd Drink 2-3 containers DAILY. Qty: 90 Container, Refills: 11    Associated Diagnoses: Mild protein-calorie malnutrition (HCC)      ferrous sulfate 325 mg (65 mg iron) tablet Take 1 Tab by mouth two (2) times daily (with meals). On an empty stomach with Vitamin C (like OJ)  Qty: 60 Tab, Refills: 11      potassium chloride SR (K-TAB) 10 mEq tablet Take 2 Tabs by mouth two (2) times a day. Qty: 360 Tab, Refills: 3      aspirin 81 mg tablet Take 81 mg by mouth daily. Activity: No restriction. Diet: Regular Diet, Low Fat and Low Fiber  Wound Care: None needed. Ice to left hand    Follow-up with Dr. Arpan Sanches in 2 weeks.

## 2018-08-08 NOTE — PROGRESS NOTES
PA here for L arm procedure drain, awaiting consent from Geraldine (sister) - next of kin    1 - PA left until Geraldine gives consent for procedure

## 2018-08-08 NOTE — PROGRESS NOTES
General Surgery End of Shift Nursing Note      Shift worked:  7p-7a   Summary of shift:    SHONNA overnight. Pt c/o pain to LUE, motrin given x 1, oxy given x 2. Seems to have some relief with the oxycodone. LUE is red and swollen, +radial pulse, elevated on pillows and ice pack applied. Pt resistant to having the ice pack, but agreed. Robledo draining adeq amt ross/blood tinged urine with large blood clots. Issues for physician to address:   Pain control     Number times ambulated in hallway past shift: 0    Number of times OOB to chair past shift: 1 ([facility])    Pain Management:  Current medication: Oxycodone, Motrin, Tylenol PRN  Patient states pain is manageable on current pain medication: NO    GI:  Current diet:  DIET REGULAR  DIET NUTRITIONAL SUPPLEMENTS No; Breakfast, Lunch, Dinner; Ensure [place] current diet: YES  Passing flatus: YES  Last Bowel Movement: several days ago       Patient Safety:  Falls Score: 4  Bed Alarm On? Yes  Sitter?  No    Todd Veliz RN

## 2018-08-08 NOTE — PROGRESS NOTES
Notified Dr. Merlin Guzman about patient's stool - small pellets of stool with small puddle of blood. OK per MD (trauma)    Place Robledo Cath back in. Wait for discharge until Ortho drains left arm and OK's for him to go.

## 2018-08-08 NOTE — PROGRESS NOTES
Admit Date: 2018  POD 8 Days Post-Op  Status/Post:  Procedure(s):  OPEN LEFT INGUIINAL HERNIA REPAIR WITH MESH    Assessment:     Active Problems:    Inguinal hernia (2018)      Urinary retention (2018)        Plan/Recommendations/Medical Decision Making:     abd benign incision nearly healed  Left hand/wrist swelling: appreciate evaluation by Merle Duarte and Dr. John Jackman  Uric acid normal  XR with swelling, no other acute abnormality    Urinary retention: wan in place and working. Discharge if bed available    -------------------------------------------------------------------------------------------------------------------      Subjective:     Patient has no new complaints. no nausea. Only c/o wrist pain      Objective:     Blood pressure 115/71, pulse (!) 115, temperature 98.8 °F (37.1 °C), resp. rate 19, weight 52.1 kg (114 lb 13.8 oz), SpO2 94 %. Temp (24hrs), Av.5 °F (36.9 °C), Min:97.6 °F (36.4 °C), Max:99.1 °F (37.3 °C)      Physical Exam:   Abdominal exam: soft  non-distended  Non-tender.   Wound: clean, dry, no drainage    Labs:   Recent Results (from the past 24 hour(s))   URINALYSIS W/ REFLEX CULTURE    Collection Time: 18 12:30 PM   Result Value Ref Range    Color YELLOW/STRAW      Appearance CLOUDY (A) CLEAR      Specific gravity 1.011 1.003 - 1.030      pH (UA) 6.5 5.0 - 8.0      Protein 30 (A) NEG mg/dL    Glucose NEGATIVE  NEG mg/dL    Ketone NEGATIVE  NEG mg/dL    Bilirubin NEGATIVE  NEG      Blood LARGE (A) NEG      Urobilinogen 0.2 0.2 - 1.0 EU/dL    Nitrites NEGATIVE  NEG      Leukocyte Esterase SMALL (A) NEG      WBC 5-10 0 - 4 /hpf    RBC >100 (H) 0 - 5 /hpf    Epithelial cells FEW FEW /lpf    Bacteria NEGATIVE  NEG /hpf    UA:UC IF INDICATED URINE CULTURE ORDERED (A) CNI      Hyaline cast 0-2 0 - 5 /lpf   URIC ACID    Collection Time: 18 10:59 PM   Result Value Ref Range    Uric acid 3.8 3.5 - 7.2 MG/DL       Data Review

## 2018-08-08 NOTE — PROGRESS NOTES
Ortho:  Left hand and wrist swelling decreased, passive flex/ext no longer painful. Continued TTP dorsal/volar wrist  Brisk cap refill finger tips. sensation to light touch throughout the UE bilat. Left wrist joint aspiration; procedure described to pt and family, risk and benefits reviewed. Consent signed by Charis Hummeljenniferjolie- Sister- Stuart Baker, witnessed by RN  Left hand/wrist placed in slight flexion and ulnar deviation. Under sterile condition(CHG & alcohol), 2cc lidocaine injected into sub-q tissues dorsal radiocarpal joint. Joint aspirated with 21g hypo- No fluid was aspirated. Band-aid applied. There were no complication pt tolerated it well  Vitals stable before and after procedure    Inflammatory process of the left wrist and hand- clinically improved over the last 24 hours.   Pt is afebrile  Uric acid nml  Superficial clot noted on US  Continue rest, ICE and elevation, gentle AROM of the hand/wrist/digits  Consider steroid taper   Office FU as necessary- [facility] [phone]      Lisa Santos PA-C

## 2018-08-09 ENCOUNTER — PATIENT OUTREACH (OUTPATIENT)
Dept: INTERNAL MEDICINE CLINIC | Age: 83
End: 2018-08-09

## 2018-08-09 NOTE — PROGRESS NOTES
Transition of Care Coordination/Hospital to Post Acute Facility:     Date/Time:  2018 4:58 PM    Patient was admitted to [facility] on  and discharged on  for L Open Inguinal Hernia Repair. Patient was transferred to [address] [place] [address],[address] for continuation of care. Inpatient RRAT score: 15    Top Challenges reviewed    Nurse Sadie Santiago says per Hipaa she is unable to discuss pt with this writer including medication reconciliation. She says verything was copied from the hospital.         Method of communication with care team :staff message     Nurse Navigator(NN) spoke with Leeann to provide introduction to self and explanation of the Nurse Navigator Role. Verified name and  as patient identifiers. Discussed and reviewed  n/a    ACP:   Does the patient have a current ACP (including DDNR):  no  Does the post acute facility have a copy of the patients ACP:  N/A    Medication(s):   New Medications at Discharge: Colace  Changed Medications at Discharge: n/a  Discontinued Medications at Discharge: n/a     PCP/Specialist follow up: Future Appointments  Date Time Provider Shyanne Trinh   2018 1:30 PM Negra Torres MD [address] [place] [address]        No opportunity to ask questions was provided. Contact information was not provided for future reference or further questions. Will continue to monitor.

## 2018-08-29 ENCOUNTER — PATIENT OUTREACH (OUTPATIENT)
Dept: INTERNAL MEDICINE CLINIC | Age: 83
End: 2018-08-29

## 2018-08-29 NOTE — PROGRESS NOTES
This writer calls [facility], learns pt is still there. No date of discharge noted.

## 2018-08-30 ENCOUNTER — TELEPHONE (OUTPATIENT)
Dept: SURGERY | Age: 83
End: 2018-08-30

## 2018-08-30 ENCOUNTER — OFFICE VISIT (OUTPATIENT)
Dept: SURGERY | Age: 83
End: 2018-08-30

## 2018-08-30 VITALS
WEIGHT: 121.9 LBS | OXYGEN SATURATION: 100 % | HEIGHT: 66 IN | HEART RATE: 100 BPM | DIASTOLIC BLOOD PRESSURE: 70 MMHG | BODY MASS INDEX: 19.59 KG/M2 | RESPIRATION RATE: 20 BRPM | SYSTOLIC BLOOD PRESSURE: 123 MMHG | TEMPERATURE: 96.9 F

## 2018-08-30 DIAGNOSIS — Z09 POSTOPERATIVE EXAMINATION: Primary | ICD-10-CM

## 2018-08-30 RX ORDER — ACETAMINOPHEN 325 MG/1
650 TABLET ORAL
COMMUNITY
End: 2019-04-30

## 2018-08-30 RX ORDER — SENNOSIDES 8.6 MG
1 TABLET ORAL DAILY
COMMUNITY
End: 2018-11-29

## 2018-08-30 RX ORDER — POLYETHYLENE GLYCOL 3350 17 G/17G
17 POWDER, FOR SOLUTION ORAL AS NEEDED
COMMUNITY
End: 2019-04-30

## 2018-08-30 NOTE — PROGRESS NOTES
Chief Complaint   Patient presents with    Surgical Follow-up     Post/op Open [facility] with mesh on 7/31/18       Tolerating PO  No abdominal or groin pain        Physical Exam:   Abdominal exam: soft  non-distended  Non-tender. Wound: clean, dry, no drainage    Doing well from the surgery  Still c/o wrist pain  Clinically looks like a gout flairup, but has no history of gout and uric acid in the hospital was normal.  Wrist aspiration was negative. Outpt work up in progress. Continue unrestricted activity.   Follow-up: supriya Akbar MD FACS

## 2018-08-30 NOTE — TELEPHONE ENCOUNTER
Spoke with [facility] LPN @ [facility] regarding pt swollen and painful left hand and wrist.X-ray done negative.  NP has ordered prednisone and Uric acid in am.

## 2018-08-30 NOTE — PROGRESS NOTES
Chief Complaint   Patient presents with    Surgical Follow-up     Post/op Open [facility] with mesh on 7/31/18   1. Have you been to the ER, urgent care clinic since your last visit? Hospitalized since your last visit?no    2. Have you seen or consulted any other health care providers outside of the [address] [place] [address] since your last visit? Include any pap smears or colon screening. No    Discussed advanced directive. Patient states that he does not have an advanced directive.

## 2018-08-30 NOTE — TELEPHONE ENCOUNTER
Charisse at [facility]. care wants to know what the orders say,or what does the provider want she could not read it. Kaelyn Skinner may answer phone when you call,[phone] fax No or [phone].

## 2018-08-30 NOTE — MR AVS SNAPSHOT
[address] [place] [address], [address] [place] [address] 
[phone] Patient: Louisa Mosley MRN: [id number] TOP:67/0/2848 Visit Information Date & Time Provider Department Dept. Phone Encounter #  
 8/30/2018 11:30 AM Lucrecia Muñoz MD [facility] [id number] Upcoming Health Maintenance Date Due Influenza Age 5 to Adult 8/1/2018 MEDICARE YEARLY EXAM 10/12/2018 GLAUCOMA SCREENING Q2Y 1/23/2020 DTaP/Tdap/Td series (2 - Td) 10/10/2026 Allergies as of 8/30/2018  Review Complete On: 8/30/2018 By: Nancy Oleary LPN No Known Allergies Current Immunizations  Reviewed on 7/31/2018 Name Date Influenza High Dose Vaccine PF 10/11/2017, 10/10/2016, 11/5/2014 Pneumococcal Conjugate (PCV-13) 4/3/2018 Pneumococcal Polysaccharide (PPSV-23) 10/10/2016 Tdap 10/10/2016 Zoster Vaccine, Live 6/27/2018, 4/3/2018 Not reviewed this visit Vitals BP Pulse Temp Resp Height(growth percentile) Weight(growth percentile) 123/70 (BP 1 Location: Left arm, BP Patient Position: Sitting) 100 96.9 °F (36.1 °C) (Oral) 20 5' 6\" (1.676 m) 121 lb 14.4 oz (55.3 kg) SpO2 BMI Smoking Status 100% 19.68 kg/m2 Unknown If Ever Smoked BMI and BSA Data Body Mass Index Body Surface Area 19.68 kg/m 2 1.6 m 2 Preferred Pharmacy Pharmacy Name Phone [facility] [address] [facility] [place]  [place] [phone] Your Updated Medication List  
  
   
This list is accurate as of 8/30/18 11:48 AM.  Always use your most recent med list.  
  
  
  
  
 acetaminophen 325 mg tablet Commonly known as:  TYLENOL Take 650 mg by mouth every four (4) hours as needed for Pain. Indications: Arthritic Pain  
  
 albuterol 2.5 mg /3 mL (0.083 %) nebulizer solution Commonly known as:  PROVENTIL VENTOLIN  
3 mL by Nebulization route every four (4) hours as needed for Wheezing or Shortness of Breath. aspirin 81 mg tablet Take 81 mg by mouth daily. ferrous sulfate 325 mg (65 mg iron) tablet Take 1 Tab by mouth two (2) times daily (with meals). On an empty stomach with Vitamin C (like OJ)  
  
 food supplemt, lactose-reduced 0.06 gram- 1 kcal/mL Liqd Commonly known as:  BOOST HIGH PROTEIN Drink 2-3 containers DAILY. ibuprofen 200 mg tablet Commonly known as:  MOTRIN Take 2 Tabs by mouth every six (6) hours as needed for Pain. Nebulizer & Compressor machine 1 Each by Does Not Apply route as needed for Cough (wheezing). Use nebulizer every 4-6 hours as needed for shortness of breath or wheezing  
  
 polyethylene glycol 17 gram packet Commonly known as:  Vivien Suhas Take 17 g by mouth as needed. potassium chloride SR 10 mEq tablet Commonly known as:  K-TAB Take 2 Tabs by mouth two (2) times a day. Senna 8.6 mg tablet Generic drug:  senna Take 1 Tab by mouth daily. tamsulosin 0.4 mg capsule Commonly known as:  FLOMAX TAKE 1 CAPSULE BY MOUTH EVERY DAY Introducing [facility]! Tyler Anderson introduces [employer] patient portal. Now you can access parts of your medical record, email your doctor's office, and request medication refills online. 1. In your internet browser, go to [email or web address] 2. Click on the First Time User? Click Here link in the Sign In box. You will see the New Member Sign Up page. 3. Enter your [employer] Access Code exactly as it appears below. You will not need to use this code after youve completed the sign-up process. If you do not sign up before the expiration date, you must request a new code. · [employer] Access Code: [email or web address] Expires: 9/23/2018 10:57 AM 
 
4.  Enter the last four digits of your Social Security Number (xxxx) and Date of Birth (mm/dd/yyyy) as indicated and click Submit. You will be taken to the next sign-up page. 5. Create a [employer] ID. This will be your [employer] login ID and cannot be changed, so think of one that is secure and easy to remember. 6. Create a [employer] password. You can change your password at any time. 7. Enter your Password Reset Question and Answer. This can be used at a later time if you forget your password. 8. Enter your e-mail address. You will receive e-mail notification when new information is available in [address] 9. Click Sign Up. You can now view and download portions of your medical record. 10. Click the Download Summary menu link to download a portable copy of your medical information. If you have questions, please visit the Frequently Asked Questions section of the [employer] website. Remember, [employer] is NOT to be used for urgent needs. For medical emergencies, dial 911. Now available from your iPhone and Android! Please provide this summary of care documentation to your next provider. Your primary care clinician is listed as JOE Hastings. If you have any questions after today's visit, please call [phone].

## 2018-09-05 NOTE — PROGRESS NOTES
I called pt and his sister said pt is in a nursing home now and if you need any further information you can call her

## 2018-09-14 ENCOUNTER — PATIENT OUTREACH (OUTPATIENT)
Dept: INTERNAL MEDICINE CLINIC | Age: 83
End: 2018-09-14

## 2018-09-14 NOTE — PROGRESS NOTES
This writer reaches out to [facility], speaks with Malik Littlejohn (dc planner) verifies pt's name and  as pt identifers. Per Malik Littlejohn Mr. Olivier Hyman has not discharged as he has transitioned to their long term care side as of 18. This writer will resolve this episode.

## 2018-11-28 ENCOUNTER — APPOINTMENT (OUTPATIENT)
Dept: GENERAL RADIOLOGY | Age: 83
DRG: 189 | End: 2018-11-28
Attending: NURSE PRACTITIONER
Payer: MEDICARE

## 2018-11-28 ENCOUNTER — APPOINTMENT (OUTPATIENT)
Dept: CT IMAGING | Age: 83
DRG: 189 | End: 2018-11-28
Attending: NURSE PRACTITIONER
Payer: MEDICARE

## 2018-11-28 ENCOUNTER — HOSPITAL ENCOUNTER (INPATIENT)
Age: 83
LOS: 7 days | Discharge: NURSING, REHAB OR LONG-TERM CARE | DRG: 189 | End: 2018-12-06
Attending: EMERGENCY MEDICINE | Admitting: INTERNAL MEDICINE
Payer: MEDICARE

## 2018-11-28 DIAGNOSIS — J96.01 ACUTE RESPIRATORY FAILURE WITH HYPOXIA AND HYPERCAPNIA (HCC): ICD-10-CM

## 2018-11-28 DIAGNOSIS — J96.02 ACUTE RESPIRATORY FAILURE WITH HYPOXIA AND HYPERCAPNIA (HCC): ICD-10-CM

## 2018-11-28 DIAGNOSIS — R09.02 HYPOXIC: Primary | ICD-10-CM

## 2018-11-28 DIAGNOSIS — R41.0 DELIRIUM: ICD-10-CM

## 2018-11-28 DIAGNOSIS — G93.40 ENCEPHALOPATHY: ICD-10-CM

## 2018-11-28 DIAGNOSIS — F03.91 DEMENTIA WITH BEHAVIORAL DISTURBANCE, UNSPECIFIED DEMENTIA TYPE: ICD-10-CM

## 2018-11-28 DIAGNOSIS — J96.02 ACUTE RESPIRATORY FAILURE WITH HYPERCAPNIA (HCC): ICD-10-CM

## 2018-11-28 DIAGNOSIS — Z71.89 COUNSELING REGARDING ADVANCED CARE PLANNING AND GOALS OF CARE: ICD-10-CM

## 2018-11-28 DIAGNOSIS — J96.02 ACUTE RESPIRATORY FAILURE WITH HYPERCAPNIA: ICD-10-CM

## 2018-11-28 DIAGNOSIS — F03.90 DEMENTIA WITHOUT BEHAVIORAL DISTURBANCE, UNSPECIFIED DEMENTIA TYPE: ICD-10-CM

## 2018-11-28 LAB
ALBUMIN SERPL-MCNC: 3.4 G/DL (ref 3.5–5)
ALBUMIN/GLOB SERPL: 0.9 {RATIO} (ref 1.1–2.2)
ALP SERPL-CCNC: 74 U/L (ref 45–117)
ALT SERPL-CCNC: 16 U/L (ref 12–78)
ANION GAP SERPL CALC-SCNC: 8 MMOL/L (ref 5–15)
APTT PPP: 27.3 SEC (ref 22.1–32)
ARTERIAL PATENCY WRIST A: YES
ARTERIAL PATENCY WRIST A: YES
AST SERPL-CCNC: 16 U/L (ref 15–37)
BASE EXCESS BLDA CALC-SCNC: 0 MMOL/L
BASE EXCESS BLDA CALC-SCNC: 1 MMOL/L
BASOPHILS # BLD: 0.1 K/UL (ref 0–0.1)
BASOPHILS NFR BLD: 1 % (ref 0–1)
BDY SITE: ABNORMAL
BDY SITE: ABNORMAL
BILIRUB SERPL-MCNC: 0.3 MG/DL (ref 0.2–1)
BNP SERPL-MCNC: 114 PG/ML (ref 0–450)
BREATHS.SPONTANEOUS ON VENT: 16
BUN SERPL-MCNC: 13 MG/DL (ref 6–20)
BUN/CREAT SERPL: 17 (ref 12–20)
CALCIUM SERPL-MCNC: 8.8 MG/DL (ref 8.5–10.1)
CHLORIDE SERPL-SCNC: 104 MMOL/L (ref 97–108)
CK SERPL-CCNC: 68 U/L (ref 39–308)
CO2 SERPL-SCNC: 27 MMOL/L (ref 21–32)
COMMENT, HOLDF: NORMAL
CREAT SERPL-MCNC: 0.78 MG/DL (ref 0.7–1.3)
DIFFERENTIAL METHOD BLD: ABNORMAL
EOSINOPHIL # BLD: 0.3 K/UL (ref 0–0.4)
EOSINOPHIL NFR BLD: 4 % (ref 0–7)
EPAP/CPAP/PEEP, PAPEEP: 6
ERYTHROCYTE [DISTWIDTH] IN BLOOD BY AUTOMATED COUNT: 15.4 % (ref 11.5–14.5)
FIO2 ON VENT: 100 %
FIO2 ON VENT: 50 %
GAS FLOW.O2 O2 DELIVERY SYS: 15 L/MIN
GLOBULIN SER CALC-MCNC: 4 G/DL (ref 2–4)
GLUCOSE SERPL-MCNC: 178 MG/DL (ref 65–100)
HCO3 BLDA-SCNC: 27 MMOL/L (ref 22–26)
HCO3 BLDA-SCNC: 29 MMOL/L (ref 22–26)
HCT VFR BLD AUTO: 36.1 % (ref 36.6–50.3)
HGB BLD-MCNC: 11.3 G/DL (ref 12.1–17)
IMM GRANULOCYTES # BLD: 0 K/UL (ref 0–0.04)
IMM GRANULOCYTES NFR BLD AUTO: 0 % (ref 0–0.5)
INR PPP: 1.1 (ref 0.9–1.1)
IPAP/PIP, IPAPIP: 14
LYMPHOCYTES # BLD: 1.6 K/UL (ref 0.8–3.5)
LYMPHOCYTES NFR BLD: 19 % (ref 12–49)
MCH RBC QN AUTO: 27.1 PG (ref 26–34)
MCHC RBC AUTO-ENTMCNC: 31.3 G/DL (ref 30–36.5)
MCV RBC AUTO: 86.6 FL (ref 80–99)
MONOCYTES # BLD: 0.6 K/UL (ref 0–1)
MONOCYTES NFR BLD: 7 % (ref 5–13)
NEUTS SEG # BLD: 5.9 K/UL (ref 1.8–8)
NEUTS SEG NFR BLD: 70 % (ref 32–75)
NRBC # BLD: 0 K/UL (ref 0–0.01)
NRBC BLD-RTO: 0 PER 100 WBC
PCO2 BLDA: 49 MMHG (ref 35–45)
PCO2 BLDA: 65 MMHG (ref 35–45)
PH BLDA: 7.27 [PH] (ref 7.35–7.45)
PH BLDA: 7.36 [PH] (ref 7.35–7.45)
PLATELET # BLD AUTO: 237 K/UL (ref 150–400)
PMV BLD AUTO: 9.5 FL (ref 8.9–12.9)
PO2 BLDA: 264 MMHG (ref 80–100)
PO2 BLDA: 88 MMHG (ref 80–100)
POTASSIUM SERPL-SCNC: 3.3 MMOL/L (ref 3.5–5.1)
PROT SERPL-MCNC: 7.4 G/DL (ref 6.4–8.2)
PROTHROMBIN TIME: 11.1 SEC (ref 9–11.1)
RBC # BLD AUTO: 4.17 M/UL (ref 4.1–5.7)
SAMPLES BEING HELD,HOLD: NORMAL
SAO2 % BLD: 100 % (ref 92–97)
SAO2 % BLD: 96 % (ref 92–97)
SAO2% DEVICE SAO2% SENSOR NAME: ABNORMAL
SAO2% DEVICE SAO2% SENSOR NAME: ABNORMAL
SODIUM SERPL-SCNC: 139 MMOL/L (ref 136–145)
SPECIMEN SITE: ABNORMAL
SPECIMEN SITE: ABNORMAL
THERAPEUTIC RANGE,PTTT: NORMAL SECS (ref 58–77)
TROPONIN I SERPL-MCNC: <0.05 NG/ML
WBC # BLD AUTO: 8.4 K/UL (ref 4.1–11.1)

## 2018-11-28 PROCEDURE — 74011250636 HC RX REV CODE- 250/636: Performed by: EMERGENCY MEDICINE

## 2018-11-28 PROCEDURE — 94640 AIRWAY INHALATION TREATMENT: CPT

## 2018-11-28 PROCEDURE — 82803 BLOOD GASES ANY COMBINATION: CPT

## 2018-11-28 PROCEDURE — 74011636320 HC RX REV CODE- 636/320: Performed by: EMERGENCY MEDICINE

## 2018-11-28 PROCEDURE — 99285 EMERGENCY DEPT VISIT HI MDM: CPT

## 2018-11-28 PROCEDURE — 5A09357 ASSISTANCE WITH RESPIRATORY VENTILATION, LESS THAN 24 CONSECUTIVE HOURS, CONTINUOUS POSITIVE AIRWAY PRESSURE: ICD-10-PCS | Performed by: EMERGENCY MEDICINE

## 2018-11-28 PROCEDURE — 85025 COMPLETE CBC W/AUTO DIFF WBC: CPT

## 2018-11-28 PROCEDURE — 96374 THER/PROPH/DIAG INJ IV PUSH: CPT

## 2018-11-28 PROCEDURE — 85610 PROTHROMBIN TIME: CPT

## 2018-11-28 PROCEDURE — 83880 ASSAY OF NATRIURETIC PEPTIDE: CPT

## 2018-11-28 PROCEDURE — 93005 ELECTROCARDIOGRAM TRACING: CPT

## 2018-11-28 PROCEDURE — 85730 THROMBOPLASTIN TIME PARTIAL: CPT

## 2018-11-28 PROCEDURE — 71275 CT ANGIOGRAPHY CHEST: CPT

## 2018-11-28 PROCEDURE — 82550 ASSAY OF CK (CPK): CPT

## 2018-11-28 PROCEDURE — 94660 CPAP INITIATION&MGMT: CPT

## 2018-11-28 PROCEDURE — 36600 WITHDRAWAL OF ARTERIAL BLOOD: CPT

## 2018-11-28 PROCEDURE — 74011250636 HC RX REV CODE- 250/636: Performed by: NURSE PRACTITIONER

## 2018-11-28 PROCEDURE — 84484 ASSAY OF TROPONIN QUANT: CPT

## 2018-11-28 PROCEDURE — 99291 CRITICAL CARE FIRST HOUR: CPT

## 2018-11-28 PROCEDURE — 80053 COMPREHEN METABOLIC PANEL: CPT

## 2018-11-28 PROCEDURE — 71045 X-RAY EXAM CHEST 1 VIEW: CPT

## 2018-11-28 PROCEDURE — 36415 COLL VENOUS BLD VENIPUNCTURE: CPT

## 2018-11-28 PROCEDURE — 74011000250 HC RX REV CODE- 250: Performed by: NURSE PRACTITIONER

## 2018-11-28 RX ORDER — LEVOFLOXACIN 5 MG/ML
750 INJECTION, SOLUTION INTRAVENOUS EVERY 24 HOURS
Status: DISCONTINUED | OUTPATIENT
Start: 2018-11-29 | End: 2018-11-30

## 2018-11-28 RX ORDER — SODIUM CHLORIDE 9 MG/ML
50 INJECTION, SOLUTION INTRAVENOUS CONTINUOUS
Status: DISCONTINUED | OUTPATIENT
Start: 2018-11-29 | End: 2018-12-03

## 2018-11-28 RX ORDER — SODIUM CHLORIDE 0.9 % (FLUSH) 0.9 %
10 SYRINGE (ML) INJECTION
Status: COMPLETED | OUTPATIENT
Start: 2018-11-28 | End: 2018-11-28

## 2018-11-28 RX ORDER — ACETYLCYSTEINE 200 MG/ML
400 SOLUTION ORAL; RESPIRATORY (INHALATION)
Status: DISCONTINUED | OUTPATIENT
Start: 2018-11-29 | End: 2018-12-01

## 2018-11-28 RX ORDER — DEXAMETHASONE SODIUM PHOSPHATE 4 MG/ML
10 INJECTION, SOLUTION INTRA-ARTICULAR; INTRALESIONAL; INTRAMUSCULAR; INTRAVENOUS; SOFT TISSUE
Status: COMPLETED | OUTPATIENT
Start: 2018-11-28 | End: 2018-11-28

## 2018-11-28 RX ORDER — SODIUM CHLORIDE 9 MG/ML
50 INJECTION, SOLUTION INTRAVENOUS
Status: COMPLETED | OUTPATIENT
Start: 2018-11-28 | End: 2018-11-28

## 2018-11-28 RX ORDER — SODIUM CHLORIDE 0.9 % (FLUSH) 0.9 %
5-10 SYRINGE (ML) INJECTION AS NEEDED
Status: DISCONTINUED | OUTPATIENT
Start: 2018-11-28 | End: 2018-12-06 | Stop reason: HOSPADM

## 2018-11-28 RX ORDER — IPRATROPIUM BROMIDE AND ALBUTEROL SULFATE 2.5; .5 MG/3ML; MG/3ML
3 SOLUTION RESPIRATORY (INHALATION)
Status: DISCONTINUED | OUTPATIENT
Start: 2018-11-29 | End: 2018-11-30

## 2018-11-28 RX ADMIN — RACEPINEPHRINE HYDROCHLORIDE 0.25 ML: 11.25 SOLUTION RESPIRATORY (INHALATION) at 20:30

## 2018-11-28 RX ADMIN — SODIUM CHLORIDE 50 ML/HR: 900 INJECTION, SOLUTION INTRAVENOUS at 22:10

## 2018-11-28 RX ADMIN — DEXAMETHASONE SODIUM PHOSPHATE 10 MG: 4 INJECTION, SOLUTION INTRAMUSCULAR; INTRAVENOUS at 20:37

## 2018-11-28 RX ADMIN — IOPAMIDOL 100 ML: 755 INJECTION, SOLUTION INTRAVENOUS at 22:10

## 2018-11-28 RX ADMIN — Medication 10 ML: at 22:10

## 2018-11-29 ENCOUNTER — APPOINTMENT (OUTPATIENT)
Dept: ULTRASOUND IMAGING | Age: 83
DRG: 189 | End: 2018-11-29
Attending: INTERNAL MEDICINE
Payer: MEDICARE

## 2018-11-29 PROBLEM — J96.01 ACUTE RESPIRATORY FAILURE WITH HYPOXIA AND HYPERCAPNIA (HCC): Status: ACTIVE | Noted: 2018-11-29

## 2018-11-29 PROBLEM — J96.02 ACUTE RESPIRATORY FAILURE WITH HYPOXIA AND HYPERCAPNIA (HCC): Status: ACTIVE | Noted: 2018-11-29

## 2018-11-29 LAB
ATRIAL RATE: 92 BPM
CALCULATED P AXIS, ECG09: 83 DEGREES
CALCULATED R AXIS, ECG10: 49 DEGREES
CALCULATED T AXIS, ECG11: 72 DEGREES
DIAGNOSIS, 93000: NORMAL
P-R INTERVAL, ECG05: 272 MS
Q-T INTERVAL, ECG07: 370 MS
QRS DURATION, ECG06: 72 MS
QTC CALCULATION (BEZET), ECG08: 457 MS
VENTRICULAR RATE, ECG03: 92 BPM

## 2018-11-29 PROCEDURE — 92610 EVALUATE SWALLOWING FUNCTION: CPT

## 2018-11-29 PROCEDURE — 76450000000

## 2018-11-29 PROCEDURE — 74011250637 HC RX REV CODE- 250/637: Performed by: GENERAL ACUTE CARE HOSPITAL

## 2018-11-29 PROCEDURE — 74011000250 HC RX REV CODE- 250: Performed by: INTERNAL MEDICINE

## 2018-11-29 PROCEDURE — 93306 TTE W/DOPPLER COMPLETE: CPT

## 2018-11-29 PROCEDURE — 74011250636 HC RX REV CODE- 250/636: Performed by: INTERNAL MEDICINE

## 2018-11-29 PROCEDURE — 94640 AIRWAY INHALATION TREATMENT: CPT

## 2018-11-29 PROCEDURE — 65660000000 HC RM CCU STEPDOWN

## 2018-11-29 PROCEDURE — 74011250637 HC RX REV CODE- 250/637: Performed by: INTERNAL MEDICINE

## 2018-11-29 PROCEDURE — 77030029684 HC NEB SM VOL KT MONA -A

## 2018-11-29 PROCEDURE — 77010033678 HC OXYGEN DAILY

## 2018-11-29 PROCEDURE — 93970 EXTREMITY STUDY: CPT

## 2018-11-29 PROCEDURE — 74011000258 HC RX REV CODE- 258: Performed by: INTERNAL MEDICINE

## 2018-11-29 RX ORDER — ACETAMINOPHEN 325 MG/1
650 TABLET ORAL
Status: DISCONTINUED | OUTPATIENT
Start: 2018-11-29 | End: 2018-12-06 | Stop reason: HOSPADM

## 2018-11-29 RX ORDER — DILTIAZEM HYDROCHLORIDE 30 MG/1
30 TABLET, FILM COATED ORAL EVERY 12 HOURS
COMMUNITY
End: 2018-12-06

## 2018-11-29 RX ORDER — ONDANSETRON 2 MG/ML
4 INJECTION INTRAMUSCULAR; INTRAVENOUS
Status: DISCONTINUED | OUTPATIENT
Start: 2018-11-29 | End: 2018-12-06 | Stop reason: HOSPADM

## 2018-11-29 RX ORDER — AMOXICILLIN 250 MG
1 CAPSULE ORAL
COMMUNITY

## 2018-11-29 RX ORDER — TAMSULOSIN HYDROCHLORIDE 0.4 MG/1
0.4 CAPSULE ORAL DAILY
Status: DISCONTINUED | OUTPATIENT
Start: 2018-11-29 | End: 2018-12-06 | Stop reason: HOSPADM

## 2018-11-29 RX ORDER — SODIUM CHLORIDE 0.9 % (FLUSH) 0.9 %
5-10 SYRINGE (ML) INJECTION AS NEEDED
Status: DISCONTINUED | OUTPATIENT
Start: 2018-11-29 | End: 2018-12-06 | Stop reason: HOSPADM

## 2018-11-29 RX ORDER — PIPERACILLIN SODIUM, TAZOBACTAM SODIUM 3; .375 G/15ML; G/15ML
INJECTION, POWDER, LYOPHILIZED, FOR SOLUTION INTRAVENOUS
Status: DISPENSED
Start: 2018-11-29 | End: 2018-11-29

## 2018-11-29 RX ORDER — ENOXAPARIN SODIUM 100 MG/ML
40 INJECTION SUBCUTANEOUS EVERY 24 HOURS
Status: DISCONTINUED | OUTPATIENT
Start: 2018-11-29 | End: 2018-12-06 | Stop reason: HOSPADM

## 2018-11-29 RX ORDER — DUTASTERIDE 0.5 MG/1
0.5 CAPSULE, LIQUID FILLED ORAL DAILY
COMMUNITY

## 2018-11-29 RX ORDER — DILTIAZEM HYDROCHLORIDE 60 MG/1
30 CAPSULE, EXTENDED RELEASE ORAL EVERY 12 HOURS
Status: DISCONTINUED | OUTPATIENT
Start: 2018-11-29 | End: 2018-11-29

## 2018-11-29 RX ORDER — SODIUM CHLORIDE 0.9 % (FLUSH) 0.9 %
5-10 SYRINGE (ML) INJECTION EVERY 8 HOURS
Status: DISCONTINUED | OUTPATIENT
Start: 2018-11-29 | End: 2018-12-06 | Stop reason: HOSPADM

## 2018-11-29 RX ORDER — FACIAL-BODY WIPES
10 EACH TOPICAL
COMMUNITY
End: 2019-04-30

## 2018-11-29 RX ORDER — DILTIAZEM HYDROCHLORIDE 30 MG/1
30 TABLET, FILM COATED ORAL EVERY 12 HOURS
Status: DISCONTINUED | OUTPATIENT
Start: 2018-11-29 | End: 2018-12-01

## 2018-11-29 RX ADMIN — Medication 10 ML: at 11:43

## 2018-11-29 RX ADMIN — Medication 10 ML: at 20:08

## 2018-11-29 RX ADMIN — METHYLPREDNISOLONE SODIUM SUCCINATE 40 MG: 40 INJECTION, POWDER, FOR SOLUTION INTRAMUSCULAR; INTRAVENOUS at 06:16

## 2018-11-29 RX ADMIN — IPRATROPIUM BROMIDE AND ALBUTEROL SULFATE 3 ML: .5; 3 SOLUTION RESPIRATORY (INHALATION) at 12:48

## 2018-11-29 RX ADMIN — PIPERACILLIN SODIUM,TAZOBACTAM SODIUM 3.38 G: 3; .375 INJECTION, POWDER, FOR SOLUTION INTRAVENOUS at 22:55

## 2018-11-29 RX ADMIN — METHYLPREDNISOLONE SODIUM SUCCINATE 40 MG: 40 INJECTION, POWDER, FOR SOLUTION INTRAMUSCULAR; INTRAVENOUS at 00:38

## 2018-11-29 RX ADMIN — Medication 10 ML: at 14:39

## 2018-11-29 RX ADMIN — PIPERACILLIN SODIUM,TAZOBACTAM SODIUM 3.38 G: 3; .375 INJECTION, POWDER, FOR SOLUTION INTRAVENOUS at 00:38

## 2018-11-29 RX ADMIN — ENOXAPARIN SODIUM 40 MG: 40 INJECTION, SOLUTION INTRAVENOUS; SUBCUTANEOUS at 22:55

## 2018-11-29 RX ADMIN — PIPERACILLIN SODIUM,TAZOBACTAM SODIUM 3.38 G: 3; .375 INJECTION, POWDER, FOR SOLUTION INTRAVENOUS at 14:39

## 2018-11-29 RX ADMIN — SODIUM CHLORIDE 50 ML/HR: 900 INJECTION, SOLUTION INTRAVENOUS at 00:40

## 2018-11-29 RX ADMIN — PIPERACILLIN SODIUM,TAZOBACTAM SODIUM 3.38 G: 3; .375 INJECTION, POWDER, FOR SOLUTION INTRAVENOUS at 06:29

## 2018-11-29 RX ADMIN — DILTIAZEM HYDROCHLORIDE 30 MG: 30 TABLET, FILM COATED ORAL at 15:04

## 2018-11-29 RX ADMIN — DILTIAZEM HYDROCHLORIDE 30 MG: 30 TABLET, FILM COATED ORAL at 18:30

## 2018-11-29 RX ADMIN — TAMSULOSIN HYDROCHLORIDE 0.4 MG: 0.4 CAPSULE ORAL at 08:49

## 2018-11-29 RX ADMIN — IPRATROPIUM BROMIDE AND ALBUTEROL SULFATE 3 ML: .5; 3 SOLUTION RESPIRATORY (INHALATION) at 18:32

## 2018-11-29 RX ADMIN — ACETYLCYSTEINE 400 MG: 200 SOLUTION ORAL; RESPIRATORY (INHALATION) at 12:48

## 2018-11-29 RX ADMIN — LEVOFLOXACIN 750 MG: 5 INJECTION, SOLUTION INTRAVENOUS at 01:45

## 2018-11-29 RX ADMIN — IPRATROPIUM BROMIDE AND ALBUTEROL SULFATE 3 ML: .5; 3 SOLUTION RESPIRATORY (INHALATION) at 00:39

## 2018-11-29 RX ADMIN — IPRATROPIUM BROMIDE AND ALBUTEROL SULFATE 3 ML: .5; 3 SOLUTION RESPIRATORY (INHALATION) at 08:52

## 2018-11-29 RX ADMIN — IPRATROPIUM BROMIDE AND ALBUTEROL SULFATE 3 ML: .5; 3 SOLUTION RESPIRATORY (INHALATION) at 20:45

## 2018-11-29 RX ADMIN — METHYLPREDNISOLONE SODIUM SUCCINATE 40 MG: 40 INJECTION, POWDER, FOR SOLUTION INTRAMUSCULAR; INTRAVENOUS at 18:31

## 2018-11-29 RX ADMIN — IPRATROPIUM BROMIDE AND ALBUTEROL SULFATE 3 ML: .5; 3 SOLUTION RESPIRATORY (INHALATION) at 04:45

## 2018-11-29 RX ADMIN — Medication 10 ML: at 00:39

## 2018-11-29 RX ADMIN — METHYLPREDNISOLONE SODIUM SUCCINATE 40 MG: 40 INJECTION, POWDER, FOR SOLUTION INTRAMUSCULAR; INTRAVENOUS at 11:42

## 2018-11-29 RX ADMIN — ENOXAPARIN SODIUM 40 MG: 40 INJECTION, SOLUTION INTRAVENOUS; SUBCUTANEOUS at 00:38

## 2018-11-29 RX ADMIN — METHYLPREDNISOLONE SODIUM SUCCINATE 40 MG: 40 INJECTION, POWDER, FOR SOLUTION INTRAMUSCULAR; INTRAVENOUS at 22:55

## 2018-11-29 NOTE — PROGRESS NOTES
Spiritual Care Assessment/Progress Note [address] 
 
 
NAME: Lauryn Saleem      MRN: [id number] AGE: 80 y.o. SEX: male Adventist Affiliation: No preference Language: English  
 
11/29/2018     Total Time (in minutes): 27 Spiritual Assessment begun in [facility] EMERGENCY DEPT through conversation with: 
  
    [x]Patient        [x] Family    [] Friend(s) Reason for Consult: Palliative Care, Initial/Spiritual Assessment Spiritual beliefs: (Please include comment if needed) [x] Identifies with a venus tradition:     
   [] Supported by a venus community:        
   [] Claims no spiritual orientation:       
   [] Seeking spiritual identity:            
   [] Adheres to an individual form of spirituality:       
   [] Not able to assess:                   
 
    
Identified resources for coping:  
   [x] Prayer                           
   [] Music                  [] Guided Imagery [x] Family/friends                 [] Pet visits [] Devotional reading                         [] Unknown 
   [] Other:                                          
 
 
Interventions offered during this visit: (See comments for more details) Patient Interventions: Affirmation of emotions/emotional suffering, Affirmation of venus, Iconic (affirming the presence of God/Higher Power), Prayer (actual), Prayer (assurance of) Family/Friend(s): Affirmation of emotions/emotional suffering, Affirmation of venus, Prayer (actual), Prayer (assurance of), Coping skills reviewed/reinforced Plan of Care: 
 
 [x] Support spiritual and/or cultural needs  
 [] Support AMD and/or advance care planning process    
 [] Support grieving process 
 [] Coordinate Rites and/or Rituals  
 [] Coordination with community clergy 
 [x] No spiritual needs identified at this time 
 [] Detailed Plan of Care below (See Comments)  [] Make referral to Music Therapy 
[] Make referral to Pet Therapy [] Make referral to Addiction services 
[] Make referral to [facility] 
[] Make referral to Spiritual Care Partner 
[] No future visits requested       
[x] Follow up visits as needed Comments: The patient was in bed wrapped warmly in a blanket. The patient's sister and brother were present. The patient and family are from [facility]. The patient has some memory issues according to his chart, but was able to carry on a conversation by making appropriate comments and/or affirmations. The family members shared limited information about the patient. The patient is about to turn 80years old in a few days. When I explained the purpose of the visit, the patient's sister indicated that prayer is always good. I agreed and asked the family to come to the bed of the patient. We prayed together. The family expressed gratitude for the visit. Rev. Kalyn Reddy EdD MDiv Palliative  Fellow For [employer] Page [phone])

## 2018-11-29 NOTE — PROGRESS NOTES
0720 - Bedside and Verbal shift change report given to jackie enriquez (oncoming nurse) by Belinda Ho (offgoing nurse). Report included the following information SBAR, Kardex, ED Summary, Intake/Output, MAR, Recent Results and Cardiac Rhythm ST.  
1445 - patient in afib with HR now in 130s. Bp stable. Paged Dr. Daisy Hilario. [id number] - spoke with Dr. Daisy Hilario and informed. New order received. 1525 - Bedside and Verbal shift change report given to marcos (oncoming nurse) by Jeanette Kaufman (offgoing nurse). Report included the following information SBAR, Kardex, ED Summary, Intake/Output, Recent Results and Cardiac Rhythm afib.

## 2018-11-29 NOTE — PROGRESS NOTES
Problem: Dysphagia (Adult) Goal: *Acute Goals and Plan of Care (Insert Text) 
2018 Speech path goals: 1. Pt will tolerate dys 3/thins with no overt s/s of aspiration. Speech LAnguage Pathology bedside swallow evaluation Patient: Suzanne Carrion (89 y.o. male) Date: 2018 Primary Diagnosis: Acute respiratory failure with hypoxia and hypercapnia (HCC) Precautions:     
 
ASSESSMENT : 
Based on the objective data described below, the patient presents with functional swallow of all textures. He ingested thins via cup and straw, purees and crackers with slow mastication due to being edentulous. No oral residue. Swallowing seemed to be consistent with his age. He was fluent and intelligible. Confused and [address] but not [address] [place] [address]! Patient will benefit from skilled intervention to address the above impairments. Patients rehabilitation potential is considered to be Good Factors which may influence rehabilitation potential include:  
[]            None noted [x]            Mental ability/status [x]            Medical condition [x]            Home/family situation and support systems 
[]            Safety awareness 
[]            Pain tolerance/management []            Other: PLAN : 
Recommendations and Planned Interventions: 
dys 3/thins Frequency/Duration: Patient will be followed by speech-language pathology 3 times a week to address goals. Discharge Recommendations: None SUBJECTIVE:  
Patient stated his birthday was 1930 several times and when the  we have for him was stated he denied it. \"What? \" OBJECTIVE:  
 
Past Medical History:  
Diagnosis Date  Anemia  BPH (benign prostatic hypertrophy)  Dementia  Gout  High cholesterol  taken off meds  Hypertension  was taken off BP meds  Left inguinal hernia 2018 Past Surgical History:  
Procedure Laterality Date  HX HERNIA REPAIR  9-10-13 [facility]HR with mesh-[facility]-Dr. Choi Mast  
 HX HERNIA REPAIR  07/31/2018  
 open LIHR with mesh Prior Level of Function/Home Situation:  
  
Diet prior to admission:  
Current Diet:  NPO Cognitive and Communication Status: 
Neurologic State: Alert Orientation Level: Oriented to person, Disoriented to place, Disoriented to situation, Disoriented to time(stated it was [facility]) Cognition: Follows commands, Memory loss, Poor safety awareness Oral Assessment: 
Oral Assessment Labial: No impairment Dentition: Edentulous Lingual: No impairment Velum: Unable to visualize P.O. Trials: 
  
Vocal quality prior to P.O.: No impairment Consistency Presented: Thin liquid; Solid;Puree Bolus Acceptance: No impairment Bolus Formation/Control: Impaired Type of Impairment: Delayed;Mastication Propulsion: Delayed (# of seconds) Oral Residue: None Initiation of Swallow: Delayed (# of seconds) Laryngeal Elevation: Functional 
Aspiration Signs/Symptoms: None Pharyngeal Phase Characteristics: No impairment, issues, or problems Oral Phase Severity: Mild-moderate Pharyngeal Phase Severity : Mild-moderate NOMS:  
The NOMS functional outcome measure was used to quantify this patient's level of swallowing impairment. Based on the NOMS, the patient was determined to be at level 5 for swallow function G Codes: In compliance with CMSs Claims Based Outcome Reporting, the following G-code set was chosen for this patient based the use of the NOMS functional outcome to quantify this patient's level of swallowing impairment. Using the NOMS, the patient was determined to be at level 5 for swallow function which correlates with the CJ= 20-39% level of severity. Based on the objective assessment provided within this note, the current, goal, and discharge g-codes are as follows: 
 
Swallow  Swallowing: 
 Swallow Current Status CJ= 20-39%  Swallow Goal Status CJ= 20-39% NOMS Swallowing Levels: 
Level 1 (CN): NPO Level 2 (CM): NPO but takes consistency in therapy Level 3 (CL): Takes less than 50% of nutrition p.o. and continues with nonoral feedings; and/or safe with mod cues; and/or max diet restriction Level 4 (CK): Safe swallow but needs mod cues; and/or mod diet restriction; and/or still requires some nonoral feeding/supplements Level 5 (CJ): Safe swallow with min diet restriction; and/or needs min cues Level 6 (CI): Independent with p.o.; rare cues; usually self cues; may need to avoid some foods or needs extra time Level 7 (CH): Independent for all p.o. NAEL. (2003). National Outcomes Measurement System (NOMS): Adult Speech-Language Pathology User's Guide. Pain: 
Pain Scale 1: Numeric (0 - 10) Pain Intensity 1: 0 After treatment:  
[]            Patient left in no apparent distress sitting up in chair 
[]            Patient left in no apparent distress in bed 
[x]            Call bell left within reach 
[]            Nursing notified 
[]            Caregiver present 
[]            Bed alarm activated COMMUNICATION/EDUCATION:  
The patients plan of care including recommendations, planned interventions, and recommended diet changes were discussed with: Registered Nurse. []            Posted safety precautions in patient's room. []            Patient/family have participated as able in goal setting and plan of care. []            Patient/family agree to work toward stated goals and plan of care. []            Patient understands intent and goals of therapy, but is neutral about his/her participation. [x]            Patient is unable to participate in goal setting and plan of care. Thank you for this referral. 
Prema Jauregui, SLP Time Calculation: 12 mins

## 2018-11-29 NOTE — ED NOTES
Assumed care of pt. Pt on monitors x 3. Pt sleeping in bed at this time. Bed locked and in low position, side rails up x 2. Call bell within reach.

## 2018-11-29 NOTE — ED NOTES
Beside report received from [facility]. Patient awake and alert, no pain or complaints. Patient resting in bed in position of comfort with call bell in reach. Patient is on bedside monitor times 3.

## 2018-11-29 NOTE — ED NOTES
Pt. Sleeping in bed, denies needs at this time. Pt on monitors x 3. Pt has IVF infusing at this time. Bed locked and low, call bell in reach.

## 2018-11-29 NOTE — PROGRESS NOTES
Pharmacy Medication Reconciliation The patient was interviewed regarding current PTA medication list, use and drug allergies;  patient present in room and obtained permission from patient to discuss drug regimen with visitor(s) present. The patient was questioned regarding use of any other inhalers, topical products, over the counter medications, herbal medications, vitamin products or ophthalmic/nasal/otic medication use. Allergy Update: [facility] Recommendations/Findings: The following amendments were made to the patient's active medication list on file at [facility]:  
1) Additions:  
+dutasteride 0.5mg po daily 
+senna 8.6-docusate 50m tab po daily prn 
+diltiazem 30mg tablet q 12hr (immediate release) 2) Deletions:  
-aspirin 81mg po daily--not a current med 
-potassium chloride 10 meq---not a current med 3) Changes: none 
 
 
-Clarified PTA med list with with transfer paperwork from [facility]. PTA medication list was corrected to the following:  
 
Prior to Admission Medications Prescriptions Last Dose Informant Patient Reported? Taking? Nebulizer & Compressor machine  Transfer Papers No No  
Si Each by Does Not Apply route as needed for Cough (wheezing). Use nebulizer every 4-6 hours as needed for shortness of breath or wheezing  
acetaminophen (TYLENOL) 325 mg tablet  at . .. Transfer Papers Yes Yes Sig: Take 650 mg by mouth every eight (8) hours as needed for Pain or Fever. albuterol (PROVENTIL VENTOLIN) 2.5 mg /3 mL (0.083 %) nebulizer solution  at . .. Transfer Papers No Yes Sig: 3 mL by Nebulization route every four (4) hours as needed for Wheezing or Shortness of Breath. bisacodyl (DULCOLAX) 10 mg suppository  at . .. Transfer Papers Yes Yes Sig: Insert 10 mg into rectum daily as needed (constipation unrelieved by miralax). dilTIAZem (CARDIZEM) 30 mg tablet  at . ... Transfer Papers Yes Yes Sig: Take 30 mg by mouth every twelve (12) hours. dutasteride (AVODART) 0.5 mg capsule 11/28/2018 at . ... Transfer Papers Yes Yes Sig: Take 0.5 mg by mouth daily. ferrous sulfate 325 mg (65 mg iron) tablet  at . .. Transfer Papers No Yes Sig: Take 1 Tab by mouth two (2) times daily (with meals). On an empty stomach with Vitamin C (like OJ)  
food supplemt, lactose-reduced (BOOST HIGH PROTEIN) 0.06 gram- 1 kcal/mL liqd  Transfer Papers No No  
Sig: Drink 2-3 containers DAILY. ibuprofen (MOTRIN) 200 mg tablet  at . .. Transfer Papers Yes Yes Sig: Take 2 Tabs by mouth every six (6) hours as needed for Pain.  
polyethylene glycol (MIRALAX) 17 gram packet  at . ..... Transfer Papers Yes Yes Sig: Take 17 g by mouth as needed (constipation w/ no BM x 3 days). senna-docusate (SENNA WITH DOCUSATE SODIUM) 8.6-50 mg per tablet  at . .. Transfer Papers Yes Yes Sig: Take 1 Tab by mouth daily as needed for Constipation (try first if constipation). sodium phosphate (ENEMA) 19-7 gram/118 mL enema  at . .. Transfer Papers Yes Yes Sig: Insert 1 Enema into rectum daily as needed (constipation unrelieved by bisacodyl suppository). tamsulosin (FLOMAX) 0.4 mg capsule  at . .. Transfer Papers No Yes Sig: TAKE 1 CAPSULE BY MOUTH EVERY DAY Facility-Administered Medications: None Thank you, CADE Gomez

## 2018-11-29 NOTE — H&P
Hospitalist Admission NoteNAME: Feliz Crow :  1930 MRN:  [id number] Date/Time:  2018 12:02 AM 
 
Patient PCP: None 
______________________________________________________________________ Given the patient's current clinical presentation, I have a high level of concern for decompensation if discharged from the emergency department. Complex decision making was performed, which includes reviewing the patient's available past medical records, laboratory results, and x-ray films. My assessment of this patient's clinical condition and my plan of care is as follows. Assessment / Plan: 
Acute respiratory failure with hypoxia and hypercapnia POA Due to mucous plugging with suspect due to Aspiration Admit to PCU Pt was placed on BiPAP but taken off by ED one Acidosis got better on ABGs If become lethargic again or worsen respiratory distress then will repeat STAT ABGs and may place on biPAP again Will respect DNR/DNI status as per discussion with sister who is POA 
NPO Swallow Eval 
Gentle IVF due to leg swelling Schedule Nebs, IV steroids IV Zosyn and IV Levaquin Schaduled Mucomyst 
Schaduled RT Caridad Delcid / Carlos Braxton CT with There is bibasilar atelectasis and mucous plugging in the lower lobes right 
greater than left, will ask pulmonary consult Per sister breathing problems have been going on for a while, suspect aspiration. Long term prognosis poor in that case, will ask palliative care consult Now on 6L O2 Leg Swelling Suspect pulmonary HTN Check 2D echo Monitor fluid status closely while on gentle IVF 
CT without pulmonary edema or effusion Possible dementia Pt seems to have poor insight and likely have underlying Dementia Code Status: DNR/DNI d/w sister Minerva Martínez who is POA Surrogate Decision Maker: Sister Minerva Martínez DVT Prophylaxis: Lovenox Baseline: Resides as [facility] care ? ? memory care unit Subjective: CHIEF COMPLAINT: sent from [facility] for respiratory distress HISTORY OF PRESENT ILLNESS:    
Josh Broderick is a 80 y.o.  male who presents with difficulty breathing. I spoke to sister over the phone who reported pt has been having breathing difficulty for a while and today she got called from [facility] about pt been sent to ED for difficulty breathing. Pt is poor historian and unable to provide any meaningful history so history is limited. In ED pt noted to be hypoxic and hypercapnic so placed on biPAP. But later taken of BiPAP by ED after Acidosis looked better. We were asked to admit for work up and evaluation of the above problems. Past Medical History:  
Diagnosis Date  Anemia  BPH (benign prostatic hypertrophy)  Dementia  Gout  High cholesterol 06/18 taken off meds  Hypertension 6/18 was taken off BP meds  Left inguinal hernia 07/2018 Past Surgical History:  
Procedure Laterality Date  HX HERNIA REPAIR  9-10-13 RIHR with mesh-[facility]-Dr. Kofi Capone  
 HX HERNIA REPAIR  07/31/2018  
 open LIHR with mesh Social History Tobacco Use  Smoking status: Unknown If Ever Smoked  Smokeless tobacco: Never Used Substance Use Topics  Alcohol use: Yes Comment: occas Family History Problem Relation Age of Onset  Stroke Mother  Stroke Sister  Diabetes Sister  Hypertension Sister  Heart Disease Brother  Hypertension Brother No Known Allergies Prior to Admission medications Medication Sig Start Date End Date Taking? Authorizing Provider  
acetaminophen (TYLENOL) 325 mg tablet Take 650 mg by mouth every four (4) hours as needed for Pain. Indications: Arthritic Pain    Provider, Historical  
senna (SENNA) 8.6 mg tablet Take 1 Tab by mouth daily. Provider, Historical  
polyethylene glycol (MIRALAX) 17 gram packet Take 17 g by mouth as needed.     Provider, Historical  
 ibuprofen (MOTRIN) 200 mg tablet Take 2 Tabs by mouth every six (6) hours as needed for Pain. 8/3/18   Anmol Herrera MD  
Nebulizer & Compressor machine 1 Each by Does Not Apply route as needed for Cough (wheezing). Use nebulizer every 4-6 hours as needed for shortness of breath or wheezing 7/26/18   Kelle Dias NP  
albuterol (PROVENTIL VENTOLIN) 2.5 mg /3 mL (0.083 %) nebulizer solution 3 mL by Nebulization route every four (4) hours as needed for Wheezing or Shortness of Breath. 7/24/18   Kelle Dias NP  
tamsulosin (FLOMAX) 0.4 mg capsule TAKE 1 CAPSULE BY MOUTH EVERY DAY 5/22/18   Kelle Dias NP  
food supplemt, lactose-reduced (BOOST HIGH PROTEIN) 0.06 gram- 1 kcal/mL liqd Drink 2-3 containers DAILY. 3/23/18   Kelle Dias NP  
ferrous sulfate 325 mg (65 mg iron) tablet Take 1 Tab by mouth two (2) times daily (with meals). On an empty stomach with Vitamin C (like OJ) Patient taking differently: Take 325 mg by mouth Daily (before breakfast). On an empty stomach with Vitamin C (like OJ) 7/20/17   Kelle Dias NP  
potassium chloride SR (K-TAB) 10 mEq tablet Take 2 Tabs by mouth two (2) times a day. Patient taking differently: Take 20 mEq by mouth daily. 4/11/17   Mack Jacobs MD  
aspirin 81 mg tablet Take 81 mg by mouth daily. Other, MD Barry  
 
 
REVIEW OF SYSTEMS:    
I am not able to complete the review of systems because: The patient is intubated and sedated The patient has altered mental status due to his acute medical problems The patient has baseline aphasia from prior stroke(s)  
y The patient has baseline dementia and is not reliable historian The patient is in acute medical distress and unable to provide information Objective: VITALS:   
Visit Vitals /74 Pulse 86 Resp 20 Ht 5' 8\" (1.727 m) Wt 63 kg (138 lb 14.2 oz) SpO2 93% BMI 21.12 kg/m² PHYSICAL EXAM: 
 
General:    Alert, cooperative, no distress, appears stated age. HEENT: Atraumatic, anicteric sclerae, pink conjunctivae No oral ulcers, mucosa moist, throat clear, dentition fair Neck:  Supple, symmetrical,  thyroid: non tender Lungs:   Wheezing Chest wall:  No tenderness  No Accessory muscle use. Heart:   Regular  rhythm,  No  murmur   ++ edema Abdomen:   Soft, non-tender. Not distended. Bowel sounds normal 
Extremities: No cyanosis. No clubbing,   
  Skin turgor normal, Capillary refill normal, Radial dial pulse 2+ Skin:     Not pale. Not Jaundiced  No rashes Psych:  Poor insight. Not depressed. Not anxious or agitated. Neurologic: EOMs intact. No facial asymmetry. No aphasia or slurred speech. Symmetrical strength, Sensation grossly intact. Alert.  
 
_______________________________________________________________________ Care Plan discussed with: 
  Comments Patient y Family  y Sister over the phone RN y   
Care Manager Consultant:     
_______________________________________________________________________ Expected  Disposition:  
Home with Family HH/PT/OT/RN   
SNF/LTC [facility]   
________________________________________________________________________ TOTAL TIME: 60 Minutes Critical Care Provided     Minutes non procedure based Comments  
 y Reviewed previous records  
>50% of visit spent in counseling and coordination of care y Discussion with family and questions answered 
  
 
________________________________________________________________________ Signed: Gurpreet Peter MD 
 
Procedures: see electronic medical records for all procedures/Xrays and details which were not copied into this note but were reviewed prior to creation of Plan. LAB DATA REVIEWED:   
Recent Results (from the past 24 hour(s)) EKG, 12 LEAD, INITIAL Collection Time: 11/28/18  8:18 PM  
Result Value Ref Range Ventricular Rate 92 BPM  
 Atrial Rate 92 BPM  
 P-R Interval 272 ms QRS Duration 72 ms Q-T Interval 370 ms QTC Calculation (Bezet) 457 ms Calculated P Axis 83 degrees Calculated R Axis 49 degrees Calculated T Axis 72 degrees Diagnosis Sinus rhythm with 1st degree AV block with premature supraventricular  
complexes Minimal voltage criteria for LVH, may be normal variant When compared with ECG of 06-AUG-2018 13:08, 
premature supraventricular complexes are now present QRS axis shifted left Criteria for Lateral infarct are no longer present CBC WITH AUTOMATED DIFF Collection Time: 11/28/18  8:25 PM  
Result Value Ref Range WBC 8.4 4.1 - 11.1 K/uL  
 RBC 4.17 4.10 - 5.70 M/uL  
 HGB 11.3 (L) 12.1 - 17.0 g/dL HCT 36.1 (L) 36.6 - 50.3 % MCV 86.6 80.0 - 99.0 FL  
 MCH 27.1 26.0 - 34.0 PG  
 MCHC 31.3 30.0 - 36.5 g/dL  
 RDW 15.4 (H) 11.5 - 14.5 % PLATELET 915 683 - 255 K/uL MPV 9.5 8.9 - 12.9 FL  
 NRBC 0.0 0  WBC ABSOLUTE NRBC 0.00 0.00 - 0.01 K/uL NEUTROPHILS 70 32 - 75 % LYMPHOCYTES 19 12 - 49 % MONOCYTES 7 5 - 13 % EOSINOPHILS 4 0 - 7 % BASOPHILS 1 0 - 1 % IMMATURE GRANULOCYTES 0 0.0 - 0.5 % ABS. NEUTROPHILS 5.9 1.8 - 8.0 K/UL  
 ABS. LYMPHOCYTES 1.6 0.8 - 3.5 K/UL  
 ABS. MONOCYTES 0.6 0.0 - 1.0 K/UL  
 ABS. EOSINOPHILS 0.3 0.0 - 0.4 K/UL  
 ABS. BASOPHILS 0.1 0.0 - 0.1 K/UL  
 ABS. IMM. GRANS. 0.0 0.00 - 0.04 K/UL  
 DF AUTOMATED    
TROPONIN I Collection Time: 11/28/18  8:25 PM  
Result Value Ref Range Troponin-I, Qt. <0.05 <0.05 ng/mL CK W/ REFLX CKMB Collection Time: 11/28/18  8:25 PM  
Result Value Ref Range CK 68 39 - 180 U/L  
METABOLIC PANEL, COMPREHENSIVE Collection Time: 11/28/18  8:25 PM  
Result Value Ref Range Sodium 139 136 - 145 mmol/L Potassium 3.3 (L) 3.5 - 5.1 mmol/L Chloride 104 97 - 108 mmol/L  
 CO2 27 21 - 32 mmol/L Anion gap 8 5 - 15 mmol/L Glucose 178 (H) 65 - 100 mg/dL BUN 13 6 - 20 MG/DL  Creatinine 0.78 0.70 - 1.30 MG/DL  
 BUN/Creatinine ratio 17 12 - 20    
 GFR est AA >60 >60 ml/min/1.73m2 GFR est non-AA >60 >60 ml/min/1.73m2 Calcium 8.8 8.5 - 10.1 MG/DL Bilirubin, total 0.3 0.2 - 1.0 MG/DL  
 ALT (SGPT) 16 12 - 78 U/L  
 AST (SGOT) 16 15 - 37 U/L Alk. phosphatase 74 45 - 117 U/L Protein, total 7.4 6.4 - 8.2 g/dL Albumin 3.4 (L) 3.5 - 5.0 g/dL Globulin 4.0 2.0 - 4.0 g/dL A-G Ratio 0.9 (L) 1.1 - 2.2 PTT Collection Time: 11/28/18  8:25 PM  
Result Value Ref Range aPTT 27.3 22.1 - 32.0 sec  
 aPTT, therapeutic range     58.0 - 77.0 SECS  
PROTHROMBIN TIME + INR Collection Time: 11/28/18  8:25 PM  
Result Value Ref Range INR 1.1 0.9 - 1.1 Prothrombin time 11.1 9.0 - 11.1 sec SAMPLES BEING HELD Collection Time: 11/28/18  8:25 PM  
Result Value Ref Range SAMPLES BEING HELD RD   
 COMMENT Add-on orders for these samples will be processed based on acceptable specimen integrity and analyte stability, which may vary by analyte. NT-PRO BNP Collection Time: 11/28/18  8:25 PM  
Result Value Ref Range NT pro- 0 - 450 PG/ML  
BLOOD GAS, ARTERIAL Collection Time: 11/28/18  8:30 PM  
Result Value Ref Range pH 7.27 (L) 7.35 - 7.45    
 PCO2 65 (H) 35.0 - 45.0 mmHg PO2 264 (H) 80 - 100 mmHg O2  (H) 92 - 97 % BICARBONATE 29 (H) 22 - 26 mmol/L  
 BASE EXCESS 0.0 mmol/L  
 O2 METHOD NRM    
 O2 FLOW RATE 15.00 L/min FIO2 100 % Sample source ARTERIAL    
 SITE RIGHT RADIAL NICHOL'S TEST YES    
BLOOD GAS, ARTERIAL Collection Time: 11/28/18 10:15 PM  
Result Value Ref Range pH 7.36 7.35 - 7.45    
 PCO2 49 (H) 35.0 - 45.0 mmHg PO2 88 80 - 100 mmHg O2 SAT 96 92 - 97 % BICARBONATE 27 (H) 22 - 26 mmol/L  
 BASE EXCESS 1.0 mmol/L  
 O2 METHOD BIPAP    
 FIO2 50 % SPONTANEOUS RATE 16.0 IPAP/PIP 14.0 EPAP/CPAP/PEEP 6.0 Sample source ARTERIAL    
 SITE RIGHT RADIAL  NICHOL'S TEST YES

## 2018-11-29 NOTE — ED NOTES
Pt arrived via EMS for Shortness of breath after eating dinner. Pt was tachycardic and breathing poorly on arrival. Pt placed on non-rebreather. Hx of dementia.

## 2018-11-29 NOTE — ED PROVIDER NOTES
EMERGENCY DEPARTMENT HISTORY AND PHYSICAL EXAM 
 
 
Date: 11/28/2018 Patient Name: Yosef Juan History of Presenting Illness Chief Complaint Patient presents with  Shortness of Breath Pt arrived via EMS for sudden onset of shortness of breath after walking to his room drom dinner. pt gasping for breath on room air with SPO2 of 87% placed on non rebreather. Pt found to be in afib belieed to be new onset but takes ardizem. Pt vomited x2 History Provided By: Patient HPI: Yosef Juan, 80 y.o. male with PMHx significant for BPH, anemia, gout, HLD, dementia, and HTN, presents via EMS to the ED with cc of sudden onset of shortness of breath after dinner. He lives in the memory care unit. He was noted to start gasping for air with audible wheezes while walking back to his room from dinner. Staff witnessed him vomited twice. No documented fevers. ROS is limited due to dementia. PCP: None Current Facility-Administered Medications Medication Dose Route Frequency Provider Last Rate Last Dose  sodium chloride (NS) flush 5-10 mL  5-10 mL IntraVENous PRN Zoraida Dunne NP Current Outpatient Medications Medication Sig Dispense Refill  acetaminophen (TYLENOL) 325 mg tablet Take 650 mg by mouth every four (4) hours as needed for Pain. Indications: Arthritic Pain  senna (SENNA) 8.6 mg tablet Take 1 Tab by mouth daily.  polyethylene glycol (MIRALAX) 17 gram packet Take 17 g by mouth as needed.  ibuprofen (MOTRIN) 200 mg tablet Take 2 Tabs by mouth every six (6) hours as needed for Pain.  Nebulizer & Compressor machine 1 Each by Does Not Apply route as needed for Cough (wheezing). Use nebulizer every 4-6 hours as needed for shortness of breath or wheezing 1 Each 0  
 albuterol (PROVENTIL VENTOLIN) 2.5 mg /3 mL (0.083 %) nebulizer solution 3 mL by Nebulization route every four (4) hours as needed for Wheezing or Shortness of Breath.  30 Each 2  
  tamsulosin (FLOMAX) 0.4 mg capsule TAKE 1 CAPSULE BY MOUTH EVERY DAY 90 Cap 0  
 food supplemt, lactose-reduced (BOOST HIGH PROTEIN) 0.06 gram- 1 kcal/mL liqd Drink 2-3 containers DAILY. 90 Container 11  
 ferrous sulfate 325 mg (65 mg iron) tablet Take 1 Tab by mouth two (2) times daily (with meals). On an empty stomach with Vitamin C (like OJ) (Patient taking differently: Take 325 mg by mouth Daily (before breakfast). On an empty stomach with Vitamin C (like OJ)) 60 Tab 11  
 potassium chloride SR (K-TAB) 10 mEq tablet Take 2 Tabs by mouth two (2) times a day. (Patient taking differently: Take 20 mEq by mouth daily.) 360 Tab 3  
 aspirin 81 mg tablet Take 81 mg by mouth daily. Past History Past Medical History: 
Past Medical History:  
Diagnosis Date  Anemia  BPH (benign prostatic hypertrophy)  Dementia  Gout  High cholesterol 06/18 taken off meds  Hypertension 6/18 was taken off BP meds  Left inguinal hernia 07/2018 Past Surgical History: 
Past Surgical History:  
Procedure Laterality Date  HX HERNIA REPAIR  9-10-13 [facility] with mesh-[facility]-Dr. Allison Lee  
 HX HERNIA REPAIR  07/31/2018  
 open LIHR with mesh Family History: 
Family History Problem Relation Age of Onset  Stroke Mother  Stroke Sister  Diabetes Sister  Hypertension Sister  Heart Disease Brother  Hypertension Brother Social History: 
Social History Tobacco Use  Smoking status: Unknown If Ever Smoked  Smokeless tobacco: Never Used Substance Use Topics  Alcohol use: Yes Comment: occas  Drug use: No  
 
 
Allergies: 
No Known Allergies Review of Systems Review of Systems Unable to perform ROS: Dementia Physical Exam  
Physical Exam  
Constitutional: He appears well-developed and well-nourished. No distress. HENT:  
Head: Atraumatic. Nose: Nose normal.  
Mouth/Throat: No oropharyngeal exudate. Eyes: Conjunctivae and EOM are normal. Right eye exhibits no discharge. Left eye exhibits no discharge. No scleral icterus. Neck: Normal range of motion. Neck supple. No JVD present. No tracheal deviation present. No thyromegaly present. Cardiovascular: Normal rate and regular rhythm. Exam reveals no gallop and no friction rub. No murmur heard. + 2 pitting edema of BLE Pulmonary/Chest: Accessory muscle usage present. No stridor. No respiratory distress. He has wheezes in the right upper field and the left upper field. He has no rales. He exhibits no tenderness. Abdominal: Soft. Bowel sounds are normal. He exhibits no distension and no mass. There is no tenderness. There is no rigidity, no rebound, no guarding, no tenderness at McBurney's point and negative Stewart's sign. Musculoskeletal: Normal range of motion. He exhibits no edema or tenderness. Lymphadenopathy:  
  He has no cervical adenopathy. Neurological: He is alert. Coordination normal.  
Skin: Skin is warm and dry. He is not diaphoretic. Psychiatric: He has a normal mood and affect. His behavior is normal.  
Nursing note and vitals reviewed. Diagnostic Study Results Labs - Recent Results (from the past 12 hour(s)) EKG, 12 LEAD, INITIAL Collection Time: 11/28/18  8:18 PM  
Result Value Ref Range Ventricular Rate 92 BPM  
 Atrial Rate 92 BPM  
 P-R Interval 272 ms QRS Duration 72 ms Q-T Interval 370 ms QTC Calculation (Bezet) 457 ms Calculated P Axis 83 degrees Calculated R Axis 49 degrees Calculated T Axis 72 degrees Diagnosis Sinus rhythm with 1st degree AV block with premature supraventricular  
complexes Minimal voltage criteria for LVH, may be normal variant When compared with ECG of 06-AUG-2018 13:08, 
premature supraventricular complexes are now present QRS axis shifted left Criteria for Lateral infarct are no longer present CBC WITH AUTOMATED DIFF  
 Collection Time: 11/28/18  8:25 PM  
Result Value Ref Range WBC 8.4 4.1 - 11.1 K/uL  
 RBC 4.17 4.10 - 5.70 M/uL  
 HGB 11.3 (L) 12.1 - 17.0 g/dL HCT 36.1 (L) 36.6 - 50.3 % MCV 86.6 80.0 - 99.0 FL  
 MCH 27.1 26.0 - 34.0 PG  
 MCHC 31.3 30.0 - 36.5 g/dL  
 RDW 15.4 (H) 11.5 - 14.5 % PLATELET 848 044 - 557 K/uL MPV 9.5 8.9 - 12.9 FL  
 NRBC 0.0 0  WBC ABSOLUTE NRBC 0.00 0.00 - 0.01 K/uL NEUTROPHILS 70 32 - 75 % LYMPHOCYTES 19 12 - 49 % MONOCYTES 7 5 - 13 % EOSINOPHILS 4 0 - 7 % BASOPHILS 1 0 - 1 % IMMATURE GRANULOCYTES 0 0.0 - 0.5 % ABS. NEUTROPHILS 5.9 1.8 - 8.0 K/UL  
 ABS. LYMPHOCYTES 1.6 0.8 - 3.5 K/UL  
 ABS. MONOCYTES 0.6 0.0 - 1.0 K/UL  
 ABS. EOSINOPHILS 0.3 0.0 - 0.4 K/UL  
 ABS. BASOPHILS 0.1 0.0 - 0.1 K/UL  
 ABS. IMM. GRANS. 0.0 0.00 - 0.04 K/UL  
 DF AUTOMATED    
TROPONIN I Collection Time: 11/28/18  8:25 PM  
Result Value Ref Range Troponin-I, Qt. <0.05 <0.05 ng/mL CK W/ REFLX CKMB Collection Time: 11/28/18  8:25 PM  
Result Value Ref Range CK 68 39 - 350 U/L  
METABOLIC PANEL, COMPREHENSIVE Collection Time: 11/28/18  8:25 PM  
Result Value Ref Range Sodium 139 136 - 145 mmol/L Potassium 3.3 (L) 3.5 - 5.1 mmol/L Chloride 104 97 - 108 mmol/L  
 CO2 27 21 - 32 mmol/L Anion gap 8 5 - 15 mmol/L Glucose 178 (H) 65 - 100 mg/dL BUN 13 6 - 20 MG/DL Creatinine 0.78 0.70 - 1.30 MG/DL  
 BUN/Creatinine ratio 17 12 - 20 GFR est AA >60 >60 ml/min/1.73m2 GFR est non-AA >60 >60 ml/min/1.73m2 Calcium 8.8 8.5 - 10.1 MG/DL Bilirubin, total 0.3 0.2 - 1.0 MG/DL  
 ALT (SGPT) 16 12 - 78 U/L  
 AST (SGOT) 16 15 - 37 U/L Alk. phosphatase 74 45 - 117 U/L Protein, total 7.4 6.4 - 8.2 g/dL Albumin 3.4 (L) 3.5 - 5.0 g/dL Globulin 4.0 2.0 - 4.0 g/dL A-G Ratio 0.9 (L) 1.1 - 2.2 PTT Collection Time: 11/28/18  8:25 PM  
Result Value Ref Range aPTT 27.3 22.1 - 32.0 sec aPTT, therapeutic range     58.0 - 77.0 SECS  
PROTHROMBIN TIME + INR Collection Time: 11/28/18  8:25 PM  
Result Value Ref Range INR 1.1 0.9 - 1.1 Prothrombin time 11.1 9.0 - 11.1 sec SAMPLES BEING HELD Collection Time: 11/28/18  8:25 PM  
Result Value Ref Range SAMPLES BEING HELD RD   
 COMMENT Add-on orders for these samples will be processed based on acceptable specimen integrity and analyte stability, which may vary by analyte. NT-PRO BNP Collection Time: 11/28/18  8:25 PM  
Result Value Ref Range NT pro- 0 - 450 PG/ML  
BLOOD GAS, ARTERIAL Collection Time: 11/28/18  8:30 PM  
Result Value Ref Range pH 7.27 (L) 7.35 - 7.45    
 PCO2 65 (H) 35.0 - 45.0 mmHg PO2 264 (H) 80 - 100 mmHg O2  (H) 92 - 97 % BICARBONATE 29 (H) 22 - 26 mmol/L  
 BASE EXCESS 0.0 mmol/L  
 O2 METHOD NRM    
 O2 FLOW RATE 15.00 L/min FIO2 100 % Sample source ARTERIAL    
 SITE RIGHT RADIAL NICHOL'S TEST YES    
BLOOD GAS, ARTERIAL Collection Time: 11/28/18 10:15 PM  
Result Value Ref Range pH 7.36 7.35 - 7.45    
 PCO2 49 (H) 35.0 - 45.0 mmHg PO2 88 80 - 100 mmHg O2 SAT 96 92 - 97 % BICARBONATE 27 (H) 22 - 26 mmol/L  
 BASE EXCESS 1.0 mmol/L  
 O2 METHOD BIPAP    
 FIO2 50 % SPONTANEOUS RATE 16.0 IPAP/PIP 14.0 EPAP/CPAP/PEEP 6.0 Sample source ARTERIAL    
 SITE RIGHT RADIAL NICHOL'S TEST YES Radiologic Studies -  
CTA CHEST W OR W WO CONT Final Result XR CHEST PORT Final Result CT Results  (Last 48 hours)  
          
 11/28/18 2210  CTA CHEST W OR W WO CONT Final result Impression:  IMPRESSION:   
1. No definite pulmonary emboli identified. 2. There is bibasilar atelectasis and mucous plugging in the lower lobes right  
greater than left. There is borderline enlarged mediastinal and hilar lymph nodes most likely  
reactive. There are tiny micronodules in the upper lobes most likely inflammatory. Sachi Deluca Narrative:  INDICATION:   hypoxia COMPARISON: None. TECHNIQUE:   
Precontrast  images were obtained to localize the volume for acquisition. Multislice helical CT arteriography was performed from the diaphragm to the  
thoracic inlet during uneventful rapid bolus intravenous administration of 100  
mL of Isovue-370. Lung and soft tissue windows were generated. Post processing  
was performed and coronal reformatted images were also generated. 3 D imaging  
was performed. CT dose reduction was achieved through use of a standardized  
protocol tailored for this examination and automatic exposure control for dose  
modulation. FINDINGS:  
There is bibasilar atelectasis with mucous plugging in the lower lobes right  
greater than left. There is suspicion of tiny micronodules in the upper lobes although study is slightly limited by respiratory motion. No definite pulmonary emboli identified. There are borderline enlarged mediastinal and hilar lymph nodes most likely  
reactive. The aorta enhances normally without evidence of aneurysm or  
dissection. There are gallstones. There is a subcentimeter low-density in the liver most  
likely a small cyst  
   
There are old rib fractures. There are slight compression deformities thoracic  
spine CXR Results  (Last 48 hours)  
          
 11/28/18 2059  XR CHEST PORT Final result Impression:  Impression: 1. Low lung volumes with bibasilar atelectasis Narrative:  INDICATION:  hypoxia Exam: Portable chest 2038. Comparison: 7/23/2018. Findings: Cardiomediastinal silhouette is within normal limits. Pulmonary  
vasculature is not engorged. Lung volumes are decreased with bibasilar  
atelectasis. No pneumothorax or effusion. Medical Decision Making I am the first provider for this patient. I reviewed the vital signs, available nursing notes, past medical history, past surgical history, family history and social history. Vital Signs-Reviewed the patient's vital signs. Patient Vitals for the past 12 hrs: 
 Pulse Resp BP SpO2  
11/28/18 2045 93 19 136/78 91 % 11/28/18 2030 89 18 136/69 99 % 11/28/18 2023    99 % 11/28/18 2017 94 19 (!) 148/93 (!) 87 % 11/28/18 2015 98 22 (!) 148/93 91 % Pulse Oximetry Analysis - 80% on RA Cardiac Monitor:  
Rate: 93 bpm 
Rhythm: Normal Sinus Rhythm with 1st degree AVB EKG interpretation: (Preliminary) Rhythm: normal sinus rhythm and 1st degree block; and regular . Rate (approx.): 92; Axis: normal; AR interval: prolonged; QRS interval: normal ; ST/T wave: non-specific changes; Other findings: non ischemic Records Reviewed: Nursing Notes, Old Medical Records, Previous electrocardiograms, Previous Radiology Studies and Previous Laboratory Studies Provider Notes (Medical Decision Making):  
Upper airway wheezing and respiratory distress - sudden onset Decadron and Rac CTA chest 
Abg - BIPAP Routine laboratory data Consult to Hospitalist  
 
CONSULT NOTE:  
11:12 PM 
Eitan Henson NP spoke with Dr. Gabriel Enriquez MD, Specialty: Hospitalist 
Discussed pt's hx, disposition, and available diagnostic and imaging results. Reviewed care plans. Consultant agrees with plans as outlined. MD to admit to inpt. Eitan Henson NP 
 
 
ED Course:  
Initial assessment performed. The patients presenting problems have been discussed, and they are in agreement with the care plan formulated and outlined with them. I have encouraged them to ask questions as they arise throughout their visit. Hemodynamically stable pt in NAD 
 
CRITICAL CARE NOTE : 
 
10:57 PM 
 
 
IMPENDING DETERIORATION -Airway and Respiratory ASSOCIATED RISK FACTORS - Hypoxia MANAGEMENT- Bedside Assessment INTERPRETATION -  Xrays, CT Scan, Blood Gases and ECG INTERVENTIONS - hemodynamic mngmt and vent mngmt CASE REVIEW - Hospitalist 
 
TREATMENT RESPONSE -Stable PERFORMED BY - Self NOTES   : 
 
 
I have spent 60 minutes of critical care time involved in lab review, consultations with specialist, family decision- making, bedside attention and documentation. During this entire length of time I was immediately available to the patient . Eitan Henson NP Critical Care Time:  
61 Disposition: 
Admit to inpt 11:12 PM 
Patient is being admitted to the hospital.  The results of their tests and reasons for their admission have been discussed with them and/or available family. They convey agreement and understanding for the need to be admitted and for their admission diagnosis. Consultation has been made with the inpatient physician specialist for hospitalization. LABORATORY TESTS: 
Recent Results (from the past 12 hour(s)) EKG, 12 LEAD, INITIAL Collection Time: 11/28/18  8:18 PM  
Result Value Ref Range Ventricular Rate 92 BPM  
 Atrial Rate 92 BPM  
 P-R Interval 272 ms QRS Duration 72 ms Q-T Interval 370 ms QTC Calculation (Bezet) 457 ms Calculated P Axis 83 degrees Calculated R Axis 49 degrees Calculated T Axis 72 degrees Diagnosis Sinus rhythm with 1st degree AV block with premature supraventricular  
complexes Minimal voltage criteria for LVH, may be normal variant When compared with ECG of 06-AUG-2018 13:08, 
premature supraventricular complexes are now present QRS axis shifted left Criteria for Lateral infarct are no longer present CBC WITH AUTOMATED DIFF Collection Time: 11/28/18  8:25 PM  
Result Value Ref Range WBC 8.4 4.1 - 11.1 K/uL  
 RBC 4.17 4.10 - 5.70 M/uL  
 HGB 11.3 (L) 12.1 - 17.0 g/dL HCT 36.1 (L) 36.6 - 50.3 % MCV 86.6 80.0 - 99.0 FL  
 MCH 27.1 26.0 - 34.0 PG  
 MCHC 31.3 30.0 - 36.5 g/dL RDW 15.4 (H) 11.5 - 14.5 % PLATELET 153 622 - 871 K/uL MPV 9.5 8.9 - 12.9 FL  
 NRBC 0.0 0  WBC ABSOLUTE NRBC 0.00 0.00 - 0.01 K/uL NEUTROPHILS 70 32 - 75 % LYMPHOCYTES 19 12 - 49 % MONOCYTES 7 5 - 13 % EOSINOPHILS 4 0 - 7 % BASOPHILS 1 0 - 1 % IMMATURE GRANULOCYTES 0 0.0 - 0.5 % ABS. NEUTROPHILS 5.9 1.8 - 8.0 K/UL  
 ABS. LYMPHOCYTES 1.6 0.8 - 3.5 K/UL  
 ABS. MONOCYTES 0.6 0.0 - 1.0 K/UL  
 ABS. EOSINOPHILS 0.3 0.0 - 0.4 K/UL  
 ABS. BASOPHILS 0.1 0.0 - 0.1 K/UL  
 ABS. IMM. GRANS. 0.0 0.00 - 0.04 K/UL  
 DF AUTOMATED    
TROPONIN I Collection Time: 11/28/18  8:25 PM  
Result Value Ref Range Troponin-I, Qt. <0.05 <0.05 ng/mL CK W/ REFLX CKMB Collection Time: 11/28/18  8:25 PM  
Result Value Ref Range CK 68 39 - 422 U/L  
METABOLIC PANEL, COMPREHENSIVE Collection Time: 11/28/18  8:25 PM  
Result Value Ref Range Sodium 139 136 - 145 mmol/L Potassium 3.3 (L) 3.5 - 5.1 mmol/L Chloride 104 97 - 108 mmol/L  
 CO2 27 21 - 32 mmol/L Anion gap 8 5 - 15 mmol/L Glucose 178 (H) 65 - 100 mg/dL BUN 13 6 - 20 MG/DL Creatinine 0.78 0.70 - 1.30 MG/DL  
 BUN/Creatinine ratio 17 12 - 20 GFR est AA >60 >60 ml/min/1.73m2 GFR est non-AA >60 >60 ml/min/1.73m2 Calcium 8.8 8.5 - 10.1 MG/DL Bilirubin, total 0.3 0.2 - 1.0 MG/DL  
 ALT (SGPT) 16 12 - 78 U/L  
 AST (SGOT) 16 15 - 37 U/L Alk. phosphatase 74 45 - 117 U/L Protein, total 7.4 6.4 - 8.2 g/dL Albumin 3.4 (L) 3.5 - 5.0 g/dL Globulin 4.0 2.0 - 4.0 g/dL A-G Ratio 0.9 (L) 1.1 - 2.2 PTT Collection Time: 11/28/18  8:25 PM  
Result Value Ref Range aPTT 27.3 22.1 - 32.0 sec  
 aPTT, therapeutic range     58.0 - 77.0 SECS  
PROTHROMBIN TIME + INR Collection Time: 11/28/18  8:25 PM  
Result Value Ref Range INR 1.1 0.9 - 1.1 Prothrombin time 11.1 9.0 - 11.1 sec SAMPLES BEING HELD Collection Time: 11/28/18  8:25 PM  
Result Value Ref Range SAMPLES BEING HELD RD   
 COMMENT Add-on orders for these samples will be processed based on acceptable specimen integrity and analyte stability, which may vary by analyte. NT-PRO BNP Collection Time: 11/28/18  8:25 PM  
Result Value Ref Range NT pro- 0 - 450 PG/ML  
BLOOD GAS, ARTERIAL Collection Time: 11/28/18  8:30 PM  
Result Value Ref Range pH 7.27 (L) 7.35 - 7.45    
 PCO2 65 (H) 35.0 - 45.0 mmHg PO2 264 (H) 80 - 100 mmHg O2  (H) 92 - 97 % BICARBONATE 29 (H) 22 - 26 mmol/L  
 BASE EXCESS 0.0 mmol/L  
 O2 METHOD NRM    
 O2 FLOW RATE 15.00 L/min FIO2 100 % Sample source ARTERIAL    
 SITE RIGHT RADIAL NICHOL'S TEST YES    
BLOOD GAS, ARTERIAL Collection Time: 11/28/18 10:15 PM  
Result Value Ref Range pH 7.36 7.35 - 7.45    
 PCO2 49 (H) 35.0 - 45.0 mmHg PO2 88 80 - 100 mmHg O2 SAT 96 92 - 97 % BICARBONATE 27 (H) 22 - 26 mmol/L  
 BASE EXCESS 1.0 mmol/L  
 O2 METHOD BIPAP    
 FIO2 50 % SPONTANEOUS RATE 16.0 IPAP/PIP 14.0 EPAP/CPAP/PEEP 6.0 Sample source ARTERIAL    
 SITE RIGHT RADIAL NICHOL'S TEST YES IMAGING RESULTS: 
CTA CHEST W OR W WO CONT Final Result XR CHEST PORT Final Result Cta Chest W Or W Wo Cont Result Date: 11/28/2018 INDICATION:   hypoxia COMPARISON: None. TECHNIQUE: Precontrast  images were obtained to localize the volume for acquisition. Multislice helical CT arteriography was performed from the diaphragm to the thoracic inlet during uneventful rapid bolus intravenous administration of 100 mL of Isovue-370. Lung and soft tissue windows were generated. Post processing was performed and coronal reformatted images were also generated. 3 D imaging was performed. CT dose reduction was achieved through use of a standardized protocol tailored for this examination and automatic exposure control for dose modulation.  FINDINGS: There is bibasilar atelectasis with mucous plugging in the lower lobes right greater than left. There is suspicion of tiny micronodules in the upper lobes although study is slightly limited by respiratory motion. No definite pulmonary emboli identified. There are borderline enlarged mediastinal and hilar lymph nodes most likely reactive. The aorta enhances normally without evidence of aneurysm or dissection. There are gallstones. There is a subcentimeter low-density in the liver most likely a small cyst There are old rib fractures. There are slight compression deformities thoracic spine IMPRESSION: 1. No definite pulmonary emboli identified. 2. There is bibasilar atelectasis and mucous plugging in the lower lobes right greater than left. There is borderline enlarged mediastinal and hilar lymph nodes most likely reactive. There are tiny micronodules in the upper lobes most likely inflammatory. Nish Ferrera Xr Chest [facility] Result Date: 11/28/2018 INDICATION:  hypoxia Exam: Portable chest 2038. Comparison: 7/23/2018. Findings: Cardiomediastinal silhouette is within normal limits. Pulmonary vasculature is not engorged. Lung volumes are decreased with bibasilar atelectasis. No pneumothorax or effusion. Impression: 1. Low lung volumes with bibasilar atelectasis MEDICATIONS GIVEN: 
Medications  
sodium chloride (NS) flush 5-10 mL (not administered) dexamethasone (DECADRON) 4 mg/mL injection 10 mg (10 mg IntraVENous Given 11/28/18 2037) racEPINEPHrine (VAPONEFRIN) 2.25% nebulizer solution (0.25 mL Nebulization Given 11/28/18 2030)  
0.9% sodium chloride infusion (50 mL/hr IntraVENous New Bag 11/28/18 2210) iopamidol (ISOVUE-370) 76 % injection 100 mL (100 mL IntraVENous Given 11/28/18 2210)  
sodium chloride (NS) flush 10 mL (10 mL IntraVENous Given 11/28/18 2210) IMPRESSION: 
1. Hypoxic 2. Acute respiratory failure with hypercapnia (HCC) PLAN: 
1. Admit to Hospitalist  
 
 
Diagnosis Clinical Impression: 1. Hypoxic 2. Acute respiratory failure with hypercapnia (HCC) Attestations: 
 
Yaz Engle NP 
11:12 PM

## 2018-11-29 NOTE — ED NOTES
Bedside and Verbal shift change report given to [address] (oncoming nurse) by Tegan Sahni (offgoing nurse). Report included the following information SBAR, ED Summary, MAR and Recent Results.

## 2018-11-29 NOTE — ED NOTES
Assumed care of pt from Natividad Bundy RN at bedside with verbal report consisting of Situation, Background, Assessment, and Recommendations (SBAR). Pt is A&O x 4. Pt resting comfortably on the stretcher in a position of comfort. Call bell within reach. Side rails x 2. Cardiac monitor x 3. Stretcher locked in the lowest position. Concerns and questions addressed at this time. Pt in no acute distress at this the time. Will continue to monitor.

## 2018-11-29 NOTE — CONSULTS
[facility]  Pulmonary, Critical Care, and Sleep Medicine    Initial Patient Consult    Name: Luisa Bonds MRN: [id number]   : 1930 Hospital: [facility]   Date: 2018        IMPRESSION:   · Acute hypoxic/hypercapnic respiratory failure  · Bronchitis (can not exclude undiagnosed exacerbated COPD)  · Mucus plugging - mild and related to bronchitis  · LE edema with pain  · Dementia  · H/O tobacco use      RECOMMENDATIONS:   · O2 - he has weaned off BiPAP  · Bronchodilators  · Pulmonary toilet  · Mucolytics  · Empiric antibiotics  · Check sputum culture, flu test  · No indication for bronchoscopy at this time  · Check LE dopplers  · DVT prophylaxis     Subjective: This patient has been seen and evaluated at the request of Dr. Kathrine Mendosa for mucus plugging. Patient is a 80 y.o. male former smoker, presented yesterday with acute on chronic respiratory distress. This had been going on for an extended period of time, but got worse yesterday. He was treated with BiPAP, steroids, and bronchodilators, and he reports he is feeling much better today. Has a cough productive of only minimal amounts of clear sputum. No f/c. Past Medical History:   Diagnosis Date    Anemia     BPH (benign prostatic hypertrophy)     Dementia     Gout     High cholesterol      taken off meds    Hypertension      was taken off BP meds    Left inguinal hernia 2018      Past Surgical History:   Procedure Laterality Date    HX HERNIA REPAIR  9-10-13    [place] [facility]-Dr. Blake Brown    HX HERNIA REPAIR  2018    open [facility] with [facility]      Prior to Admission medications    Medication Sig Start Date End Date Taking? Authorizing Provider   acetaminophen (TYLENOL) 325 mg tablet Take 650 mg by mouth every four (4) hours as needed for Pain. Indications: Arthritic Pain    Provider, Historical   senna (SENNA) 8.6 mg tablet Take 1 Tab by mouth daily.     Provider, Historical polyethylene glycol (MIRALAX) 17 gram packet Take 17 g by mouth as needed. Provider, Historical   ibuprofen (MOTRIN) 200 mg tablet Take 2 Tabs by mouth every six (6) hours as needed for Pain. 8/3/18   Thuy Yanez MD   Nebulizer & Compressor machine 1 Each by Does Not Apply route as needed for Cough (wheezing). Use nebulizer every 4-6 hours as needed for shortness of breath or wheezing 7/26/18   Irene Hoover NP   albuterol (PROVENTIL VENTOLIN) 2.5 mg /3 mL (0.083 %) nebulizer solution 3 mL by Nebulization route every four (4) hours as needed for Wheezing or Shortness of Breath. 7/24/18   Irene Hoover NP   tamsulosin (FLOMAX) 0.4 mg capsule TAKE 1 CAPSULE BY MOUTH EVERY DAY 5/22/18   Irene Hoover NP   food supplemt, lactose-reduced (BOOST HIGH PROTEIN) 0.06 gram- 1 kcal/mL liqd Drink 2-3 containers DAILY. 3/23/18   Irene Hoover NP   ferrous sulfate 325 mg (65 mg iron) tablet Take 1 Tab by mouth two (2) times daily (with meals). On an empty stomach with Vitamin C (like OJ)  Patient taking differently: Take 325 mg by mouth Daily (before breakfast). On an empty stomach with Vitamin C (like OJ) 7/20/17   Irene Hoover NP   potassium chloride SR (K-TAB) 10 mEq tablet Take 2 Tabs by mouth two (2) times a day. Patient taking differently: Take 20 mEq by mouth daily. 4/11/17   Thora Crigler, MD   aspirin 81 mg tablet Take 81 mg by mouth daily.     Other, MD Barry     No Known Allergies   Social History     Tobacco Use    Smoking status: Former smoker, ~30-40 p-y    Smokeless tobacco: Never Used   Substance Use Topics    Alcohol use: Yes     Comment: occas      Family History   Problem Relation Age of Onset   Gyula.Decent Stroke Mother     Stroke Sister     Diabetes Sister     Hypertension Sister     Heart Disease Brother     Hypertension Brother         Current Facility-Administered Medications   Medication Dose Route Frequency    tamsulosin (FLOMAX) capsule 0.4 mg  0.4 mg Oral DAILY    sodium chloride (NS) flush 5-10 mL  5-10 mL IntraVENous Q8H    enoxaparin (LOVENOX) injection 40 mg  40 mg SubCUTAneous Q24H    piperacillin-tazobactam (ZOSYN) 3.375 g in 0.9% sodium chloride (MBP/ADV) 100 mL  3.375 g IntraVENous Q8H    piperacillin-tazobactam (ZOSYN) 3.375 gram injection        0.9% sodium chloride infusion  50 mL/hr IntraVENous CONTINUOUS    methylPREDNISolone (PF) (SOLU-MEDROL) injection 40 mg  40 mg IntraVENous Q6H    levoFLOXacin (LEVAQUIN) 750 mg in D5W IVPB  750 mg IntraVENous Q24H    acetylcysteine (MUCOMYST) 200 mg/mL (20 %) solution 400 mg  400 mg Nebulization QID RT    albuterol-ipratropium (DUO-NEB) 2.5 MG-0.5 MG/3 ML  3 mL Nebulization Q4H RT       Review of Systems:  A comprehensive review of systems was negative except for: Musculoskeletal: positive for leg pain    Objective:   Vital Signs:    Visit Vitals  BP (!) 143/102 (BP 1 Location: Right arm, BP Patient Position: At rest)   Pulse (!) 101   Temp 98.2 °F (36.8 °C)   Resp 20   Ht 5' 8\" (1.727 m)   Wt 63 kg (138 lb 14.2 oz)   SpO2 94%   BMI 21.12 kg/m²       O2 Device: Nasal cannula   O2 Flow Rate (L/min): 6 l/min   Temp (24hrs), Av °F (36.7 °C), Min:97.8 °F (36.6 °C), Max:98.2 °F (36.8 °C)       Intake/Output:   Last shift:      No intake/output data recorded. Last 3 shifts: No intake/output data recorded. No intake or output data in the 24 hours ending 18 0947   Physical Exam:   General:  Alert, cooperative, no distress, appears stated age. Head:  Normocephalic, without obvious abnormality, atraumatic. Eyes:  Conjunctivae/corneas clear. PERRL, EOMs intact. Nose: Nares normal. Septum midline. Mucosa normal.     Throat: Lips, mucosa, and tongue normal.     Neck: Supple, symmetrical, trachea midline    Back:   Symmetric, no curvature. ROM normal.   Lungs:   Poor bilateral air movement with few scattered ronchi   Chest wall:  No tenderness or deformity. Heart:  Irregular, normal rate   Abdomen:   Soft, non-tender.  Bowel sounds normal.    Extremities: Extremities normal, atraumatic, no cyanosis, +LE edema   Skin: Skin color, texture, turgor normal. No rashes or lesions   Lymph nodes: Cervical, supraclavicular nodes normal.   Neurologic: Grossly nonfocal     Data review:     Recent Results (from the past 24 hour(s))   EKG, 12 LEAD, INITIAL    Collection Time: 11/28/18  8:18 PM   Result Value Ref Range    Ventricular Rate 92 BPM    Atrial Rate 92 BPM    P-R Interval 272 ms    QRS Duration 72 ms    Q-T Interval 370 ms    QTC Calculation (Bezet) 457 ms    Calculated P Axis 83 degrees    Calculated R Axis 49 degrees    Calculated T Axis 72 degrees    Diagnosis       Sinus rhythm with 1st degree AV block  Minimal voltage criteria for LVH, may be normal variant  When compared with ECG of 06-AUG-2018 13:08,  Criteria for Lateral infarct are no longer present  Confirmed by Jomar Lake, P.VShayna ([id number]) on 11/29/2018 9:39:56 AM     CBC WITH AUTOMATED DIFF    Collection Time: 11/28/18  8:25 PM   Result Value Ref Range    WBC 8.4 4.1 - 11.1 K/uL    RBC 4.17 4.10 - 5.70 M/uL    HGB 11.3 (L) 12.1 - 17.0 g/dL    HCT 36.1 (L) 36.6 - 50.3 %    MCV 86.6 80.0 - 99.0 FL    MCH 27.1 26.0 - 34.0 PG    MCHC 31.3 30.0 - 36.5 g/dL    RDW 15.4 (H) 11.5 - 14.5 %    PLATELET 784 500 - 379 K/uL    MPV 9.5 8.9 - 12.9 FL    NRBC 0.0 0  WBC    ABSOLUTE NRBC 0.00 0.00 - 0.01 K/uL    NEUTROPHILS 70 32 - 75 %    LYMPHOCYTES 19 12 - 49 %    MONOCYTES 7 5 - 13 %    EOSINOPHILS 4 0 - 7 %    BASOPHILS 1 0 - 1 %    IMMATURE GRANULOCYTES 0 0.0 - 0.5 %    ABS. NEUTROPHILS 5.9 1.8 - 8.0 K/UL    ABS. LYMPHOCYTES 1.6 0.8 - 3.5 K/UL    ABS. MONOCYTES 0.6 0.0 - 1.0 K/UL    ABS. EOSINOPHILS 0.3 0.0 - 0.4 K/UL    ABS. BASOPHILS 0.1 0.0 - 0.1 K/UL    ABS. IMM.  GRANS. 0.0 0.00 - 0.04 K/UL    DF AUTOMATED     TROPONIN I    Collection Time: 11/28/18  8:25 PM   Result Value Ref Range    Troponin-I, Qt. <0.05 <0.05 ng/mL   CK W/ REFLX CKMB    Collection Time: 11/28/18  8:25 PM   Result Value Ref Range    CK 68 39 - 771 U/L   METABOLIC PANEL, COMPREHENSIVE    Collection Time: 11/28/18  8:25 PM   Result Value Ref Range    Sodium 139 136 - 145 mmol/L    Potassium 3.3 (L) 3.5 - 5.1 mmol/L    Chloride 104 97 - 108 mmol/L    CO2 27 21 - 32 mmol/L    Anion gap 8 5 - 15 mmol/L    Glucose 178 (H) 65 - 100 mg/dL    BUN 13 6 - 20 MG/DL    Creatinine 0.78 0.70 - 1.30 MG/DL    BUN/Creatinine ratio 17 12 - 20      GFR est AA >60 >60 ml/min/1.73m2    GFR est non-AA >60 >60 ml/min/1.73m2    Calcium 8.8 8.5 - 10.1 MG/DL    Bilirubin, total 0.3 0.2 - 1.0 MG/DL    ALT (SGPT) 16 12 - 78 U/L    AST (SGOT) 16 15 - 37 U/L    Alk. phosphatase 74 45 - 117 U/L    Protein, total 7.4 6.4 - 8.2 g/dL    Albumin 3.4 (L) 3.5 - 5.0 g/dL    Globulin 4.0 2.0 - 4.0 g/dL    A-G Ratio 0.9 (L) 1.1 - 2.2     PTT    Collection Time: 11/28/18  8:25 PM   Result Value Ref Range    aPTT 27.3 22.1 - 32.0 sec    aPTT, therapeutic range     58.0 - 77.0 SECS   PROTHROMBIN TIME + INR    Collection Time: 11/28/18  8:25 PM   Result Value Ref Range    INR 1.1 0.9 - 1.1      Prothrombin time 11.1 9.0 - 11.1 sec   SAMPLES BEING HELD    Collection Time: 11/28/18  8:25 PM   Result Value Ref Range    SAMPLES BEING HELD RD     COMMENT        Add-on orders for these samples will be processed based on acceptable specimen integrity and analyte stability, which may vary by analyte.    NT-PRO BNP    Collection Time: 11/28/18  8:25 PM   Result Value Ref Range    NT pro- 0 - 450 PG/ML   BLOOD GAS, ARTERIAL    Collection Time: 11/28/18  8:30 PM   Result Value Ref Range    pH 7.27 (L) 7.35 - 7.45      PCO2 65 (H) 35.0 - 45.0 mmHg    PO2 264 (H) 80 - 100 mmHg    O2  (H) 92 - 97 %    BICARBONATE 29 (H) 22 - 26 mmol/L    BASE EXCESS 0.0 mmol/L    O2 METHOD NRM      O2 FLOW RATE 15.00 L/min    FIO2 100 %    Sample source ARTERIAL      SITE RIGHT RADIAL      NICHOL'S TEST YES     BLOOD GAS, ARTERIAL    Collection Time: 11/28/18 10:15 PM   Result Value Ref Range pH 7.36 7.35 - 7.45      PCO2 49 (H) 35.0 - 45.0 mmHg    PO2 88 80 - 100 mmHg    O2 SAT 96 92 - 97 %    BICARBONATE 27 (H) 22 - 26 mmol/L    BASE EXCESS 1.0 mmol/L    O2 METHOD BIPAP      FIO2 50 %    SPONTANEOUS RATE 16.0      IPAP/PIP 14.0      EPAP/CPAP/PEEP 6.0      Sample source ARTERIAL      SITE RIGHT RADIAL      NICHOL'S TEST YES         Imaging:  I have personally reviewed the patients radiographs and have reviewed the reports:  Mild bibasilar mucus plugging        Lina Watt MD

## 2018-11-29 NOTE — ED NOTES
Bedside and Verbal shift change report given to Alissa Grewal RN (oncoming nurse) by Nathaniel Gaona RN (offgoing nurse). Report included the following information SBAR, Kardex, ED Summary, Intake/Output and Recent Results.

## 2018-11-29 NOTE — ED NOTES
Bedside and verbal report given to Lady John RN on [facility] at shift change for routine progression of care. Report consisted of patients Situation, Background, Assessment and Recommendations(SBAR). Information from the following report(s) SBAR, Kardex, ED Summary, [facility] and Recent Results was reviewed with the receiving nurse. Opportunity for questions and clarification was provided.

## 2018-11-30 ENCOUNTER — APPOINTMENT (OUTPATIENT)
Dept: CT IMAGING | Age: 83
DRG: 189 | End: 2018-11-30
Attending: NURSE PRACTITIONER
Payer: MEDICARE

## 2018-11-30 PROBLEM — R06.02 SOB (SHORTNESS OF BREATH): Status: ACTIVE | Noted: 2018-11-30

## 2018-11-30 PROBLEM — Z71.89 COUNSELING REGARDING ADVANCED CARE PLANNING AND GOALS OF CARE: Status: ACTIVE | Noted: 2018-11-30

## 2018-11-30 PROBLEM — R41.0 DELIRIUM: Status: ACTIVE | Noted: 2018-11-30

## 2018-11-30 LAB
ALBUMIN SERPL-MCNC: 3 G/DL (ref 3.5–5)
ALBUMIN/GLOB SERPL: 0.8 {RATIO} (ref 1.1–2.2)
ALP SERPL-CCNC: 57 U/L (ref 45–117)
ALT SERPL-CCNC: 15 U/L (ref 12–78)
ANION GAP SERPL CALC-SCNC: 11 MMOL/L (ref 5–15)
ARTERIAL PATENCY WRIST A: YES
AST SERPL-CCNC: 12 U/L (ref 15–37)
BASE DEFICIT BLDA-SCNC: 0.2 MMOL/L
BASOPHILS # BLD: 0 K/UL (ref 0–0.1)
BASOPHILS NFR BLD: 0 % (ref 0–1)
BDY SITE: ABNORMAL
BILIRUB SERPL-MCNC: 0.2 MG/DL (ref 0.2–1)
BUN SERPL-MCNC: 19 MG/DL (ref 6–20)
BUN/CREAT SERPL: 18 (ref 12–20)
CALCIUM SERPL-MCNC: 8.4 MG/DL (ref 8.5–10.1)
CHLORIDE SERPL-SCNC: 102 MMOL/L (ref 97–108)
CO2 SERPL-SCNC: 22 MMOL/L (ref 21–32)
CREAT SERPL-MCNC: 1.05 MG/DL (ref 0.7–1.3)
DIFFERENTIAL METHOD BLD: ABNORMAL
EOSINOPHIL # BLD: 0 K/UL (ref 0–0.4)
EOSINOPHIL NFR BLD: 0 % (ref 0–7)
ERYTHROCYTE [DISTWIDTH] IN BLOOD BY AUTOMATED COUNT: 15.7 % (ref 11.5–14.5)
GAS FLOW.O2 O2 DELIVERY SYS: 0.5 L/MIN
GLOBULIN SER CALC-MCNC: 4 G/DL (ref 2–4)
GLUCOSE BLD STRIP.AUTO-MCNC: 162 MG/DL (ref 65–100)
GLUCOSE SERPL-MCNC: 220 MG/DL (ref 65–100)
HCO3 BLDA-SCNC: 25 MMOL/L (ref 22–26)
HCT VFR BLD AUTO: 34.4 % (ref 36.6–50.3)
HGB BLD-MCNC: 10.8 G/DL (ref 12.1–17)
IMM GRANULOCYTES # BLD: 0.1 K/UL (ref 0–0.04)
IMM GRANULOCYTES NFR BLD AUTO: 1 % (ref 0–0.5)
LYMPHOCYTES # BLD: 0.4 K/UL (ref 0.8–3.5)
LYMPHOCYTES NFR BLD: 5 % (ref 12–49)
MCH RBC QN AUTO: 26.7 PG (ref 26–34)
MCHC RBC AUTO-ENTMCNC: 31.4 G/DL (ref 30–36.5)
MCV RBC AUTO: 84.9 FL (ref 80–99)
MONOCYTES # BLD: 0.2 K/UL (ref 0–1)
MONOCYTES NFR BLD: 2 % (ref 5–13)
NEUTS SEG # BLD: 7.7 K/UL (ref 1.8–8)
NEUTS SEG NFR BLD: 92 % (ref 32–75)
NRBC # BLD: 0 K/UL (ref 0–0.01)
NRBC BLD-RTO: 0 PER 100 WBC
PCO2 BLDA: 41 MMHG (ref 35–45)
PH BLDA: 7.4 [PH] (ref 7.35–7.45)
PLATELET # BLD AUTO: 247 K/UL (ref 150–400)
PMV BLD AUTO: 9.7 FL (ref 8.9–12.9)
PO2 BLDA: 69 MMHG (ref 80–100)
POTASSIUM SERPL-SCNC: 3.4 MMOL/L (ref 3.5–5.1)
PROT SERPL-MCNC: 7 G/DL (ref 6.4–8.2)
RBC # BLD AUTO: 4.05 M/UL (ref 4.1–5.7)
RBC MORPH BLD: ABNORMAL
RBC MORPH BLD: ABNORMAL
SAO2 % BLD: 94 % (ref 92–97)
SAO2% DEVICE SAO2% SENSOR NAME: ABNORMAL
SERVICE CMNT-IMP: ABNORMAL
SODIUM SERPL-SCNC: 135 MMOL/L (ref 136–145)
SPECIMEN SITE: ABNORMAL
WBC # BLD AUTO: 8.4 K/UL (ref 4.1–11.1)

## 2018-11-30 PROCEDURE — 70450 CT HEAD/BRAIN W/O DYE: CPT

## 2018-11-30 PROCEDURE — 36600 WITHDRAWAL OF ARTERIAL BLOOD: CPT

## 2018-11-30 PROCEDURE — 74011000250 HC RX REV CODE- 250: Performed by: NURSE PRACTITIONER

## 2018-11-30 PROCEDURE — 74011250636 HC RX REV CODE- 250/636: Performed by: INTERNAL MEDICINE

## 2018-11-30 PROCEDURE — 74011000258 HC RX REV CODE- 258: Performed by: INTERNAL MEDICINE

## 2018-11-30 PROCEDURE — 65660000000 HC RM CCU STEPDOWN

## 2018-11-30 PROCEDURE — 74011000250 HC RX REV CODE- 250: Performed by: GENERAL ACUTE CARE HOSPITAL

## 2018-11-30 PROCEDURE — 82962 GLUCOSE BLOOD TEST: CPT

## 2018-11-30 PROCEDURE — 74011250636 HC RX REV CODE- 250/636: Performed by: NURSE PRACTITIONER

## 2018-11-30 PROCEDURE — 82803 BLOOD GASES ANY COMBINATION: CPT

## 2018-11-30 PROCEDURE — 94640 AIRWAY INHALATION TREATMENT: CPT

## 2018-11-30 PROCEDURE — 74011250637 HC RX REV CODE- 250/637: Performed by: INTERNAL MEDICINE

## 2018-11-30 PROCEDURE — 85025 COMPLETE CBC W/AUTO DIFF WBC: CPT

## 2018-11-30 PROCEDURE — 74011000250 HC RX REV CODE- 250: Performed by: INTERNAL MEDICINE

## 2018-11-30 PROCEDURE — 74011250637 HC RX REV CODE- 250/637: Performed by: NURSE PRACTITIONER

## 2018-11-30 PROCEDURE — 74011250637 HC RX REV CODE- 250/637: Performed by: GENERAL ACUTE CARE HOSPITAL

## 2018-11-30 PROCEDURE — 77030011943

## 2018-11-30 PROCEDURE — 36415 COLL VENOUS BLD VENIPUNCTURE: CPT

## 2018-11-30 PROCEDURE — 80053 COMPREHEN METABOLIC PANEL: CPT

## 2018-11-30 RX ORDER — QUETIAPINE FUMARATE 25 MG/1
25 TABLET, FILM COATED ORAL
Status: DISCONTINUED | OUTPATIENT
Start: 2018-11-30 | End: 2018-12-01

## 2018-11-30 RX ORDER — FINASTERIDE 5 MG/1
5 TABLET, FILM COATED ORAL DAILY
Status: DISCONTINUED | OUTPATIENT
Start: 2018-11-30 | End: 2018-12-06 | Stop reason: HOSPADM

## 2018-11-30 RX ORDER — POTASSIUM CHLORIDE 750 MG/1
10 TABLET, FILM COATED, EXTENDED RELEASE ORAL
Status: COMPLETED | OUTPATIENT
Start: 2018-11-30 | End: 2018-11-30

## 2018-11-30 RX ORDER — HALOPERIDOL 5 MG/ML
2 INJECTION INTRAMUSCULAR
Status: DISCONTINUED | OUTPATIENT
Start: 2018-11-30 | End: 2018-12-06 | Stop reason: HOSPADM

## 2018-11-30 RX ORDER — LORAZEPAM 2 MG/ML
INJECTION INTRAMUSCULAR
Status: DISCONTINUED
Start: 2018-11-30 | End: 2018-11-30

## 2018-11-30 RX ORDER — LEVOFLOXACIN 5 MG/ML
750 INJECTION, SOLUTION INTRAVENOUS
Status: DISCONTINUED | OUTPATIENT
Start: 2018-12-02 | End: 2018-12-01

## 2018-11-30 RX ORDER — HALOPERIDOL 5 MG/ML
1 INJECTION INTRAMUSCULAR
Status: COMPLETED | OUTPATIENT
Start: 2018-11-30 | End: 2018-11-30

## 2018-11-30 RX ORDER — LEVALBUTEROL INHALATION SOLUTION 0.63 MG/3ML
0.63 SOLUTION RESPIRATORY (INHALATION)
Status: DISCONTINUED | OUTPATIENT
Start: 2018-11-30 | End: 2018-11-30

## 2018-11-30 RX ORDER — LORAZEPAM 2 MG/ML
1 INJECTION INTRAMUSCULAR
Status: DISCONTINUED | OUTPATIENT
Start: 2018-11-30 | End: 2018-11-30

## 2018-11-30 RX ORDER — METOPROLOL TARTRATE 25 MG/1
12.5 TABLET, FILM COATED ORAL EVERY 12 HOURS
Status: DISCONTINUED | OUTPATIENT
Start: 2018-11-30 | End: 2018-12-03

## 2018-11-30 RX ORDER — CHLORDIAZEPOXIDE HYDROCHLORIDE 5 MG/1
10 CAPSULE, GELATIN COATED ORAL 3 TIMES DAILY
Status: DISCONTINUED | OUTPATIENT
Start: 2018-11-30 | End: 2018-11-30

## 2018-11-30 RX ORDER — CHLORDIAZEPOXIDE HYDROCHLORIDE 25 MG/1
25 CAPSULE, GELATIN COATED ORAL 3 TIMES DAILY
Status: DISCONTINUED | OUTPATIENT
Start: 2018-11-30 | End: 2018-11-30

## 2018-11-30 RX ORDER — LEVALBUTEROL INHALATION SOLUTION 0.63 MG/3ML
0.63 SOLUTION RESPIRATORY (INHALATION)
Status: DISCONTINUED | OUTPATIENT
Start: 2018-11-30 | End: 2018-12-01

## 2018-11-30 RX ORDER — DILTIAZEM HYDROCHLORIDE 5 MG/ML
10 INJECTION INTRAVENOUS ONCE
Status: COMPLETED | OUTPATIENT
Start: 2018-11-30 | End: 2018-11-30

## 2018-11-30 RX ORDER — LORAZEPAM 2 MG/ML
2 INJECTION INTRAMUSCULAR
Status: DISCONTINUED | OUTPATIENT
Start: 2018-11-30 | End: 2018-11-30

## 2018-11-30 RX ADMIN — ACETYLCYSTEINE: 200 SOLUTION ORAL; RESPIRATORY (INHALATION) at 15:28

## 2018-11-30 RX ADMIN — Medication 10 ML: at 05:37

## 2018-11-30 RX ADMIN — TAMSULOSIN HYDROCHLORIDE 0.4 MG: 0.4 CAPSULE ORAL at 13:21

## 2018-11-30 RX ADMIN — METOPROLOL TARTRATE 12.5 MG: 25 TABLET ORAL at 13:21

## 2018-11-30 RX ADMIN — LEVALBUTEROL HYDROCHLORIDE 0.63 MG: 0.63 SOLUTION RESPIRATORY (INHALATION) at 23:00

## 2018-11-30 RX ADMIN — Medication 10 ML: at 08:04

## 2018-11-30 RX ADMIN — ACETYLCYSTEINE 400 MG: 200 SOLUTION ORAL; RESPIRATORY (INHALATION) at 00:10

## 2018-11-30 RX ADMIN — Medication 10 ML: at 13:18

## 2018-11-30 RX ADMIN — Medication 10 ML: at 17:38

## 2018-11-30 RX ADMIN — POTASSIUM CHLORIDE 10 MEQ: 750 TABLET, EXTENDED RELEASE ORAL at 09:04

## 2018-11-30 RX ADMIN — PIPERACILLIN SODIUM,TAZOBACTAM SODIUM 3.38 G: 3; .375 INJECTION, POWDER, FOR SOLUTION INTRAVENOUS at 15:18

## 2018-11-30 RX ADMIN — METHYLPREDNISOLONE SODIUM SUCCINATE 40 MG: 40 INJECTION, POWDER, FOR SOLUTION INTRAMUSCULAR; INTRAVENOUS at 05:37

## 2018-11-30 RX ADMIN — PIPERACILLIN SODIUM,TAZOBACTAM SODIUM 3.38 G: 3; .375 INJECTION, POWDER, FOR SOLUTION INTRAVENOUS at 21:32

## 2018-11-30 RX ADMIN — LEVALBUTEROL HYDROCHLORIDE 0.63 MG: 0.63 SOLUTION RESPIRATORY (INHALATION) at 20:21

## 2018-11-30 RX ADMIN — LORAZEPAM 1 MG: 2 INJECTION INTRAMUSCULAR; INTRAVENOUS at 07:01

## 2018-11-30 RX ADMIN — FINASTERIDE 5 MG: 5 TABLET, FILM COATED ORAL at 15:18

## 2018-11-30 RX ADMIN — LEVOFLOXACIN 750 MG: 5 INJECTION, SOLUTION INTRAVENOUS at 01:01

## 2018-11-30 RX ADMIN — METHYLPREDNISOLONE SODIUM SUCCINATE 40 MG: 40 INJECTION, POWDER, FOR SOLUTION INTRAMUSCULAR; INTRAVENOUS at 17:37

## 2018-11-30 RX ADMIN — Medication 10 ML: at 21:33

## 2018-11-30 RX ADMIN — CHLORDIAZEPOXIDE HYDROCHLORIDE 25 MG: 25 CAPSULE ORAL at 07:53

## 2018-11-30 RX ADMIN — QUETIAPINE FUMARATE 25 MG: 25 TABLET ORAL at 21:32

## 2018-11-30 RX ADMIN — ACETYLCYSTEINE: 200 SOLUTION ORAL; RESPIRATORY (INHALATION) at 20:21

## 2018-11-30 RX ADMIN — LEVALBUTEROL HYDROCHLORIDE 0.63 MG: 0.63 SOLUTION RESPIRATORY (INHALATION) at 15:28

## 2018-11-30 RX ADMIN — ACETYLCYSTEINE 400 MG: 200 SOLUTION ORAL; RESPIRATORY (INHALATION) at 12:15

## 2018-11-30 RX ADMIN — IPRATROPIUM BROMIDE AND ALBUTEROL SULFATE 3 ML: .5; 3 SOLUTION RESPIRATORY (INHALATION) at 04:40

## 2018-11-30 RX ADMIN — METOPROLOL TARTRATE 12.5 MG: 25 TABLET ORAL at 21:32

## 2018-11-30 RX ADMIN — HALOPERIDOL LACTATE 1 MG: 5 INJECTION INTRAMUSCULAR at 10:00

## 2018-11-30 RX ADMIN — PIPERACILLIN SODIUM,TAZOBACTAM SODIUM 3.38 G: 3; .375 INJECTION, POWDER, FOR SOLUTION INTRAVENOUS at 05:37

## 2018-11-30 RX ADMIN — LEVALBUTEROL HYDROCHLORIDE 0.63 MG: 0.63 SOLUTION RESPIRATORY (INHALATION) at 12:14

## 2018-11-30 RX ADMIN — METHYLPREDNISOLONE SODIUM SUCCINATE 40 MG: 40 INJECTION, POWDER, FOR SOLUTION INTRAMUSCULAR; INTRAVENOUS at 13:18

## 2018-11-30 RX ADMIN — IPRATROPIUM BROMIDE AND ALBUTEROL SULFATE 3 ML: .5; 3 SOLUTION RESPIRATORY (INHALATION) at 00:11

## 2018-11-30 RX ADMIN — DILTIAZEM HYDROCHLORIDE 30 MG: 30 TABLET, FILM COATED ORAL at 21:32

## 2018-11-30 RX ADMIN — DILTIAZEM HYDROCHLORIDE 10 MG: 5 INJECTION INTRAVENOUS at 08:03

## 2018-11-30 RX ADMIN — LORAZEPAM 2 MG: 2 INJECTION INTRAMUSCULAR; INTRAVENOUS at 07:37

## 2018-11-30 NOTE — PROGRESS NOTES
ADULT PROTOCOL: JET AEROSOL ASSESSMENT Patient  Yohana Mancini     80 y.o.   male     11/30/2018  12:24 PM 
 
Breath Sounds Pre Procedure: Right Breath Sounds: Diminished Left Breath Sounds: Diminished Breath Sounds Post Procedure: Right Breath Sounds: Diminished Left Breath Sounds: Diminished Breathing pattern: Pre procedure Breathing Pattern: Regular Post procedure Breathing Pattern: Regular Heart Rate: Pre procedure Pulse: 111 Post procedure Pulse: 98 Resp Rate: Pre procedure Respirations: 18 Post procedure Respirations: 19 Cough: Pre procedure Cough: Non-productive Post procedure Cough: Non-productive Oxygen: O2 Device: Nasal cannula   4LNC Changed: NO SpO2: Pre procedure SpO2: 95 %   with oxygen Post procedure SpO2: 100 %  with oxygen Nebulizer Therapy: Current medications Aerosolized Medications: Xopenex, Mucomyst 
    Changed: NO 
 
 
 
Problem List:  
Patient Active Problem List  
Diagnosis Code  Incarcerated inguinal hernia K40.30  Hypertension I10  Benign prostatic hyperplasia N40.0  Gout M10.9  High cholesterol E78.00  Benign prostatic hyperplasia without lower urinary tract symptoms N40.0  Left inguinal hernia K40.90  
 BMI less than 19,adult Z68.1  Inguinal hernia K40.90  
 Urinary retention R33.9  Acute respiratory failure with hypoxia and hypercapnia (HCC) J96.01, J96.02 Respiratory Therapist: Eric Geogre, RT

## 2018-11-30 NOTE — CONSULTS
Palliative Medicine Consult  Tim: [phone] ([id number])    Patient Name: Yves Reyez  YOB: 1930    Date of Initial Consult: 11/30/18  Reason for Consult: care decisions  Requesting Provider: Leoncio Partida   Primary Care Physician: None     SUMMARY:   Yves Reyez is a 80 y.o. with a past history of BPH, anemia, gout, HLD, dementia, and HTN, who was admitted on 11/28/2018 from [facility] memory care unit with a diagnosis of acute respiratory failure with hypercapnia. Current medical issues leading to Palliative Medicine involvement include: lives in locked Memory Care Unit. Psychosocial: residing in [facility], locked unit since August 2018. Prior was living alone with personal care attendant during the day. Pt drank \"a bottle\" of liquor daily when he lived at home. Never , has 2 children but they were adopted out as infants and never had contact with pt.  5 brothers and 4 sisters, 1 sister and 2 brothers as still alive. Sister Olga Gutierrez has been acting as surrogate decision-maker and helping with finances. PALLIATIVE DIAGNOSES:   1. SOB  2. Acute hypoxic and hypercarbic respiratory failure due to mucous plugging with suspect of aspiration  3. Edema BLE  4. Dementia   5. Delirium   6. Care decisions   PLAN:   1. Met with sister Geraldine, obtained history,  introduced Palliative Medicine as a support for pt and families dealing with life limiting disease, to help with symptom management, education and understanding disease process and treatment options, and to discuss goals of care, what pt wants in terms of treatment as well as code status. Discussed pt's medical condition; she had a lot of questions about his AMS as he is normally pleasant and cooperative, discussed causes include medications, illness, and changes in environment and daily schedule. She wants him discharged back to [facility] as soon as he is medically able so he doesn't lose his room there. 2. Completed POST form (see ACP note), copy on chart. 3. Initial consult note routed to primary continuity provider  4. Communicated plan of care with: Palliative IDT, RN   GOALS OF CARE / TREATMENT PREFERENCES:     GOALS OF CARE:  Patient/Health Care Proxy Stated Goals: Ulysses Olmstead) continue current level of care  2)  Discharge back to [address] [place] [address],[address] when medically ready      TREATMENT PREFERENCES:   Code Status: DNR    Advance Care Planning:  [x] [facility] Interdisciplinary Team has updated the ACP Navigator with [address] and Patient Capacity    Primary Decision Maker ([address]): [address] [place]  Relationship to patient:  sister  Phone number:  [phone]  [] Named in a scanned document   [x] Legal Next of Kin  [] Guardian    Secondary Decision Maker ([address]):   Relationship to patient:  Phone number:  [] Named in a scanned document   [] Legal Next of Kin  [] Guardian    Medical Interventions: Limited additional interventions   Other Instructions:     POST FORM:  1) DNR  2) LIMITED MEDICAL INTERVENTIONS  3) NO FEEDING TUBEArtificially Administered Nutrition: No feeding tube     Other:    As far as possible, the palliative care team has discussed with patient / health care proxy about goals of care / treatment preferences for patient. HISTORY:     History obtained from: CHART, SISTER    CHIEF COMPLAINT: SOB    HPI/SUBJECTIVE:    The patient is:   [] Verbal and participatory  [x] Non-participatory due to: delirious     11/28: BIBA with sudden onset of SOB after dinner. Was noted to start gasping for air with audible wheezes while walking back to his room from dinner. Staff observed pt vomiting x2. ED W/U:  EXAM:  2+ pitting edema of BLE, accessory muscle use, wheezing RUL and GIOVANI.   LAB: K 3.3, glu 178, blood gas pH 7.27, PC02 65, C02 264, bicarb 29  IMAGING:  CHEST CTA no pulmonary emboli, bibasilar atelectasis and mucous plugging in right lower lobe greater than left, borderline enlarged mediastinal and hilar lymph nodes most likely reactive, there are tiny micronodules in upper lobes most likely inflammatory. CXR low lung volumes with bibasilar atelectasis. HOSPITAL COURSE: 11/29: admitted to [facility] for routine progression of care. In afib. 11/30: told RN he drinks 1/2 pint whiskey per day. Code atlas this am: pt yelling, cursing, attempting to leave. 1140 code yellow pt found on floor by bed. Clinical Pain Assessment (nonverbal scale for severity on nonverbal patients):   Clinical Pain Assessment  Severity: 0     Activity (Movement): Lying quietly, normal position    Duration: for how long has pt been experiencing pain (e.g., 2 days, 1 month, years)  Frequency: how often pain is an issue (e.g., several times per day, once every few days, constant)     FUNCTIONAL ASSESSMENT:     Palliative Performance Scale (PPS):  PPS: 30       PSYCHOSOCIAL/SPIRITUAL SCREENING:     Palliative IDT has assessed this patient for cultural preferences / practices and a referral made as appropriate to needs (Cultural Services, Patient Advocacy, Ethics, etc.)    Any spiritual / Taoism concerns:  [] Yes /  [x] No    Caregiver Burnout:  [] Yes /  [x] No /  [] No Caregiver Present      Anticipatory grief assessment:   [x] Normal  / [] Maladaptive       ESAS Anxiety:   unable to assess due to pt factors  ESAS Depression:   unable to assess due to pt factors     REVIEW OF SYSTEMS:     Positive and pertinent negative findings in ROS are noted above in HPI. The following systems were [] reviewed / [x] unable to be reviewed as noted in HPI  Other findings are noted below. Systems: constitutional, ears/nose/mouth/throat, respiratory, gastrointestinal, genitourinary, musculoskeletal, integumentary, neurologic, psychiatric, endocrine. Positive findings noted below.   Modified ESAS Completed by: provider           Pain: 0           Dyspnea: 0                    PHYSICAL EXAM:     From RN flowsheet:  Wt Readings from Last 3 Encounters:   11/29/18 136 lb (61.7 kg)   08/30/18 121 lb 14.4 oz (55.3 kg)   07/31/18 114 lb 13.8 oz (52.1 kg)     Blood pressure (!) 153/110, pulse 100, temperature 97.5 °F (36.4 °C), resp. rate 19, height 5' 8\" (1.727 m), weight 136 lb (61.7 kg), SpO2 100 %. Pain Scale 1: Numeric (0 - 10)  Pain Intensity 1: 0                 Last bowel movement, if known:     Constitutional: thin, speech nonsensical   Eyes: pupils equal, anicteric  ENMT: no nasal discharge, moist mucous membranes  Cardiovascular: regular rhythm, distal pulses intact  Respiratory: breathing not labored, symmetric  Gastrointestinal: soft non-tender, +bowel sounds  Musculoskeletal: no deformity, no tenderness to palpation  Skin: warm, dry  Neurologic: not following commands, moving all extremities  Psychiatric: confused, restless     HISTORY:     Active Problems:    Acute respiratory failure with hypoxia and hypercapnia (HCC) (11/29/2018)      Past Medical History:   Diagnosis Date    Anemia     BPH (benign prostatic hypertrophy)     Dementia     Gout     High cholesterol     06/18 taken off meds    Hypertension     6/18 was taken off BP meds    Left inguinal hernia 07/2018      Past Surgical History:   Procedure Laterality Date    HX HERNIA REPAIR  9-10-13    [facility]Dr[facility] Tanner Bustos    HX HERNIA REPAIR  07/31/2018    open [facility] with mesh      Family History   Problem Relation Age of Onset    Stroke Mother     Stroke Sister     Diabetes Sister     Hypertension Sister     Heart Disease Brother     Hypertension Brother       History reviewed, no pertinent family history.   Social History     Tobacco Use    Smoking status: Unknown If Ever Smoked    Smokeless tobacco: Never Used   Substance Use Topics    Alcohol use: Yes     Comment: occas     No Known Allergies   Current Facility-Administered Medications   Medication Dose Route Frequency    levalbuterol (XOPENEX) nebulizer soln 0.63 mg/3 mL 0.63 mg Nebulization Q4H RT    metoprolol tartrate (LOPRESSOR) tablet 12.5 mg  12.5 mg Oral Q12H    finasteride (PROSCAR) tablet 5 mg  5 mg Oral DAILY    [START ON 12/2/2018] levoFLOXacin (LEVAQUIN) 750 mg in D5W IVPB  750 mg IntraVENous Q48H    tamsulosin (FLOMAX) capsule 0.4 mg  0.4 mg Oral DAILY    sodium chloride (NS) flush 5-10 mL  5-10 mL IntraVENous Q8H    sodium chloride (NS) flush 5-10 mL  5-10 mL IntraVENous PRN    acetaminophen (TYLENOL) tablet 650 mg  650 mg Oral Q6H PRN    ondansetron (ZOFRAN) injection 4 mg  4 mg IntraVENous Q4H PRN    enoxaparin (LOVENOX) injection 40 mg  40 mg SubCUTAneous Q24H    piperacillin-tazobactam (ZOSYN) 3.375 g in 0.9% sodium chloride (MBP/ADV) 100 mL  3.375 g IntraVENous Q8H    dilTIAZem (CARDIZEM) IR tablet 30 mg  30 mg Oral Q12H    sodium chloride (NS) flush 5-10 mL  5-10 mL IntraVENous PRN    0.9% sodium chloride infusion  50 mL/hr IntraVENous CONTINUOUS    methylPREDNISolone (PF) (SOLU-MEDROL) injection 40 mg  40 mg IntraVENous Q6H    acetylcysteine (MUCOMYST) 200 mg/mL (20 %) solution 400 mg  400 mg Nebulization QID RT          LAB AND IMAGING FINDINGS:     Lab Results   Component Value Date/Time    WBC 8.4 11/30/2018 02:45 AM    HGB 10.8 (L) 11/30/2018 02:45 AM    PLATELET 316 72/36/5709 02:45 AM     Lab Results   Component Value Date/Time    Sodium 135 (L) 11/30/2018 02:45 AM    Potassium 3.4 (L) 11/30/2018 02:45 AM    Chloride 102 11/30/2018 02:45 AM    CO2 22 11/30/2018 02:45 AM    BUN 19 11/30/2018 02:45 AM    Creatinine 1.05 11/30/2018 02:45 AM    Calcium 8.4 (L) 11/30/2018 02:45 AM    Magnesium 1.6 09/15/2013 06:13 AM    Phosphorus 2.8 09/14/2013 03:23 AM      Lab Results   Component Value Date/Time    AST (SGOT) 12 (L) 11/30/2018 02:45 AM    Alk.  phosphatase 57 11/30/2018 02:45 AM    Protein, total 7.0 11/30/2018 02:45 AM    Albumin 3.0 (L) 11/30/2018 02:45 AM    Globulin 4.0 11/30/2018 02:45 AM     Lab Results   Component Value Date/Time    INR 1.1 11/28/2018 08:25 PM    Prothrombin time 11.1 11/28/2018 08:25 PM    aPTT 27.3 11/28/2018 08:25 PM      No results found for: IRON, FE, TIBC, IBCT, PSAT, FERR   Lab Results   Component Value Date/Time    pH 7.36 11/28/2018 10:15 PM    PCO2 49 (H) 11/28/2018 10:15 PM    PO2 88 11/28/2018 10:15 PM     No components found for: [place]   Lab Results   Component Value Date/Time    CK 43 01/06/2017 12:50 PM    CK - MB 1.2 01/06/2017 12:50 PM                Total time: 75  Counseling / coordination time, spent as noted above: 65  > 50% counseling / coordination?: y    Prolonged service was provided for  []30 min   []75 min in face to face time in the presence of the patient, spent as noted above. Time Start:   Time End:   Note: this can only be billed with 01143 (initial) or 26525 (follow up). If multiple start / stop times, list each separately.

## 2018-11-30 NOTE — CONSULTS
NEUROLOGY NOTE         DATE OF CONSULTATION: 11/30/2018    CONSULTED BY: Lazaro Schroeder MD    Chief Complaint   Patient presents with    Shortness of Breath     Pt arrived via EMS for sudden onset of shortness of breath after walking to his room drom dinner. pt gasping for breath on room air with SPO2 of 87% placed on non rebreather. Pt found to be in afib belieed to be new onset but takes ardizem. Pt vomited x2        Reason for Consult  I have been asked to see the patient in neurological consultation to render advice and opinion regarding lethargy and confusion. HISTORY OF PRESENT ILLNESS  Юлия Mar is a 80 y.o. male who presents to the hospital because of SOB, now lethargic and confused. He is resident of [facility] with at least a 10 year history of dementia. He does not have as far as I can tell any history of focal neurologic disease.      ROS  POSITIVES ARE RED  Review of Systems   Quite lethargic, denies pain, unable to complete ROS           PMH  Past Medical History:   Diagnosis Date    Anemia     BPH (benign prostatic hypertrophy)     Dementia     Gout     High cholesterol     06/18 taken off meds    Hypertension     6/18 was taken off BP meds    Left inguinal hernia 07/2018       FH  Family History   Problem Relation Age of Onset    Stroke Mother     Stroke Sister     Diabetes Sister     Hypertension Sister     Heart Disease Brother     Hypertension Brother        SH  Social History     Socioeconomic History    Marital status: SINGLE     Spouse name: Not on file    Number of children: Not on file    Years of education: Not on file    Highest education level: Not on file   Tobacco Use    Smoking status: Unknown If Ever Smoked    Smokeless tobacco: Never Used   Substance and Sexual Activity    Alcohol use: Yes     Comment: occas    Drug use: No    Sexual activity: No   Other Topics Concern   Social History Narrative    ** Merged History Encounter **            ALLERGIES  No Known Allergies    PHYSICAL EXAM  EXAMINATION:   Patient Vitals for the past 24 hrs:   Temp Pulse Resp BP SpO2   11/30/18 1217     100 %   11/30/18 0721 97.5 °F (36.4 °C) (!) 119 15 (!) 147/91 99 %   11/30/18 0439     95 %   11/30/18 0333 98.2 °F (36.8 °C) (!) 103 16 126/83 97 %   11/30/18 0009     95 %   11/29/18 2344 98.3 °F (36.8 °C)       11/29/18 2317 97.9 °F (36.6 °C) (!) 103 18 132/74 93 %   11/29/18 2043     95 %   11/29/18 2014 97.7 °F (36.5 °C) (!) 108 22 139/83 98 %   11/29/18 2013 97.4 °F (36.3 °C)       11/29/18 1838 98.2 °F (36.8 °C) (!) 123 20 130/81 95 %   11/29/18 1800  (!) 116 20 130/81 94 %   11/29/18 1700  (!) 116 19 134/78 94 %   11/29/18 1600  (!) 118 18 141/89 95 %   11/29/18 1500  (!) 132 21 (!) 153/100 (!) 89 %   11/29/18 1400  (!) 123 18 137/90 94 %   11/29/18 1300  (!) 115 20 (!) 138/95 95 %        General:   Physical Exam   CONSTITUTIONAL: Oriented to person  Head: Normocephalic and atraumatic. Jovan Pottier SKIN:  No significant bruising or lacerations. Neurological Examination:   Mental Status:  Arousable,  Speech slurred, no teeth. Will respond to voice, follow no commands     Cranial Nerves:  PERRL, Extraocular movements seem nl,  Facial movement symmetric. Responds to loud voice Palate elevatesTongue midline. NECK stiff but n0 meningismus  Motor: Withdraws all 4 ext to pain    Sensation: Withdraws all 4 ext to pain    Reflexes: DTRs symmetric throughout. Plantar responses downgoing.        LAB DATA REVIEWED:    Results for orders placed or performed during the hospital encounter of 11/28/18   CBC WITH AUTOMATED DIFF   Result Value Ref Range    WBC 8.4 4.1 - 11.1 K/uL    RBC 4.17 4.10 - 5.70 M/uL    HGB 11.3 (L) 12.1 - 17.0 g/dL    HCT 36.1 (L) 36.6 - 50.3 %    MCV 86.6 80.0 - 99.0 FL    MCH 27.1 26.0 - 34.0 PG    MCHC 31.3 30.0 - 36.5 g/dL    RDW 15.4 (H) 11.5 - 14.5 %    PLATELET 400 845 - 888 K/uL    MPV 9.5 8.9 - 12.9 FL    NRBC 0.0 0  WBC    ABSOLUTE NRBC 0.00 0.00 - 0.01 K/uL    NEUTROPHILS 70 32 - 75 %    LYMPHOCYTES 19 12 - 49 %    MONOCYTES 7 5 - 13 %    EOSINOPHILS 4 0 - 7 %    BASOPHILS 1 0 - 1 %    IMMATURE GRANULOCYTES 0 0.0 - 0.5 %    ABS. NEUTROPHILS 5.9 1.8 - 8.0 K/UL    ABS. LYMPHOCYTES 1.6 0.8 - 3.5 K/UL    ABS. MONOCYTES 0.6 0.0 - 1.0 K/UL    ABS. EOSINOPHILS 0.3 0.0 - 0.4 K/UL    ABS. BASOPHILS 0.1 0.0 - 0.1 K/UL    ABS. IMM. GRANS. 0.0 0.00 - 0.04 K/UL    DF AUTOMATED     TROPONIN I   Result Value Ref Range    Troponin-I, Qt. <0.05 <0.05 ng/mL   CK W/ REFLX CKMB   Result Value Ref Range    CK 68 39 - 523 U/L   METABOLIC PANEL, COMPREHENSIVE   Result Value Ref Range    Sodium 139 136 - 145 mmol/L    Potassium 3.3 (L) 3.5 - 5.1 mmol/L    Chloride 104 97 - 108 mmol/L    CO2 27 21 - 32 mmol/L    Anion gap 8 5 - 15 mmol/L    Glucose 178 (H) 65 - 100 mg/dL    BUN 13 6 - 20 MG/DL    Creatinine 0.78 0.70 - 1.30 MG/DL    BUN/Creatinine ratio 17 12 - 20      GFR est AA >60 >60 ml/min/1.73m2    GFR est non-AA >60 >60 ml/min/1.73m2    Calcium 8.8 8.5 - 10.1 MG/DL    Bilirubin, total 0.3 0.2 - 1.0 MG/DL    ALT (SGPT) 16 12 - 78 U/L    AST (SGOT) 16 15 - 37 U/L    Alk.  phosphatase 74 45 - 117 U/L    Protein, total 7.4 6.4 - 8.2 g/dL    Albumin 3.4 (L) 3.5 - 5.0 g/dL    Globulin 4.0 2.0 - 4.0 g/dL    A-G Ratio 0.9 (L) 1.1 - 2.2     PTT   Result Value Ref Range    aPTT 27.3 22.1 - 32.0 sec    aPTT, therapeutic range     58.0 - 77.0 SECS   PROTHROMBIN TIME + INR   Result Value Ref Range    INR 1.1 0.9 - 1.1      Prothrombin time 11.1 9.0 - 11.1 sec   BLOOD GAS, ARTERIAL   Result Value Ref Range    pH 7.27 (L) 7.35 - 7.45      PCO2 65 (H) 35.0 - 45.0 mmHg    PO2 264 (H) 80 - 100 mmHg    O2  (H) 92 - 97 %    BICARBONATE 29 (H) 22 - 26 mmol/L    BASE EXCESS 0.0 mmol/L    O2 METHOD NRM      O2 FLOW RATE 15.00 L/min    FIO2 100 %    Sample source ARTERIAL      SITE RIGHT RADIAL      NICHOL'S TEST YES     SAMPLES BEING HELD   Result Value Ref Range    SAMPLES BEING HELD RD     COMMENT        Add-on orders for these samples will be processed based on acceptable specimen integrity and analyte stability, which may vary by analyte. NT-PRO BNP   Result Value Ref Range    NT pro- 0 - 450 PG/ML   BLOOD GAS, ARTERIAL   Result Value Ref Range    pH 7.36 7.35 - 7.45      PCO2 49 (H) 35.0 - 45.0 mmHg    PO2 88 80 - 100 mmHg    O2 SAT 96 92 - 97 %    BICARBONATE 27 (H) 22 - 26 mmol/L    BASE EXCESS 1.0 mmol/L    O2 METHOD BIPAP      FIO2 50 %    SPONTANEOUS RATE 16.0      IPAP/PIP 14.0      EPAP/CPAP/PEEP 6.0      Sample source ARTERIAL      SITE RIGHT RADIAL      NICHOL'S TEST YES     METABOLIC PANEL, COMPREHENSIVE   Result Value Ref Range    Sodium 135 (L) 136 - 145 mmol/L    Potassium 3.4 (L) 3.5 - 5.1 mmol/L    Chloride 102 97 - 108 mmol/L    CO2 22 21 - 32 mmol/L    Anion gap 11 5 - 15 mmol/L    Glucose 220 (H) 65 - 100 mg/dL    BUN 19 6 - 20 MG/DL    Creatinine 1.05 0.70 - 1.30 MG/DL    BUN/Creatinine ratio 18 12 - 20      GFR est AA >60 >60 ml/min/1.73m2    GFR est non-AA >60 >60 ml/min/1.73m2    Calcium 8.4 (L) 8.5 - 10.1 MG/DL    Bilirubin, total 0.2 0.2 - 1.0 MG/DL    ALT (SGPT) 15 12 - 78 U/L    AST (SGOT) 12 (L) 15 - 37 U/L    Alk.  phosphatase 57 45 - 117 U/L    Protein, total 7.0 6.4 - 8.2 g/dL    Albumin 3.0 (L) 3.5 - 5.0 g/dL    Globulin 4.0 2.0 - 4.0 g/dL    A-G Ratio 0.8 (L) 1.1 - 2.2     CBC WITH AUTOMATED DIFF   Result Value Ref Range    WBC 8.4 4.1 - 11.1 K/uL    RBC 4.05 (L) 4.10 - 5.70 M/uL    HGB 10.8 (L) 12.1 - 17.0 g/dL    HCT 34.4 (L) 36.6 - 50.3 %    MCV 84.9 80.0 - 99.0 FL    MCH 26.7 26.0 - 34.0 PG    MCHC 31.4 30.0 - 36.5 g/dL    RDW 15.7 (H) 11.5 - 14.5 %    PLATELET 388 513 - 291 K/uL    MPV 9.7 8.9 - 12.9 FL    NRBC 0.0 0  WBC    ABSOLUTE NRBC 0.00 0.00 - 0.01 K/uL    NEUTROPHILS 92 (H) 32 - 75 %    LYMPHOCYTES 5 (L) 12 - 49 %    MONOCYTES 2 (L) 5 - 13 %    EOSINOPHILS 0 0 - 7 %    BASOPHILS 0 0 - 1 %    IMMATURE GRANULOCYTES 1 (H) 0.0 - 0.5 %    ABS. NEUTROPHILS 7.7 1.8 - 8.0 K/UL    ABS. LYMPHOCYTES 0.4 (L) 0.8 - 3.5 K/UL    ABS. MONOCYTES 0.2 0.0 - 1.0 K/UL    ABS. EOSINOPHILS 0.0 0.0 - 0.4 K/UL    ABS. BASOPHILS 0.0 0.0 - 0.1 K/UL    ABS. IMM. GRANS. 0.1 (H) 0.00 - 0.04 K/UL    DF AUTOMATED      RBC COMMENTS ANISOCYTOSIS  1+        RBC COMMENTS OVALOCYTES  1+       GLUCOSE, POC   Result Value Ref Range    Glucose (POC) 162 (H) 65 - 100 mg/dL    Performed by Madison Varghese    EKG, 12 LEAD, INITIAL   Result Value Ref Range    Ventricular Rate 92 BPM    Atrial Rate 92 BPM    P-R Interval 272 ms    QRS Duration 72 ms    Q-T Interval 370 ms    QTC Calculation (Bezet) 457 ms    Calculated P Axis 83 degrees    Calculated R Axis 49 degrees    Calculated T Axis 72 degrees    Diagnosis       Sinus rhythm with 1st degree AV block  Minimal voltage criteria for LVH, may be normal variant  When compared with ECG of 06-AUG-2018 13:08,  Criteria for Lateral infarct are no longer present  Confirmed by Daphney Valenzuela P.VShayna ([id number]) on 2018 9:39:56 AM          Imaging review:     CT HEAD 2009    IMPRESSION: The extra-axial collection overlying the left cerebral   hemisphere is unchanged in size and mass effect. There is no evidence of   acute component. The minimal extra-axial collection on the right is also   unchanged. There is no evidence of acute hemorrhage or edema to suggest an   area of acute ischemic change at this time. Mass effect on the ventricles   are unchanged.            HOME MEDS  Prior to Admission Medications   Prescriptions Last Dose Informant Patient Reported? Taking? Nebulizer & Compressor machine  Transfer Papers No No   Si Each by Does Not Apply route as needed for Cough (wheezing). Use nebulizer every 4-6 hours as needed for shortness of breath or wheezing   acetaminophen (TYLENOL) 325 mg tablet  at . .. Transfer Papers Yes Yes   Sig: Take 650 mg by mouth every eight (8) hours as needed for Pain or Fever.    albuterol (PROVENTIL VENTOLIN) 2.5 mg /3 mL (0.083 %) nebulizer solution  at . .. Transfer Papers No Yes   Sig: 3 mL by Nebulization route every four (4) hours as needed for Wheezing or Shortness of Breath. bisacodyl (DULCOLAX) 10 mg suppository  at . .. Transfer Papers Yes Yes   Sig: Insert 10 mg into rectum daily as needed (constipation unrelieved by miralax). dilTIAZem (CARDIZEM) 30 mg tablet  at . ... Transfer Papers Yes Yes   Sig: Take 30 mg by mouth every twelve (12) hours. dutasteride (AVODART) 0.5 mg capsule 11/28/2018 at . ... Transfer Papers Yes Yes   Sig: Take 0.5 mg by mouth daily. ferrous sulfate 325 mg (65 mg iron) tablet  at . .. Transfer Papers No Yes   Sig: Take 1 Tab by mouth two (2) times daily (with meals). On an empty stomach with Vitamin C (like OJ)   food supplemt, lactose-reduced (BOOST HIGH PROTEIN) 0.06 gram- 1 kcal/mL liqd  Transfer Papers No No   Sig: Drink 2-3 containers DAILY. ibuprofen (MOTRIN) 200 mg tablet  at . .. Transfer Papers Yes Yes   Sig: Take 2 Tabs by mouth every six (6) hours as needed for Pain.   polyethylene glycol (MIRALAX) 17 gram packet  at . ..... Transfer Papers Yes Yes   Sig: Take 17 g by mouth as needed (constipation w/ no BM x 3 days). senna-docusate (SENNA WITH DOCUSATE SODIUM) 8.6-50 mg per tablet  at . .. Transfer Papers Yes Yes   Sig: Take 1 Tab by mouth daily as needed for Constipation (try first if constipation). sodium phosphate (ENEMA) 19-7 gram/118 mL enema  at . .. Transfer Papers Yes Yes   Sig: Insert 1 Enema into rectum daily as needed (constipation unrelieved by bisacodyl suppository). tamsulosin (FLOMAX) 0.4 mg capsule  at . ..  Transfer Papers No Yes   Sig: TAKE 1 CAPSULE BY MOUTH EVERY DAY      Facility-Administered Medications: None       CURRENT MEDS  Current Facility-Administered Medications   Medication Dose Route Frequency    levalbuterol (XOPENEX) nebulizer soln 0.63 mg/3 mL  0.63 mg Nebulization Q4H RT    metoprolol tartrate (LOPRESSOR) tablet 12.5 mg  12.5 mg Oral Q12H    tamsulosin (FLOMAX) capsule 0.4 mg  0.4 mg Oral DAILY    sodium chloride (NS) flush 5-10 mL  5-10 mL IntraVENous Q8H    enoxaparin (LOVENOX) injection 40 mg  40 mg SubCUTAneous Q24H    piperacillin-tazobactam (ZOSYN) 3.375 g in 0.9% sodium chloride (MBP/ADV) 100 mL  3.375 g IntraVENous Q8H    dilTIAZem (CARDIZEM) IR tablet 30 mg  30 mg Oral Q12H    0.9% sodium chloride infusion  50 mL/hr IntraVENous CONTINUOUS    methylPREDNISolone (PF) (SOLU-MEDROL) injection 40 mg  40 mg IntraVENous Q6H    levoFLOXacin (LEVAQUIN) 750 mg in D5W IVPB  750 mg IntraVENous Q24H    acetylcysteine (MUCOMYST) 200 mg/mL (20 %) solution 400 mg  400 mg Nebulization QID RT       IMPRESSION:RECOMMENDATIONS:  Yves Reyez is a 80 y.o. male who presents with lethargic confusion, I cannot find any focal findings, await head CT results, suspect dementia with superimposed insult producing current encephalopathy, will check EEG to make sure it is only symmetrically slow. Cassius Spear.  Bandar Kovacs MD  Neurologist

## 2018-11-30 NOTE — PROGRESS NOTES
PCU SHIFT NURSING NOTE Bedside and Verbal shift change report given to Parul Newby RN (oncoming nurse) by Natasha Dolan RN (offgoing nurse). Report included the following information SBAR, Kardex, ED Summary, Procedure Summary, Intake/Output, MAR, Recent Results, Med Rec Status, Cardiac Rhythm A-fib controlled, Alarm Parameters  and Quality Measures. Shift Summary:  
 
 
 
Admission Date 11/28/2018 Admission Diagnosis Acute respiratory failure with hypoxia and hypercapnia (HCC) Consults IP CONSULT TO PALLIATIVE CARE - PROVIDER 
IP CONSULT TO NEUROLOGY 
IP CONSULT TO PULMONOLOGY 
IP CONSULT TO CARDIOLOGY Consults [x]PT [x]OT [x]Speech [x]Case Management  
  
[x] Palliative Cardiac Monitoring Order  
[x]Yes []No  
 
IV drips []Yes Drip:                            Dose: 
Drip:                            Dose: 
Drip:                            Dose:  
[x]No  
 
GI Prophylaxis [x]Yes []No  
 
 
 
DVT Prophylaxis SCDs:     
     
 Matti stockings:     
  
[x] Medication []Contraindicated []None Activity Level Activity Level: Up with Assistance Activity Assistance: Partial (two people) Purposeful Rounding every 1-2 hour? [x]Yes Belcher Score  Total Score: 6 Bed Alarm (If score 3 or >) [x]Yes  
[] Refused (See signed refusal form in chart) Lane Score  Lane Score: 18 Lane Score (if score 14 or less) [x]PMT consult  
[]Wound Care consult [x]Specialty bed  
[x] Nutrition consult Needs prior to discharge:  
Home O2 required:   
[]Yes  
[x]No  
 If yes, how much O2 required? Other:  
 Last Bowel Movement: Last Bowel Movement Date: 11/29/18 Influenza Vaccine Received Flu Vaccine for Current Season (usually Sept-March): Yes Pneumonia Vaccine Diet Active Orders Diet DIET DYSPHAGIA PUREED (NDD1) LDAs Peripheral IV 11/28/18 Left Antecubital (Active) Site Assessment Clean, dry, & intact 11/30/2018  4:44 PM  
Phlebitis Assessment 0 11/30/2018  4:44 PM  
Infiltration Assessment 0 11/30/2018  4:44 PM  
Dressing Status Clean, dry, & intact 11/30/2018  4:44 PM  
Dressing Type Tape;Transparent 11/30/2018  4:44 PM  
Hub Color/Line Status Green; Infusing;Flushed 11/30/2018  4:44 PM  
Action Taken Open ports on tubing capped 11/30/2018  4:44 PM  
Alcohol Cap Used Yes 11/30/2018  4:44 PM  
                  
Urinary Catheter Intake & Output Date 11/29/18 0700 - 11/30/18 0659 11/30/18 0700 - 12/01/18 6002 Shift 0331-8446 7372-1266 24 Hour Total 9599-3419 6540-7825 24 Hour Total  
INTAKE  
P.O.    240  240  
  P. O.    240  240  
I. V.(mL/kg/hr)    915.3  915.3 Cardizem Volume    2  2 Volume (0.9% sodium chloride infusion)    113.3  113.3 Volume (levoFLOXacin (LEVAQUIN) 750 mg in D5W IVPB)    300  300 Volume (piperacillin-tazobactam (ZOSYN) 3.375 g in 0.9% sodium chloride (MBP/ADV) 100 mL)    500  500 Volume (levoFLOXacin (LEVAQUIN) 750 mg in D5W IVPB)    0  0 Shift Total(mL/kg)    1155. 3(18.7)  1155. 3(18.7) OUTPUT Urine(mL/kg/hr)  175(0.2) 175(0.1) 400  400 Urine    400  400 Urine Voided  175 175 Urine Occurrence(s)    1 x  1 x Shift Total(mL/kg)  175(2.8) 175(2.8) 400(6.5)  400(6.5) NET  -175 -175 755.3  755. 3 Weight (kg) 63 61.7 61.7 61.7 61.7 61.7 Readmission Risk Assessment Tool Score Medium Risk 25 Total Score 3 Has Seen PCP in Last 6 Months (Yes=3, No=0) 2 . Living with Significant Other. Assisted Living. LTAC. SNF. or  
Rehab  
 4 IP Visits Last 12 Months (1-3=4, 4=9, >4=11) 9 Pt. Coverage (Medicare=5 , Medicaid, or Self-Pay=4) Criteria that do not apply:  
 Patient Length of Stay (>5 days = 3) Charlson Comorbidity Score (Age + Comorbid Conditions) Expected Length of Stay 3d 19h Actual Length of Stay 1

## 2018-11-30 NOTE — ACP (ADVANCE CARE PLANNING)
TREATMENT PREFERENCES:   Code Status: DNR     Advance Care Planning:  [x] The [facility] [place] [place] Interdisciplinary Team has updated the ACP Navigator with [address] and Patient Capacity     Primary Decision Maker ([address]): Stacey Mosqueda  Relationship to patient:  sister  Phone number:  [phone]  [] Named in a scanned document   [x] Legal Next of Kin  [] Guardian     Medical Interventions: Limited additional interventions   Other Instructions:      POST FORM:  1) DNR  2) LIMITED MEDICAL INTERVENTIONS  3) NO FEEDING TUBEArtificially Administered Nutrition: No feeding tube      Other:     As far as possible, the palliative care team has discussed with patient / health care proxy about goals of care / treatment preferences for patient.

## 2018-11-30 NOTE — PROGRESS NOTES
Received report from [facility], [address] [facility] on dayshift about Britta Husbands expected to arrive from ED. SBAR discussed. -1900. Pt arrived to unit at [address] [place] [address], tolerating 5LNC in bed comfortably. Pt tachycardic, intermittently reaching 140s throughout the night and becoming fidgety and mildly confused, but reamined calm and easily redirected. Pt admitted to drinking 1/2 pint-pint whiskey per day. Expect withdrawals and CIWA today (11/30). -0600. Eber Betancourt RN Contacted Dr. Andres Gonzalez, notified of pt withdrawal symptoms (see note above). CIWA protocol ordered. -[id number]

## 2018-11-30 NOTE — PROGRESS NOTES
Speech path Two attempts to evaluate the pt. Earlier he was breathing hard and swallowing therapy was deferred. He is currently too lethargic to eat. Yue stated he masticated the pear and meat bolus a very long time and was more timely with thins and purees. We will recommend downgrade to dys 1/purees.  
Leigh Jose, SLP

## 2018-11-30 NOTE — PROGRESS NOTES
[facility] Critical Care, and Sleep Medicine Name: Chaitanya Greenberg MRN: [id number] : 1930 Hospital: [facility] Date: 2018 IMPRESSION:  
· Acute hypoxic/hypercapnic respiratory failure · Bronchitis (can not exclude undiagnosed exacerbated COPD) · Mucus plugging - mild and related to bronchitis · LE edema with pain · Dementia · H/O tobacco use RECOMMENDATIONS:  
· O2 - wean · Bronchodilators · Pulmonary toilet · Mucolytics · Empiric antibiotics · DVT prophylaxis Subjective:  
 
18: agitation/confusion this morning, now more calm and eating breakfast 
 
Initial history: This patient has been seen and evaluated at the request of Dr. Monica Bosch for mucus plugging. Patient is a 80 y.o. male former smoker, presented yesterday with acute on chronic respiratory distress. This had been going on for an extended period of time, but got worse yesterday. He was treated with BiPAP, steroids, and bronchodilators, and he reports he is feeling much better today. Has a cough productive of only minimal amounts of clear sputum. No f/c. Past Medical History:  
Diagnosis Date  Anemia  BPH (benign prostatic hypertrophy)  Dementia  Gout  High cholesterol  taken off meds  Hypertension  was taken off BP meds  Left inguinal hernia 2018 Past Surgical History:  
Procedure Laterality Date  HX HERNIA REPAIR  9-10-13 [facility] with mesh-[facility]-Dr. Kushal Fairbanks  
 HX HERNIA REPAIR  2018  
 open [facility] with mesh Prior to Admission medications Medication Sig Start Date End Date Taking? Authorizing Provider  
dutasteride (AVODART) 0.5 mg capsule Take 0.5 mg by mouth daily. Yes Provider, Historical  
bisacodyl (DULCOLAX) 10 mg suppository Insert 10 mg into rectum daily as needed (constipation unrelieved by miralax).    Yes Provider, Historical  
 dilTIAZem (CARDIZEM) 30 mg tablet Take 30 mg by mouth every twelve (12) hours. Yes Provider, Historical  
sodium phosphate (ENEMA) 19-7 gram/118 mL enema Insert 1 Enema into rectum daily as needed (constipation unrelieved by bisacodyl suppository). Yes Provider, Historical  
senna-docusate (SENNA WITH DOCUSATE SODIUM) 8.6-50 mg per tablet Take 1 Tab by mouth daily as needed for Constipation (try first if constipation). Yes Provider, Historical  
acetaminophen (TYLENOL) 325 mg tablet Take 650 mg by mouth every eight (8) hours as needed for Pain or Fever. Yes Provider, Historical  
polyethylene glycol (MIRALAX) 17 gram packet Take 17 g by mouth as needed (constipation w/ no BM x 3 days). Yes Provider, Historical  
ibuprofen (MOTRIN) 200 mg tablet Take 2 Tabs by mouth every six (6) hours as needed for Pain. 8/3/18  Yes Rehan Ortega MD  
albuterol (PROVENTIL VENTOLIN) 2.5 mg /3 mL (0.083 %) nebulizer solution 3 mL by Nebulization route every four (4) hours as needed for Wheezing or Shortness of Breath. 7/24/18  Yes Jyana Gee NP  
tamsulosin (FLOMAX) 0.4 mg capsule TAKE 1 CAPSULE BY MOUTH EVERY DAY 5/22/18  Yes Pamela Jung NP  
ferrous sulfate 325 mg (65 mg iron) tablet Take 1 Tab by mouth two (2) times daily (with meals). On an empty stomach with Vitamin C (like OJ) 7/20/17  Yes Pamela Jung NP Nebulizer & Compressor machine 1 Each by Does Not Apply route as needed for Cough (wheezing). Use nebulizer every 4-6 hours as needed for shortness of breath or wheezing 7/26/18   Pamela Jung NP  
food supplemt, lactose-reduced (BOOST HIGH PROTEIN) 0.06 gram- 1 kcal/mL liqd Drink 2-3 containers DAILY. 3/23/18   Pamela Jung NP No Known Allergies Social History Tobacco Use  Smoking status: Former smoker, ~30-40 p-y  Smokeless tobacco: Never Used Substance Use Topics  Alcohol use: Yes Comment: occas Family History Problem Relation Age of Onset  Stroke Mother  Stroke Sister  Diabetes Sister  Hypertension Sister  Heart Disease Brother  Hypertension Brother Current Facility-Administered Medications Medication Dose Route Frequency  LORazepam (ATIVAN) 2 mg/mL injection  chlordiazePOXIDE (LIBRIUM) capsule 25 mg  25 mg Oral TID  haloperidol lactate (HALDOL) injection 1 mg  1 mg IntraVENous NOW  tamsulosin (FLOMAX) capsule 0.4 mg  0.4 mg Oral DAILY  sodium chloride (NS) flush 5-10 mL  5-10 mL IntraVENous Q8H  
 enoxaparin (LOVENOX) injection 40 mg  40 mg SubCUTAneous Q24H  piperacillin-tazobactam (ZOSYN) 3.375 g in 0.9% sodium chloride (MBP/ADV) 100 mL  3.375 g IntraVENous Q8H  
 dilTIAZem (CARDIZEM) IR tablet 30 mg  30 mg Oral Q12H  
 0.9% sodium chloride infusion  50 mL/hr IntraVENous CONTINUOUS  
 methylPREDNISolone (PF) (SOLU-MEDROL) injection 40 mg  40 mg IntraVENous Q6H  
 levoFLOXacin (LEVAQUIN) 750 mg in D5W IVPB  750 mg IntraVENous Q24H  
 acetylcysteine (MUCOMYST) 200 mg/mL (20 %) solution 400 mg  400 mg Nebulization QID RT  
 albuterol-ipratropium (DUO-NEB) 2.5 MG-0.5 MG/3 ML  3 mL Nebulization Q4H RT  
 
 
Review of Systems: A comprehensive review of systems was negative except for: Musculoskeletal: positive for leg pain Objective: 
Vital Signs:   
Visit Vitals BP (!) 147/91 (BP 1 Location: Right arm, BP Patient Position: At rest) Pulse (!) 119 Temp 97.5 °F (36.4 °C) Resp 15 Ht 5' 8\" (1.727 m) Wt 61.7 kg (136 lb) SpO2 99% BMI 20.68 kg/m² O2 Device: Nasal cannula O2 Flow Rate (L/min): 4 l/min Temp (24hrs), Av.9 °F (36.6 °C), Min:97.4 °F (36.3 °C), Max:98.3 °F (36.8 °C) Intake/Output:  
Last shift:      No intake/output data recorded. Last 3 shifts:  1901 -  0700 In: -  
Out: 570 [[id number]] Intake/Output Summary (Last 24 hours) at 2018 0151 Last data filed at 2018 0204 Gross per 24 hour Intake  Output 175 ml Net -175 ml Physical Exam:  
General:  Alert, no distress Head:  Normocephalic, without obvious abnormality, atraumatic. Eyes:  Conjunctivae/corneas clear. Nose: Nares normal. Septum midline. Mucosa normal.    
Throat: Lips, mucosa, and tongue normal.    
Neck: Supple, symmetrical, trachea midline Back:   Symmetric, no curvature. ROM normal.  
Lungs:   Poor bilateral air movement with few scattered ronchi Chest wall:  No tenderness or deformity. Heart:  Irregular, normal rate Abdomen:   Soft, non-tender. Bowel sounds normal.   
Extremities: Extremities normal, atraumatic, no cyanosis, +LE edema Skin: Skin color, texture, turgor normal. No rashes or lesions Neurologic: Grossly nonfocal  
 
Data:  
Labs: 
Recent Labs 11/30/18 0245 11/28/18 2025 WBC 8.4 8.4 HGB 10.8* 11.3* HCT 34.4* 36.1*  
 237 Recent Labs 11/30/18 0245 11/28/18 2025 * 139  
K 3.4* 3.3*  
 104 CO2 22 27 * 178* BUN 19 13 CREA 1.05 0.78  
CA 8.4* 8.8 ALB 3.0* 3.4* TBILI 0.2 0.3 SGOT 12* 16 ALT 15 16 INR  --  1.1 Recent Labs  
  11/28/18 2215 11/28/18 2030 PH 7.36 7.27* PCO2 49* 65* PO2 88 264* HCO3 27* 29* FIO2 50 100 Imaging: 
I have personally reviewed the patients radiographs: 
None today Beatriz Go MD

## 2018-11-30 NOTE — PROGRESS NOTES
Problem: Falls - Risk of 
Goal: *Absence of Falls Document [facility] Fall Risk and appropriate interventions in the flowsheet. Outcome: Progressing Towards Goal 
Fall Risk Interventions: 
Mobility Interventions: Patient to call before getting OOB Mentation Interventions: More frequent rounding Medication Interventions: Patient to call before getting OOB Elimination Interventions: Bed/chair exit alarm Comments: Remind pt to use call bell. Keep pt door open.

## 2018-11-30 NOTE — PROGRESS NOTES
Hospitalist Progress Note NAME: Gumaro Madrid :  1930 MRN:  [id number] Interim Hospital Summary: 80 y.o. male whom presented on 2018 with Assessment / Plan: 
Acute respiratory failure with hypoxia and hypercapnia POA Due to mucous plugging with suspect due to Aspiration - CTA chest: No PE, bibasilar atelectasis and mucous plugging in the lower lobes right 
greater than left. borderline enlarged mediastinal and hilar lymph nodes most likely reactive. - Pt was on BiPAP at ER, but taken off since then -  O2 sat 99% @ 4L via n/c 
- appreciate pulmonology input; wean O2 as long as O2 sat is greater than equal to 92% Continue with Xopenex (changed from albuterol due to tachycardia), mucinex, and solumedrol 
- continue with emperic ABX; IV levo and Zosyn. -  To be seen by [facility]; pt was groggy to complete evaluation 
- will check ABG to ensure he is not hypercapnic AMS Dementia 
- aggressive, confused, not following direction. Initially, his presentation was mistaken with alcohol withdrew, but it is very unlikely. Spoke with the nurse Theodore Richard) from [facility] who informed me that he is very appropriate, orient x 3, always like to eat at same place. - appreciate neurology input; head CT result pending 
- bedside sitter required Hypertension Sinus arrhythmia 
- Echo: Systolic function was normal. Ejection fraction was estimated to be 55 %. There were no regional wall motion abnormalities. Moderate mitral valve regurgitation 
- continue with Cardizem and lopressor - appreciate cardiology input 
  
Urinary retension BMP 
- bladder scan revealed 460ml. No urge to void. Advised nursing staff to straight cath 
- continue with flomax. Proscar was added Hypokalemia 
- replete; will monitor Code Status: DNR/DNI d/w sister Divina Milan who is POA Surrogate Decision Maker: Sister Divina Milan 
  
DVT Prophylaxis: Lovenox 
  
Baseline: Resides as [facility] Disposition: SNF Subjective: Chief Complaint / Reason for Physician Visit Pt is confused, agitated, not following commends. Discussed with RN events overnight. Review of Systems: 
Symptom Y/N Comments  Symptom Y/N Comments Fever/Chills    Chest Pain Poor Appetite    Edema Cough    Abdominal Pain Sputum    Joint Pain SOB/WU    Pruritis/Rash Nausea/vomit    Tolerating PT/OT Diarrhea    Tolerating Diet Constipation    Other Could NOT obtain due to:   
 
Objective: VITALS:  
Last 24hrs VS reviewed since prior progress note. Most recent are: 
Patient Vitals for the past 24 hrs: 
 Temp Pulse Resp BP SpO2  
11/30/18 1321    (!) 153/110   
11/30/18 1217     100 % 11/30/18 1143  100 19  99 % 11/30/18 1141    (!) 148/115   
11/30/18 0721 97.5 °F (36.4 °C) (!) 119 15 (!) 147/91 99 % 11/30/18 0439     95 % 11/30/18 0333 98.2 °F (36.8 °C) (!) 103 16 126/83 97 % 11/30/18 0009     95 % 11/29/18 2344 98.3 °F (36.8 °C)      
11/29/18 2317 97.9 °F (36.6 °C) (!) 103 18 132/74 93 % 11/29/18 2043     95 % 11/29/18 2014 97.7 °F (36.5 °C) (!) 108 22 139/83 98 % 11/29/18 2013 97.4 °F (36.3 °C)      
11/29/18 1838 98.2 °F (36.8 °C) (!) 123 20 130/81 95 % 11/29/18 1800  (!) 116 20 130/81 94 % 11/29/18 1700  (!) 116 19 134/78 94 % 11/29/18 1600  (!) 118 18 141/89 95 % 11/29/18 1500  (!) 132 21 (!) 153/100 (!) 89 % Intake/Output Summary (Last 24 hours) at 11/30/2018 1415 Last data filed at 11/30/2018 0800 Gross per 24 hour Intake 1053.33 ml Output 175 ml Net 878.33 ml PHYSICAL EXAM: 
General: Ill appearing, underweight, Alert, uncooperative, no acute distress   
EENT:  EOMI. Anicteric sclerae. MMM Resp:  Exp wheezing through out,  No accessory muscle use CV:  Regular  rhythm,  No edema GI:  Soft, Non distended, Non tender.  +Bowel sounds Neurologic:  Alert, speech difficult to understand Psych:   Poor insight. pt is anxious and agitated Skin:  No rashes. No jaundice Reviewed most current lab test results and cultures  YES Reviewed most current radiology test results   YES Review and summation of old records today    NO Reviewed patient's current orders and MAR    YES 
PMH/SH reviewed - no change compared to H&P 
________________________________________________________________________ Care Plan discussed with: 
  Comments Patient y Family RN y   
Care Manager y Consultant     
                 y Multidiciplinary team rounds were held today with , nursing, pharmacist and clinical coordinator. Patient's plan of care was discussed; medications were reviewed and discharge planning was addressed. ________________________________________________________________________ Total NON critical care TIME: 30   Minutes Total CRITICAL CARE TIME Spent:   Minutes non procedure based Comments >50% of visit spent in counseling and coordination of care    
________________________________________________________________________ July Rodríguez NP Procedures: see electronic medical records for all procedures/Xrays and details which were not copied into this note but were reviewed prior to creation of Plan. LABS: 
I reviewed today's most current labs and imaging studies. Pertinent labs include: 
Recent Labs 11/30/18 0245 11/28/18 2025 WBC 8.4 8.4 HGB 10.8* 11.3* HCT 34.4* 36.1*  
 237 Recent Labs 11/30/18 0245 11/28/18 2025 * 139  
K 3.4* 3.3*  
 104 CO2 22 27 * 178* BUN 19 13 CREA 1.05 0.78  
CA 8.4* 8.8 ALB 3.0* 3.4* TBILI 0.2 0.3 SGOT 12* 16 ALT 15 16 INR  --  1.1 Signed: )Katie Reyes, NP

## 2018-11-30 NOTE — PROGRESS NOTES
0725 : Report received from [facility]. SBAR, Kardex, Intake/Output, MAR and Recent Results were discussed. Lenin Meza, VALERIE 
  
3405 : Code atlas called. Patient yelling, cursing and attempting to leave room. 2mg ativan given. New orders from [facility] NP. Patient in [employer] overnight, attempting to get EKG when patient will lie still.  
 
0905 : Patient continues to attempt to get out of bed/agitation. Notified [facility] NP, will place order for haldol. 0920 : Dr. Yanelis Mackay rounding, patient more relaxed. Will hold haldol, received orders to change nebs to xopenex. 0930 : Notified respiratory nebs changed to xopenex, missed treatment this morning for code atlas. 2357: Patient continually trying to climb out of bed. Will give one time dose of haldol. 1140 : Code yellow called, bed alarm going off, patient found on floor beside bed. Denies complaints of pain. 1:1 sitter placed. 1150 : Spoke with respiratory, requested xopenex neb. 1219 : Sister present in room, updated that patient fell and will now have 1:1 sitter. Sister is taking patient's $69 found in derik pocket home. 1346 : Patient has not voided since 7am, bladder scan showed 461mL. Notified Bertrand Bales NP, will review chart. 1421 : Patient off unit for CT. Spoke with Bertrand Bales NP, will straight cath and send UA when patient returns. 1505 : Report given to Grand janine RN. SBAR, Kardex, MAR, Accordion or Recent Results were discussed. Grand janine RN assumed care of the pt. Lenin Meza RN At this time patient returned to unit. Respiratory notified of blood gas ordered.

## 2018-11-30 NOTE — ROUTINE PROCESS
TRANSFER - OUT REPORT: 
 
Verbal report given to Jazmin RN(name) on Karene Showers  being transferred to [facility](unit) for routine progression of care Report consisted of patients Situation, Background, Assessment and  
Recommendations(SBAR). Information from the following report(s) SBAR, Kardex, ED Summary and MAR was reviewed with the receiving nurse. Lines:  
Peripheral IV 11/28/18 Left Antecubital (Active) Site Assessment Clean, dry, & intact 11/28/2018  8:45 PM  
Phlebitis Assessment 0 11/28/2018  8:45 PM  
Infiltration Assessment 0 11/28/2018  8:45 PM  
Dressing Status Clean, dry, & intact 11/28/2018  8:45 PM  
  
 
Opportunity for questions and clarification was provided. Patient transported with: 
 Monitor

## 2018-11-30 NOTE — CONSULTS
[address] [place] [address] [address] [facility]     Date of  Admission: 11/28/2018  7:59 PM     Admission type:Emergency    Consult for: afib  Consult by: hospitalist      Subjective:     David Wallace is a 80 y.o. male  With PMH anemia, dementia, HLD, HTN who was admitted for Acute respiratory failure with hypoxia and hypercapnia ([facility] [address] Per ED provider note, Mr. Braulio Cai came via EMS to the hospital for SOB. He also had vomiting. Cardiology consulted for a- fib. Tele review does not show a-fib. Lead II does appear to be afib (primary lead pulled up on tele), but other leads clearly show 1 p wave for each QRS complex. On assessment, Mr. Braulio Cai is disoriented and fidgeting with his gown and telemetry. RN states that he is being monitored for alcohol withdrawal and has received librium and ativan. Mr. Braulio Cai denies chest pain, palpitations, or SOB, however, breathing is labored during assessment. It does not appear that Mr. Braulio Cai follows with a cardiologist.  ECHO yesterday shows and EF of 55%; NWMA; mod MR. Cardiac risk factors: family history, dyslipidemia, male gender, hypertension.       Patient Active Problem List    Diagnosis Date Noted    Acute respiratory failure with hypoxia and hypercapnia ([address] 11/29/2018    Urinary retention 08/02/2018    Inguinal hernia 07/31/2018    BMI less than 19,adult 07/23/2018    Left inguinal hernia 07/11/2018    Benign prostatic hyperplasia without lower urinary tract symptoms 10/11/2017    Hypertension     Benign prostatic hyperplasia     Gout     High cholesterol     Incarcerated inguinal hernia 09/14/2013      None  Past Medical History:   Diagnosis Date    Anemia     BPH (benign prostatic hypertrophy)     Dementia     Gout     High cholesterol     06/18 taken off meds    Hypertension     6/18 was taken off BP meds    Left inguinal hernia 07/2018      Social History Socioeconomic History    Marital status: SINGLE     Spouse name: Not on file    Number of children: Not on file    Years of education: Not on file    Highest education level: Not on file   Tobacco Use    Smoking status: Unknown If Ever Smoked    Smokeless tobacco: Never Used   Substance and Sexual Activity    Alcohol use: Yes     Comment: occas    Drug use: No    Sexual activity: No   Other Topics Concern   Social History Narrative    ** Merged History Encounter **          No Known Allergies   Family History   Problem Relation Age of Onset    Stroke Mother     Stroke Sister     Diabetes Sister     Hypertension Sister     Heart Disease Brother     Hypertension Brother       Current Facility-Administered Medications   Medication Dose Route Frequency    LORazepam (ATIVAN) injection 2 mg  2 mg IntraVENous Q1H PRN    LORazepam (ATIVAN) injection 1 mg  1 mg IntraVENous Q1H PRN    LORazepam (ATIVAN) 2 mg/mL injection        chlordiazePOXIDE (LIBRIUM) capsule 25 mg  25 mg Oral TID    levalbuterol (XOPENEX) nebulizer soln 0.63 mg/3 mL  0.63 mg Nebulization Q4H RT    tamsulosin (FLOMAX) capsule 0.4 mg  0.4 mg Oral DAILY    sodium chloride (NS) flush 5-10 mL  5-10 mL IntraVENous Q8H    sodium chloride (NS) flush 5-10 mL  5-10 mL IntraVENous PRN    acetaminophen (TYLENOL) tablet 650 mg  650 mg Oral Q6H PRN    ondansetron (ZOFRAN) injection 4 mg  4 mg IntraVENous Q4H PRN    enoxaparin (LOVENOX) injection 40 mg  40 mg SubCUTAneous Q24H    piperacillin-tazobactam (ZOSYN) 3.375 g in 0.9% sodium chloride (MBP/ADV) 100 mL  3.375 g IntraVENous Q8H    dilTIAZem (CARDIZEM) IR tablet 30 mg  30 mg Oral Q12H    sodium chloride (NS) flush 5-10 mL  5-10 mL IntraVENous PRN    0.9% sodium chloride infusion  50 mL/hr IntraVENous CONTINUOUS    methylPREDNISolone (PF) (SOLU-MEDROL) injection 40 mg  40 mg IntraVENous Q6H    levoFLOXacin (LEVAQUIN) 750 mg in D5W IVPB  750 mg IntraVENous Q24H    acetylcysteine (MUCOMYST) 200 mg/mL (20 %) solution 400 mg  400 mg Nebulization QID RT        Review of Symptoms:   Limited by patient disorientation         Objective:      Visit Vitals  BP (!) 147/91 (BP 1 Location: Right arm, BP Patient Position: At rest)   Pulse (!) 119   Temp 97.5 °F (36.4 °C)   Resp 15   Ht 5' 8\" (1.727 m)   Wt 61.7 kg (136 lb)   SpO2 99%   BMI 20.68 kg/m²       Physical:   General: restless AAM resting in bed with labored breathing   Heart: RapidRR, no m/S3/JVD  Lungs: clear/dim   Abdomen: Soft, +BS, NTND   Extremities: LE naomy +DP/PT, no edema   Neurologic: oriented to self. Restless. Data Review:   Recent Labs     11/30/18 0245 11/28/18 2025   WBC 8.4 8.4   HGB 10.8* 11.3*   HCT 34.4* 36.1*    237     Recent Labs     11/30/18 0245 11/28/18 2025   * 139   K 3.4* 3.3*    104   CO2 22 27   * 178*   BUN 19 13   CREA 1.05 0.78   CA 8.4* 8.8   ALB 3.0* 3.4*   TBILI 0.2 0.3   SGOT 12* 16   ALT 15 16   INR  --  1.1       Recent Labs     11/28/18 2025   TROIQ <0.05         Intake/Output Summary (Last 24 hours) at 11/30/2018 1122  Last data filed at 11/30/2018 0800  Gross per 24 hour   Intake 1053.33 ml   Output 175 ml   Net 878.33 ml        Cardiographics    Telemetry: SA-ST with 1st degree AVB;    ECG: SR with 1st degree AVB  Echocardiogram: EF of 55%; NWMA; mod MR. CTA chest: \"1. No definite pulmonary emboli identified.   2. There is bibasilar atelectasis and mucous plugging in the lower lobes right greater than left.   There is borderline enlarged mediastinal and hilar lymph nodes most likely  Reactive. There are tiny micronodules in the upper lobes most likely inflammatory. \"       Assessment:       Active Problems:    Acute respiratory failure with hypoxia and hypercapnia ([address] (11/29/2018)         Plan:     Feliz Alu is a 80 y.o. male admitted with respiratory failure. Cardiology consulted for suspected a fib.   Tele review shows a sinus arrhythmia to sinus tach with 1st degree AVB. Primary lead displayed on tele does appear to be a fib, but other leads show that it is not. No significant labs. EF 55%. · Do not see a fib on tele review. Tachycardia likely stemming from respiratory failure and alcohol withdrawal.    · Continue with cardizem for HTN and to help with rate control. · Will add a small dose of BB to help as well. · Nebs have been changed to xopenex, so that will hopefully help. · Continue to treat underlying process. Thank you for consulting [facility]. No further cardiology recommendations. Please call with questions. Doris Schuster NP  DNP, RN, AGAP-BC      Patient seen and examined by me with nurse practitioner. Randy Maldonado personally performed all components of the history, physical, and medical decision making and agree with the assessment and plan with minor modifications as noted.     Arianne Shetty MD

## 2018-12-01 LAB
ALBUMIN SERPL-MCNC: 3.2 G/DL (ref 3.5–5)
ALBUMIN/GLOB SERPL: 0.7 {RATIO} (ref 1.1–2.2)
ALP SERPL-CCNC: 54 U/L (ref 45–117)
ALT SERPL-CCNC: 20 U/L (ref 12–78)
ANION GAP SERPL CALC-SCNC: 5 MMOL/L (ref 5–15)
AST SERPL-CCNC: 22 U/L (ref 15–37)
BASOPHILS # BLD: 0 K/UL (ref 0–0.1)
BASOPHILS NFR BLD: 0 % (ref 0–1)
BILIRUB SERPL-MCNC: 0.5 MG/DL (ref 0.2–1)
BUN SERPL-MCNC: 19 MG/DL (ref 6–20)
BUN/CREAT SERPL: 25 (ref 12–20)
CALCIUM SERPL-MCNC: 8.8 MG/DL (ref 8.5–10.1)
CHLORIDE SERPL-SCNC: 106 MMOL/L (ref 97–108)
CO2 SERPL-SCNC: 26 MMOL/L (ref 21–32)
CREAT SERPL-MCNC: 0.76 MG/DL (ref 0.7–1.3)
DIFFERENTIAL METHOD BLD: ABNORMAL
EOSINOPHIL # BLD: 0 K/UL (ref 0–0.4)
EOSINOPHIL NFR BLD: 0 % (ref 0–7)
ERYTHROCYTE [DISTWIDTH] IN BLOOD BY AUTOMATED COUNT: 15.9 % (ref 11.5–14.5)
FOLATE SERPL-MCNC: 26.1 NG/ML (ref 5–21)
GLOBULIN SER CALC-MCNC: 4.4 G/DL (ref 2–4)
GLUCOSE SERPL-MCNC: 138 MG/DL (ref 65–100)
HCT VFR BLD AUTO: 38.3 % (ref 36.6–50.3)
HGB BLD-MCNC: 12.3 G/DL (ref 12.1–17)
IMM GRANULOCYTES # BLD: 0.1 K/UL (ref 0–0.04)
IMM GRANULOCYTES NFR BLD AUTO: 1 % (ref 0–0.5)
LYMPHOCYTES # BLD: 0.5 K/UL (ref 0.8–3.5)
LYMPHOCYTES NFR BLD: 5 % (ref 12–49)
MAGNESIUM SERPL-MCNC: 1.9 MG/DL (ref 1.6–2.4)
MCH RBC QN AUTO: 27.3 PG (ref 26–34)
MCHC RBC AUTO-ENTMCNC: 32.1 G/DL (ref 30–36.5)
MCV RBC AUTO: 85.1 FL (ref 80–99)
MONOCYTES # BLD: 0.1 K/UL (ref 0–1)
MONOCYTES NFR BLD: 1 % (ref 5–13)
NEUTS SEG # BLD: 8.7 K/UL (ref 1.8–8)
NEUTS SEG NFR BLD: 93 % (ref 32–75)
NRBC # BLD: 0 K/UL (ref 0–0.01)
NRBC BLD-RTO: 0 PER 100 WBC
PHOSPHATE SERPL-MCNC: 2.5 MG/DL (ref 2.6–4.7)
PLATELET # BLD AUTO: 278 K/UL (ref 150–400)
PMV BLD AUTO: 9.6 FL (ref 8.9–12.9)
POTASSIUM SERPL-SCNC: 3.9 MMOL/L (ref 3.5–5.1)
PROT SERPL-MCNC: 7.6 G/DL (ref 6.4–8.2)
RBC # BLD AUTO: 4.5 M/UL (ref 4.1–5.7)
SODIUM SERPL-SCNC: 137 MMOL/L (ref 136–145)
VIT B12 SERPL-MCNC: 1147 PG/ML (ref 193–986)
WBC # BLD AUTO: 9.4 K/UL (ref 4.1–11.1)

## 2018-12-01 PROCEDURE — 94640 AIRWAY INHALATION TREATMENT: CPT

## 2018-12-01 PROCEDURE — 84100 ASSAY OF PHOSPHORUS: CPT

## 2018-12-01 PROCEDURE — 77010033678 HC OXYGEN DAILY

## 2018-12-01 PROCEDURE — 82746 ASSAY OF FOLIC ACID SERUM: CPT

## 2018-12-01 PROCEDURE — 74011250636 HC RX REV CODE- 250/636: Performed by: GENERAL ACUTE CARE HOSPITAL

## 2018-12-01 PROCEDURE — 74011250637 HC RX REV CODE- 250/637: Performed by: NURSE PRACTITIONER

## 2018-12-01 PROCEDURE — 77030018836 HC SOL IRR NACL ICUM -A

## 2018-12-01 PROCEDURE — 74011250637 HC RX REV CODE- 250/637: Performed by: GENERAL ACUTE CARE HOSPITAL

## 2018-12-01 PROCEDURE — 85025 COMPLETE CBC W/AUTO DIFF WBC: CPT

## 2018-12-01 PROCEDURE — 74011000258 HC RX REV CODE- 258: Performed by: INTERNAL MEDICINE

## 2018-12-01 PROCEDURE — 36415 COLL VENOUS BLD VENIPUNCTURE: CPT

## 2018-12-01 PROCEDURE — 82607 VITAMIN B-12: CPT

## 2018-12-01 PROCEDURE — 83735 ASSAY OF MAGNESIUM: CPT

## 2018-12-01 PROCEDURE — 74011250636 HC RX REV CODE- 250/636: Performed by: INTERNAL MEDICINE

## 2018-12-01 PROCEDURE — 65660000000 HC RM CCU STEPDOWN

## 2018-12-01 PROCEDURE — 74011000250 HC RX REV CODE- 250: Performed by: GENERAL ACUTE CARE HOSPITAL

## 2018-12-01 PROCEDURE — 80053 COMPREHEN METABOLIC PANEL: CPT

## 2018-12-01 PROCEDURE — 74011250637 HC RX REV CODE- 250/637: Performed by: INTERNAL MEDICINE

## 2018-12-01 PROCEDURE — 74011000250 HC RX REV CODE- 250: Performed by: INTERNAL MEDICINE

## 2018-12-01 RX ORDER — LEVALBUTEROL INHALATION SOLUTION 0.63 MG/3ML
0.63 SOLUTION RESPIRATORY (INHALATION)
Status: DISCONTINUED | OUTPATIENT
Start: 2018-12-01 | End: 2018-12-06 | Stop reason: HOSPADM

## 2018-12-01 RX ORDER — QUETIAPINE FUMARATE 25 MG/1
12.5 TABLET, FILM COATED ORAL
Status: DISCONTINUED | OUTPATIENT
Start: 2018-12-01 | End: 2018-12-06 | Stop reason: HOSPADM

## 2018-12-01 RX ORDER — DILTIAZEM HYDROCHLORIDE 30 MG/1
30 TABLET, FILM COATED ORAL
Status: DISCONTINUED | OUTPATIENT
Start: 2018-12-01 | End: 2018-12-02

## 2018-12-01 RX ORDER — ACETYLCYSTEINE 200 MG/ML
400 SOLUTION ORAL; RESPIRATORY (INHALATION)
Status: DISCONTINUED | OUTPATIENT
Start: 2018-12-01 | End: 2018-12-06 | Stop reason: HOSPADM

## 2018-12-01 RX ORDER — LEVOFLOXACIN 5 MG/ML
750 INJECTION, SOLUTION INTRAVENOUS EVERY 24 HOURS
Status: DISCONTINUED | OUTPATIENT
Start: 2018-12-01 | End: 2018-12-03

## 2018-12-01 RX ORDER — SODIUM,POTASSIUM PHOSPHATES 280-250MG
1 POWDER IN PACKET (EA) ORAL 2 TIMES DAILY
Status: COMPLETED | OUTPATIENT
Start: 2018-12-01 | End: 2018-12-01

## 2018-12-01 RX ADMIN — METHYLPREDNISOLONE SODIUM SUCCINATE 40 MG: 40 INJECTION, POWDER, FOR SOLUTION INTRAMUSCULAR; INTRAVENOUS at 00:12

## 2018-12-01 RX ADMIN — LEVALBUTEROL HYDROCHLORIDE 0.63 MG: 0.63 SOLUTION RESPIRATORY (INHALATION) at 13:14

## 2018-12-01 RX ADMIN — METHYLPREDNISOLONE SODIUM SUCCINATE 40 MG: 40 INJECTION, POWDER, FOR SOLUTION INTRAMUSCULAR; INTRAVENOUS at 06:59

## 2018-12-01 RX ADMIN — METHYLPREDNISOLONE SODIUM SUCCINATE 40 MG: 40 INJECTION, POWDER, FOR SOLUTION INTRAMUSCULAR; INTRAVENOUS at 11:52

## 2018-12-01 RX ADMIN — Medication 10 ML: at 07:00

## 2018-12-01 RX ADMIN — PIPERACILLIN SODIUM,TAZOBACTAM SODIUM 3.38 G: 3; .375 INJECTION, POWDER, FOR SOLUTION INTRAVENOUS at 21:13

## 2018-12-01 RX ADMIN — POTASSIUM & SODIUM PHOSPHATES POWDER PACK 280-160-250 MG 1 PACKET: 280-160-250 PACK at 09:37

## 2018-12-01 RX ADMIN — DILTIAZEM HYDROCHLORIDE 30 MG: 30 TABLET, FILM COATED ORAL at 14:29

## 2018-12-01 RX ADMIN — POTASSIUM & SODIUM PHOSPHATES POWDER PACK 280-160-250 MG 1 PACKET: 280-160-250 PACK at 18:18

## 2018-12-01 RX ADMIN — SODIUM CHLORIDE 50 ML/HR: 900 INJECTION, SOLUTION INTRAVENOUS at 21:19

## 2018-12-01 RX ADMIN — METOPROLOL TARTRATE 12.5 MG: 25 TABLET ORAL at 09:37

## 2018-12-01 RX ADMIN — LEVALBUTEROL HYDROCHLORIDE 0.63 MG: 0.63 SOLUTION RESPIRATORY (INHALATION) at 20:55

## 2018-12-01 RX ADMIN — ENOXAPARIN SODIUM 40 MG: 40 INJECTION, SOLUTION INTRAVENOUS; SUBCUTANEOUS at 00:12

## 2018-12-01 RX ADMIN — Medication 10 ML: at 11:53

## 2018-12-01 RX ADMIN — FINASTERIDE 5 MG: 5 TABLET, FILM COATED ORAL at 09:37

## 2018-12-01 RX ADMIN — SODIUM CHLORIDE 50 ML/HR: 900 INJECTION, SOLUTION INTRAVENOUS at 08:29

## 2018-12-01 RX ADMIN — METOPROLOL TARTRATE 12.5 MG: 25 TABLET ORAL at 21:11

## 2018-12-01 RX ADMIN — PIPERACILLIN SODIUM,TAZOBACTAM SODIUM 3.38 G: 3; .375 INJECTION, POWDER, FOR SOLUTION INTRAVENOUS at 06:59

## 2018-12-01 RX ADMIN — ACETYLCYSTEINE 400 MG: 200 SOLUTION ORAL; RESPIRATORY (INHALATION) at 20:54

## 2018-12-01 RX ADMIN — LEVOFLOXACIN 750 MG: 5 INJECTION, SOLUTION INTRAVENOUS at 11:52

## 2018-12-01 RX ADMIN — QUETIAPINE FUMARATE 12.5 MG: 25 TABLET ORAL at 21:12

## 2018-12-01 RX ADMIN — ACETYLCYSTEINE 400 MG: 200 SOLUTION ORAL; RESPIRATORY (INHALATION) at 13:14

## 2018-12-01 RX ADMIN — METHYLPREDNISOLONE SODIUM SUCCINATE 40 MG: 40 INJECTION, POWDER, FOR SOLUTION INTRAMUSCULAR; INTRAVENOUS at 18:17

## 2018-12-01 RX ADMIN — LEVALBUTEROL HYDROCHLORIDE 0.63 MG: 0.63 SOLUTION RESPIRATORY (INHALATION) at 07:37

## 2018-12-01 RX ADMIN — DILTIAZEM HYDROCHLORIDE 30 MG: 30 TABLET, FILM COATED ORAL at 09:37

## 2018-12-01 RX ADMIN — Medication 10 ML: at 14:29

## 2018-12-01 RX ADMIN — TAMSULOSIN HYDROCHLORIDE 0.4 MG: 0.4 CAPSULE ORAL at 09:37

## 2018-12-01 RX ADMIN — PIPERACILLIN SODIUM,TAZOBACTAM SODIUM 3.38 G: 3; .375 INJECTION, POWDER, FOR SOLUTION INTRAVENOUS at 14:29

## 2018-12-01 RX ADMIN — LEVALBUTEROL HYDROCHLORIDE 0.63 MG: 0.63 SOLUTION RESPIRATORY (INHALATION) at 03:00

## 2018-12-01 RX ADMIN — DILTIAZEM HYDROCHLORIDE 30 MG: 30 TABLET, FILM COATED ORAL at 17:03

## 2018-12-01 RX ADMIN — Medication 10 ML: at 21:13

## 2018-12-01 RX ADMIN — ACETYLCYSTEINE 400 MG: 200 SOLUTION ORAL; RESPIRATORY (INHALATION) at 07:38

## 2018-12-01 NOTE — PROGRESS NOTES
ADULT PROTOCOL: JET AEROSOL  REASSESSMENT Patient  Nicole Rao     80 y.o.   male     12/1/2018  10:33 AM 
 
Breath Sounds Pre Procedure: Right Breath Sounds: Clear, Lower, Diminished Left Breath Sounds: Clear, Lower, Diminished Breath Sounds Post Procedure: Right Breath Sounds: Clear, Lower, Diminished Left Breath Sounds: Clear, Lower, Diminished Breathing pattern: Pre procedure Breathing Pattern: Regular Post procedure Breathing Pattern: Regular Heart Rate: Pre procedure Pulse: 87 
         Post procedure Pulse: 94 Resp Rate: Pre procedure Respirations: 16 Post procedure Respirations: 16 
 
     
 
Cough: Pre procedure Cough: Non-productive Post procedure Cough: Non-productive Oxygen: O2 Device: Nasal cannula   Flow rate (L/min) 3 lpm 
   Changed: NO SpO2: Pre procedure SpO2: 97 %   with oxygen Post procedure SpO2: 95 %  with oxygen Nebulizer Therapy: Current medications Aerosolized Medications: Mucomyst, Xopenex Changed: YES/ TID and PRN Smoking History: former smoker Problem List:  
Patient Active Problem List  
Diagnosis Code  Incarcerated inguinal hernia K40.30  Hypertension I10  Benign prostatic hyperplasia N40.0  Gout M10.9  High cholesterol E78.00  Benign prostatic hyperplasia without lower urinary tract symptoms N40.0  Left inguinal hernia K40.90  
 BMI less than 19,adult Z68.1  Inguinal hernia K40.90  
 Urinary retention R33.9  Acute respiratory failure with hypoxia and hypercapnia (HCC) J96.01, J96.02  
 SOB (shortness of breath) R06.02  
 Delirium R41.0  Counseling regarding advanced care planning and goals of care Z71.89 Respiratory Therapist: Isa Pastor, RT

## 2018-12-01 NOTE — PROGRESS NOTES
Hospitalist Progress Note NAME: Andrea Jacobs :  1930 MRN:  [id number] Interim Hospital Summary: 80 y.o. male whom presented on 2018 with Assessment / Plan: 
Acute respiratory failure with hypoxia and hypercapnia POA Due to mucous plugging with suspect due to Aspiration - CTA chest: No PE, bibasilar atelectasis and mucous plugging in the lower lobes right 
greater than left. borderline enlarged mediastinal and hilar lymph nodes most likely reactive. - Pt was on BiPAP at ER, but taken off since then -  O2 sat 99% @ 3L via n/c 
- appreciate pulmonology input; wean O2 as long as O2 sat is greater than equal to 92% Continue with Xopenex (changed from albuterol due to tachycardia), mucinex, and solumedrol 
- continue with emperic ABX; IV levo and Zosyn. -  To be seen by S[facility]; pt was groggy to complete evaluation yesterday 
- PCO2 41 from  ABG 
  
AMS Dementia - Pt is much calmer today, a bit sleepy (decreased the dose of seroquel to 12.5mg from 25mg), but able to answer the question. He is much cooperative, orient to self and place unlike being aggressive, confused, not following direction on . Initially, his presentation was mistaken with alcohol withdrew, but it is very unlikely. Spoke with the nurse Suzan Benedict from [facility] on 2018, who informed me that he is very appropriate, orient x 3, always like to eat at same place. 
- neurology following Head CT: Chronic left subdural fluid collection. Prominent atrophy and white matter disease. 
- bedside sitter required 
  
Hypertension Sinus arrhythmia 
- Echo: Systolic function was normal. Ejection fraction was estimated to be 55 %. There were no regional wall motion abnormalities. Moderate mitral valve regurgitation 
- continue with Cardizem IR (dose increased to three times a day from twice a day with parameter and lopressor - appreciate cardiology input 
  
Urinary retension BMP - bladder scan revealed 460ml. No urge to void. straight cath x 1 yesterday. Able to void with the reminder or he gets incontinent of urine. NO PVR. - continue with flomax & Proscar  
  
Hypokalemia 
- replete; will monitor 
  
Code Status: DNR/DNI d/w sister Emily English who is POA Surrogate Decision Maker: Sister Emily English 
  
DVT Prophylaxis: Lovenox 
  
Baseline: Resides as [facility]  
  
Disposition: [facility] Subjective: Chief Complaint / Reason for Physician Visit \"I know I am at the hospital\". Discussed with RN events overnight. Review of Systems: 
Symptom Y/N Comments  Symptom Y/N Comments Fever/Chills n   Chest Pain n   
Poor Appetite    Edema Cough    Abdominal Pain Sputum    Joint Pain SOB/WU n   Pruritis/Rash Nausea/vomit n   Tolerating PT/OT Diarrhea    Tolerating Diet Constipation    Other Could NOT obtain due to:   
 
Objective: VITALS:  
Last 24hrs VS reviewed since prior progress note. Most recent are: 
Patient Vitals for the past 24 hrs: 
 Temp Pulse Resp BP SpO2  
12/01/18 1101 98.7 °F (37.1 °C) (!) 104 21 132/90 97 % 12/01/18 0738     97 % 12/01/18 0721 97.8 °F (36.6 °C) 94 19 (!) 141/98 97 % 12/01/18 0351 97.9 °F (36.6 °C) 91 21 (!) 136/101 96 % 12/01/18 0300     99 % 11/30/18 2300     98 % 11/30/18 2251 97.5 °F (36.4 °C) 85 18 (!) 125/92 96 % 11/30/18 2021     96 % 11/30/18 1953 97.4 °F (36.3 °C) 99 22 (!) 149/98 97 % 11/30/18 1700 96.7 °F (35.9 °C) 96 24 (!) 146/96 100 % 11/30/18 1644 96.6 °F (35.9 °C) 98 24 (!) 159/107 99 % 11/30/18 1528     99 % 11/30/18 1513 96.5 °F (35.8 °C) 94 26 (!) 165/107 96 % 11/30/18 1321    (!) 153/110   
11/30/18 1217     100 % Intake/Output Summary (Last 24 hours) at 12/1/2018 1215 Last data filed at 12/1/2018 2592 Gross per 24 hour Intake 922 ml Output 1125 ml Net -203 ml PHYSICAL EXAM: 
 General: Ill appearing. Alert, cooperative, no acute distress   
EENT:  EOMI. Anicteric sclerae. MMM Resp:  Clear in apex with decreased breath sounds at bases, no wheezing or rales. No accessory muscle use CV:  Tachycardia  rhythm,  No edema GI:  Soft, Non distended, Non tender.  +Bowel sounds Neurologic:  Alert and oriented X self and place, normal speech, Psych:   poor insight but much better than yesterday. Not anxious nor agitated Skin:  No rashes. No jaundice Reviewed most current lab test results and cultures  YES Reviewed most current radiology test results   YES Review and summation of old records today    NO Reviewed patient's current orders and MAR    YES 
PMH/SH reviewed - no change compared to H&P 
________________________________________________________________________ Care Plan discussed with: 
  Comments Patient y Family RN y   
Care Manager Consultant Multidiciplinary team rounds were held today with , nursing, pharmacist and clinical coordinator. Patient's plan of care was discussed; medications were reviewed and discharge planning was addressed. ________________________________________________________________________ Total NON critical care TIME:  30   Minutes Total CRITICAL CARE TIME Spent:   Minutes non procedure based Comments >50% of visit spent in counseling and coordination of care    
________________________________________________________________________ July Mccall NP Procedures: see electronic medical records for all procedures/Xrays and details which were not copied into this note but were reviewed prior to creation of Plan. LABS: 
I reviewed today's most current labs and imaging studies. Pertinent labs include: 
Recent Labs 12/01/18 
0422 11/30/18 
0245 11/28/18 2025 WBC 9.4 8.4 8.4 HGB 12.3 10.8* 11.3* HCT 38.3 34.4* 36.1*  
 247 237 Recent Labs 12/01/18 0422 11/30/18 
0245 11/28/18 2025  135* 139  
K 3.9 3.4* 3.3*  
 102 104 CO2 26 22 27 * 220* 178* BUN 19 19 13 CREA 0.76 1.05 0.78  
CA 8.8 8.4* 8.8 MG 1.9  --   --   
PHOS 2.5*  --   --   
ALB 3.2* 3.0* 3.4* TBILI 0.5 0.2 0.3 SGOT 22 12* 16 ALT 20 15 16 INR  --   --  1.1 Signed: )Fernando Waterman, NP

## 2018-12-01 NOTE — PROGRESS NOTES
Pharmacy Automatic Renal Dosing Protocol - Antimicrobials Indication for Antimicrobials: CAP, aspiration Current Regimen of Each Antimicrobial: 
Levaquin 750 mg IV q24h - started  day 3 Zosyn 3.375gm IV q8h - started  day 3 Significant Cultures: none Labs: 
Recent Labs 18 
0422 18 
0245 18 CREA 0.76 1.05 0.78 BUN 19 19 13 WBC 9.4 8.4 8.4 Temp (24hrs), Av.2 °F (36.2 °C), Min:96.5 °F (35.8 °C), Max:97.9 °F (36.6 °C) Creatinine Clearance (mL/min) or Dialysis:  
Estimated Creatinine Clearance: 59.8 mL/min (based on SCr of 0.76 mg/dL). Estimated Creatinine Clearance (using IBW):66.2 mL/min Impression/Plan: · Afebrile, WBC < 10k, SCr normalized · Continue Zosyn regimen · Adjusted Levofloxacin to 750mg IV q24h · Antimicrobial stop date - pending Pharmacy will follow daily and adjust medications as appropriate for renal function and/or serum levels.  
 
Thank you, 
Nhan Barrios, [facility]

## 2018-12-01 NOTE — PROGRESS NOTES
PCU SHIFT NURSING NOTE Bedside shift change report given to [address] (oncoming nurse) by Mattie Villalpando (offgoing nurse). Report included the following information SBAR. Shift Summary:  
1930 received handoff report at bedside. Sitter at bedside. Pt resting at this time. 2100 pt quiet on this night. No s/s of agitation. 2300 continues on IV/ ABT 
0513 pt left ac 18g dislodged. Will Pressure dressing applied. Continues on 02-3 [email or web address] remains at bedside. Admission Date 11/28/2018 Admission Diagnosis Acute respiratory failure with hypoxia and hypercapnia (HCC) Consults IP CONSULT TO PALLIATIVE CARE - PROVIDER 
IP CONSULT TO NEUROLOGY 
IP CONSULT TO PULMONOLOGY 
IP CONSULT TO CARDIOLOGY Consults []PT []OT []Speech  
[]Case Management  
  
[] Palliative Cardiac Monitoring Order []Yes []No  
 
IV drips []Yes Drip:                            Dose: 
Drip:                            Dose: 
Drip:                            Dose:  
[]No  
 
GI Prophylaxis []Yes []No  
 
 
 
DVT Prophylaxis SCDs:     
     
 Matti stockings:     
  
[] Medication []Contraindicated []None Activity Level Activity Level: Up with Assistance Activity Assistance: Partial (two people) Purposeful Rounding every 1-2 hour? []Yes Belcher Score  Total Score: 6 Bed Alarm (If score 3 or >) []Yes  
[] Refused (See signed refusal form in chart) Lane Score  Lane Score: 18 Lane Score (if score 14 or less) []PMT consult  
[]Wound Care consult []Specialty bed  
[] Nutrition consult Needs prior to discharge:  
Home O2 required:   
[]Yes []No  
 If yes, how much O2 required? Other:  
 Last Bowel Movement: Last Bowel Movement Date: 11/29/18 Influenza Vaccine Received Flu Vaccine for Current Season (usually Sept-March): Yes Pneumonia Vaccine Diet Active Orders Diet DIET DYSPHAGIA PUREED (NDD1) LDAs Peripheral IV 11/28/18 Left Antecubital (Active) Site Assessment Clean, dry, & intact 11/30/2018  4:44 PM  
Phlebitis Assessment 0 11/30/2018  4:44 PM  
Infiltration Assessment 0 11/30/2018  4:44 PM  
Dressing Status Clean, dry, & intact 11/30/2018  4:44 PM  
Dressing Type Tape;Transparent 11/30/2018  4:44 PM  
Hub Color/Line Status Green; Infusing;Flushed 11/30/2018  4:44 PM  
Action Taken Open ports on tubing capped 11/30/2018  4:44 PM  
Alcohol Cap Used Yes 11/30/2018  4:44 PM  
                  
Urinary Catheter Intake & Output Date 11/30/18 0700 - 12/01/18 0505 12/01/18 0700 - 12/02/18 9082 Shift 5449-6635 6781-6252 24 Hour Total 4857-1675 5384-8789 24 Hour Total  
INTAKE  
P.O. 240  240     
  P. O. 240  240     
I. V.(mL/kg/hr) 915.3(1.2)  915.3 Cardizem Volume 2  2 Volume (0.9% sodium chloride infusion) 113.3  113.3 Volume (levoFLOXacin (LEVAQUIN) 750 mg in D5W IVPB) 300  300 Volume (piperacillin-tazobactam (ZOSYN) 3.375 g in 0.9% sodium chloride (MBP/ADV) 100 mL) 500  500 Volume (levoFLOXacin (LEVAQUIN) 750 mg in D5W IVPB) 0  0 Shift Total(mL/kg) 1155. 3(18.7)  1155. 3(18.7) OUTPUT Urine(mL/kg/hr) 400(0.5)  400 Urine 400  400 Urine Occurrence(s) 1 x  1 x Shift Total(mL/kg) 400(6.5)  400(6.5) .3  755. 3 Weight (kg) 61.7 61.7 61.7 61.7 61.7 61.7 Readmission Risk Assessment Tool Score Medium Risk 25 Total Score 3 Has Seen PCP in Last 6 Months (Yes=3, No=0) 2 . Living with Significant Other. Assisted Living. LTAC. SNF. or  
Rehab  
 4 IP Visits Last 12 Months (1-3=4, 4=9, >4=11) 9 Pt. Coverage (Medicare=5 , Medicaid, or Self-Pay=4) Criteria that do not apply:  
 Patient Length of Stay (>5 days = 3) Charlson Comorbidity Score (Age + Comorbid Conditions) Expected Length of Stay 3d 19h Actual Length of Stay 2

## 2018-12-01 NOTE — PROGRESS NOTES
PCU SHIFT NURSING NOTE Bedside and Verbal shift change report given to Cherelle Coronado RN (oncoming nurse) by Zuri Castro RN (offgoing nurse). Report included the following information SBAR, Kardex, Intake/Output, MAR, Recent Results and Cardiac Rhythm A-Fib. Shift Summary:  
 
 
Admission Date 11/28/2018 Admission Diagnosis Acute respiratory failure with hypoxia and hypercapnia (HCC) Consults IP CONSULT TO PALLIATIVE CARE - PROVIDER 
IP CONSULT TO NEUROLOGY 
IP CONSULT TO PULMONOLOGY 
IP CONSULT TO CARDIOLOGY Consults []PT []OT []Speech  
[]Case Management  
  
[] Palliative Cardiac Monitoring Order []Yes []No  
 
IV drips []Yes Drip:                            Dose: 
Drip:                            Dose: 
Drip:                            Dose:  
[]No  
 
GI Prophylaxis []Yes []No  
 
 
 
DVT Prophylaxis SCDs:     
     
 Matti stockings:     
  
[] Medication []Contraindicated []None Activity Level Activity Level: Up with Assistance Activity Assistance: Partial (two people) Purposeful Rounding every 1-2 hour? []Yes Belcher Score  Total Score: 6 Bed Alarm (If score 3 or >) []Yes  
[] Refused (See signed refusal form in chart) Lane Score  Lane Score: 18 Lane Score (if score 14 or less) []PMT consult  
[]Wound Care consult []Specialty bed  
[] Nutrition consult Needs prior to discharge:  
Home O2 required:   
[]Yes []No  
 If yes, how much O2 required? Other:  
 Last Bowel Movement: Last Bowel Movement Date: 11/29/18 Influenza Vaccine Received Flu Vaccine for Current Season (usually Sept-March): Yes Pneumonia Vaccine Diet Active Orders Diet DIET DYSPHAGIA PUREED (NDD1) LDAs Peripheral IV 12/01/18 Anterior;Right Antecubital (Active) Site Assessment Clean, dry, & intact 12/1/2018  6:59 AM  
Phlebitis Assessment 0 12/1/2018  6:59 AM  
Infiltration Assessment 0 12/1/2018  6:59 AM  
 Dressing Status Clean, dry, & intact 12/1/2018  6:59 AM  
Dressing Type Transparent 12/1/2018  6:59 AM  
Hub Color/Line Status Blue 12/1/2018  6:59 AM  
Alcohol Cap Used Yes 12/1/2018  6:59 AM  
                  
Urinary Catheter Intake & Output Date 11/30/18 0700 - 12/01/18 1211 12/01/18 0700 - 12/02/18 2866 Shift 8953-3154 8451-6703 24 Hour Total 9235-1676 3485-0044 24 Hour Total  
INTAKE  
P.O. 240 120 360     
  P. O. 240 120 360     
I. V.(mL/kg/hr) 915.3(1.2) 100(0.1) 1015.3(0.7) Cardizem Volume 2  2 Volume (0.9% sodium chloride infusion) 113.3  113.3 Volume (levoFLOXacin (LEVAQUIN) 750 mg in D5W IVPB) 300  300 Volume (piperacillin-tazobactam (ZOSYN) 3.375 g in 0.9% sodium chloride (MBP/ADV) 100 mL) 500 100 600 Volume (levoFLOXacin (LEVAQUIN) 750 mg in D5W IVPB) 0  0 Shift Total(mL/kg) 1155. 3(18.7) 220(3.6) 1375. 3(22.3) OUTPUT Urine(mL/kg/hr) 400(0.5) 500(0.7) 900(0.6) Urine 400  400 Urine Voided  500 500 Urine Occurrence(s) 1 x 1 x 2 x Shift Total(mL/kg) 400(6.5) 500(8.1) 900(14.6) .3 -280 475.3 Weight (kg) 61.7 61.7 61.7 61.7 61.7 61.7 Readmission Risk Assessment Tool Score Medium Risk 25 Total Score 3 Has Seen PCP in Last 6 Months (Yes=3, No=0) 2 . Living with Significant Other. Assisted Living. LTAC. SNF. or  
Rehab  
 4 IP Visits Last 12 Months (1-3=4, 4=9, >4=11) 9 Pt. Coverage (Medicare=5 , Medicaid, or Self-Pay=4) Criteria that do not apply:  
 Patient Length of Stay (>5 days = 3) Charlson Comorbidity Score (Age + Comorbid Conditions) Expected Length of Stay 3d 19h Actual Length of Stay 2

## 2018-12-02 LAB
ANION GAP SERPL CALC-SCNC: 5 MMOL/L (ref 5–15)
BUN SERPL-MCNC: 24 MG/DL (ref 6–20)
BUN/CREAT SERPL: 29 (ref 12–20)
CALCIUM SERPL-MCNC: 8.8 MG/DL (ref 8.5–10.1)
CHLORIDE SERPL-SCNC: 106 MMOL/L (ref 97–108)
CO2 SERPL-SCNC: 28 MMOL/L (ref 21–32)
CREAT SERPL-MCNC: 0.82 MG/DL (ref 0.7–1.3)
GLUCOSE SERPL-MCNC: 177 MG/DL (ref 65–100)
PHOSPHATE SERPL-MCNC: 2.3 MG/DL (ref 2.6–4.7)
POTASSIUM SERPL-SCNC: 4.1 MMOL/L (ref 3.5–5.1)
SODIUM SERPL-SCNC: 139 MMOL/L (ref 136–145)

## 2018-12-02 PROCEDURE — 77010033678 HC OXYGEN DAILY

## 2018-12-02 PROCEDURE — 74011250637 HC RX REV CODE- 250/637: Performed by: NURSE PRACTITIONER

## 2018-12-02 PROCEDURE — 65660000000 HC RM CCU STEPDOWN

## 2018-12-02 PROCEDURE — 36415 COLL VENOUS BLD VENIPUNCTURE: CPT

## 2018-12-02 PROCEDURE — 74011000258 HC RX REV CODE- 258: Performed by: INTERNAL MEDICINE

## 2018-12-02 PROCEDURE — 74011000250 HC RX REV CODE- 250: Performed by: GENERAL ACUTE CARE HOSPITAL

## 2018-12-02 PROCEDURE — 74011250636 HC RX REV CODE- 250/636: Performed by: GENERAL ACUTE CARE HOSPITAL

## 2018-12-02 PROCEDURE — 74011250636 HC RX REV CODE- 250/636: Performed by: INTERNAL MEDICINE

## 2018-12-02 PROCEDURE — 80048 BASIC METABOLIC PNL TOTAL CA: CPT

## 2018-12-02 PROCEDURE — 84100 ASSAY OF PHOSPHORUS: CPT

## 2018-12-02 PROCEDURE — 74011250637 HC RX REV CODE- 250/637: Performed by: INTERNAL MEDICINE

## 2018-12-02 PROCEDURE — 94640 AIRWAY INHALATION TREATMENT: CPT

## 2018-12-02 RX ORDER — PREDNISONE 20 MG/1
40 TABLET ORAL
Status: COMPLETED | OUTPATIENT
Start: 2018-12-03 | End: 2018-12-05

## 2018-12-02 RX ORDER — DILTIAZEM HYDROCHLORIDE 30 MG/1
30 TABLET, FILM COATED ORAL
Status: DISCONTINUED | OUTPATIENT
Start: 2018-12-02 | End: 2018-12-03

## 2018-12-02 RX ADMIN — ACETYLCYSTEINE 400 MG: 200 SOLUTION ORAL; RESPIRATORY (INHALATION) at 07:31

## 2018-12-02 RX ADMIN — ACETYLCYSTEINE 400 MG: 200 SOLUTION ORAL; RESPIRATORY (INHALATION) at 14:43

## 2018-12-02 RX ADMIN — LEVOFLOXACIN 750 MG: 5 INJECTION, SOLUTION INTRAVENOUS at 11:26

## 2018-12-02 RX ADMIN — Medication 10 ML: at 21:52

## 2018-12-02 RX ADMIN — PIPERACILLIN SODIUM,TAZOBACTAM SODIUM 3.38 G: 3; .375 INJECTION, POWDER, FOR SOLUTION INTRAVENOUS at 05:22

## 2018-12-02 RX ADMIN — DILTIAZEM HYDROCHLORIDE 30 MG: 30 TABLET, FILM COATED ORAL at 08:19

## 2018-12-02 RX ADMIN — METOPROLOL TARTRATE 12.5 MG: 25 TABLET ORAL at 20:46

## 2018-12-02 RX ADMIN — DILTIAZEM HYDROCHLORIDE 30 MG: 30 TABLET, FILM COATED ORAL at 11:26

## 2018-12-02 RX ADMIN — Medication 10 ML: at 05:22

## 2018-12-02 RX ADMIN — Medication 10 ML: at 15:36

## 2018-12-02 RX ADMIN — DILTIAZEM HYDROCHLORIDE 30 MG: 30 TABLET, FILM COATED ORAL at 15:36

## 2018-12-02 RX ADMIN — DILTIAZEM HYDROCHLORIDE 30 MG: 30 TABLET, FILM COATED ORAL at 21:51

## 2018-12-02 RX ADMIN — ENOXAPARIN SODIUM 40 MG: 40 INJECTION, SOLUTION INTRAVENOUS; SUBCUTANEOUS at 00:14

## 2018-12-02 RX ADMIN — PIPERACILLIN SODIUM,TAZOBACTAM SODIUM 3.38 G: 3; .375 INJECTION, POWDER, FOR SOLUTION INTRAVENOUS at 15:36

## 2018-12-02 RX ADMIN — PIPERACILLIN SODIUM,TAZOBACTAM SODIUM 3.38 G: 3; .375 INJECTION, POWDER, FOR SOLUTION INTRAVENOUS at 21:51

## 2018-12-02 RX ADMIN — QUETIAPINE FUMARATE 12.5 MG: 25 TABLET ORAL at 21:52

## 2018-12-02 RX ADMIN — TAMSULOSIN HYDROCHLORIDE 0.4 MG: 0.4 CAPSULE ORAL at 08:19

## 2018-12-02 RX ADMIN — ACETYLCYSTEINE 400 MG: 200 SOLUTION ORAL; RESPIRATORY (INHALATION) at 19:38

## 2018-12-02 RX ADMIN — METHYLPREDNISOLONE SODIUM SUCCINATE 40 MG: 40 INJECTION, POWDER, FOR SOLUTION INTRAMUSCULAR; INTRAVENOUS at 05:21

## 2018-12-02 RX ADMIN — LEVALBUTEROL HYDROCHLORIDE 0.63 MG: 0.63 SOLUTION RESPIRATORY (INHALATION) at 19:38

## 2018-12-02 RX ADMIN — METOPROLOL TARTRATE 12.5 MG: 25 TABLET ORAL at 08:19

## 2018-12-02 RX ADMIN — METHYLPREDNISOLONE SODIUM SUCCINATE 40 MG: 40 INJECTION, POWDER, FOR SOLUTION INTRAMUSCULAR; INTRAVENOUS at 11:26

## 2018-12-02 RX ADMIN — LEVALBUTEROL HYDROCHLORIDE 0.63 MG: 0.63 SOLUTION RESPIRATORY (INHALATION) at 14:43

## 2018-12-02 RX ADMIN — FINASTERIDE 5 MG: 5 TABLET, FILM COATED ORAL at 08:19

## 2018-12-02 RX ADMIN — LEVALBUTEROL HYDROCHLORIDE 0.63 MG: 0.63 SOLUTION RESPIRATORY (INHALATION) at 07:31

## 2018-12-02 RX ADMIN — METHYLPREDNISOLONE SODIUM SUCCINATE 40 MG: 40 INJECTION, POWDER, FOR SOLUTION INTRAMUSCULAR; INTRAVENOUS at 00:14

## 2018-12-02 NOTE — PROGRESS NOTES
PCU SHIFT NURSING NOTE Bedside shift change report given to [address] (oncoming nurse) by Young Durham RN (offgoing nurse). Report included the following information SBAR. Shift Summary:  
1930 pt sitting up in bed with [email or web address] in place. More alert than yesterday. No behavioral issues at this time. Will continue to prompt to empty bladder. 2300 continue on IV ABT no ill effects noted. No respiratory issues note. Continues pulmonary tx from resp. Therapist.  
 
 
Admission Date 11/28/2018 Admission Diagnosis Acute respiratory failure with hypoxia and hypercapnia (HCC) Consults IP CONSULT TO PALLIATIVE CARE - PROVIDER 
IP CONSULT TO NEUROLOGY 
IP CONSULT TO PULMONOLOGY 
IP CONSULT TO CARDIOLOGY Consults []PT []OT []Speech  
[]Case Management  
  
[] Palliative Cardiac Monitoring Order []Yes []No  
 
IV drips []Yes Drip:                            Dose: 
Drip:                            Dose: 
Drip:                            Dose:  
[]No  
 
GI Prophylaxis []Yes []No  
 
 
 
DVT Prophylaxis SCDs:     
     
 Matti stockings:     
  
[] Medication []Contraindicated []None Activity Level Activity Level: Bed Rest   
 Activity Assistance: Partial (one person) Purposeful Rounding every 1-2 hour? []Yes Belcher Score  Total Score: 6 Bed Alarm (If score 3 or >) []Yes  
[] Refused (See signed refusal form in chart) Lane Score  Lane Score: 17 Lane Score (if score 14 or less) []PMT consult  
[]Wound Care consult []Specialty bed  
[] Nutrition consult Needs prior to discharge:  
Home O2 required:   
[]Yes []No  
 If yes, how much O2 required? Other:  
 Last Bowel Movement: Last Bowel Movement Date: 11/29/18 Influenza Vaccine Received Flu Vaccine for Current Season (usually Sept-March): Yes Pneumonia Vaccine Diet Active Orders Diet DIET DYSPHAGIA PUREED (NDD1) LDAs Peripheral IV 12/01/18 Anterior;Right Antecubital (Active) Site Assessment Clean, dry, & intact 12/1/2018  7:09 PM  
Phlebitis Assessment 0 12/1/2018  7:09 PM  
Infiltration Assessment 0 12/1/2018  7:09 PM  
Dressing Status Clean, dry, & intact 12/1/2018  7:09 PM  
Dressing Type Tape;Transparent 12/1/2018  7:09 PM  
Hub Color/Line Status Blue; Infusing 12/1/2018  7:09 PM  
Action Taken Open ports on tubing capped 12/1/2018  7:09 PM  
Alcohol Cap Used Yes 12/1/2018  7:09 PM  
                  
Urinary Catheter Intake & Output Date 12/01/18 0700 - 12/02/18 2941 12/02/18 0700 - 12/03/18 5406 Shift 9179-2933 0658-7623 24 Hour Total 2724-9469 0196-4497 24 Hour Total  
INTAKE  
P.O. 100  100     
  P. O. 100  100     
I. V.(mL/kg/hr) 350(0.5) 100 450 Volume (piperacillin-tazobactam (ZOSYN) 3.375 g in 0.9% sodium chloride (MBP/ADV) 100 mL) 200 100 300 Volume (levoFLOXacin (LEVAQUIN) 750 mg in D5W IVPB) 150  150 Shift Total(mL/kg) 450(7.3) 100(1.6) 550(8.9) OUTPUT Urine(mL/kg/hr) 525(0.7) 150 675 Urine Voided 525 150 675 Urine Occurrence(s) 1 x  1 x Shift Total(mL/kg) 525(8.5) 150(2.4) 675(10.9) NET -75 -50 -125 Weight (kg) 61.7 61.7 61.7 61.7 61.7 61.7 Readmission Risk Assessment Tool Score Medium Risk 25 Total Score 3 Has Seen PCP in Last 6 Months (Yes=3, No=0) 2 . Living with Significant Other. Assisted Living. LTAC. SNF. or  
Rehab  
 4 IP Visits Last 12 Months (1-3=4, 4=9, >4=11) 9 Pt. Coverage (Medicare=5 , Medicaid, or Self-Pay=4) Criteria that do not apply:  
 Patient Length of Stay (>5 days = 3) Charlson Comorbidity Score (Age + Comorbid Conditions) Expected Length of Stay 3d 19h Actual Length of Stay 3

## 2018-12-02 NOTE — PROGRESS NOTES
Pharmacy Automatic Renal Dosing Protocol - Antimicrobials Indication for Antimicrobials: CAP, aspiration Current Regimen of Each Antimicrobial: 
Levaquin 750 mg IV q24h - started  day 4 Zosyn 3.375gm IV q8h - started  day 4 Significant Cultures: none Labs: 
Recent Labs 18 
0989 18 
0422 18 
0245 CREA 0.82 0.76 1.05  
BUN 24* 19 19 WBC  --  9.4 8.4 Temp (24hrs), Av.2 °F (36.8 °C), Min:97.4 °F (36.3 °C), Max:99 °F (37.2 °C) Creatinine Clearance (mL/min) or Dialysis:  
Estimated Creatinine Clearance: 55.4 mL/min (based on SCr of 0.82 mg/dL). Estimated Creatinine Clearance (using IBW):61.4 mL/min Impression/Plan: · Afebrile, WBC < 10k, SCr normalized · Continue Zosyn regimen · Continue  Levofloxacin to 750mg IV q24h · Antimicrobial stop date - pending Recommendation:  consider indication, please consider addition of stop date for LQ and Zosyn and or change to abx w/ PO route Pharmacy will follow daily and adjust medications as appropriate for renal function and/or serum levels.  
 
Thank you, 
Wilmer Rodríguez, [facility]

## 2018-12-02 NOTE — PROGRESS NOTES
PCU SHIFT NURSING NOTE Bedside and Verbal shift change report given to Rawland Snellen, RN (oncoming nurse) by Jose Montana RN (offgoing nurse). Report included the following information SBAR, Kardex, Intake/Output, MAR, Recent Results and Cardiac Rhythm NSR. Shift Summary:  
1030 - Sitter removed and telesitter placed at bedside to promote pt safety per LUCÍA Arechiga.  
[address] called to report pt getting in bed; returned to room and observed pt hanging out of bed with feet on the floor. Pt states \"I got to go to the bathroom. \" Assisted pt with urinal, but pt states \"I already went. \" Bed pad saturated with urine. Provided incontinence care and repositioned pt in bed. Bed alarm intact and call bel within reach. Admission Date 11/28/2018 Admission Diagnosis Acute respiratory failure with hypoxia and hypercapnia (HCC) Consults IP CONSULT TO PALLIATIVE CARE - PROVIDER 
IP CONSULT TO NEUROLOGY 
IP CONSULT TO PULMONOLOGY 
IP CONSULT TO CARDIOLOGY Consults []PT []OT []Speech  
[]Case Management  
  
[] Palliative Cardiac Monitoring Order []Yes []No  
 
IV drips []Yes Drip:                            Dose: 
Drip:                            Dose: 
Drip:                            Dose:  
[]No  
 
GI Prophylaxis []Yes []No  
 
 
 
DVT Prophylaxis SCDs:     
     
 Matti stockings:     
  
[] Medication []Contraindicated []None Activity Level Activity Level: Bed Rest   
 Activity Assistance: Partial (one person) Purposeful Rounding every 1-2 hour? []Yes Belcher Score  Total Score: 6 Bed Alarm (If score 3 or >) []Yes  
[] Refused (See signed refusal form in chart) Lane Score  Lane Score: 17 Lane Score (if score 14 or less) []PMT consult  
[]Wound Care consult []Specialty bed  
[] Nutrition consult Needs prior to discharge:  
Home O2 required:   
[]Yes []No  
 If yes, how much O2 required? Other: Last Bowel Movement: Last Bowel Movement Date: 12/02/18 Influenza Vaccine Received Flu Vaccine for Current Season (usually Sept-March): Yes Pneumonia Vaccine Diet Active Orders Diet DIET DYSPHAGIA PUREED (NDD1) LDAs Peripheral IV 12/01/18 Anterior;Right Antecubital (Active) Site Assessment Clean, dry, & intact 12/2/2018  3:43 AM  
Phlebitis Assessment 0 12/2/2018  3:43 AM  
Infiltration Assessment 0 12/2/2018  3:43 AM  
Dressing Status Clean, dry, & intact 12/2/2018  3:43 AM  
Dressing Type Transparent;Tape 12/2/2018  3:43 AM  
Hub Color/Line Status Blue; Infusing;Flushed 12/2/2018  3:43 AM  
Action Taken Open ports on tubing capped 12/2/2018  3:43 AM  
Alcohol Cap Used Yes 12/2/2018  3:43 AM  
                  
Urinary Catheter Intake & Output Date 12/01/18 0700 - 12/02/18 6967 12/02/18 0700 - 12/03/18 0624 Shift 0676-0909 5588-4619 24 Hour Total 6577-2517 3122-1374 24 Hour Total  
INTAKE  
P.O. 100  100     
  P. O. 100  100     
I. V.(mL/kg/hr) 350(0.5) 1061.7(1.4) 1411.7(1) Volume (0.9% sodium chloride infusion)  961.7 961.7 Volume (piperacillin-tazobactam (ZOSYN) 3.375 g in 0.9% sodium chloride (MBP/ADV) 100 mL) 200 100 300 Volume (levoFLOXacin (LEVAQUIN) 750 mg in D5W IVPB) 150  150 Shift Total(mL/kg) 450(7.3) 5347.3(20.9) 1511.7(24.5) OUTPUT Urine(mL/kg/hr) 525(0.7) 475(0.6) 1000(0.7) Urine Voided  Urine Occurrence(s) 1 x  1 x Stool Stool Occurrence(s)  1 x 1 x Shift Total(mL/kg) 525(8.5) 475(7.7) 1000(16.2) NET -75 586.7 511.7 Weight (kg) 61.7 61.7 61.7 61.7 61.7 61.7 Readmission Risk Assessment Tool Score Medium Risk 25 Total Score 3 Has Seen PCP in Last 6 Months (Yes=3, No=0) 2 . Living with Significant Other. Assisted Living. LTAC. SNF. or  
Rehab  
 4 IP Visits Last 12 Months (1-3=4, 4=9, >4=11) 9 Pt. Coverage (Medicare=5 , Medicaid, or Self-Pay=4) Criteria that do not apply:  
 Patient Length of Stay (>5 days = 3) Charlson Comorbidity Score (Age + Comorbid Conditions) Expected Length of Stay 3d 19h Actual Length of Stay 3

## 2018-12-02 NOTE — PROGRESS NOTES
Hospitalist Progress Note NAME: Lauryn Saleem :  1930 MRN:  [id number] Interim Hospital Summary: 80 y.o. male whom presented on 2018 with Assessment / Plan: 
Acute respiratory failure with hypoxia and hypercapnia POA Due to mucous plugging with suspect due to Aspiration - CTA chest: No PE, bibasilar atelectasis and mucous plugging in the lower lobes right 
greater than left.  borderline enlarged mediastinal and hilar lymph nodes most likely reactive. - Pt was on BiPAP at ER, but no need for BiPAP since the admission. 
-  O2 sat 96% @ 2L via n/c 
- appreciate pulmonology input; wean O2 as long as O2 sat is greater than equal to 92% 
  Continue with Xopenex (changed from albuterol due to tachycardia), mucinex, and solumedrol 
- d/c IV solumedrol. Prednisone 40mg po every day x 3 starting tomorrow 
- continue with emperic ABX; IV levo and Zosyn. - To be seen by [facility]; pt was groggy to complete evaluation on  
- PCO2 41 from  ABG 
  
AMS Dementia - Pt is much alert today then yesterday. Very pleasant. - continue with seroquel to 12.5mg qhs 
- pt was aggressive, confused, not following direction on . Initially, his presentation was mistaken with alcohol withdrew, but it is very unlikely. Spoke with the nurse Baldemar Moore) from [facility] on 2018, who informed me that he is very appropriate, orient x 3, always like to eat at same place. 
- neurology following Head CT: Chronic left subdural fluid collection. Prominent atrophy and white matter disease. 
  
Hypertension Sinus arrhythmia 
- Echo: Systolic function was normal. Ejection fraction was estimated to be 55 %. There were no regional wall motion abnormalities. Moderate mitral valve regurgitation 
- continue with Cardizem IR (dose increased to QID) with parameter. Will change to CD tomorrow if BP/HR much better controlled. Continue with lopressor - appreciate cardiology input 
  
Urinary retension BMP - bladder scan revealed 460ml. No urge to void. straight cath x 1 yesterday. Able to void with the reminder or he gets incontinent of urine. NO PVR. - continue with flomax & Proscar  
  
Hypokalemia 
- replete; will monitor 
  
Code Status: DNR/DNI d/w sister Zahra Herrera who is POA Surrogate Decision Maker: Sister Clearance Block 
  
DVT Prophylaxis: Lovenox 
  
Baseline: Resides as [facility]  
  
Disposition: [facility]  
 
 
Subjective: Chief Complaint / Reason for Physician Visit \"I feel ok ma'am\". Discussed with RN events overnight. Review of Systems: 
Symptom Y/N Comments  Symptom Y/N Comments Fever/Chills n   Chest Pain n   
Poor Appetite    Edema Cough    Abdominal Pain Sputum    Joint Pain SOB/WU n   Pruritis/Rash Nausea/vomit n   Tolerating PT/OT Diarrhea    Tolerating Diet Constipation    Other Could NOT obtain due to:   
 
Objective: VITALS:  
Last 24hrs VS reviewed since prior progress note. Most recent are: 
Patient Vitals for the past 24 hrs: 
 Temp Pulse Resp BP SpO2  
12/02/18 1126 98.2 °F (36.8 °C) 91 18 (!) 142/93 96 % 12/02/18 0819  (!) 101     
12/02/18 0818     95 % 12/02/18 0731     96 % 12/02/18 0708 98.3 °F (36.8 °C) 88 17 (!) 149/96 96 % 12/02/18 0343 97.4 °F (36.3 °C) 91 22 (!) 134/92 95 % 12/02/18 0013 99 °F (37.2 °C) 98 20 134/90 95 % 12/01/18 2053     95 % 12/01/18 1909 97.9 °F (36.6 °C) (!) 102 22 125/81 95 % 12/01/18 1513 98.5 °F (36.9 °C) (!) 106 20 138/88 95 % 12/01/18 1315     93 % Intake/Output Summary (Last 24 hours) at 12/2/2018 1208 Last data filed at 12/2/2018 1413 Gross per 24 hour Intake 1775 ml Output 775 ml Net 1000 ml PHYSICAL EXAM: 
General: Ill appearing. Alert, cooperative, no acute distress   
EENT:  EOMI. Anicteric sclerae. MMM Resp:  Clear in apex with decreased breath sounds at bases, no wheezing or rales. No accessory muscle use CV:  Regular  rhythm,  No edema GI:  Soft, Non distended, Non tender.  +Bowel sounds Neurologic:  Alert and oriented X 3, normal speech, Psych:   Good insight. Not anxious nor agitated Skin:  No rashes. No jaundice Reviewed most current lab test results and cultures  YES Reviewed most current radiology test results   YES Review and summation of old records today    NO Reviewed patient's current orders and MAR    YES 
PMH/SH reviewed - no change compared to H&P 
________________________________________________________________________ Care Plan discussed with: 
  Comments Patient y Family RN y   
Care Manager Consultant Multidiciplinary team rounds were held today with , nursing, pharmacist and clinical coordinator. Patient's plan of care was discussed; medications were reviewed and discharge planning was addressed. ________________________________________________________________________ Total NON critical care TIME:  20  Minutes Total CRITICAL CARE TIME Spent:   Minutes non procedure based Comments >50% of visit spent in counseling and coordination of care    
________________________________________________________________________ July Hanna NP Procedures: see electronic medical records for all procedures/Xrays and details which were not copied into this note but were reviewed prior to creation of Plan. LABS: 
I reviewed today's most current labs and imaging studies. Pertinent labs include: 
Recent Labs 12/01/18 0422 11/30/18 
0245 WBC 9.4 8.4 HGB 12.3 10.8* HCT 38.3 34.4*  
 247 Recent Labs 12/02/18 
1131 12/01/18 0422 11/30/18 
0245  137 135* K 4.1 3.9 3.4*  
 106 102 CO2 28 26 22 * 138* 220* BUN 24* 19 19 CREA 0.82 0.76 1.05  
CA 8.8 8.8 8.4* MG  --  1.9  --   
PHOS 2.3* 2.5*  --   
ALB  --  3.2* 3.0* TBILI  --  0.5 0.2 SGOT  --  22 12* ALT  --  20 15 Signed: )Víctor Mckeon, NP

## 2018-12-03 PROCEDURE — 74011250637 HC RX REV CODE- 250/637: Performed by: INTERNAL MEDICINE

## 2018-12-03 PROCEDURE — 74011000258 HC RX REV CODE- 258: Performed by: INTERNAL MEDICINE

## 2018-12-03 PROCEDURE — 74011250637 HC RX REV CODE- 250/637: Performed by: NURSE PRACTITIONER

## 2018-12-03 PROCEDURE — G8988 SELF CARE GOAL STATUS: HCPCS | Performed by: OCCUPATIONAL THERAPIST

## 2018-12-03 PROCEDURE — 97535 SELF CARE MNGMENT TRAINING: CPT | Performed by: OCCUPATIONAL THERAPIST

## 2018-12-03 PROCEDURE — 65660000000 HC RM CCU STEPDOWN

## 2018-12-03 PROCEDURE — 74011000250 HC RX REV CODE- 250: Performed by: GENERAL ACUTE CARE HOSPITAL

## 2018-12-03 PROCEDURE — 94640 AIRWAY INHALATION TREATMENT: CPT

## 2018-12-03 PROCEDURE — 97530 THERAPEUTIC ACTIVITIES: CPT

## 2018-12-03 PROCEDURE — G8987 SELF CARE CURRENT STATUS: HCPCS | Performed by: OCCUPATIONAL THERAPIST

## 2018-12-03 PROCEDURE — 77010033678 HC OXYGEN DAILY

## 2018-12-03 PROCEDURE — 74011636637 HC RX REV CODE- 636/637: Performed by: NURSE PRACTITIONER

## 2018-12-03 PROCEDURE — 97166 OT EVAL MOD COMPLEX 45 MIN: CPT | Performed by: OCCUPATIONAL THERAPIST

## 2018-12-03 PROCEDURE — 97161 PT EVAL LOW COMPLEX 20 MIN: CPT

## 2018-12-03 PROCEDURE — 74011250636 HC RX REV CODE- 250/636: Performed by: INTERNAL MEDICINE

## 2018-12-03 RX ORDER — GUAIFENESIN 100 MG/5ML
200 SOLUTION ORAL 4 TIMES DAILY
Status: DISCONTINUED | OUTPATIENT
Start: 2018-12-03 | End: 2018-12-06 | Stop reason: HOSPADM

## 2018-12-03 RX ORDER — DILTIAZEM HYDROCHLORIDE 120 MG/1
120 CAPSULE, COATED, EXTENDED RELEASE ORAL DAILY
Status: DISCONTINUED | OUTPATIENT
Start: 2018-12-03 | End: 2018-12-06 | Stop reason: HOSPADM

## 2018-12-03 RX ORDER — METOPROLOL TARTRATE 25 MG/1
25 TABLET, FILM COATED ORAL EVERY 12 HOURS
Status: DISCONTINUED | OUTPATIENT
Start: 2018-12-03 | End: 2018-12-04

## 2018-12-03 RX ORDER — AMOXICILLIN AND CLAVULANATE POTASSIUM 600; 42.9 MG/5ML; MG/5ML
600 POWDER, FOR SUSPENSION ORAL 2 TIMES DAILY WITH MEALS
Status: COMPLETED | OUTPATIENT
Start: 2018-12-03 | End: 2018-12-04

## 2018-12-03 RX ORDER — METOPROLOL TARTRATE 25 MG/1
12.5 TABLET, FILM COATED ORAL ONCE
Status: COMPLETED | OUTPATIENT
Start: 2018-12-03 | End: 2018-12-03

## 2018-12-03 RX ADMIN — Medication 10 ML: at 06:29

## 2018-12-03 RX ADMIN — GUAIFENESIN 200 MG: 200 SOLUTION ORAL at 21:06

## 2018-12-03 RX ADMIN — QUETIAPINE FUMARATE 12.5 MG: 25 TABLET ORAL at 21:09

## 2018-12-03 RX ADMIN — TAMSULOSIN HYDROCHLORIDE 0.4 MG: 0.4 CAPSULE ORAL at 08:14

## 2018-12-03 RX ADMIN — ENOXAPARIN SODIUM 40 MG: 40 INJECTION, SOLUTION INTRAVENOUS; SUBCUTANEOUS at 01:01

## 2018-12-03 RX ADMIN — ACETYLCYSTEINE 400 MG: 200 SOLUTION ORAL; RESPIRATORY (INHALATION) at 15:29

## 2018-12-03 RX ADMIN — ACETYLCYSTEINE 400 MG: 200 SOLUTION ORAL; RESPIRATORY (INHALATION) at 07:57

## 2018-12-03 RX ADMIN — PREDNISONE 40 MG: 20 TABLET ORAL at 08:14

## 2018-12-03 RX ADMIN — FINASTERIDE 5 MG: 5 TABLET, FILM COATED ORAL at 08:15

## 2018-12-03 RX ADMIN — ENOXAPARIN SODIUM 40 MG: 40 INJECTION, SOLUTION INTRAVENOUS; SUBCUTANEOUS at 23:38

## 2018-12-03 RX ADMIN — AMOXICILLIN AND CLAVULANATE POTASSIUM 600 MG: 600; 42.9 POWDER, FOR SUSPENSION ORAL at 13:38

## 2018-12-03 RX ADMIN — Medication 10 ML: at 21:06

## 2018-12-03 RX ADMIN — LEVALBUTEROL HYDROCHLORIDE 0.63 MG: 0.63 SOLUTION RESPIRATORY (INHALATION) at 19:38

## 2018-12-03 RX ADMIN — METOPROLOL TARTRATE 25 MG: 25 TABLET ORAL at 21:07

## 2018-12-03 RX ADMIN — LEVALBUTEROL HYDROCHLORIDE 0.63 MG: 0.63 SOLUTION RESPIRATORY (INHALATION) at 15:28

## 2018-12-03 RX ADMIN — GUAIFENESIN 200 MG: 200 SOLUTION ORAL at 13:38

## 2018-12-03 RX ADMIN — ACETYLCYSTEINE 400 MG: 200 SOLUTION ORAL; RESPIRATORY (INHALATION) at 19:39

## 2018-12-03 RX ADMIN — PIPERACILLIN SODIUM,TAZOBACTAM SODIUM 3.38 G: 3; .375 INJECTION, POWDER, FOR SOLUTION INTRAVENOUS at 06:28

## 2018-12-03 RX ADMIN — GUAIFENESIN 200 MG: 200 SOLUTION ORAL at 11:00

## 2018-12-03 RX ADMIN — METOPROLOL TARTRATE 12.5 MG: 25 TABLET ORAL at 08:14

## 2018-12-03 RX ADMIN — GUAIFENESIN 200 MG: 200 SOLUTION ORAL at 18:06

## 2018-12-03 RX ADMIN — Medication 10 ML: at 13:39

## 2018-12-03 RX ADMIN — METOPROLOL TARTRATE 12.5 MG: 25 TABLET ORAL at 13:38

## 2018-12-03 RX ADMIN — DILTIAZEM HYDROCHLORIDE 120 MG: 120 CAPSULE, COATED, EXTENDED RELEASE ORAL at 08:14

## 2018-12-03 RX ADMIN — LEVALBUTEROL HYDROCHLORIDE 0.63 MG: 0.63 SOLUTION RESPIRATORY (INHALATION) at 07:57

## 2018-12-03 NOTE — PROGRESS NOTES
Hospitalist Progress Note NAME: Yosef Juan :  1930 MRN:  [id number] Interim Hospital Summary: 80 y.o. male whom presented on 2018 with Assessment / Plan: 
Acute respiratory failure with hypoxia and hypercapnia POA Due to mucous plugging with suspect due to Aspiration - CTA chest: No PE, bibasilar atelectasis and mucous plugging in the lower lobes right 
greater than left.  borderline enlarged mediastinal and hilar lymph nodes most likely reactive. - Pt was on BiPAP at ER, but no need for BiPAP since the admission. 
-  O2 sat 96% @ 2L via n/c 
- appreciate pulmonology input; wean O2 as long as O2 sat is greater than equal to 92% 
  Continue with Xopenex (changed from albuterol due to tachycardia), mucinex, and solumedrol 
- d/c IV solumedrol. Prednisone 40mg po every day x 3 starting tomorrow 
- received IV levo and Zosyn, will complete total of 7 days of ABX with Augmentin. - tolerating dysphagia diet without difficulty 
  
AMS Dementia 
- alert and orient to self and place. However, pt gets impulsive when trying to get out of bed. Fall precaution. Advised pt to call for help when he wants to get out of bed 
- continue with seroquel to 12.5mg qhs 
- pt was aggressive, confused, not following direction on . Initially, his presentation was mistaken with alcohol withdrew, but it is very unlikely. Spoke with the nurse Hetal Perrin) from [facility] on 2018, who informed me that he is very appropriate, orient x 3, always like to eat at same place. 
- neurology following 
[facility] CT: Chronic left subdural fluid collection. Prominent atrophy and white matter disease. 
  
Hypertension Sinus arrhythmia 
- Echo: Systolic function was normal. Ejection fraction was estimated to be 55 %. There were no regional wall motion abnormalities. Moderate mitral valve regurgitation 
- continue with Cardizem CD. Increased lopressor 25mg BID 
- appreciate cardiology input 
  
Urinary retension BMP 
- bladder scan revealed 460ml. No urge to void. straight cath x 1 yesterday. Able to void with the reminder or he gets incontinent of urine. NO PVR. - continue with flomax & Proscar  
  
Hypokalemia 
- replete; will monitor 
  
Code Status: DNR/DNI d/w sister German Lutz who is POA Surrogate Decision Maker: Sister German Lutz 
  
DVT Prophylaxis: Lovenox 
  
Baseline: Resides as [facility]  
  
Disposition: SNF  
May transfer to telemetry unit from stepdown 
  
 
 
Subjective: Chief Complaint / Reason for Physician Visit \"I feel ok\". Discussed with RN events overnight. Review of Systems: 
Symptom Y/N Comments  Symptom Y/N Comments Fever/Chills n   Chest Pain n   
Poor Appetite    Edema Cough    Abdominal Pain Sputum    Joint Pain SOB/WU n   Pruritis/Rash Nausea/vomit n   Tolerating PT/OT Diarrhea    Tolerating Diet Constipation    Other Could NOT obtain due to:   
 
Objective: VITALS:  
Last 24hrs VS reviewed since prior progress note. Most recent are: 
Patient Vitals for the past 24 hrs: 
 Temp Pulse Resp BP SpO2  
12/03/18 0757     98 % 12/03/18 0716 98 °F (36.7 °C) 84 18 (!) 161/103 95 % 12/03/18 0400 97.6 °F (36.4 °C) 72 18 (!) 148/112 98 % 12/02/18 2304 97.8 °F (36.6 °C) 87 18 135/86 95 % 12/02/18 1936     100 % 12/1934 97.7 °F (36.5 °C) (!) 102 18 (!) 141/93 99 % 12/02/18 1536 97.9 °F (36.6 °C) (!) 106 18 (!) 141/97 95 % 12/02/18 1443     96 % Intake/Output Summary (Last 24 hours) at 12/3/2018 1153 Last data filed at 12/3/2018 7961 Gross per 24 hour Intake 2403.33 ml Output 300 ml Net 2103.33 ml PHYSICAL EXAM: 
General: Ill appearing,. Alert, cooperative, no acute distress   
EENT:  EOMI. Anicteric sclerae. MMM Resp:  Bilateral rhonchi with exp wheezing,  No accessory muscle use CV:  Regular  rhythm,  No edema GI:  Soft, Non distended, Non tender.  +Bowel sounds Neurologic:  Alert and oriented X 2, normal speech, Psych:   Fair insight. Not anxious nor agitated Skin:  No rashes. No jaundice Reviewed most current lab test results and cultures  YES Reviewed most current radiology test results   YES Review and summation of old records today    NO Reviewed patient's current orders and MAR    YES 
PMH/SH reviewed - no change compared to H&P 
________________________________________________________________________ Care Plan discussed with: 
  Comments Patient y Family RN y   
Care Manager y Consultant     
                 y Multidiciplinary team rounds were held today with , nursing, pharmacist and clinical coordinator. Patient's plan of care was discussed; medications were reviewed and discharge planning was addressed. ________________________________________________________________________ Total NON critical care TIME:  30  Minutes Total CRITICAL CARE TIME Spent:   Minutes non procedure based Comments >50% of visit spent in counseling and coordination of care    
________________________________________________________________________ Hyunsun Clearance LUCÍA Bello Procedures: see electronic medical records for all procedures/Xrays and details which were not copied into this note but were reviewed prior to creation of Plan. LABS: 
I reviewed today's most current labs and imaging studies. Pertinent labs include: 
Recent Labs 12/01/18 0422 WBC 9.4 HGB 12.3 HCT 38.3  Recent Labs 12/02/18 0429 12/01/18 0422  137  
K 4.1 3.9  106 CO2 28 26 * 138* BUN 24* 19  
CREA 0.82 0.76 CA 8.8 8.8 MG  --  1.9 PHOS 2.3* 2.5* ALB  --  3.2* TBILI  --  0.5 SGOT  --  22 ALT  --  20 Signed: )Pj Mckeon, NP

## 2018-12-03 NOTE — PROGRESS NOTES
PCU SHIFT NURSING NOTE Bedside and Verbal shift change report given to Bernard Diaz RN (oncoming nurse) by Suman Adkins RN (offgoing nurse). Report included the following information SBAR, Kardex, Intake/Output, MAR, Recent Results and Cardiac Rhythm NSR. Shift Summary:  
1573 - Attempted to call report to Latasha PADILLA; nurse to call back for report. 1600 - TRANSFER - OUT REPORT: 
 
Verbal report given to VALERIE Pagan(name) on [facility]  being transferred to [facility](unit) for routine progression of care Report consisted of patients Situation, Background, Assessment and  
Recommendations(SBAR). Information from the following report(s) SBAR, Kardex, Intake/Output, MAR, Recent Results and Cardiac Rhythm NSR/A-Fib was reviewed with the receiving nurse. Lines:  
Peripheral IV 12/01/18 Anterior;Right Antecubital (Active) Site Assessment Clean, dry, & intact 12/3/2018  7:45 AM  
Phlebitis Assessment 0 12/3/2018  7:45 AM  
Infiltration Assessment 0 12/3/2018  7:45 AM  
Dressing Status Clean, dry, & intact 12/3/2018  7:45 AM  
Dressing Type Tape;Transparent 12/3/2018  7:45 AM  
Hub Color/Line Status Blue; Infusing 12/3/2018  7:45 AM  
Action Taken Open ports on tubing capped 12/3/2018  4:00 AM  
Alcohol Cap Used Yes 12/3/2018  4:00 AM  
  
 
Opportunity for questions and clarification was provided. Patient transported with: 
 O2 @ 2 liters Tech Admission Date 11/28/2018 Admission Diagnosis Acute respiratory failure with hypoxia and hypercapnia (HCC) Consults IP CONSULT TO PALLIATIVE CARE - PROVIDER 
IP CONSULT TO NEUROLOGY 
IP CONSULT TO PULMONOLOGY 
IP CONSULT TO CARDIOLOGY Consults []PT []OT []Speech  
[]Case Management  
  
[] Palliative Cardiac Monitoring Order []Yes []No  
 
IV drips []Yes Drip:                            Dose: 
Drip:                            Dose: 
Drip:                            Dose:  
[]No  
 
GI Prophylaxis []Yes []No  
 
 
 
 DVT Prophylaxis SCDs:     
     
 Matti stockings:     
  
[] Medication []Contraindicated []None Activity Level Activity Level: Up with Assistance Activity Assistance: Partial (one person) Purposeful Rounding every 1-2 hour? []Yes Belcher Score  Total Score: 6 Bed Alarm (If score 3 or >) []Yes  
[] Refused (See signed refusal form in chart) Lane Score  Lane Score: 17 Lane Score (if score 14 or less) []PMT consult  
[]Wound Care consult []Specialty bed  
[] Nutrition consult Needs prior to discharge:  
Home O2 required:   
[]Yes []No  
 If yes, how much O2 required? Other:  
 Last Bowel Movement: Last Bowel Movement Date: 12/02/18 Influenza Vaccine Received Flu Vaccine for Current Season (usually Sept-March): Yes Pneumonia Vaccine Diet Active Orders Diet DIET DYSPHAGIA PUREED (NDD1) LDAs Peripheral IV 12/01/18 Anterior;Right Antecubital (Active) Site Assessment Clean, dry, & intact 12/3/2018  4:00 AM  
Phlebitis Assessment 0 12/3/2018  4:00 AM  
Infiltration Assessment 0 12/3/2018  4:00 AM  
Dressing Status Clean, dry, & intact 12/3/2018  4:00 AM  
Dressing Type Tape 12/3/2018  4:00 AM  
Hub Color/Line Status Blue; Infusing 12/3/2018  4:00 AM  
Action Taken Open ports on tubing capped 12/3/2018  4:00 AM  
Alcohol Cap Used Yes 12/3/2018  4:00 AM  
                  
Urinary Catheter Intake & Output Date 12/02/18 0700 - 12/03/18 1773 12/03/18 0700 - 12/04/18 1493 Shift [id number] 1900-0659 24 Hour Total 3927-8467 9288-1444 24 Hour Total  
INTAKE  
P.O.  650 650     
  P. O.  650 650     
I. V.(mL/kg/hr) 432.5(0.6) 200(0.3) 632.5(0.4) Volume (0.9% sodium chloride infusion) 432.5  432.5 Volume (piperacillin-tazobactam (ZOSYN) 3.375 g in 0.9% sodium chloride (MBP/ADV) 100 mL)  200 200 Shift Total(mL/kg) 432.5(7) 850(13.8) 1282. 5(20.8) OUTPUT Urine(mL/kg/hr)  300(0.4) 300(0.2) Urine Voided  300 300 Urine Occurrence(s) 2 x  2 x Shift Total(mL/kg)  300(4.9) 300(4.9) .5 550 982.5 Weight (kg) 61.7 61.7 61.7 61.7 61.7 61.7 Readmission Risk Assessment Tool Score Medium Risk 25 Total Score 3 Has Seen PCP in Last 6 Months (Yes=3, No=0) 2 . Living with Significant Other. Assisted Living. LTAC. SNF. or  
Rehab  
 4 IP Visits Last 12 Months (1-3=4, 4=9, >4=11) 9 Pt. Coverage (Medicare=5 , Medicaid, or Self-Pay=4) Criteria that do not apply:  
 Patient Length of Stay (>5 days = 3) Charlson Comorbidity Score (Age + Comorbid Conditions) Expected Length of Stay 3d 19h Actual Length of Stay 4

## 2018-12-03 NOTE — PROGRESS NOTES
PALLIATIVE MEDICINE Palliative Medicine was consulted for goals of care. Pt's goals are clear, he wants to limited medical interventions, no feeding tube, DNR. Will sign off. please re-consult if Palliative Medicine can be of further assistance. Thank you for including Palliative Medicine in this patient's care.   
Estrada Matias, DIEGOC

## 2018-12-03 NOTE — PROGRESS NOTES
Reason for Admission:  Acute respiratory failure with hypoxia RRAT Score:  18 Do you (patient/family) have any concerns for transition/discharge? CM spoke with pt sister. Sisters only concern is bed availability at [facility]. Plan for utilizing home health:  No plan to utilize [facility]. Likelihood of readmission? Moderate Transition of Care Plan:     CM spoke with pt sister Poncho First [phone]) to discuss d/c planning as pt is confused and unable to answer CM  Questions. Pt sister reports that pt had been at [facility] (LTC) since August of 2018. CM informed pt that it is being recommended that he return to [facility] for SNF. Pt sister in agreement with pt returning to [facility] with PT. She voiced concern about her brother being able to get his bed at [facility] back. CM informed her that pt must be 24 hrs sitter free before returning to [facility]. CM to contact [facility] to let them know that pt family would like for him to return. Pt will need transport to [facility] as his sister is unable to provide. Pt being transferred to [address]. CM gave handoff to SUNNY Padilla via email. CM will continue to follow pt for d/c placement. Care Management Interventions Discharge Durable Medical Equipment: No 
Physical Therapy Consult: Yes Current Support Network: [address] [place] [address] Confirm Follow Up Transport: Other (see comment)(Ambulance transport ) Discharge Location Discharge Placement: [address] Cameron Garcia, Care Manager [phone]

## 2018-12-03 NOTE — PROGRESS NOTES
917 [facility] TRANSFER - IN REPORT: 
 
Verbal report received from Verna(name) on [place]  being received from PCU(unit) for routine progression of care Report consisted of patients Situation, Background, Assessment and  
Recommendations(SBAR). Information from the following report(s) SBAR was reviewed with the receiving nurse. Opportunity for questions and clarification was provided. Assessment completed upon patients arrival to unit and care assumed. Primary Nurse Lex Richter and Rebel Jett RN performed a dual skin assessment on this patient Impairment noted- see wound doc flow sheet Lane score is 16 Pt has scattered scabs on extremities. Heels are red but blanches, sacrum red but blanches, excoriation on scrotum.  
 
Bedside(SBAR) Shift report given to Chaitanya Carey

## 2018-12-03 NOTE — PROGRESS NOTES
[facility], Critical Care, and Sleep Medicine Name: Gumaro Madrid MRN: [id number] : 1930 Hospital: [facility] Date: 12/3/2018 IMPRESSION:  
· Acute hypoxic/hypercapnic respiratory failure- still on O2 
· Bronchitis (can not exclude undiagnosed exacerbated COPD)- still congested · Mucus plugging - mild and related to bronchitis · LE edema with pain · Dementia- pleasant and follows commands · H/O tobacco use RECOMMENDATIONS:  
· O2 - wean · Bronchodilators · Systemic steroids- taper · Pulmonary toilet · Mucolytics · Empiric antibiotics · DVT prophylaxis Subjective:  
 
12/3- d/w nursing. Mirna Powell Friday, weak. Telesitter. From  Care. REady for activity. Congested cough with deep breathes. No fever. Wheezes musical. Sister calls daily. 18: agitation/confusion this morning, now more calm and eating breakfast 
 
Initial history: This patient has been seen and evaluated at the request of Dr. Aleyda Harris for mucus plugging. Patient is a 80 y.o. male former smoker, presented yesterday with acute on chronic respiratory distress. This had been going on for an extended period of time, but got worse yesterday. He was treated with BiPAP, steroids, and bronchodilators, and he reports he is feeling much better today. Has a cough productive of only minimal amounts of clear sputum. No f/c. Past Medical History:  
Diagnosis Date  Anemia  BPH (benign prostatic hypertrophy)  Dementia  Gout  High cholesterol  taken off meds  Hypertension  was taken off BP meds  Left inguinal hernia 2018 Past Surgical History:  
Procedure Laterality Date  HX HERNIA REPAIR  9-10-13 RIHR with mesh-[facility]-Dr. Luh Pearl  
 HX HERNIA REPAIR  2018  
 open LIHR with mesh Prior to Admission medications Medication Sig Start Date End Date Taking? Authorizing Provider dutasteride (AVODART) 0.5 mg capsule Take 0.5 mg by mouth daily. Yes Provider, Historical  
bisacodyl (DULCOLAX) 10 mg suppository Insert 10 mg into rectum daily as needed (constipation unrelieved by miralax). Yes Provider, Historical  
dilTIAZem (CARDIZEM) 30 mg tablet Take 30 mg by mouth every twelve (12) hours. Yes Provider, Historical  
sodium phosphate (ENEMA) 19-7 gram/118 mL enema Insert 1 Enema into rectum daily as needed (constipation unrelieved by bisacodyl suppository). Yes Provider, Historical  
senna-docusate (SENNA WITH DOCUSATE SODIUM) 8.6-50 mg per tablet Take 1 Tab by mouth daily as needed for Constipation (try first if constipation). Yes Provider, Historical  
acetaminophen (TYLENOL) 325 mg tablet Take 650 mg by mouth every eight (8) hours as needed for Pain or Fever. Yes Provider, Historical  
polyethylene glycol (MIRALAX) 17 gram packet Take 17 g by mouth as needed (constipation w/ no BM x 3 days). Yes Provider, Historical  
ibuprofen (MOTRIN) 200 mg tablet Take 2 Tabs by mouth every six (6) hours as needed for Pain. 8/3/18  Yes Denisse Herzog MD  
albuterol (PROVENTIL VENTOLIN) 2.5 mg /3 mL (0.083 %) nebulizer solution 3 mL by Nebulization route every four (4) hours as needed for Wheezing or Shortness of Breath. 7/24/18  Yes Kevon Gee NP  
tamsulosin (FLOMAX) 0.4 mg capsule TAKE 1 CAPSULE BY MOUTH EVERY DAY 5/22/18  Yes Gabby Espana NP  
ferrous sulfate 325 mg (65 mg iron) tablet Take 1 Tab by mouth two (2) times daily (with meals). On an empty stomach with Vitamin C (like OJ) 7/20/17  Yes Gabby Espana NP Nebulizer & Compressor machine 1 Each by Does Not Apply route as needed for Cough (wheezing). Use nebulizer every 4-6 hours as needed for shortness of breath or wheezing 7/26/18   Gabby Espana NP  
food supplemt, lactose-reduced (BOOST HIGH PROTEIN) 0.06 gram- 1 kcal/mL liqd Drink 2-3 containers DAILY. 3/23/18   Gabby Espana NP No Known Allergies Social History Tobacco Use  Smoking status: Former smoker, ~30-40 p-y  Smokeless tobacco: Never Used Substance Use Topics  Alcohol use: Yes Comment: occas Family History Problem Relation Age of Onset  Stroke Mother  Stroke Sister  Diabetes Sister  Hypertension Sister  Heart Disease Brother  Hypertension Brother Current Facility-Administered Medications Medication Dose Route Frequency  dilTIAZem CD (CARDIZEM CD) capsule 120 mg  120 mg Oral DAILY  amoxicillin-clavulanate (AUGMENTIN) 600-42.9 mg/5 mL oral suspension 600 mg  600 mg Oral BID WITH MEALS  predniSONE (DELTASONE) tablet 40 mg  40 mg Oral DAILY WITH BREAKFAST  acetylcysteine (MUCOMYST) 200 mg/mL (20 %) solution 400 mg  400 mg Nebulization TID RT  
 levalbuterol (XOPENEX) nebulizer soln 0.63 mg/3 mL  0.63 mg Nebulization TID RT  
 QUEtiapine (SEROquel) tablet 12.5 mg  12.5 mg Oral QHS  metoprolol tartrate (LOPRESSOR) tablet 12.5 mg  12.5 mg Oral Q12H  
 finasteride (PROSCAR) tablet 5 mg  5 mg Oral DAILY  tamsulosin (FLOMAX) capsule 0.4 mg  0.4 mg Oral DAILY  sodium chloride (NS) flush 5-10 mL  5-10 mL IntraVENous Q8H  
 enoxaparin (LOVENOX) injection 40 mg  40 mg SubCUTAneous Q24H Review of Systems: A comprehensive review of systems was negative except for: Musculoskeletal: positive for leg pain Objective: 
Vital Signs:   
Visit Vitals BP (!) 161/103 (BP 1 Location: Left arm, BP Patient Position: At rest) Pulse 84 Temp 98 °F (36.7 °C) Resp 18 Ht 5' 8\" (1.727 m) Wt 61.7 kg (136 lb) SpO2 98% BMI 20.68 kg/m² O2 Device: Nasal cannula O2 Flow Rate (L/min): 2 l/min Temp (24hrs), Av.9 °F (36.6 °C), Min:97.6 °F (36.4 °C), Max:98.2 °F (36.8 °C) Intake/Output:  
Last shift:      No intake/output data recorded. Last 3 shifts:  1901 -  0700 In: 2607.5 [P.O.:650; I.V.:1957.5] Out: 775 [Urine:775] Intake/Output Summary (Last 24 hours) at 12/3/2018 2343 Last data filed at 12/3/2018 0285 Gross per 24 hour Intake 1282.5 ml Output 300 ml Net 982.5 ml Physical Exam:  
General:  Alert, no distress  AAM eating breakfast w minimal assistance Head:  Normocephalic, without obvious abnormality, atraumatic. Eyes:  Conjunctivae/corneas clear. Nose: Nares normal. Septum midline. Mucosa normal.    
Throat: Lips, mucosa, and tongue normal.    
Neck: Supple, symmetrical, trachea midline Back:   Symmetric, no curvature. ROM normal.  
Lungs:   Poor bilateral air movement with few scattered ronchi and wheezes Chest wall:  No tenderness or deformity. Heart:  Irregular, normal rate Abdomen:   Soft, non-tender. Bowel sounds normal.   
Extremities: Extremities normal, atraumatic, no cyanosis, +LE edema Skin: Skin color, texture, turgor normal. No rashes or lesions Neurologic: Grossly nonfocal, conversant and polite Data:  
Labs: 
Recent Labs 12/01/18 
0422 WBC 9.4 HGB 12.3 HCT 38.3  Recent Labs 12/02/18 
0429 12/01/18 
0422  137  
K 4.1 3.9  106 CO2 28 26 * 138* BUN 24* 19  
CREA 0.82 0.76 CA 8.8 8.8 MG  --  1.9 PHOS 2.3* 2.5* ALB  --  3.2* TBILI  --  0.5 SGOT  --  22 ALT  --  20 Recent Labs 11/30/18 [address] PH 7.40 PCO2 41 PO2 69* HCO3 25 Imaging: 
I have personally reviewed the patients radiographs: 
None today Dell Arevalo MD

## 2018-12-03 NOTE — PROGRESS NOTES
Problem: Self Care Deficits Care Plan (Adult) Goal: *Acute Goals and Plan of Care (Insert Text) Occupational Therapy Goals Initiated 12/3/2018 1. Patient will perform grooming standing at the sink with contact guard assist within 7 day(s). 2.  Patient will perform bathing with supervision/standby assist within 7 day(s). 3.  Patient will perform lower body dressing with supervision/standby assist within 7 day(s). 4.  Patient will perform toilet transfers with supervision/standby assist within 7 day(s). 5.  Patient will perform all aspects of toileting with contact guard assist within 7 day(s). Occupational Therapy EVALUATION Patient: Yohana Mancini (61 y.o. male) Date: 12/3/2018 Primary Diagnosis: Acute respiratory failure with hypoxia and hypercapnia (HCC) Precautions:  Fall, DNR 
 
ASSESSMENT : 
Based on the objective data described below, the patient presents with deficits in self-care, primarily due to decreased activity tolerance, decreased safety, poor insight into deficits, decreased dynamic balance, impaired short term memory, and general weakness. He is pleasantly confused, oriented to self only. He was admitted from [facility]. Unsure of exact prior level of function, but per chart, he has a hired caregiver that comes to assist with patient as needed. Patient will benefit from skilled OT treatment to attempt to increase safety and independence in basic self-care tasks. Patient will benefit from skilled intervention to address the above impairments. Patients rehabilitation potential is considered to be Fair Factors which may influence rehabilitation potential include:  
[]             None noted [x]             Mental ability/status []             Medical condition []             Home/family situation and support systems [x]             Safety awareness []             Pain tolerance/management 
[]             Other: PLAN : 
Recommendations and Planned Interventions: [x]               Self Care Training                  [x]        Therapeutic Activities [x]               Functional Mobility Training    [x]        Cognitive Retraining 
[x]               Therapeutic Exercises           []        Endurance Activities [x]               Balance Training                   []        Neuromuscular Re-Education []               Visual/Perceptual Training     [x]   Home Safety Training 
[x]               Patient Education                 [x]        Family Training/Education []               Other (comment): Frequency/Duration: Patient will be followed by occupational therapy 3 times a week to address goals. Discharge Recommendations: [facility] vs Return to [address] Further Equipment Recommendations for Discharge: Defer to facility SUBJECTIVE:  
Patient stated I'll take some money, if you've got it.   (when asked if he needed anything at the end of the session) OBJECTIVE DATA SUMMARY:  
HISTORY:  
Past Medical History:  
Diagnosis Date  Anemia  BPH (benign prostatic hypertrophy)  Dementia  Gout  High cholesterol 06/18 taken off meds  Hypertension 6/18 was taken off BP meds  Left inguinal hernia 07/2018 Past Surgical History:  
Procedure Laterality Date  HX HERNIA REPAIR  9-10-13 RIHR with mesh-[facility]-Dr. Soriano More  
 HX HERNIA REPAIR  07/31/2018  
 open LIHR with mesh Prior Level of Function/Environment/Context: Unsure; see above Expanded or extensive additional review of patient history: h/o dementia Home Situation Home Environment: Long term care Care Facility Name: [facility] One/Two Story Residence: One story Living Alone: No 
Support Systems: Assisted living Patient Expects to be Discharged to[de-identified] Skilled nursing facility Current DME Used/Available at Home: None Hand dominance: RightEXAMINATION OF PERFORMANCE DEFICITS: 
Cognitive/Behavioral Status: 
Neurologic State: Alert;Confused Orientation Level: Disoriented to place; Disoriented to time;Disoriented to situation;Oriented to person Cognition: Decreased attention/concentration; Follows commands; Impaired decision making;Poor safety awareness Perception: Appears intact Perseveration: Perseverates during conversation Safety/Judgement: Decreased awareness of environment;Decreased insight into deficits; Decreased awareness of need for safety;Decreased awareness of need for assistance Skin: No visible issues Edema: R knee Hearing: Auditory Auditory Impairment: Hard of hearing, bilateral 
Vision/Perceptual:   
Not formally tested; Patient was able to read SpO2 on the portable pulse ox. Range of Motion: 
AROM: Within functional limits Strength: 
Strength: Generally decreased, functional 
  
  
  
  
Coordination: 
  
Fine Motor Skills-Upper: Left Intact; Right Intact Gross Motor Skills-Upper: Left Intact; Right Intact Balance: 
Sitting: Intact Standing: Impaired Standing - Static: Good Standing - Dynamic : Fair Functional Mobility and Transfers for ADLs:Bed Mobility: 
Rolling: Supervision Supine to Sit: Supervision Scooting: Supervision Transfers: 
Sit to Stand: Minimum assistance Stand to Sit: Minimum assistance ADL Assessment: 
Feeding: (Not tested) Oral Facial Hygiene/Grooming: Supervision(seated) Bathing: Minimum assistance Upper Body Dressing: Supervision Lower Body Dressing: Minimum assistance Toileting: Moderate assistance ADL Intervention and task modifications: 
Discussed safety and fall prevention during ADL. Educated patient on safe techniques for sit to stand and functional transfers using RW. Unsure of his ability to learn/carry-over due to cognitive deficits. Cognitive Retraining Safety/Judgement: Decreased awareness of environment;Decreased insight into deficits; Decreased awareness of need for safety;Decreased awareness of need for assistance Functional Measure: 
Barthel Index: 
 
Bathin Bladder: 5 Bowels: 5 Groomin Dressin Feedin Mobility: 0 Stairs: 0 Toilet Use: 0 Transfer (Bed to Chair and Back): 5 Total: 25 Barthel and G-code impairment scale: 
Percentage of impairment CH 
0% CI 
1-19% CJ 
20-39% CK 
40-59% CL 
60-79% CM 
80-99% CN 
100% Barthel Score 0-100 100 99-80 79-60 59-40 20-39 1-19 
 0 Barthel Score 0-20 20 17-19 13-16 9-12 5-8 1-4 0 The Barthel ADL Index: Guidelines 1. The index should be used as a record of what a patient does, not as a record of what a patient could do. 2. The main aim is to establish degree of independence from any help, physical or verbal, however minor and for whatever reason. 3. The need for supervision renders the patient not independent. 4. A patient's performance should be established using the best available evidence. Asking the patient, friends/relatives and nurses are the usual sources, but direct observation and common sense are also important. However direct testing is not needed. 5. Usually the patient's performance over the preceding 24-48 hours is important, but occasionally longer periods will be relevant. 6. Middle categories imply that the patient supplies over 50 per cent of the effort. 7. Use of aids to be independent is allowed. Luisa Olson., Barthel, D.W. (1579). Functional evaluation: the Barthel Index. [facility] (14)2. JHONY Saab, Ericka White., Chanelle Castro.[facility], [address] [place] [address] (). Measuring the change indisability after inpatient rehabilitation; comparison of the responsiveness of the Barthel Index and Functional [place] Measure. Journal of Neurology, Neurosurgery, and Psychiatry, 66(4), 674-127.  
ARIELLE Elder, BAILEY Hammond, Leora Valdivia MShaynaA. (2004.) Assessment of post-stroke quality of life in cost-effectiveness studies: The usefulness of the Barthel Index and the EuroQoL-5D. [facility], 13, 165-20 G codes: In compliance with CMSs Claims Based Outcome Reporting, the following G-code set was chosen for this patient based on their primary functional limitation being treated: The outcome measure chosen to determine the severity of the functional limitation was the Barthel Index with a score of 25/100 which was correlated with the impairment scale. ? Self Care:  
  - CURRENT STATUS: CL - 60%-79% impaired, limited or restricted  - GOAL STATUS: CK - 40%-59% impaired, limited or restricted  - D/C STATUS:  ---------------To be determined--------------- Occupational Therapy Evaluation Charge Determination History Examination Decision-Making MEDIUM Complexity : Expanded review of history including physical, cognitive and psychosocial  history  HIGH Complexity : 5 or more performance deficits relating to physical, cognitive , or psychosocial skils that result in activity limitations and / or participation restrictions MEDIUM Complexity : Patient may present with comorbidities that affect occupational performnce. Miniml to moderate modification of tasks or assistance (eg, physical or verbal ) with assesment(s) is necessary to enable patient to complete evaluation Based on the above components, the patient evaluation is determined to be of the following complexity level: MEDIUM Pain: 
Pain Scale 1: Adult Nonverbal Pain Scale Pain Intensity 1: 0 Activity Tolerance:  
Fair After treatment:  
[] Patient left in no apparent distress sitting up in chair 
[x] Patient left in no apparent distress in bed 
[x] Call bell left within reach [x] Nursing notified 
[] Caregiver present [x] Bed alarm activated COMMUNICATION/EDUCATION:  
The patients plan of care was discussed with: Physical Therapist and Registered Nurse. 
[] Home safety education was provided and the patient/caregiver indicated understanding. [] Patient/family have participated as able in goal setting and plan of care. [] Patient/family agree to work toward stated goals and plan of care. [] Patient understands intent and goals of therapy, but is neutral about his/her participation. [x] Patient is unable to participate in goal setting and plan of care. This patients plan of care is appropriate for delegation to [facility]. Thank you for this referral. 
Carol Levy, OTR/L Time Calculation: 24 mins

## 2018-12-03 NOTE — PROGRESS NOTES
PCU SHIFT NURSING NOTE Bedside shift change report given to [address] (oncoming nurse) by Brian Mccallum RN (offgoing nurse). Report included the following information SBAR. Shift Summary:  
1920 received handoff report at bedside. Pt. In bed with HOB elevated watching TV rhythm sinus tach in no distress. telesitter in room. Admission Date 11/28/2018 Admission Diagnosis Acute respiratory failure with hypoxia and hypercapnia (HCC) Consults IP CONSULT TO PALLIATIVE CARE - PROVIDER 
IP CONSULT TO NEUROLOGY 
IP CONSULT TO PULMONOLOGY 
IP CONSULT TO CARDIOLOGY Consults []PT []OT []Speech  
[]Case Management  
  
[] Palliative Cardiac Monitoring Order []Yes []No  
 
IV drips []Yes Drip:                            Dose: 
Drip:                            Dose: 
Drip:                            Dose:  
[]No  
 
GI Prophylaxis []Yes []No  
 
 
 
DVT Prophylaxis SCDs:     
     
 Matti stockings:     
  
[] Medication []Contraindicated []None Activity Level Activity Level: Bed Rest   
 Activity Assistance: Partial (one person) Purposeful Rounding every 1-2 hour? []Yes Belcher Score  Total Score: 6 Bed Alarm (If score 3 or >) []Yes  
[] Refused (See signed refusal form in chart) Lane Score  Lane Score: 17 Lane Score (if score 14 or less) []PMT consult  
[]Wound Care consult []Specialty bed  
[] Nutrition consult Needs prior to discharge:  
Home O2 required:   
[]Yes []No  
 If yes, how much O2 required? Other:  
 Last Bowel Movement: Last Bowel Movement Date: 12/02/18 Influenza Vaccine Received Flu Vaccine for Current Season (usually Sept-March): Yes Pneumonia Vaccine Diet Active Orders Diet DIET DYSPHAGIA PUREED (NDD1) LDAs Peripheral IV 12/01/18 Anterior;Right Antecubital (Active) Site Assessment Clean, dry, & intact 12/2/2018  3:38 PM  
Phlebitis Assessment 0 12/2/2018  3:38 PM  
 Infiltration Assessment 0 12/2/2018  3:38 PM  
Dressing Status Clean, dry, & intact 12/2/2018  3:38 PM  
Dressing Type Tape;Transparent 12/2/2018  3:38 PM  
Hub Color/Line Status Blue; Infusing 12/2/2018  3:38 PM  
Action Taken Open ports on tubing capped 12/2/2018  3:43 AM  
Alcohol Cap Used Yes 12/2/2018  3:43 AM  
                  
Urinary Catheter Intake & Output Date 12/01/18 1900 - 12/02/18 8545 12/02/18 0700 - 12/03/18 7489 Shift 6420-8893 24 Hour Total 3737-8878 8469-3480 24 Hour Total  
INTAKE  
P.O.  100     
  P. O.  100     
I. V.(mL/kg/hr) 1325 1675 432.5(0.6)  432.5 Volume (0.9% sodium chloride infusion) 1125 1125 432.5  432.5 Volume (piperacillin-tazobactam (ZOSYN) 3.375 g in 0.9% sodium chloride (MBP/ADV) 100 mL) 200 400 Volume (levoFLOXacin (LEVAQUIN) 750 mg in D5W IVPB) 0 150 Shift Total(mL/kg) 1325(21.5) 1775(28.8) 432.5(7)  432.5(7) OUTPUT Urine(mL/kg/hr) 475 1000 Urine Voided 475 1000 Urine Occurrence(s)  1 x 2 x  2 x Stool Stool Occurrence(s) 1 x 1 x Shift Total(mL/kg) 475(7.7) 1000(16.2)  775 432.5  432.5 Weight (kg) 61.7 61.7 61.7 61.7 61.7 Readmission Risk Assessment Tool Score Medium Risk 25 Total Score 3 Has Seen PCP in Last 6 Months (Yes=3, No=0) 2 . Living with Significant Other. Assisted Living. LTAC. SNF. or  
Rehab  
 4 IP Visits Last 12 Months (1-3=4, 4=9, >4=11) 9 Pt. Coverage (Medicare=5 , Medicaid, or Self-Pay=4) Criteria that do not apply:  
 Patient Length of Stay (>5 days = 3) Charlson Comorbidity Score (Age + Comorbid Conditions) Expected Length of Stay 3d 19h Actual Length of Stay 3

## 2018-12-03 NOTE — PROGRESS NOTES
Problem: Mobility Impaired (Adult and Pediatric) Goal: *Acute Goals and Plan of Care (Insert Text) Physical Therapy Goals Initiated 12/3/2018 1. Patient will move from supine to sit and sit to supine  in bed with supervision/set-up within 7 day(s). 2.  Patient will transfer from bed to chair and chair to bed with supervision/set-up using the least restrictive device within 7 day(s). 3.  Patient will perform sit to stand with supervision/set-up within 7 day(s). 4.  Patient will ambulate with supervision/set-up for 50 feet with the least restrictive device within 7 day(s). physical Therapy EVALUATION Patient: Lauryn Saleem (57 y.o. male) Date: 12/3/2018 Primary Diagnosis: Acute respiratory failure with hypoxia and hypercapnia (HCC) Precautions:   Fall, DNR 
 
ASSESSMENT : 
Based on the objective data described below, the patient presents with decreased functional mobility, impaired balance and gait, baseline confusion. Pt cleared by RN to participate in therapy. Pt received supine in bed and agreeable to therapy. Pt oriented to self only. Per chart review, pt was living at [facility] (/SNF). Pt tolerated evaluation fairly well. SpO2: 91% at rest and during activity 94%, although noted increased work of breathing and difficulty taking in deep breaths. Pt completed bed mobility with supervision, sit<>stand with RW with min A. Pt able to take side steps along the bedside with RW with min A. Pt demonstrated narrow TARI, decreased alesia and shuffle steps. Pt with slight posterior trunk leaning during static and dynamic standing. Pt assisted back to supine position as he is transferring to another unit. Pt will continue to benefit from PT to progress mobility, improve tolerance to activity. Pt will benefit from SNF placement pending progress and medical course. Niki Darnell Patient will benefit from skilled intervention to address the above impairments. Patients rehabilitation potential is considered to be Fair Factors which may influence rehabilitation potential include:  
[]         None noted 
[x]         Mental ability/status [x]         Medical condition 
[]         Home/family situation and support systems 
[]         Safety awareness 
[]         Pain tolerance/management 
[]         Other: PLAN : 
Recommendations and Planned Interventions: 
[x]           Bed Mobility Training             [x]    Neuromuscular Re-Education 
[x]           Transfer Training                   []    Orthotic/Prosthetic Training 
[x]           Gait Training                         []    Modalities [x]           Therapeutic Exercises           []    Edema Management/Control 
[x]           Therapeutic Activities            [x]    Patient and Family Training/Education 
[]           Other (comment): Frequency/Duration: Patient will be followed by physical therapy  3 times a week to address goals. Discharge Recommendations: Placido Cesar Further Equipment Recommendations for Discharge: none SUBJECTIVE:  
Patient stated Tiny Railing.   When asked who is the current President. And patient reported\"1986\" as the current year OBJECTIVE DATA SUMMARY:  
HISTORY:   
Past Medical History:  
Diagnosis Date  Anemia  BPH (benign prostatic hypertrophy)  Dementia  Gout  High cholesterol 06/18 taken off meds  Hypertension 6/18 was taken off BP meds  Left inguinal hernia 07/2018 Past Surgical History:  
Procedure Laterality Date  HX HERNIA REPAIR  9-10-13 [facility] with mesh-[facility]-Dr. Na Yang  
 HX HERNIA REPAIR  07/31/2018  
 open LI[facility] with mesh Prior Level of Function/Home Situation: pt unreliable historian given history of dementia. Per chart review pt lives at [facility] (DP/SNF) Personal factors and/or comorbidities impacting plan of care:  
 
Home Situation Home Environment: Long term care Care Facility Name: [facility] One/Two Story Residence: One story Living Alone: No 
Support Systems: Assisted living Patient Expects to be Discharged to[de-identified] Skilled nursing facility Current DME Used/Available at Home: None EXAMINATION/PRESENTATION/DECISION MAKING: Critical Behavior: 
Neurologic State: Alert, Confused Orientation Level: Disoriented to place, Disoriented to time, Disoriented to situation, Oriented to person Cognition: Decreased attention/concentration, Follows commands, Impaired decision making, Poor safety awareness Hearing: Auditory Auditory Impairment: Hard of hearing, bilateralSkin:   
Edema:  
Range Of Motion: 
AROM: Within functional limits Strength:   
Strength: Generally decreased, functional 
  
  
  
  
  
  
Tone & Sensation:  
  
  
  
  
  
  
  
  
  
   
Coordination: 
  
Vision:  
  
Functional Mobility: 
Bed Mobility: 
Rolling: Supervision Supine to Sit: Supervision Scooting: Supervision Transfers: 
Sit to Stand: Minimum assistance Stand to Sit: Minimum assistance Balance:  
Sitting: Intact Standing: Impaired Standing - Static: Good Standing - Dynamic : FairAmbulation/Gait Training:Distance (ft): 5 Feet (ft) Assistive Device: Gait belt;Walker, rolling Ambulation - Level of Assistance: Minimal assistance Gait Abnormalities: Decreased step clearance Base of Support: Widened;Center of gravity altered Speed/Johanna: Pace decreased (<100 feet/min) Step Length: Right shortened;Left shortened Stairs: Therapeutic Exercises:  
 
 
Functional Measure: 
Barthel Index: 
 
Bathin Bladder: 5 Bowels: 5 Groomin Dressin Feedin Mobility: 0 Stairs: 0 Toilet Use: 0 Transfer (Bed to Chair and Back): 5 Total: 25 Barthel and G-code impairment scale: 
Percentage of impairment CH 
0% CI 
1-19% CJ 
20-39% CK 
40-59% CL 
60-79% CM 
80-99% CN 
100% Barthel Score 0-100 100 99-80 79-60 59-40 20-39 1-19 
 0 Barthel Score 0-20 20 17-19 13-16 9-12 5-8 1-4 0 The Barthel ADL Index: Guidelines 1. The index should be used as a record of what a patient does, not as a record of what a patient could do. 2. The main aim is to establish degree of independence from any help, physical or verbal, however minor and for whatever reason. 3. The need for supervision renders the patient not independent. 4. A patient's performance should be established using the best available evidence. Asking the patient, friends/relatives and nurses are the usual sources, but direct observation and common sense are also important. However direct testing is not needed. 5. Usually the patient's performance over the preceding 24-48 hours is important, but occasionally longer periods will be relevant. 6. Middle categories imply that the patient supplies over 50 per cent of the effort. 7. Use of aids to be independent is allowed. Jennifer Kwan., Barthel, D.W. (6086). Functional evaluation: the Barthel Index. [facility] (14)2. ALENA DeF, Janice Cortes., [facility]., [place], [address] [facility] (1999). Measuring the change indisability after inpatient rehabilitation; comparison of the responsiveness of the Barthel Index and Functional Rains Measure. Journal of Neurology, Neurosurgery, and Psychiatry, 66(4), 470-013. Shayla Israel, N.J.A, Nathaniel May,  W.J.MICHELLE, & Caterina Corbett MKEATON. (2004.) Assessment of post-stroke quality of life in cost-effectiveness studies: The usefulness of the Barthel Index and the EuroQoL-5D. [facility], 13, 615-55 G codes: In compliance with CMSs Claims Based Outcome Reporting, the following G-code set was chosen for this patient based on their primary functional limitation being treated:  
 
The outcome measure chosen to determine the severity of the functional limitation was the barthel with a score of 25/100 which was correlated with the impairment scale. ? Mobility - Walking and Moving Around:  
  - CURRENT STATUS: CL - 60%-79% impaired, limited or restricted  - GOAL STATUS: CK - 40%-59% impaired, limited or restricted  - D/C STATUS:  ---------------To be determined--------------- Based on the above components, the patient evaluation is determined to be of the following complexity level: LOW Pain: 
Pain Scale 1: Adult Nonverbal Pain Scale Pain Intensity 1: 0 Activity Tolerance:  
Fair. VSS Please refer to the flowsheet for vital signs taken during this treatment. After treatment:  
[]         Patient left in no apparent distress sitting up in chair 
[x]         Patient left in no apparent distress in bed 
[x]         Call bell left within reach [x]         Nursing notified 
[x]         Caregiver present [x]         Bed alarm activated, Telesitter COMMUNICATION/EDUCATION:  
The patients plan of care was discussed with: Occupational Therapist and Registered Nurse. 
[]         Fall prevention education was provided and the patient/caregiver indicated understanding. []         Patient/family have participated as able in goal setting and plan of care. []         Patient/family agree to work toward stated goals and plan of care. []         Patient understands intent and goals of therapy, but is neutral about his/her participation. [x]         Patient is unable to participate in goal setting and plan of care. Thank you for this referral. 
Monita Bumpers, PT, DPT Time Calculation: 21 mins

## 2018-12-04 ENCOUNTER — APPOINTMENT (OUTPATIENT)
Dept: GENERAL RADIOLOGY | Age: 83
DRG: 189 | End: 2018-12-04
Attending: NURSE PRACTITIONER
Payer: MEDICARE

## 2018-12-04 PROCEDURE — 65660000000 HC RM CCU STEPDOWN

## 2018-12-04 PROCEDURE — 74011636637 HC RX REV CODE- 636/637: Performed by: NURSE PRACTITIONER

## 2018-12-04 PROCEDURE — 94760 N-INVAS EAR/PLS OXIMETRY 1: CPT

## 2018-12-04 PROCEDURE — 74011250637 HC RX REV CODE- 250/637: Performed by: NURSE PRACTITIONER

## 2018-12-04 PROCEDURE — 77010033678 HC OXYGEN DAILY

## 2018-12-04 PROCEDURE — 97530 THERAPEUTIC ACTIVITIES: CPT | Performed by: PHYSICAL THERAPIST

## 2018-12-04 PROCEDURE — 71045 X-RAY EXAM CHEST 1 VIEW: CPT

## 2018-12-04 PROCEDURE — 74011250636 HC RX REV CODE- 250/636: Performed by: GENERAL ACUTE CARE HOSPITAL

## 2018-12-04 PROCEDURE — 93005 ELECTROCARDIOGRAM TRACING: CPT

## 2018-12-04 PROCEDURE — 97535 SELF CARE MNGMENT TRAINING: CPT | Performed by: OCCUPATIONAL THERAPIST

## 2018-12-04 PROCEDURE — 74011250636 HC RX REV CODE- 250/636: Performed by: INTERNAL MEDICINE

## 2018-12-04 PROCEDURE — 74011250637 HC RX REV CODE- 250/637: Performed by: INTERNAL MEDICINE

## 2018-12-04 PROCEDURE — 97530 THERAPEUTIC ACTIVITIES: CPT | Performed by: OCCUPATIONAL THERAPIST

## 2018-12-04 PROCEDURE — 74011000250 HC RX REV CODE- 250: Performed by: GENERAL ACUTE CARE HOSPITAL

## 2018-12-04 PROCEDURE — 94640 AIRWAY INHALATION TREATMENT: CPT

## 2018-12-04 PROCEDURE — 92526 ORAL FUNCTION THERAPY: CPT

## 2018-12-04 PROCEDURE — 97116 GAIT TRAINING THERAPY: CPT | Performed by: PHYSICAL THERAPIST

## 2018-12-04 RX ORDER — FUROSEMIDE 40 MG/1
40 TABLET ORAL ONCE
Status: COMPLETED | OUTPATIENT
Start: 2018-12-04 | End: 2018-12-04

## 2018-12-04 RX ADMIN — DILTIAZEM HYDROCHLORIDE 120 MG: 120 CAPSULE, COATED, EXTENDED RELEASE ORAL at 09:57

## 2018-12-04 RX ADMIN — METOPROLOL TARTRATE 25 MG: 25 TABLET ORAL at 09:57

## 2018-12-04 RX ADMIN — AMOXICILLIN AND CLAVULANATE POTASSIUM 600 MG: 600; 42.9 POWDER, FOR SUSPENSION ORAL at 22:00

## 2018-12-04 RX ADMIN — PREDNISONE 40 MG: 20 TABLET ORAL at 09:57

## 2018-12-04 RX ADMIN — LEVALBUTEROL HYDROCHLORIDE 0.63 MG: 0.63 SOLUTION RESPIRATORY (INHALATION) at 14:25

## 2018-12-04 RX ADMIN — AMOXICILLIN AND CLAVULANATE POTASSIUM 600 MG: 600; 42.9 POWDER, FOR SUSPENSION ORAL at 00:27

## 2018-12-04 RX ADMIN — GUAIFENESIN 200 MG: 200 SOLUTION ORAL at 13:59

## 2018-12-04 RX ADMIN — QUETIAPINE FUMARATE 12.5 MG: 25 TABLET ORAL at 21:59

## 2018-12-04 RX ADMIN — LEVALBUTEROL HYDROCHLORIDE 0.63 MG: 0.63 SOLUTION RESPIRATORY (INHALATION) at 21:05

## 2018-12-04 RX ADMIN — FINASTERIDE 5 MG: 5 TABLET, FILM COATED ORAL at 09:57

## 2018-12-04 RX ADMIN — HALOPERIDOL LACTATE 2 MG: 5 INJECTION INTRAMUSCULAR at 04:01

## 2018-12-04 RX ADMIN — Medication 10 ML: at 21:59

## 2018-12-04 RX ADMIN — FUROSEMIDE 40 MG: 40 TABLET ORAL at 13:59

## 2018-12-04 RX ADMIN — ACETYLCYSTEINE 400 MG: 200 SOLUTION ORAL; RESPIRATORY (INHALATION) at 20:00

## 2018-12-04 RX ADMIN — Medication 10 ML: at 13:59

## 2018-12-04 RX ADMIN — GUAIFENESIN 200 MG: 200 SOLUTION ORAL at 09:56

## 2018-12-04 RX ADMIN — LEVALBUTEROL HYDROCHLORIDE 0.63 MG: 0.63 SOLUTION RESPIRATORY (INHALATION) at 07:42

## 2018-12-04 RX ADMIN — ACETYLCYSTEINE 400 MG: 200 SOLUTION ORAL; RESPIRATORY (INHALATION) at 14:25

## 2018-12-04 RX ADMIN — Medication 10 ML: at 04:00

## 2018-12-04 RX ADMIN — ACETYLCYSTEINE 400 MG: 200 SOLUTION ORAL; RESPIRATORY (INHALATION) at 07:43

## 2018-12-04 RX ADMIN — GUAIFENESIN 200 MG: 200 SOLUTION ORAL at 17:18

## 2018-12-04 RX ADMIN — AMOXICILLIN AND CLAVULANATE POTASSIUM 600 MG: 600; 42.9 POWDER, FOR SUSPENSION ORAL at 10:07

## 2018-12-04 RX ADMIN — GUAIFENESIN 200 MG: 200 SOLUTION ORAL at 21:59

## 2018-12-04 RX ADMIN — ENOXAPARIN SODIUM 40 MG: 40 INJECTION, SOLUTION INTRAVENOUS; SUBCUTANEOUS at 23:32

## 2018-12-04 RX ADMIN — TAMSULOSIN HYDROCHLORIDE 0.4 MG: 0.4 CAPSULE ORAL at 09:57

## 2018-12-04 NOTE — PROGRESS NOTES
Hospitalist Progress Note NAME: Corrina Amor :  1930 MRN:  [id number] Interim Hospital Summary: 80 y.o. male whom presented on 2018 with Assessment / Plan: 
Acute respiratory failure with hypoxia and hypercapnia POA Due to mucous plugging with suspect due to Aspiration - CTA chest: No PE, bibasilar atelectasis and mucous plugging in the lower lobes right 
greater than left.  borderline enlarged mediastinal and hilar lymph nodes most likely reactive. - Pt was on BiPAP at ER, but no need for BiPAP since the admission. 
-  O2 sat 96% @ 2L via n/c 
- appreciate pulmonology input; wean O2 as long as O2 sat is greater than equal to 92% 
  Continue with Xopenex (changed from albuterol due to tachycardia), mucinex, and solumedrol - Prednisone 40mg po every day; will taper the dose per pulmonology 
- received IV levo and Zosyn, will complete total of 7 days of ABX with Augmentin. - tolerating dysphagia diet without difficulty 
- weak congested cough; will check CXR 
  
AMS ? Lewy body Dementia 
- alert and orient to self and place. However, pt was confused and aggressive with the staff members. Still has tele sitter at bedside 
- continue with seroquel to 12.5mg qhs 
- pt was aggressive, confused, not following direction on . Initially, his presentation was mistaken with alcohol withdrew, but it is very unlikely. Spoke with the nurse Nazario Ku) from [facility] on 2018, who informed me that he is very appropriate, orient x 3, always like to eat at same place. - contacted neurology for any additional recommendation 
[facility] CT: Chronic left subdural fluid collection. Prominent atrophy and white matter disease.   
 
Hypertension Sinus arrhythmia 
- Echo: Systolic function was normal. Ejection fraction was estimated to be 55 %. There were no regional wall motion abnormalities. Moderate mitral valve regurgitation 
- continue with Cardizem CD. - appreciate cardiology input; had 3 second pause today. Pt was asymptomatic. BB was d/c'd by cardiology. Poor candidate for pacemaker. Recommend medical management 
 
  
Urinary retension BMP 
- bladder scan revealed 460ml. No urge to void. straight cath x 1 yesterday. Able to void with the reminder or he gets incontinent of urine. NO PVR. - continue with flomax & Proscar  
  
Hypokalemia 
- replete; will monitor 
  
Code Status: DNR/DNI d/w sister Prashant Kellogg who is POA Surrogate Decision Maker: Sister Prashant Kellogg 
  
DVT Prophylaxis: Lovenox 
  
Baseline: Resides as [facility]  
  
Disposition: SNF  
May transfer to telemetry unit from [facility] 
  
  
 
 
Subjective: Chief Complaint / Reason for Physician Visit \"I don't know what is going on\". Discussed with RN events overnight. Review of Systems: 
Symptom Y/N Comments  Symptom Y/N Comments Fever/Chills n   Chest Pain n   
Poor Appetite    Edema Cough y   Abdominal Pain Sputum    Joint Pain SOB/WU n   Pruritis/Rash Nausea/vomit n   Tolerating PT/OT Diarrhea    Tolerating Diet Constipation    Other Could NOT obtain due to:   
 
Objective: VITALS:  
Last 24hrs VS reviewed since prior progress note. Most recent are: 
Patient Vitals for the past 24 hrs: 
 Temp Pulse Resp BP SpO2  
12/04/18 1432 97.9 °F (36.6 °C) 65 16 132/77 99 % 12/04/18 1427     98 % 12/04/18 1034 97.5 °F (36.4 °C) 80 18 130/79 100 % 12/04/18 0744     98 % 12/04/18 0716 97.6 °F (36.4 °C) 66 17 138/80 100 % 12/03/18 2258 97.4 °F (36.3 °C) 77 18 137/85 96 % 12/03/18 2107  95  145/77   
12/03/18 1942     96 % 12/03/18 1941 97.2 °F (36.2 °C) 96 18 146/84 96 % Intake/Output Summary (Last 24 hours) at 12/4/2018 1817 Last data filed at 12/4/2018 1816 Gross per 24 hour Intake 660 ml Output 950 ml Net -290 ml PHYSICAL EXAM: 
General: Ill appearing, Alert, cooperative, no acute distress   
 EENT:  EOMI. Anicteric sclerae. MMM Resp:  Bilateral rhonchi, no wheezing or rales. No accessory muscle use CV:  Regular  rhythm,  trace of pitting edema GI:  Soft, Non distended, Non tender.  +Bowel sounds Neurologic:  Alert and oriented X self, normal speech, Psych:   Poor insight. Not anxious nor agitated Skin:  No rashes. No jaundice Reviewed most current lab test results and cultures  YES Reviewed most current radiology test results   YES Review and summation of old records today    NO Reviewed patient's current orders and MAR    YES 
PMH/SH reviewed - no change compared to H&P 
________________________________________________________________________ Care Plan discussed with: 
  Comments Patient y Family RN y   
Care Manager y Consultant Multidiciplinary team rounds were held today with , nursing, pharmacist and clinical coordinator. Patient's plan of care was discussed; medications were reviewed and discharge planning was addressed. ________________________________________________________________________ Total NON critical care TIME:  30  Minutes Total CRITICAL CARE TIME Spent:   Minutes non procedure based Comments >50% of visit spent in counseling and coordination of care    
________________________________________________________________________ July Jose NP Procedures: see electronic medical records for all procedures/Xrays and details which were not copied into this note but were reviewed prior to creation of Plan. LABS: 
I reviewed today's most current labs and imaging studies. Pertinent labs include: No results for input(s): WBC, HGB, HCT, PLT, HGBEXT, HCTEXT, PLTEXT in the last 72 hours. Recent Labs 12/02/18 2053   
K 4.1  CO2 28 * BUN 24* CREA 0.82 CA 8.8 PHOS 2.3* Signed: )Merlin Frock, NP

## 2018-12-04 NOTE — PROGRESS NOTES
Speech path treatment note Pt was seen today for swallowing therapy. He has been on dys 1/purees and thins for approx 5 days. He is masticating the cracker well today and it is timely. Taking straw sips of thins well. No coughing noted. He has some upper airway congestion. Will recommend upgrade to dys 2/minced. Looking better today.   
Leah Kapadia, SLP

## 2018-12-04 NOTE — PROGRESS NOTES
Problem: Self Care Deficits Care Plan (Adult) Goal: *Acute Goals and Plan of Care (Insert Text) Occupational Therapy Goals Initiated 12/3/2018 1. Patient will perform grooming standing at the sink with contact guard assist within 7 day(s). 2.  Patient will perform bathing with supervision/standby assist within 7 day(s). 3.  Patient will perform lower body dressing with supervision/standby assist within 7 day(s). 4.  Patient will perform toilet transfers with supervision/standby assist within 7 day(s). 5.  Patient will perform all aspects of toileting with contact guard assist within 7 day(s). Occupational Therapy TREATMENT Patient: Gloria La (18 y.o. male) Date: 12/4/2018 Diagnosis: Acute respiratory failure with hypoxia and hypercapnia (HCC) <principal problem not specified> Precautions: Fall, DNR 
 
ASSESSMENT: 
Patient confused, but pleasant and cooperative. He requires cueing for sequencing and attention to task  during ADLs and functional mobility. Overall he was supervision for bed mobility, CGA for transfers and ambulation, was SBA for toileting and supervision for standing grooming. Cueing frequently needed for safety during functional mobility as well. Patient noted to demonstrate fair activity tolerance and VSS on 2 L NC t/o session. At this point he is benefit from OT and will need SNF rehab at discharge. Progression toward goals: 
[]       Improving appropriately and progressing toward goals [x]       Improving slowly and progressing toward goals 
[]       Not making progress toward goals and plan of care will be adjusted PLAN: 
Patient continues to benefit from skilled intervention to address the above impairments. Continue treatment per established plan of care. Discharge Recommendations:  Placido Cesar Further Equipment Recommendations for Discharge:  TBD OBJECTIVE DATA SUMMARY:  
Cognitive/Behavioral Status: 
Neurologic State: Alert;Confused Orientation Level: Oriented to person;Disoriented to situation;Disoriented to place; Disoriented to time Cognition: Decreased attention/concentration;Decreased command following; Impaired decision making;Poor safety awareness;Memory loss Perseveration: No perseveration noted Safety/Judgement: Decreased awareness of environment;Decreased awareness of need for assistance;Decreased awareness of need for safety;Decreased insight into deficits Functional Mobility and Transfers for ADLs:Bed Mobility: 
Rolling: Supervision Supine to Sit: Supervision Scooting: Supervision Transfers: 
Sit to Stand: Contact guard assistance; Additional time Bed to Chair: Contact guard assistance(ambulating with a RW) Toilet Transfer : Additional time;Contact guard assistance Bathroom Mobility: Contact guard assistance(ambulating with a RW) Balance: 
Sitting: Intact Standing: Impaired Standing - Static: Constant support;Good Standing - Dynamic : Fair ADL Intervention: 
  
 
Grooming Grooming Assistance: Supervision/set up(standing at sink) Washing Face: Supervision/set-up Washing Hands: Supervision/set-up Cues: Verbal cues provided;Visual cues provided(for attention and sequencing) Toileting Toileting Assistance: Stand-by assistance Bladder Hygiene: Supervision/set-up Clothing Management: Stand-by assistance Cues: Verbal cues provided Cognitive Retraining Safety/Judgement: Decreased awareness of environment;Decreased awareness of need for assistance;Decreased awareness of need for safety;Decreased insight into deficits Pain: 
Pain Scale 1: Numeric (0 - 10) Pain Intensity 1: 0 Activity Tolerance: VSS on 2L NC Please refer to the flowsheet for vital signs taken during this treatment. After treatment:  
[x] Patient left in no apparent distress sitting up in chair 
[] Patient left in no apparent distress in bed 
[x] Call bell left within reach [x] Nursing notified [] Caregiver present [x] Bed alarm activated COMMUNICATION/COLLABORATION:  
The patients plan of care was discussed with: Physical Therapist and Registered Nurse Ludin Meyer, OTR/L Time Calculation: 25 mins

## 2018-12-04 NOTE — PROGRESS NOTES
[address] [place] [address]  [phone] Cardiology Progress Note 12/4/2018 4:02 PM 
 
Admit Date: 11/28/2018 Admit Diagnosis:  
Acute respiratory failure with hypoxia and hypercapnia (HCC) Subjective:  
 
Gerardo Velazquez is a 80 y.o. male  With PMH anemia, dementia, HLD, HTN who was admitted for Acute respiratory failure with hypoxia and hypercapnia ([facility] [address] Overnight events: 
-asked to see patient again for two 3 second pauses 
-VSS; On 2L NC 
-labs steady 
-Mr. Yaquelin Weiss is pleasantly confused. He denies chest pain, dizziness, or SOB. Visit Vitals /77 (BP 1 Location: Left arm, BP Patient Position: Supine) Pulse 65 Temp 97.9 °F (36.6 °C) Resp 16 Ht 5' 8\" (1.727 m) Wt 61.7 kg (136 lb) SpO2 99% BMI 20.68 kg/m² Current Facility-Administered Medications Medication Dose Route Frequency  dilTIAZem CD (CARDIZEM CD) capsule 120 mg  120 mg Oral DAILY  amoxicillin-clavulanate (AUGMENTIN) 600-42.9 mg/5 mL oral suspension 600 mg  600 mg Oral BID WITH MEALS  guaiFENesin (ROBITUSSIN) 100 mg/5 mL oral liquid 200 mg  200 mg Oral QID  predniSONE (DELTASONE) tablet 40 mg  40 mg Oral DAILY WITH BREAKFAST  acetylcysteine (MUCOMYST) 200 mg/mL (20 %) solution 400 mg  400 mg Nebulization TID RT  
 levalbuterol (XOPENEX) nebulizer soln 0.63 mg/3 mL  0.63 mg Nebulization TID RT  
 levalbuterol (XOPENEX) nebulizer soln 0.63 mg/3 mL  0.63 mg Nebulization Q4H PRN  
 QUEtiapine (SEROquel) tablet 12.5 mg  12.5 mg Oral QHS  finasteride (PROSCAR) tablet 5 mg  5 mg Oral DAILY  haloperidol lactate (HALDOL) injection 2 mg  2 mg IntraVENous Q6H PRN  
 tamsulosin (FLOMAX) capsule 0.4 mg  0.4 mg Oral DAILY  sodium chloride (NS) flush 5-10 mL  5-10 mL IntraVENous Q8H  
 sodium chloride (NS) flush 5-10 mL  5-10 mL IntraVENous PRN  
 acetaminophen (TYLENOL) tablet 650 mg  650 mg Oral Q6H PRN  
  ondansetron (ZOFRAN) injection 4 mg  4 mg IntraVENous Q4H PRN  
 enoxaparin (LOVENOX) injection 40 mg  40 mg SubCUTAneous Q24H  
 sodium chloride (NS) flush 5-10 mL  5-10 mL IntraVENous PRN Objective:  
  
Physical Exam: 
General Appearance:  pleasantly confused elderly AAM resting in bed sleeping, but easily awakened. Chest:   Coarse right -sounds more upper airway Cardiovascular:  Regular rate and rhythm, no murmur.  
Abdomen:   Soft, non-tender, bowel sounds are active.  
Extremities: palpable distal pulses; trace edema Skin:  Warm and dry.  
 
Data Review: No results for input(s): WBC, HGB, HCT, PLT, HGBEXT, HCTEXT, PLTEXT in the last 72 hours. Recent Labs 12/02/18 
4708   
K 4.1  CO2 28 * BUN 24* CREA 0.82 CA 8.8 PHOS 2.3* No results for input(s): TROIQ, CPK, CKMB in the last 72 hours. Intake/Output Summary (Last 24 hours) at 12/4/2018 8278 Last data filed at 12/4/2018 1541 Gross per 24 hour Intake 660 ml Output 1000 ml Net -340 ml Telemetry: SA/SR with 1st degree AVB;  two pauses of 3.03 and 3.18 seconds ECG: SR with 1st degree AVB Echocardiogram: EF of 55%; NWMA; mod MR. CTA chest: \"1. No definite pulmonary emboli identified.  
2. There is bibasilar atelectasis and mucous plugging in the lower lobes right greater than left.  There is borderline enlarged mediastinal and hilar lymph nodes most likely Reactive. There are tiny micronodules in the upper lobes most likely inflammatory. \" Assessment:  
 
Active Problems: 
  Acute respiratory failure with hypoxia and hypercapnia ([address] (11/29/2018) SOB (shortness of breath) (11/30/2018) Delirium (11/30/2018) Counseling regarding advanced care planning and goals of care (11/30/2018) Plan:  
 
 
Arrhythmia:  Baseline SA/SR with 1st degree AVB. Tachycardia on admission. EF 55% with mod MR.    K 4.1. · Patient with two 3 second pauses today. Asymptomatic. With recent increase in BB yesterday, feel that likely contributed. Will d/c BB and monitor for further pauses. · Continue cardizem · Patient would be a poor candidate for a pacemaker due to his dementia, so ideally will be able to medially manage his heart rate Acute respiratory failure:  Improving. Mucus plugging. · Pulmonary following · Patient with some LE edema today that he did not have last Friday during consult. Lasix given by pulmonary already. I/O 3L + as charted. Carletha Opitz, NP 
DNP, RN, AGAP-[facility] 12/4/2018 Patient seen, examined by me personally. Plan discussed as detailed. Agree with note as outlined by  NP. I confirm findings in history and physical exam. No additional findings noted. Agree with plan as outlined above.   
 
1700 Jaylen Eid MD

## 2018-12-04 NOTE — PROGRESS NOTES
ADULT PROTOCOL: JET AEROSOL  REASSESSMENT Patient  Юлия Mar     80 y.o.   male     12/4/2018  9:44 AM 
 
Breath Sounds Pre Procedure: Right Breath Sounds: Diminished Left Breath Sounds: Diminished Breath Sounds Post Procedure: Right Breath Sounds: Diminished Left Breath Sounds: Diminished Breathing pattern: Pre procedure Breathing Pattern: Regular Post procedure Breathing Pattern: Regular Heart Rate: Pre procedure Pulse: 84 
         Post procedure Pulse: 108 Resp Rate: Pre procedure Respirations: 14 Post procedure Respirations: 18 
 
 
Cough: Pre procedure Cough: Non-productive Post procedure Cough: Non-productive Oxygen: O2 Device: Nasal cannula   2LNC Changed: NO SpO2: Pre procedure SpO2: 98 %   with oxygen Post procedure SpO2: 99 %  with oxygen Nebulizer Therapy: Current medications Aerosolized Medications: Mucomyst, Xopenex Changed: NO 
 
 
 
Problem List:  
Patient Active Problem List  
Diagnosis Code  Incarcerated inguinal hernia K40.30  Hypertension I10  Benign prostatic hyperplasia N40.0  Gout M10.9  High cholesterol E78.00  Benign prostatic hyperplasia without lower urinary tract symptoms N40.0  Left inguinal hernia K40.90  
 BMI less than 19,adult Z68.1  Inguinal hernia K40.90  
 Urinary retention R33.9  Acute respiratory failure with hypoxia and hypercapnia (HCC) J96.01, J96.02  
 SOB (shortness of breath) R06.02  
 Delirium R41.0  Counseling regarding advanced care planning and goals of care Z71.89 Respiratory Therapist: Margi Salvador RT

## 2018-12-04 NOTE — PROGRESS NOTES
Problem: Mobility Impaired (Adult and Pediatric) Goal: *Acute Goals and Plan of Care (Insert Text) Physical Therapy Goals Initiated 12/3/2018 1. Patient will move from supine to sit and sit to supine  in bed with supervision/set-up within 7 day(s). 2.  Patient will transfer from bed to chair and chair to bed with supervision/set-up using the least restrictive device within 7 day(s). 3.  Patient will perform sit to stand with supervision/set-up within 7 day(s). 4.  Patient will ambulate with supervision/set-up for 50 feet with the least restrictive device within 7 day(s). physical Therapy TREATMENT Patient: Henrry Mcwilliams (83 y.o. male) Date: 12/4/2018 Diagnosis: Acute respiratory failure with hypoxia and hypercapnia (HCC) <principal problem not specified> Precautions: Fall, DNR Chart, physical therapy assessment, plan of care and goals were reviewed. ASSESSMENT: 
Patient making steady progress toward goals. Patient needing supervision with bed mobility but has trouble sequencing task. Sit to stand with CGA and cues for technique and some mild posterior lean initially but able to correct with extra time. Amb approx 50 feet with Rw and CGA with CGA and needs redirection to stay on task. Continues to be confused stating that it is [address] [place] [address] is the president. Recommend return to SNF. Progression toward goals: 
[x]    Improving appropriately and progressing toward goals 
[]    Improving slowly and progressing toward goals 
[]    Not making progress toward goals and plan of care will be adjusted PLAN: 
Patient continues to benefit from skilled intervention to address the above impairments. Continue treatment per established plan of care. Discharge Recommendations:  Placido Cesar Further Equipment Recommendations for Discharge:  none SUBJECTIVE:  
Patient stated I don't know where I am. OBJECTIVE DATA SUMMARY:  
Critical Behavior: Neurologic State: Alert, Confused Orientation Level: Oriented to person, Disoriented to situation, Disoriented to place, Disoriented to time Cognition: Decreased attention/concentration, Decreased command following, Impaired decision making, Poor safety awareness, Memory loss Safety/Judgement: Decreased awareness of environment, Decreased awareness of need for assistance, Decreased awareness of need for safety, Decreased insight into deficits Functional Mobility Training: 
Bed Mobility: 
Rolling: Supervision Supine to Sit: Supervision Scooting: Supervision Transfers: 
Sit to Stand: Contact guard assistance Stand to Sit: Contact guard assistance Bed to Chair: Contact guard assistance(ambulating with a RW) Balance: 
Sitting: Intact Standing: Impaired Standing - Static: Constant support;Good Standing - Dynamic : FairAmbulation/Gait Training: 
Distance (ft): 50 Feet (ft) Assistive Device: Gait belt;Walker, rolling Ambulation - Level of Assistance: Contact guard assistance Gait Abnormalities: Decreased step clearance;Shuffling gait Base of Support: Widened Speed/Johanna: Pace decreased (<100 feet/min); Slow Step Length: Left shortened;Right shortened Pain: 
Pain Scale 1: Numeric (0 - 10) Pain Intensity 1: 0 Activity Tolerance: VSS Please refer to the flowsheet for vital signs taken during this treatment. After treatment:  
[x]    Patient left in no apparent distress sitting up in chair 
[]    Patient left in no apparent distress in bed 
[x]    Call bell left within reach [x]    Nursing notified 
[]    Caregiver present [x]    Chair alarm activated COMMUNICATION/COLLABORATION:  
The patients plan of care was discussed with: Physical Therapist, Occupational Therapist and Registered Nurse Marisol Hay PT, DPT Time Calculation: 26 mins

## 2018-12-04 NOTE — PROGRESS NOTES
[facility], Critical Care, and Sleep Medicine Name: Yves Reyez MRN: [id number] : 1930 Hospital: [facility] Date: 2018 IMPRESSION:  
· Acute hypoxic/hypercapnic respiratory failure- still on O2 
· Bronchitis (can not exclude undiagnosed exacerbated COPD)- still congested · Mucus plugging - mild and related to bronchitis · LE edema with pain · Dementia- pleasant and follows commands · H/O tobacco use RECOMMENDATIONS:  
· O2 - wean · Diuresis? · Bronchodilators · Systemic steroids- taper · Pulmonary toilet · Mucolytics · Empiric antibiotics · DVT prophylaxis Subjective:  
 
 pt still has leg edema- no calf pain. No cramps. On NC O2. Congested cough. 12/3- d/w nursing. Yumi Rough Friday, weak. Telesitter. From  Care. REady for activity. Congested cough with deep breathes. No fever. Wheezes musical. Sister calls daily. 18: agitation/confusion this morning, now more calm and eating breakfast 
 
Initial history: This patient has been seen and evaluated at the request of Dr. Crystal Ku for mucus plugging. Patient is a 80 y.o. male former smoker, presented yesterday with acute on chronic respiratory distress. This had been going on for an extended period of time, but got worse yesterday. He was treated with BiPAP, steroids, and bronchodilators, and he reports he is feeling much better today. Has a cough productive of only minimal amounts of clear sputum. No f/c. Past Medical History:  
Diagnosis Date  Anemia  BPH (benign prostatic hypertrophy)  Dementia  Gout  High cholesterol  taken off meds  Hypertension  was taken off BP meds  Left inguinal hernia 2018 Past Surgical History:  
Procedure Laterality Date  HX HERNIA REPAIR  9-10-13 [facility] with mesh-[facility]-Dr. Tanner Bustos  
 HX HERNIA REPAIR  2018  
 open [facility] with mesh Prior to Admission medications Medication Sig Start Date End Date Taking? Authorizing Provider  
dutasteride (AVODART) 0.5 mg capsule Take 0.5 mg by mouth daily. Yes Provider, Historical  
bisacodyl (DULCOLAX) 10 mg suppository Insert 10 mg into rectum daily as needed (constipation unrelieved by miralax). Yes Provider, Historical  
dilTIAZem (CARDIZEM) 30 mg tablet Take 30 mg by mouth every twelve (12) hours. Yes Provider, Historical  
sodium phosphate (ENEMA) 19-7 gram/118 mL enema Insert 1 Enema into rectum daily as needed (constipation unrelieved by bisacodyl suppository). Yes Provider, Historical  
senna-docusate (SENNA WITH DOCUSATE SODIUM) 8.6-50 mg per tablet Take 1 Tab by mouth daily as needed for Constipation (try first if constipation). Yes Provider, Historical  
acetaminophen (TYLENOL) 325 mg tablet Take 650 mg by mouth every eight (8) hours as needed for Pain or Fever. Yes Provider, Historical  
polyethylene glycol (MIRALAX) 17 gram packet Take 17 g by mouth as needed (constipation w/ no BM x 3 days). Yes Provider, Historical  
ibuprofen (MOTRIN) 200 mg tablet Take 2 Tabs by mouth every six (6) hours as needed for Pain. 8/3/18  Yes Darcus Alpers, MD  
albuterol (PROVENTIL VENTOLIN) 2.5 mg /3 mL (0.083 %) nebulizer solution 3 mL by Nebulization route every four (4) hours as needed for Wheezing or Shortness of Breath. 7/24/18  Yes Fili Gee NP  
tamsulosin (FLOMAX) 0.4 mg capsule TAKE 1 CAPSULE BY MOUTH EVERY DAY 5/22/18  Yes Sierra Mcneal NP  
ferrous sulfate 325 mg (65 mg iron) tablet Take 1 Tab by mouth two (2) times daily (with meals). On an empty stomach with Vitamin C (like OJ) 7/20/17  Yes Sierra Mcneal NP Nebulizer & Compressor machine 1 Each by Does Not Apply route as needed for Cough (wheezing).  Use nebulizer every 4-6 hours as needed for shortness of breath or wheezing 7/26/18   Sierra Mcneal NP  
food supplemt, lactose-reduced (BOOST HIGH PROTEIN) 0.06 gram- 1 kcal/mL liqd Drink 2-3 containers DAILY. 3/23/18   Luann Arcos NP No Known Allergies Social History Tobacco Use  Smoking status: Former smoker, ~30-40 p-y  Smokeless tobacco: Never Used Substance Use Topics  Alcohol use: Yes Comment: occas Family History Problem Relation Age of Onset  Stroke Mother  Stroke Sister  Diabetes Sister  Hypertension Sister  Heart Disease Brother  Hypertension Brother Current Facility-Administered Medications Medication Dose Route Frequency  dilTIAZem CD (CARDIZEM CD) capsule 120 mg  120 mg Oral DAILY  amoxicillin-clavulanate (AUGMENTIN) 600-42.9 mg/5 mL oral suspension 600 mg  600 mg Oral BID WITH MEALS  guaiFENesin (ROBITUSSIN) 100 mg/5 mL oral liquid 200 mg  200 mg Oral QID  metoprolol tartrate (LOPRESSOR) tablet 25 mg  25 mg Oral Q12H  predniSONE (DELTASONE) tablet 40 mg  40 mg Oral DAILY WITH BREAKFAST  acetylcysteine (MUCOMYST) 200 mg/mL (20 %) solution 400 mg  400 mg Nebulization TID RT  
 levalbuterol (XOPENEX) nebulizer soln 0.63 mg/3 mL  0.63 mg Nebulization TID RT  
 QUEtiapine (SEROquel) tablet 12.5 mg  12.5 mg Oral QHS  finasteride (PROSCAR) tablet 5 mg  5 mg Oral DAILY  tamsulosin (FLOMAX) capsule 0.4 mg  0.4 mg Oral DAILY  sodium chloride (NS) flush 5-10 mL  5-10 mL IntraVENous Q8H  
 enoxaparin (LOVENOX) injection 40 mg  40 mg SubCUTAneous Q24H Review of Systems: A comprehensive review of systems was negative except for: Musculoskeletal: positive for leg pain Objective: 
Vital Signs:   
Visit Vitals /79 (BP 1 Location: Left arm, BP Patient Position: Supine) Pulse 80 Temp 97.5 °F (36.4 °C) Resp 18 Ht 5' 8\" (1.727 m) Wt 61.7 kg (136 lb) SpO2 100% BMI 20.68 kg/m² O2 Device: Nasal cannula O2 Flow Rate (L/min): 2 l/min Temp (24hrs), Av.5 °F (36.4 °C), Min:97.2 °F (36.2 °C), Max:97.7 °F (36.5 °C) Intake/Output: Last shift:      12/04 0701 - 12/04 1900 In: 5 [P.O.:420] Out: 400 [Urine:400] Last 3 shifts: 12/02 1901 - 12/04 0700 In: 1970.8 [P.O.:650; I.V.:1320.8] Out: 1100 [Urine:1100] Intake/Output Summary (Last 24 hours) at 12/4/2018 1326 Last data filed at 12/4/2018 1036 Gross per 24 hour Intake 420 ml Output 1200 ml Net -780 ml Physical Exam:  
General:  Alert, no distress  AAM eating breakfast w minimal assistance Head:  Normocephalic, without obvious abnormality, atraumatic. Eyes:  Conjunctivae/corneas clear. Nose: Nares normal. Septum midline. Mucosa normal.    
Throat: Lips, mucosa, and tongue normal.    
Neck: Supple, symmetrical, trachea midline Back:   Symmetric, no curvature. ROM normal.  
Lungs:   Poor bilateral air movement with few scattered ronchi and wheezes Chest wall:  No tenderness or deformity. Heart:  Irregular, normal rate Abdomen:   Soft, non-tender. Bowel sounds normal.   
Extremities: Extremities normal, atraumatic, no cyanosis, +LE edema Skin: Skin color, texture, turgor normal. No rashes or lesions Neurologic: Grossly nonfocal, conversant and polite Data:  
Labs: 
No results for input(s): WBC, HGB, HCT, PLT, HGBEXT, HCTEXT, PLTEXT, HGBEXT, HCTEXT, PLTEXT in the last 72 hours. Recent Labs 12/02/18 
5236   
K 4.1  CO2 28 * BUN 24* CREA 0.82 CA 8.8 PHOS 2.3* No results for input(s): PH, PCO2, PO2, HCO3, FIO2 in the last 72 hours. Imaging: 
I have personally reviewed the patients radiographs: 
None today Clyde Key MD

## 2018-12-04 NOTE — PROGRESS NOTES
Report  Received from [address] [place] [address]. Kardex, MAR, Recent Results or Cardiac Rhythm nsr/sa were discussed. , RN assumed care of the pt. Etelvina Pollock RN  
 
 
0400 Patient becoming more confused and belligerent; punching, kicking,swearing, spitting at nursing staff. Prn Haldol given with the assistance of two other nurses. Patient eventually becoming calmer and resting. Will continue to monitor. Bed alarm in place.

## 2018-12-04 NOTE — ADT AUTH CERT NOTES
Respiratory Failure GRG - Care Day 6 (12/4/2018) by Murray Dale RN Review Status Review Entered Completed 12/4/2018 13:42 Criteria Review Care Day: 6 Care Date: 12/4/2018 Level of Care: Telemetry Guideline Day 3 Level Of Care ( ) * Activity level acceptable Clinical Status  
(X) * Ventilation status appropriate 12/4/2018 1:41 PM EST by Dionisio Belle  
  ON 2 LPM VIA NC  
  
( ) * Airway status acceptable  
(X) * Respiratory status acceptable 12/4/2018 1:41 PM EST by Dionisio Belle  
  RR 18  
  
( ) * Stable chest findings ( ) * Neurologic status acceptable  
(X) * Temperature status acceptable 12/4/2018 1:41 PM EST by Ruperto Arredondo 97.5  
  
( ) * No infection, or status acceptable ( ) * General Discharge Criteria met Interventions  
(X) * No chest tube, or status acceptable ( ) * Intake acceptable ( ) * No inpatient interventions needed 12/4/2018 1:42 PM EST by Dionisio Belle Subject: Additional Clinical Information MEDS:  
  
dilTIAZem CD (CARDIZEM CD) capsule 120 mg 120 mgOralDAILY  
  
amoxicillin-clavulanate (AUGMENTIN) 600-42.9 mg/5 mL oral suspension 600 mg 600 mgOralBID WITH MEALS  
  
guaiFENesin (ROBITUSSIN) 100 mg/5 mL oral liquid 200 mg 200 mgOralQID  
  
metoprolol tartrate (LOPRESSOR) tablet 25 mg 25 [id number]  
  
predniSONE (DELTASONE) tablet 40 mg 40 mgOralDAILY WITH BREAKFAST  
  
acetylcysteine (MUCOMYST) 200 mg/mL (20 %) solution 400 mg 400 mgNebulizationTID RT  
  
levalbuterol (XOPENEX) nebulizer soln 0.63 mg/3 mL 0.63 mgNebulizationTID RT  
  
QUEtiapine (SEROquel) tablet 12.5 mg 12.5 mgOralQHS  
  
finasteride (PROSCAR) tablet 5 mg 5 mgOralDAILY  
  
tamsulosin (FLOMAX) capsule 0.4 mg 0.4 mgOralDAILY  
  
sodium chloride (NS) flush 5-10 mL 5-10 [id number]  
  
enoxaparin (LOVENOX) injection 40 mg 40 [id number]    
  
  
12/4/2018 1:41 PM EST by Dionisio Belle Subject: Additional Clinical Information PULMONARY PROGRESS NOTE: 12/4 pt still has leg edema- no calf pain. No cramps. On NC O2. Congested cough IMPRESSION:  
  
Acute hypoxic/hypercapnic respiratory failure- still on O2 Bronchitis (can not exclude undiagnosed exacerbated COPD)- still congested Mucus plugging - mild and related to bronchitis LE edema with pain Dementia- pleasant and follows commands H/O tobacco use    
  
RECOMMENDATIONS:  
  
O2 - wean Diuresis? Bronchodilators Systemic steroids- taper Pulmonary toilet Mucolytics Empiric antibiotics DVT prophylaxis VS: T 97.5, P 80, R 18, /79, SPO2 100% 2 LPM VIA NC  
  
  
  
  
  
  
  
* Milestone 12/2/2018  Clinical Review Stepdown LOC by Negro Garza RN Review Status Review Entered In Primary 12/4/2018 13:33 Criteria Review 12/2/2018  
  
MEDICAL PROGRESS NOTE: 
Assessment / Plan: 
Acute respiratory failure with hypoxia and hypercapnia POA Due to mucous plugging with suspect due to Aspiration - CTA chest: No PE, bibasilar atelectasis and mucous plugging in the lower lobes right 
greater than left.  borderline enlarged mediastinal and hilar lymph nodes most likely reactive. - Pt was on BiPAP at ER, but no need for BiPAP since the admission. 
-  O2 sat 96% @ 2L via n/c 
- appreciate pulmonology input; wean O2 as long as O2 sat is greater than equal to 92% 
  Continue with Xopenex (changed from albuterol due to tachycardia), mucinex, and solumedrol 
- d/c IV solumedrol. Prednisone 40mg po every day x 3 starting tomorrow 
- continue with emperic ABX; IV levo and Zosyn. - To be seen by SPL; pt was groggy to complete evaluation on 11/30 
- PCO2 41 from 11/30 ABG 
  
AMS Dementia - Pt is much alert today then yesterday. Very pleasant.  
- continue with seroquel to 12.5mg qhs 
- pt was aggressive, confused, not following direction on 11/30. Initially, his presentation was mistaken with alcohol withdrew, but it is very unlikely. Spoke with the nurse Kaye Flores) from [facility] on 11/30/2018, who informed me that he is very appropriate, orient x 3, always like to eat at same place. 
- neurology following 
[facility] CT: Chronic left subdural fluid collection. Prominent atrophy and white matter disease. 
  
Hypertension Sinus arrhythmia 
- Echo: Systolic function was normal. Ejection fraction was estimated to be 55 %. There were no regional wall motion abnormalities. Moderate mitral valve regurgitation 
- continue with Cardizem IR (dose increased to QID) with parameter. Will change to CD tomorrow if BP/HR much better controlled. Continue with lopressor - appreciate cardiology input 
  
Urinary retension BMP 
- bladder scan revealed 460ml. No urge to void. straight cath x 1 yesterday. Able to void with the reminder or he gets incontinent of urine. NO PVR. - continue with flomax & Proscar  
  
Hypokalemia 
- replete; will monitor 
  
Code Status: DNR/DNI d/w sister Perley Paget who is POA Surrogate Decision Maker: Sister Perley Paget 
  
DVT Prophylaxis: Lovenox 
  
Baseline: Resides as [facility]  
  
Disposition: [facility]  
  
Patient Vitals for the past 24 hrs: ON 2 LPM VIA NC 
  Temp Pulse Resp BP SpO2  
12/02/18 1126 98.2 °F (36.8 °C) 91 18 (!) 142/93 96 % 12/02/18 0819  (!) 101     
12/02/18 0818     95 % 12/02/18 0731     96 % 12/02/18 0708 98.3 °F (36.8 °C) 88 17 (!) 149/96 96 %  
  
LABS: , BUN 24, PHOS 2.3 
  
MEDS: 
acetylcysteine (MUCOMYST) 200 mg/mL (20 %) solution 400 mg Dose: 400 mg Freq: 3 TIMES DAILY RESP Route: NEBULIZATION 
enoxaparin (LOVENOX) injection 40 mg  
Dose: 40 mg 
Freq: EVERY 24 HOURS Route: SC 
finasteride (PROSCAR) tablet 5 mg Dose: 5 mg Freq: DAILY Route: PO 
levalbuterol (XOPENEX) nebulizer soln 0.63 mg/3 mL Dose: 0.63 mg 
Freq: 3 TIMES DAILY RESP Route: NEBULIZATION 
 QUEtiapine (SEROquel) tablet 12.5 mg  
Dose: 12.5 mg 
Freq: EVERY BEDTIME Route: PO 
tamsulosin (FLOMAX) capsule 0.4 mg  
Dose: 0.4 mg 
Freq: DAILY Route: PO 
piperacillin-tazobactam (ZOSYN) 3.375 g in 0.9% sodium chloride (MBP/ADV) 100 mL Dose: 3.375 g Freq: EVERY 8 HOURS Route: IV 
dilTIAZem (CARDIZEM) IR tablet 30 mg  
Dose: 30 mg 
Freq: 4 TIMES DAILY BEFORE MEALS & NIGHTLY Route: PO 
levoFLOXacin (LEVAQUIN) 750 mg in D5W IVPB Dose: 750 mg 
Freq: EVERY 24 HOURS Route: IV 
methylPREDNISolone (PF) (SOLU-MEDROL) injection 40 mg  
Dose: 40 mg 
Freq: EVERY 6 HOURS Route: IV 
metoprolol tartrate (LOPRESSOR) tablet 12.5 mg  
Dose: 12.5 mg 
Freq: EVERY 12 HOURS Route: PO 
   
  
12/1/2018 Clinical Review Stepdown LOC by Murray Dale RN Review Status Review Entered In Primary 12/4/2018 13:27 Criteria Review 12/1/2018 
  
MD PROGRESS NOTE: 
Assessment / Plan: 
Acute respiratory failure with hypoxia and hypercapnia POA Due to mucous plugging with suspect due to Aspiration - CTA chest: No PE, bibasilar atelectasis and mucous plugging in the lower lobes right 
greater than left.  borderline enlarged mediastinal and hilar lymph nodes most likely reactive. - Pt was on BiPAP at ER, but taken off since then -  O2 sat 99% @ 3L via n/c 
- appreciate pulmonology input; wean O2 as long as O2 sat is greater than equal to 92% 
  Continue with Xopenex (changed from albuterol due to tachycardia), mucinex, and solumedrol 
- continue with emperic ABX; IV levo and Zosyn. -  To be seen by SPL; pt was groggy to complete evaluation yesterday 
- PCO2 41 from 11/30 ABG 
  
AMS Dementia - Pt is much calmer today, a bit sleepy (decreased the dose of seroquel to 12.5mg from 25mg), but able to answer the question.  He is much cooperative, orient to self and place unlike being aggressive, confused, not following direction on 11/30. Initially, his presentation was mistaken with alcohol withdrew, but it is very unlikely. Spoke with the nurse Alexa Garber) from [facility] on 11/30/2018, who informed me that he is very appropriate, orient x 3, always like to eat at same place. 
- neurology following 
[facility] CT: Chronic left subdural fluid collection. Prominent atrophy and white matter disease. 
- bedside sitter required 
  
Hypertension Sinus arrhythmia 
- Echo: Systolic function was normal. Ejection fraction was estimated to be 55 %. There were no regional wall motion abnormalities. Moderate mitral valve regurgitation 
- continue with Cardizem IR (dose increased to three times a day from twice a day with parameter and lopressor - appreciate cardiology input 
  
Urinary retension BMP 
- bladder scan revealed 460ml. No urge to void. straight cath x 1 yesterday. Able to void with the reminder or he gets incontinent of urine. NO PVR. - continue with flomax & Proscar  
  
Hypokalemia 
- replete; will monitor 
  
Patient Vitals for the past 24 hrs: ON 2 LPM VIA NC 
  Temp Pulse Resp BP SpO2  
12/01/18 1101 98.7 °F (37.1 °C) (!) 104 21 132/90 97 % 12/01/18 0738     97 % 12/01/18 0721 97.8 °F (36.6 °C) 94 19 (!) 141/98 97 %  
  
LABS: , PHOS 2.5, ALB 3.2 
  
MEDS: 
0.9% sodium chloride infusion Rate: 50 mL/hr Dose: 50 mL/hr Freq: CONTINUOUS Route: IV 
dilTIAZem (CARDIZEM) IR tablet 30 mg  
Dose: 30 mg 
Freq: 3 TIMES DAILY BEFORE MEALS Route: PO 
levalbuterol (XOPENEX) nebulizer soln 0.63 mg/3 mL Dose: 0.63 mg 
Freq: EVERY 4 HOURS RESP Route: NEBULIZATION 
levoFLOXacin (LEVAQUIN) 750 mg in D5W IVPB Dose: 750 mg 
Freq: EVERY 24 HOURS Route: IV 
methylPREDNISolone (PF) (SOLU-MEDROL) injection 40 mg  
Dose: 40 mg 
Freq: EVERY 6 HOURS Route: IV 
metoprolol tartrate (LOPRESSOR) tablet 12.5 mg  
Dose: 12.5 mg 
Freq: EVERY 12 HOURS Route: PO 
enoxaparin (LOVENOX) injection 40 mg  
Dose: 40 mg 
Freq: EVERY 24 HOURS Route: SC 
finasteride (PROSCAR) tablet 5 mg Dose: 5 mg Freq: DAILY Route: PO 
acetylcysteine (MUCOMYST) 200 mg/mL (20 %) solution 400 mg Dose: 400 mg Freq: 3 TIMES 
levalbuterol (XOPENEX) nebulizer soln 0.63 mg/3 mL Dose: 0.63 mg 
Freq: 3 TIMES DAILY RESP Route: NEBULIZATION 
QUEtiapine (SEROquel) tablet 12.5 mg  
Dose: 12.5 mg 
Freq: EVERY BEDTIME Route: PO 
tamsulosin (FLOMAX) capsule 0.4 mg  
Dose: 0.4 mg 
Freq: DAILY Route: PO 
piperacillin-tazobactam (ZOSYN) 3.375 g in 0.9% sodium chloride (MBP/ADV) 100 mL Dose: 3.375 g Freq: EVERY 8 HOURS Route: IV 
potassium, sodium phosphates (NEUTRA-PHOS) packet 1 Packet Dose: 1 Packet Freq: 2 TIMES DAILY Route: PO 
   
  
Respiratory Failure GRG - Care Day 5 (12/3/2018) by Darryle Libra, RN Review Status Review Entered Completed 12/3/2018 14:16 Criteria Review Care Day: 5 Care Date: 12/3/2018 Level of Care: Telemetry Guideline Day 3 Level Of Care ( ) * Activity level acceptable 12/3/2018 2:16 PM EST by Lemuel Ag with assist  
  
  
Clinical Status  
(X) * Ventilation status appropriate 12/3/2018 2:16 PM EST by Iftikhar Mason  
  rr 18  
  
(X) * Airway status acceptable ( ) * Respiratory status acceptable 12/3/2018 2:16 PM EST by Randolph Ramirez remains on 2L NC  
  
( ) * Stable chest findings 12/3/2018 2:16 PM EST by Rob luna  
  
( ) * Neurologic status acceptable 12/3/2018 2:16 PM EST by Sasha Rivera this morning  
  
(X) * Temperature status acceptable 12/3/2018 2:16 PM EST by Neelima Service 98.2  
  
( ) * No infection, or status acceptable ( ) * General Discharge Criteria met Interventions  
(X) * No chest tube, or status acceptable 12/3/2018 2:16 PM EST by Iftikhar Mason  
  none noted ( ) * Intake acceptable ( ) * No inpatient interventions needed 12/3/2018 2:16 PM EST by Iftikhar Mason Subject: Additional Clinical Information 12/3/18Vitals: 98.2, 82, 18, 149/89, 97% 2L NC meds: Mucomyst neb TID, Augmentin 600mg PO BID, Cardizem 120mg PO daily, lovenox 120mg PO daily, proscar 5mg PO daily, robitussin 200mg PO 4x daily, xopenex neb TID, Lopressor 12.5mg PO X2, zosyn 3.375g IV Q8h Labs: No new results for 12/3/18 Plan: IV abx, diet dysphagia pureed, cardiac monitor, PT/OT, out of bed with assist, seizure precautions * Milestone Additional Notes 12/3/18 IM:  
Drowsy this morning, demented, pleasant.  
   
A/P:  
   
Acute respiratory failure with hypoxia and hypercapnia POA - improving Acute bronchitis - improving  
-Continue nebulizer therapy  
-Continue mucolytics, steroid taper and switch to PO abx  
-Cx negative thus far  
-Pulm input appreciated  
   
Pulmonary:  
d/w nursing. Jacob Paredes Friday, weak. Telesitter. From [facility]. REady for activity. Congested cough with deep breathes. No fever. Wheezes musical.   
  
\" O2 - wean \" Bronchodilators \" Systemic steroids- taper \" Pulmonary toilet \" Mucolytics \" Empiric antibiotics \" DVT prophylaxis

## 2018-12-04 NOTE — PROGRESS NOTES
0700: Bedside report received from Charles Wood, [address]: [address] [facility] about needing refill for augmentin dose. 0930: RN called pharmacy regarding augmentin dose. 1500: Tele notified nurse of two pauses in heart rhythm--one was 3.03 seconds and the second was 3.20 seconds. VSS and pt asymptomic. HR in 60s. NP Ephraim notified. NP [facility] notified. Orders received to perform EKG. Will continue to monitor. 1900:  
Bedside shift change report given to Charles Wood RN (oncoming nurse). Report included the following information SBAR, Kardex, Intake/Output, MAR, Recent Results and Cardiac Rhythm Sinus arrythmia. SHIFT SUMMARY: 
 
 
 
 
 
[address] NURSING NOTE Admission Date 11/28/2018 Admission Diagnosis Acute respiratory failure with hypoxia and hypercapnia (HCC) Consults IP CONSULT TO PULMONOLOGY 
IP CONSULT TO CARDIOLOGY 
IP CONSULT TO NEUROLOGY Cardiac Monitoring [x] Yes [] No  
  
Purposeful Hourly Rounding [x] Yes   
Yecenia Score Total Score: 6 Yecenia score 3 or > [x] Bed Alarm [x] Avasys [] 1:1 sitter [] Patient refused (Signed refusal form in chart) Lane Score Lane Score: 16 Lane score 14 or < [] PMT consult [] Wound Care consult  
 []  Specialty bed  [] Nutrition consult Influenza Vaccine Received Flu Vaccine for Current Season (usually Sept-March): Yes Oxygen needs? [] Room air Oxygen @  []1L    [x]2L    []3L   []4L    []5L   []6L via NC Chronic home O2 use? [] Yes [x] No 
Perform O2 challenge test and document in progress note using smartphrase (.Homeoxygen) Last bowel movement Last Bowel Movement Date: 12/02/18 Urinary Catheter LDAs Peripheral IV 12/01/18 Anterior;Right Antecubital (Active) Site Assessment Clean, dry, & intact 12/4/2018  3:05 PM  
Phlebitis Assessment 0 12/4/2018  3:05 PM  
Infiltration Assessment 0 12/4/2018  3:05 PM  
Dressing Status Clean, dry, & intact 12/4/2018  3:05 PM  
 Dressing Type Transparent 12/4/2018  3:05 PM  
Hub Color/Line Status Blue;Flushed 12/4/2018  3:05 PM  
Action Taken Open ports on tubing capped 12/3/2018  4:00 AM  
Alcohol Cap Used Yes 12/3/2018  4:00 AM  
                  
  
Readmission Risk Assessment Tool Score High Risk   
      
 21 Total Score 3 Has Seen PCP in Last 6 Months (Yes=3, No=0) 2 . Living with Significant Other. Assisted Living. LTAC. SNF. or  
Rehab  
 3 Patient Length of Stay (>5 days = 3) 4 IP Visits Last 12 Months (1-3=4, 4=9, >4=11) 9 Pt. Coverage (Medicare=5 , Medicaid, or Self-Pay=4) Criteria that do not apply:  
 Charlson Comorbidity Score (Age + Comorbid Conditions) Expected Length of Stay 3d 19h Actual Length of Stay 5

## 2018-12-05 LAB
AMMONIA PLAS-SCNC: 42 UMOL/L
ANION GAP SERPL CALC-SCNC: 6 MMOL/L (ref 5–15)
ATRIAL RATE: 73 BPM
BASOPHILS # BLD: 0 K/UL (ref 0–0.1)
BASOPHILS NFR BLD: 0 % (ref 0–1)
BUN SERPL-MCNC: 22 MG/DL (ref 6–20)
BUN/CREAT SERPL: 33 (ref 12–20)
CALCIUM SERPL-MCNC: 8.7 MG/DL (ref 8.5–10.1)
CALCULATED P AXIS, ECG09: 79 DEGREES
CALCULATED R AXIS, ECG10: 61 DEGREES
CALCULATED T AXIS, ECG11: 88 DEGREES
CHLORIDE SERPL-SCNC: 99 MMOL/L (ref 97–108)
CO2 SERPL-SCNC: 31 MMOL/L (ref 21–32)
CREAT SERPL-MCNC: 0.66 MG/DL (ref 0.7–1.3)
DIAGNOSIS, 93000: NORMAL
DIFFERENTIAL METHOD BLD: ABNORMAL
EOSINOPHIL # BLD: 0.1 K/UL (ref 0–0.4)
EOSINOPHIL NFR BLD: 1 % (ref 0–7)
ERYTHROCYTE [DISTWIDTH] IN BLOOD BY AUTOMATED COUNT: 15.4 % (ref 11.5–14.5)
GLUCOSE SERPL-MCNC: 135 MG/DL (ref 65–100)
HCT VFR BLD AUTO: 37.7 % (ref 36.6–50.3)
HGB BLD-MCNC: 12 G/DL (ref 12.1–17)
IMM GRANULOCYTES # BLD: 0.2 K/UL (ref 0–0.04)
IMM GRANULOCYTES NFR BLD AUTO: 2 % (ref 0–0.5)
LYMPHOCYTES # BLD: 1 K/UL (ref 0.8–3.5)
LYMPHOCYTES NFR BLD: 13 % (ref 12–49)
MAGNESIUM SERPL-MCNC: 2.2 MG/DL (ref 1.6–2.4)
MCH RBC QN AUTO: 26.9 PG (ref 26–34)
MCHC RBC AUTO-ENTMCNC: 31.8 G/DL (ref 30–36.5)
MCV RBC AUTO: 84.5 FL (ref 80–99)
MONOCYTES # BLD: 0.6 K/UL (ref 0–1)
MONOCYTES NFR BLD: 8 % (ref 5–13)
NEUTS SEG # BLD: 5.8 K/UL (ref 1.8–8)
NEUTS SEG NFR BLD: 76 % (ref 32–75)
NRBC # BLD: 0 K/UL (ref 0–0.01)
NRBC BLD-RTO: 0 PER 100 WBC
P-R INTERVAL, ECG05: 212 MS
PHOSPHATE SERPL-MCNC: 3.2 MG/DL (ref 2.6–4.7)
PLATELET # BLD AUTO: 243 K/UL (ref 150–400)
PMV BLD AUTO: 9.5 FL (ref 8.9–12.9)
POTASSIUM SERPL-SCNC: 3.8 MMOL/L (ref 3.5–5.1)
Q-T INTERVAL, ECG07: 376 MS
QRS DURATION, ECG06: 80 MS
QTC CALCULATION (BEZET), ECG08: 414 MS
RBC # BLD AUTO: 4.46 M/UL (ref 4.1–5.7)
SODIUM SERPL-SCNC: 136 MMOL/L (ref 136–145)
T4 FREE SERPL-MCNC: 1.2 NG/DL (ref 0.8–1.5)
TSH SERPL DL<=0.05 MIU/L-ACNC: 1.07 UIU/ML (ref 0.36–3.74)
VENTRICULAR RATE, ECG03: 73 BPM
WBC # BLD AUTO: 7.7 K/UL (ref 4.1–11.1)

## 2018-12-05 PROCEDURE — 36415 COLL VENOUS BLD VENIPUNCTURE: CPT

## 2018-12-05 PROCEDURE — 80048 BASIC METABOLIC PNL TOTAL CA: CPT

## 2018-12-05 PROCEDURE — 74011250637 HC RX REV CODE- 250/637: Performed by: NURSE PRACTITIONER

## 2018-12-05 PROCEDURE — 97530 THERAPEUTIC ACTIVITIES: CPT

## 2018-12-05 PROCEDURE — 85025 COMPLETE CBC W/AUTO DIFF WBC: CPT

## 2018-12-05 PROCEDURE — 82140 ASSAY OF AMMONIA: CPT

## 2018-12-05 PROCEDURE — 83735 ASSAY OF MAGNESIUM: CPT

## 2018-12-05 PROCEDURE — 74011636637 HC RX REV CODE- 636/637: Performed by: NURSE PRACTITIONER

## 2018-12-05 PROCEDURE — 94760 N-INVAS EAR/PLS OXIMETRY 1: CPT

## 2018-12-05 PROCEDURE — 74011250637 HC RX REV CODE- 250/637: Performed by: INTERNAL MEDICINE

## 2018-12-05 PROCEDURE — 94640 AIRWAY INHALATION TREATMENT: CPT

## 2018-12-05 PROCEDURE — 97535 SELF CARE MNGMENT TRAINING: CPT | Performed by: OCCUPATIONAL THERAPIST

## 2018-12-05 PROCEDURE — 74011000250 HC RX REV CODE- 250: Performed by: NURSE PRACTITIONER

## 2018-12-05 PROCEDURE — 65660000000 HC RM CCU STEPDOWN

## 2018-12-05 PROCEDURE — 97116 GAIT TRAINING THERAPY: CPT

## 2018-12-05 PROCEDURE — 77010033678 HC OXYGEN DAILY

## 2018-12-05 PROCEDURE — 84439 ASSAY OF FREE THYROXINE: CPT

## 2018-12-05 PROCEDURE — 84443 ASSAY THYROID STIM HORMONE: CPT

## 2018-12-05 PROCEDURE — 84100 ASSAY OF PHOSPHORUS: CPT

## 2018-12-05 PROCEDURE — 92526 ORAL FUNCTION THERAPY: CPT

## 2018-12-05 PROCEDURE — 74011000250 HC RX REV CODE- 250: Performed by: GENERAL ACUTE CARE HOSPITAL

## 2018-12-05 RX ORDER — FACIAL-BODY WIPES
10 EACH TOPICAL DAILY PRN
Status: DISCONTINUED | OUTPATIENT
Start: 2018-12-05 | End: 2018-12-06 | Stop reason: HOSPADM

## 2018-12-05 RX ORDER — BISACODYL 5 MG
5 TABLET, DELAYED RELEASE (ENTERIC COATED) ORAL DAILY PRN
Status: DISCONTINUED | OUTPATIENT
Start: 2018-12-05 | End: 2018-12-06 | Stop reason: HOSPADM

## 2018-12-05 RX ORDER — DOCUSATE SODIUM 100 MG/1
100 CAPSULE, LIQUID FILLED ORAL 2 TIMES DAILY
Status: DISCONTINUED | OUTPATIENT
Start: 2018-12-05 | End: 2018-12-06 | Stop reason: HOSPADM

## 2018-12-05 RX ORDER — POLYETHYLENE GLYCOL 3350 17 G/17G
17 POWDER, FOR SOLUTION ORAL DAILY
Status: DISCONTINUED | OUTPATIENT
Start: 2018-12-05 | End: 2018-12-06 | Stop reason: HOSPADM

## 2018-12-05 RX ADMIN — BISACODYL 5 MG: 5 TABLET, COATED ORAL at 17:31

## 2018-12-05 RX ADMIN — DOCUSATE SODIUM 100 MG: 100 CAPSULE, LIQUID FILLED ORAL at 09:17

## 2018-12-05 RX ADMIN — ACETYLCYSTEINE 400 MG: 200 SOLUTION ORAL; RESPIRATORY (INHALATION) at 13:46

## 2018-12-05 RX ADMIN — FINASTERIDE 5 MG: 5 TABLET, FILM COATED ORAL at 09:17

## 2018-12-05 RX ADMIN — Medication 5 ML: at 06:00

## 2018-12-05 RX ADMIN — Medication 10 ML: at 13:55

## 2018-12-05 RX ADMIN — LEVALBUTEROL HYDROCHLORIDE 0.63 MG: 0.63 SOLUTION RESPIRATORY (INHALATION) at 20:49

## 2018-12-05 RX ADMIN — DILTIAZEM HYDROCHLORIDE 120 MG: 120 CAPSULE, COATED, EXTENDED RELEASE ORAL at 09:17

## 2018-12-05 RX ADMIN — POLYETHYLENE GLYCOL 3350 17 G: 17 POWDER, FOR SOLUTION ORAL at 09:22

## 2018-12-05 RX ADMIN — LEVALBUTEROL HYDROCHLORIDE 0.63 MG: 0.63 SOLUTION RESPIRATORY (INHALATION) at 13:46

## 2018-12-05 RX ADMIN — GUAIFENESIN 200 MG: 200 SOLUTION ORAL at 13:55

## 2018-12-05 RX ADMIN — Medication 10 ML: at 21:19

## 2018-12-05 RX ADMIN — GUAIFENESIN 200 MG: 200 SOLUTION ORAL at 09:20

## 2018-12-05 RX ADMIN — LEVALBUTEROL HYDROCHLORIDE 0.63 MG: 0.63 SOLUTION RESPIRATORY (INHALATION) at 08:20

## 2018-12-05 RX ADMIN — ACETYLCYSTEINE: 200 SOLUTION ORAL; RESPIRATORY (INHALATION) at 20:49

## 2018-12-05 RX ADMIN — QUETIAPINE FUMARATE 12.5 MG: 25 TABLET ORAL at 21:19

## 2018-12-05 RX ADMIN — DOCUSATE SODIUM 100 MG: 100 CAPSULE, LIQUID FILLED ORAL at 17:31

## 2018-12-05 RX ADMIN — GUAIFENESIN 200 MG: 200 SOLUTION ORAL at 21:19

## 2018-12-05 RX ADMIN — PREDNISONE 40 MG: 20 TABLET ORAL at 09:17

## 2018-12-05 RX ADMIN — GUAIFENESIN 200 MG: 200 SOLUTION ORAL at 17:33

## 2018-12-05 RX ADMIN — LACTULOSE 10 G: 20 SOLUTION ORAL at 10:07

## 2018-12-05 RX ADMIN — ACETYLCYSTEINE 400 MG: 200 SOLUTION ORAL; RESPIRATORY (INHALATION) at 08:20

## 2018-12-05 RX ADMIN — TAMSULOSIN HYDROCHLORIDE 0.4 MG: 0.4 CAPSULE ORAL at 09:17

## 2018-12-05 NOTE — PROGRESS NOTES
Problem: Dysphagia (Adult) Goal: *Acute Goals and Plan of Care (Insert Text) 
11/29/2018 Speech path goals: 1. Pt will tolerate dys 3/thins with no overt s/s of aspiration. Speech language pathology dysphagia treatment Patient: Jay Mahmood (69 y.o. male) Date: 12/5/2018 Diagnosis: Acute respiratory failure with hypoxia and hypercapnia (HCC) <principal problem not specified> Precautions: swallow, Fall, DNR 
 
ASSESSMENT: 
Patient observed with dysphagia 2/thin liquid breakfast tray this morning. Patient with excellent tolerance of dysphagia 2 solids with functional mastication and full oral clearance. However, after patient finished eating he began drinking and patient with delayed coughing with thin liquids. Question if related to aspiration as patient with COPD and admitted with respiratory failure. No overt s/s aspiration observed with nectar liquids. Recommend MBS to fully assess pharyngeal phase of swallow and determine if coughing is related to aspiration. Earliest this can be completed is tomorrow morning at 8:30am due to radiology scheduling. Progression toward goals: 
[x]         Improving appropriately and progressing toward goals 
[]         Improving slowly and progressing toward goals 
[]         Not making progress toward goals and plan of care will be adjusted PLAN: 
Recommendations and Planned Interventions: 
--Dysphagia 2/nectar liquid diet 
--SLP to follow up tomorrow morning for MBS Patient continues to benefit from skilled intervention to address the above impairments. Continue treatment per established plan of care. Discharge Recommendations: To Be Determined SUBJECTIVE:  
Patient stated At the hospital. Patient oriented to person and hospital only. OBJECTIVE:  
Cognitive and Communication Status: 
Neurologic State: Alert, Confused Orientation Level: Oriented to person, Oriented to place, Disoriented to time Cognition: Follows commands, Decreased attention/concentration Perception: Appears intact Perseveration: No perseveration noted Safety/Judgement: Not assessed Dysphagia Treatment: 
Oral Assessment: 
Oral Assessment Dentition: Edentulous P.O. Trials: 
Patient Position: upright in bed Vocal quality prior to P.O.: No impairment Consistency Presented: Mechanical soft; Thin liquid; Nectar thick liquid; Other (comment)(breakfast tray) How Presented: Self-fed/presented;Cup/sip;Cup/gulp; Spoon;Straw;Successive swallows Bolus Acceptance: No impairment Bolus Formation/Control: Impaired Type of Impairment: Delayed;Mastication;Premature spillage Propulsion: Delayed (# of seconds) Oral Residue: None Initiation of Swallow: Delayed (# of seconds) Laryngeal Elevation: Functional 
Aspiration Signs/Symptoms: Weak cough(with liquids, none with solids) Pharyngeal Phase Characteristics: No impairment, issues, or problems Pain: 
Pain Scale 1: Numeric (0 - 10) Pain Intensity 1: 0 After treatment:  
[]              Patient left in no apparent distress sitting up in chair 
[x]              Patient left in no apparent distress in bed 
[x]              Call bell left within reach [x]              Nursing notified 
[]              Caregiver present 
[]              Bed alarm activated COMMUNICATION/EDUCATION:  
The patients plan of care including recommendations, planned interventions, and recommended diet changes were discussed with: Registered Nurse. ANIA Diamond Time Calculation: 20 mins

## 2018-12-05 NOTE — PROGRESS NOTES
0700: Bedside report received from Julián Mendoza 49: Telesitter discontinued. 1535: RN found pt to have NC out of nose. Sats 89%. NC reinserted. RN explained importance of keeping NC in nose. Will continue to monitor. 1900:  
Bedside shift change report given to VALERIE Mendoza (oncoming nurse). Report included the following information SBAR, Kardex, Intake/Output, MAR, Recent Results and Cardiac Rhythm Sinus tach. SHIFT SUMMARY: 
 
 
 
 
 
[facility] NURSING NOTE Admission Date 11/28/2018 Admission Diagnosis Acute respiratory failure with hypoxia and hypercapnia (HCC) Consults IP CONSULT TO PULMONOLOGY 
IP CONSULT TO CARDIOLOGY 
IP CONSULT TO NEUROLOGY Cardiac Monitoring [x] Yes [] No  
  
Purposeful Hourly Rounding [x] Yes   
Yecenia Score Total Score: 6 Yecenia score 3 or > [x] Bed Alarm [] Avasys [] 1:1 sitter [] Patient refused (Signed refusal form in chart) Lane Score Lane Score: 17 Lane score 14 or < [] PMT consult [] Wound Care consult  
 []  Specialty bed  [] Nutrition consult Influenza Vaccine Received Flu Vaccine for Current Season (usually Sept-March): Yes Oxygen needs? [] Room air Oxygen @  []1L    [x]2L    []3L   []4L    []5L   []6L via NC Chronic home O2 use? [] Yes [x] No 
Perform O2 challenge test and document in progress note using smartphrase (.Homeoxygen) Last bowel movement Last Bowel Movement Date: 12/02/18 Urinary Catheter LDAs Peripheral IV 12/01/18 Anterior;Right Antecubital (Active) Site Assessment Clean, dry, & intact 12/5/2018  3:07 PM  
Phlebitis Assessment 0 12/5/2018  3:07 PM  
Infiltration Assessment 0 12/5/2018  3:07 PM  
Dressing Status Clean, dry, & intact 12/5/2018  3:07 PM  
Dressing Type Transparent 12/5/2018  3:07 PM  
Hub Color/Line Status Blue;Flushed 12/5/2018  3:07 PM  
Action Taken Open ports on tubing capped 12/3/2018  4:00 AM  
Alcohol Cap Used Yes 12/3/2018  4:00 AM  
                  
  
 Readmission Risk Assessment Tool Score High Risk   
      
 21 Total Score 3 Has Seen PCP in Last 6 Months (Yes=3, No=0) 2 . Living with Significant Other. Assisted Living. LTAC. SNF. or  
Rehab  
 3 Patient Length of Stay (>5 days = 3) 4 IP Visits Last 12 Months (1-3=4, 4=9, >4=11) 9 Pt. Coverage (Medicare=5 , Medicaid, or Self-Pay=4) Criteria that do not apply:  
 Charlson Comorbidity Score (Age + Comorbid Conditions) Expected Length of Stay 3d 19h Actual Length of Stay 6

## 2018-12-05 NOTE — PROGRESS NOTES
Hospitalist Progress Note NAME: Chandler Jin :  1930 MRN:  [id number] Interim Hospital Summary: 80 y.o. male whom presented on 2018 with Assessment / Plan: 
Acute respiratory failure with hypoxia and hypercapnia POA Due to mucous plugging with suspect due to Aspiration - CTA chest: No PE, bibasilar atelectasis and mucous plugging in the lower lobes right 
greater than left.  borderline enlarged mediastinal and hilar lymph nodes most likely reactive. - Pt was on BiPAP at ER, but no need for BiPAP since the admission. 
-  O2 sat 96% @ 2L via n/c 
- appreciate pulmonology input; wean O2 as long as O2 sat is greater than equal to 92% 
  Continue with Xopenex (changed from albuterol due to tachycardia), mucinex, and solumedrol - Prednisone 40mg po every day; will taper the dose per pulmonology 
- received IV levo and Zosyn, will complete total of 7 days of ABX with Augmentin. - tolerating dysphagia diet without difficulty 
- weak congested cough; 
  CXR:  Low lung volumes with bibasilar atelectasis. Pulmonary toilet AMS Delirium vs Lewy body Dementia 
- alert and orient to self and place. However, pt was confused and aggressive with the staff members. Still has tele sitter at bedside 
- continue with seroquel to 12.5mg qhs 
- pt was aggressive, confused, not following direction on . Initially, his presentation was mistaken with alcohol withdrew, but it is very unlikely. Spoke with the nurse Aby Loyd from [facility] on 2018, who informed me that he is very appropriate, orient x 3, always like to eat at same place. - contacted neurology for any additional recommendation 
[facility] CT: Chronic left subdural fluid collection. Prominent atrophy and white matter disease.   
  
Hypertension Sinus arrhythmia 
- Echo: Systolic function was normal. Ejection fraction was estimated to be 55 %.  There were no regional wall motion abnormalities. Moderate mitral valve regurgitation 
- continue with Cardizem CD.  
- appreciate cardiology input; had 3 second pause today. Pt was asymptomatic. BB was d/c'd by cardiology. Poor candidate for pacemaker. Recommend medical management 
  
  
Urinary retension (resolved) BMP 
- continue with flomax & Proscar  
  
Hypokalemia 
- replete; will monitor 
  
Code Status: DNR/DNI d/w sister Conchis Paredes who is POA Surrogate Decision Maker: Sister Conchis Paredes 
  
DVT Prophylaxis: Lovenox 
  
Baseline: Resides as [facility]  
  
Disposition: SNF  
Tentative discharge tomorrow 
  
  
 
 
 
Subjective: Chief Complaint / Reason for Physician Visit \"I am ok\". Discussed with RN events overnight. Review of Systems: 
Symptom Y/N Comments  Symptom Y/N Comments Fever/Chills n   Chest Pain n   
Poor Appetite    Edema Cough    Abdominal Pain Sputum    Joint Pain SOB/WU n   Pruritis/Rash Nausea/vomit n   Tolerating PT/OT Diarrhea    Tolerating Diet Constipation    Other Could NOT obtain due to:   
 
Objective: VITALS:  
Last 24hrs VS reviewed since prior progress note. Most recent are: 
Patient Vitals for the past 24 hrs: 
 Temp Pulse Resp BP SpO2  
12/05/18 1502 98.2 °F (36.8 °C) (!) 103 18 124/61 90 % 12/05/18 1349     95 % 12/05/18 1136 98 °F (36.7 °C) 95 18 144/75 97 % 12/05/18 0823     96 % 12/05/18 0723 97.5 °F (36.4 °C) 77 16 125/75 97 % 12/05/18 0333 98.1 °F (36.7 °C) 80 18 107/68 97 % 12/04/18 2235 97.6 °F (36.4 °C) 91 18 109/64 94 % 12/04/18 2116     97 % 12/04/18 2106     95 % 12/04/18 1950 97.8 °F (36.6 °C) 79 18 116/69 96 % Intake/Output Summary (Last 24 hours) at 12/5/2018 1613 Last data filed at 12/5/2018 1138 Gross per 24 hour Intake 960 ml Output 1150 ml Net -190 ml PHYSICAL EXAM: 
General: Ill appearing. Alert, cooperative, no acute distress   
EENT:  EOMI. Anicteric sclerae. MMM Resp:  Clear in apex with decreased breath sounds at bases, no wheezing or rales. No accessory muscle use CV:  Regular  rhythm,  No edema GI:  Soft, Non distended, Non tender.  +Bowel sounds Neurologic:  Alert and oriented X self and place normal speech, Psych:   Fair insight. Not anxious nor agitated Skin:  No rashes. No jaundice Reviewed most current lab test results and cultures  YES Reviewed most current radiology test results   YES Review and summation of old records today    NO Reviewed patient's current orders and MAR    YES 
PMH/SH reviewed - no change compared to H&P 
________________________________________________________________________ Care Plan discussed with: 
  Comments Patient y Family RN y   
Care Manager y Consultant     
                 y Multidiciplinary team rounds were held today with , nursing, pharmacist and clinical coordinator. Patient's plan of care was discussed; medications were reviewed and discharge planning was addressed. ________________________________________________________________________ Total NON critical care TIME: 30   Minutes Total CRITICAL CARE TIME Spent:   Minutes non procedure based Comments >50% of visit spent in counseling and coordination of care    
________________________________________________________________________ July Hernandez NP Procedures: see electronic medical records for all procedures/Xrays and details which were not copied into this note but were reviewed prior to creation of Plan. LABS: 
I reviewed today's most current labs and imaging studies. Pertinent labs include: 
Recent Labs 12/05/18 
0544 WBC 7.7 HGB 12.0*  
HCT 37.7  Recent Labs 12/05/18 
0544   
K 3.8 CL 99  
CO2 31 * BUN 22* CREA 0.66* CA 8.7 MG 2.2 PHOS 3.2 Signed: )Shawna Mccloud, NP

## 2018-12-05 NOTE — PROGRESS NOTES
Problem: Mobility Impaired (Adult and Pediatric) Goal: *Acute Goals and Plan of Care (Insert Text) Physical Therapy Goals Initiated 12/3/2018 1. Patient will move from supine to sit and sit to supine  in bed with supervision/set-up within 7 day(s). 2.  Patient will transfer from bed to chair and chair to bed with supervision/set-up using the least restrictive device within 7 day(s). 3.  Patient will perform sit to stand with supervision/set-up within 7 day(s). 4.  Patient will ambulate with supervision/set-up for 50 feet with the least restrictive device within 7 day(s). physical Therapy TREATMENT Patient: Sonya Newman (15 y.o. male) Date: 12/5/2018 Diagnosis: Acute respiratory failure with hypoxia and hypercapnia (HCC) <principal problem not specified> Precautions: Fall, DNR Chart, physical therapy assessment, plan of care and goals were reviewed. ASSESSMENT: 
Patient received resting in bed, agreeable and cleared to therapy. Patient required SBA-CGA for bed mobility. Patient required CGA for sit <> stand with RW. Patient is pleasantly confused, cooperative and wanting to participate with PT. Patient ambulated 30 feet x 2 with CGA and RW. Patient with mildly unsteady gait with decreased step length and shuffled steps. Patient with VSS on 2L O2. Patient left sitting in the chair with the bed alarm on at the conclusion of PT treatment session. Patient will benefit from returning to SNF at discharge. Recommend patient ambulate to the bathroom with RW x 1 assist and sit in the chair for all meals to maintain strength. Progression toward goals: 
[]    Improving appropriately and progressing toward goals 
[]    Improving slowly and progressing toward goals 
[]    Not making progress toward goals and plan of care will be adjusted PLAN: 
Patient continues to benefit from skilled intervention to address the above impairments. Continue treatment per established plan of care. Discharge Recommendations:  Placido Cesar Further Equipment Recommendations for Discharge:  TBD SUBJECTIVE:  
Patient stated [birth year].  OBJECTIVE DATA SUMMARY:  
Critical Behavior: 
Neurologic State: Alert, Confused Orientation Level: Oriented to person, Oriented to place, Disoriented to time Cognition: Follows commands, Decreased attention/concentration Safety/Judgement: Not assessed Functional Mobility Training: 
Bed Mobility: 
Rolling: Stand-by assistance Supine to Sit: Stand-by assistance Scooting: Contact guard assistance; Additional time Transfers: 
Sit to Stand: Contact guard assistance Stand to Sit: Contact guard assistance Bed to Chair: Contact guard assistance Balance: 
Sitting: Intact; Without support Standing: Impaired; With support Standing - Static: Good Standing - Dynamic : FairAmbulation/Gait Training: 
Distance (ft): 60 Feet (ft) Assistive Device: Gait belt;Walker, rolling Ambulation - Level of Assistance: Contact guard assistance Gait Abnormalities: Decreased step clearance;Shuffling gait Base of Support: Widened Speed/Johanna: Pace decreased (<100 feet/min) Step Length: Left shortened;Right shortened Pain: 
Pain Scale 1: Numeric (0 - 10) Pain Intensity 1: 0 Activity Tolerance:  
Fair, VSS on 2L O2. Please refer to the flowsheet for vital signs taken during this treatment. After treatment:  
[x]    Patient left in no apparent distress sitting up in chair 
[]    Patient left in no apparent distress in bed 
[x]    Call bell left within reach [x]    Nursing notified 
[]    Caregiver present [x]    Bed alarm activated COMMUNICATION/COLLABORATION:  
The patients plan of care was discussed with: Occupational Therapist, Registered Nurse and  Hong Garcia PT, DPT Time Calculation: 23 mins

## 2018-12-05 NOTE — PROGRESS NOTES
Problem: Patient Education: Go to Patient Education Activity-- Dysphagia Goal: Patient/Family Education Outcome: Progressing Towards Goal 
Progressing towards goals, Pt tolerating swallowing meals with minimal coughing.

## 2018-12-05 NOTE — PROGRESS NOTES
Chief Complaint:  
 
Admitted for shortness of breath. My colleague Dr. Drake Mesa on 11/30/2018 for confusion. Noted to be a nursing home resident with a ten year history of dementia. Exam at the time was non focal. Asked to re-evaluate by his NP Ephraim. She relays a fluctuating pattern of confusion with night time combativeness in spite of his improving medical status. Assesment and Plan 1. Delirium 
delirium vs lewy body dementia Has a fluctuating mental status that is worse at night which would be more consistent with delirium. Given cardiac history would avoid usage of atypicals. May be better off on benzodiazepines. Given his COPD would ensure optimal oxygenation at night eiher with inhalers or supplemental 02.  
 
 
2. Dementia Alzheimer's vs Lewy body. Family is not available for history. 3. COPD Complicates his deliirum. Ensure optimal treatment especially at night avoiding inhalers close to bed time as is reasonable as it may lead to more arousal.  
 
 
Allergies Patient has no known allergies. Medications Current Facility-Administered Medications Medication Dose Route Frequency  polyethylene glycol (MIRALAX) packet 17 g  17 g Oral DAILY  docusate sodium (COLACE) capsule 100 mg  100 mg Oral BID  
 bisacodyl (DULCOLAX) suppository 10 mg  10 mg Rectal DAILY PRN  
 bisacodyl (DULCOLAX) tablet 5 mg  5 mg Oral DAILY PRN  
 dilTIAZem CD (CARDIZEM CD) capsule 120 mg  120 mg Oral DAILY  guaiFENesin (ROBITUSSIN) 100 mg/5 mL oral liquid 200 mg  200 mg Oral QID  predniSONE (DELTASONE) tablet 40 mg  40 mg Oral DAILY WITH BREAKFAST  acetylcysteine (MUCOMYST) 200 mg/mL (20 %) solution 400 mg  400 mg Nebulization TID RT  
 levalbuterol (XOPENEX) nebulizer soln 0.63 mg/3 mL  0.63 mg Nebulization TID RT  
 levalbuterol (XOPENEX) nebulizer soln 0.63 mg/3 mL  0.63 mg Nebulization Q4H PRN  
 QUEtiapine (SEROquel) tablet 12.5 mg  12.5 mg Oral QHS  finasteride (PROSCAR) tablet 5 mg  5 mg Oral DAILY  haloperidol lactate (HALDOL) injection 2 mg  2 mg IntraVENous Q6H PRN  
 tamsulosin (FLOMAX) capsule 0.4 mg  0.4 mg Oral DAILY  sodium chloride (NS) flush 5-10 mL  5-10 mL IntraVENous Q8H  
 sodium chloride (NS) flush 5-10 mL  5-10 mL IntraVENous PRN  
 acetaminophen (TYLENOL) tablet 650 mg  650 mg Oral Q6H PRN  
 ondansetron (ZOFRAN) injection 4 mg  4 mg IntraVENous Q4H PRN  
 enoxaparin (LOVENOX) injection 40 mg  40 mg SubCUTAneous Q24H  
 sodium chloride (NS) flush 5-10 mL  5-10 mL IntraVENous PRN Medical History Past Medical History:  
Diagnosis Date  Anemia  BPH (benign prostatic hypertrophy)  Dementia  Gout  High cholesterol 06/18 taken off meds  Hypertension 6/18 was taken off BP meds  Left inguinal hernia 07/2018 Review of Systems Unable to perform ROS: Dementia Exam: 
 
Visit Vitals /75 (BP 1 Location: Right arm, BP Patient Position: At rest) Pulse 77 Temp 97.5 °F (36.4 °C) Resp 16 Ht 5' 8\" (1.727 m) Wt 136 lb (61.7 kg) SpO2 96% BMI 20.68 kg/m²  
  
eneral: Well developed, well nourished. Patient in no apparent distress Head: Normocephalic, atraumatic, anicteric sclera Neck Normal ROM, No thyromegally Cardiac: Regular rate and rhythm Ext: No pedal edema Skin: Supple no rash NeurologicExam: 
Mental Status: Alert and oriented to person only Speech: Fluent no aphasia slightly dysarthric Cranial Nerves:  II - XII Intact Motor:  Full and symmetric strength of upper and lower proximal and distal muscles. Normal bulk and tone. Sensory:   Symmetric and intact with no perceived deficits modalities involving small or large fibers. Gait:  deferred. Tremor:   No tremor noted. Cerebellar:  Coordination intact. Imaging CT Results (most recent): 
Results from Hospital Encounter encounter on 11/28/18 CT HEAD WO CONT Narrative EXAM:  CT HEAD WO CONT INDICATION:   Confusion/delirium, altered LOC, unexplained COMPARISON: None. CONTRAST:  None. TECHNIQUE: Unenhanced CT of the head was performed using 5 mm images. Brain and 
bone windows were generated. CT dose reduction was achieved through use of a 
standardized protocol tailored for this examination and automatic exposure 
control for dose modulation. FINDINGS: 
There is a 8 mm left subdural fluid collection which appear chronic with wall 
calcifications. No significant mass effect and no shift of the midline. There 
has been prior emile holes surgery on the left. There is atrophy and 
periventricular white matter change. There is no masses. No acute hemorrhage is 
seen. Impression  impression: Chronic left subdural fluid collection. Prominent atrophy and white 
matter disease. MRI Results (most recent): No results found for this or any previous visit. Lab Review Lab Results Component Value Date/Time WBC 7.7 12/05/2018 05:44 AM  
 HCT 37.7 12/05/2018 05:44 AM  
 HGB 12.0 (L) 12/05/2018 05:44 AM  
 PLATELET 066 39/67/5534 05:44 AM  
 
 
Lab Results Component Value Date/Time Sodium 136 12/05/2018 05:44 AM  
 Potassium 3.8 12/05/2018 05:44 AM  
 Chloride 99 12/05/2018 05:44 AM  
 CO2 31 12/05/2018 05:44 AM  
 Glucose 135 (H) 12/05/2018 05:44 AM  
 BUN 22 (H) 12/05/2018 05:44 AM  
 Creatinine 0.66 (L) 12/05/2018 05:44 AM  
 Calcium 8.7 12/05/2018 05:44 AM  
 
 
No components found for: Danny Burden No results found for: ZACKARY No results found for: HBA1C, HGBE8, NJV3NVCA, [id number] Lab Results Component Value Date/Time Vitamin B12 1,147 (H) 12/01/2018 04:22 AM  
 Folate 26.1 (H) 12/01/2018 04:22 AM  
 
 
No results found for: ZACKARYAnuj Lab Results Component Value Date/Time  Cholesterol, total 149 10/11/2017 01:14 PM  
 HDL Cholesterol 53 10/11/2017 01:14 PM  
 LDL, calculated 63 10/11/2017 01:14 PM  
 VLDL, calculated 33 10/11/2017 01:14 PM  
 Triglyceride 167 (H) 10/11/2017 01:14 PM

## 2018-12-05 NOTE — PROGRESS NOTES
Discussed pt in IDR - may be stable to return to [facility] on thurs, 12/6 if remains tele sitter-free for 24 hours. Update sent to [facility] via [facility]. Pt will likely need transport arranged at [facility]? Brooks Gong, MSW

## 2018-12-05 NOTE — PROGRESS NOTES
Occupational Therapy Goals Initiated 12/3/2018 1. Patient will perform grooming standing at the sink with contact guard assist within 7 day(s). 2.  Patient will perform bathing with supervision/standby assist within 7 day(s). 3.  Patient will perform lower body dressing with supervision/standby assist within 7 day(s). 4.  Patient will perform toilet transfers with supervision/standby assist within 7 day(s). 5.  Patient will perform all aspects of toileting with contact guard assist within 7 day(s). Occupational Therapy TREATMENT Patient: Kranthi Virk (23 y.o. male) Date: 12/5/2018 Diagnosis: Acute respiratory failure with hypoxia and hypercapnia (HCC) <principal problem not specified> Precautions: Fall, DNR 
 
ASSESSMENT: 
Patient demonstrating improving strength, balance and activity tolerance for the performance of ADLs and mobility. He was able to perform LB dressing with SBA and was supervision for grooming and toileting. In regards to functional mobility he was SBA for sit to stand transfer, as well as for ambulation to/from the bathroom with a RW. Intermittent cueing needed for attention and safety awareness, but patient receptive and responsive. At this time he continues to benefit from acute OT and will need SNF rehab at discharge. Progression toward goals: 
[x]       Improving appropriately and progressing toward goals 
[]       Improving slowly and progressing toward goals 
[]       Not making progress toward goals and plan of care will be adjusted PLAN: 
Patient continues to benefit from skilled intervention to address the above impairments. Continue treatment per established plan of care. Discharge Recommendations:  Placido Cesar Further Equipment Recommendations for Discharge:  TBD OBJECTIVE DATA SUMMARY:  
Cognitive/Behavioral Status: 
Neurologic State: Alert Orientation Level: Oriented to person;Oriented to place; Disoriented to situation;Disoriented to time Cognition: Follows commands;Decreased attention/concentration Perception: Appears intact Perseveration: No perseveration noted Safety/Judgement: Decreased awareness of need for assistance;Decreased awareness of need for safety;Decreased insight into deficits Functional Mobility and Transfers for ADLs:Bed Mobility: 
 Presented up in chair Scooting: Supervision Transfers: 
Sit to Stand: Stand-by assistance Toilet Transfer : Stand-by assistance(using grab bar) Bathroom Mobility: Stand-by assistance(ambulating to/from bathroom with a RW) Balance: 
Sitting: Intact Standing: Impaired(but good with support of RW) Standing - Static: Good Standing - Dynamic : Good ADL Intervention: 
Grooming Washing Face: Supervision/set-up(standing at sink) Washing Hands: Supervision/set-up Cues: Verbal cues provided Lower Body Dressing Assistance Underpants: Stand-by assistance Socks: Stand-by assistance Leg Crossed Method Used: No 
Position Performed: Seated in chair;Standing;Bending forward method Cues: Verbal cues provided Toileting Toileting Assistance: Supervision/set up Clothing Management: Supervision/set-up Cues: Verbal cues provided Cognitive Retraining Safety/Judgement: Decreased awareness of need for assistance;Decreased awareness of need for safety;Decreased insight into deficits Pain: 
Pain Scale 1: Numeric (0 - 10) Pain Intensity 1: 0 Activity Tolerance: VSS on 2 L with activity After treatment:  
[x] Patient left in no apparent distress sitting up in chair 
[] Patient left in no apparent distress in bed 
[x] Call bell left within reach [x] Nursing notified 
[] Caregiver present [x] Bed alarm activated COMMUNICATION/COLLABORATION:  
The patients plan of care was discussed with: Registered Nurse Ludin Meyer, OTR/L Time Calculation: 15 mins

## 2018-12-05 NOTE — PROGRESS NOTES
Report  Received from [facility]. Kardex, MAR, Recent Results or Cardiac Rhythm nsr 1/sa were discussed. , RN assumed care of the pt. Juice Bedolla RN 
 
 
 
 
 
Problem: Falls - Risk of 
Goal: *Absence of Falls Document [facility] Fall Risk and appropriate interventions in the flowsheet. Outcome: Progressing Towards Goal 
Fall Risk Interventions: 
Mobility Interventions: Bed/chair exit alarm, Communicate number of staff needed for ambulation/transfer, OT consult for ADLs, Patient to call before getting OOB, PT Consult for mobility concerns, Strengthening exercises (ROM-active/passive), Utilize walker, cane, or other assistive device Mentation Interventions: Adequate sleep, hydration, pain control, Bed/chair exit alarm, Door open when patient unattended, Evaluate medications/consider consulting pharmacy Medication Interventions: Bed/chair exit alarm, Evaluate medications/consider consulting pharmacy, Patient to call before getting OOB, Teach patient to arise slowly Elimination Interventions: Bed/chair exit alarm, Call light in reach, Patient to call for help with toileting needs, Toileting schedule/hourly rounds History of Falls Interventions: Bed/chair exit alarm, Door open when patient unattended, Investigate reason for fall

## 2018-12-05 NOTE — PROGRESS NOTES
12/5/2018 10:45 AM 
 
Admit Date: 11/28/2018 Admit Diagnosis: Acute respiratory failure with hypoxia and hypercapnia (HCC) Subjective:  
 
Yesenia Casillas   denies chest pain, chest pressure/discomfort, dyspnea, palpitations, irregular heart beats, near-syncope, syncope. Visit Vitals /75 (BP 1 Location: Right arm, BP Patient Position: At rest) Pulse 77 Temp 97.5 °F (36.4 °C) Resp 16 Ht 5' 8\" (1.727 m) Wt 136 lb (61.7 kg) SpO2 96% BMI 20.68 kg/m² Current Facility-Administered Medications Medication Dose Route Frequency  polyethylene glycol (MIRALAX) packet 17 g  17 g Oral DAILY  docusate sodium (COLACE) capsule 100 mg  100 mg Oral BID  
 bisacodyl (DULCOLAX) suppository 10 mg  10 mg Rectal DAILY PRN  
 bisacodyl (DULCOLAX) tablet 5 mg  5 mg Oral DAILY PRN  
 dilTIAZem CD (CARDIZEM CD) capsule 120 mg  120 mg Oral DAILY  guaiFENesin (ROBITUSSIN) 100 mg/5 mL oral liquid 200 mg  200 mg Oral QID  acetylcysteine (MUCOMYST) 200 mg/mL (20 %) solution 400 mg  400 mg Nebulization TID RT  
 levalbuterol (XOPENEX) nebulizer soln 0.63 mg/3 mL  0.63 mg Nebulization TID RT  
 levalbuterol (XOPENEX) nebulizer soln 0.63 mg/3 mL  0.63 mg Nebulization Q4H PRN  
 QUEtiapine (SEROquel) tablet 12.5 mg  12.5 mg Oral QHS  finasteride (PROSCAR) tablet 5 mg  5 mg Oral DAILY  haloperidol lactate (HALDOL) injection 2 mg  2 mg IntraVENous Q6H PRN  
 tamsulosin (FLOMAX) capsule 0.4 mg  0.4 mg Oral DAILY  sodium chloride (NS) flush 5-10 mL  5-10 mL IntraVENous Q8H  
 sodium chloride (NS) flush 5-10 mL  5-10 mL IntraVENous PRN  
 acetaminophen (TYLENOL) tablet 650 mg  650 mg Oral Q6H PRN  
 ondansetron (ZOFRAN) injection 4 mg  4 mg IntraVENous Q4H PRN  
 enoxaparin (LOVENOX) injection 40 mg  40 mg SubCUTAneous Q24H  
 sodium chloride (NS) flush 5-10 mL  5-10 mL IntraVENous PRN Objective:  
  
Visit Vitals /75 (BP 1 Location: Right arm, BP Patient Position: At rest) Pulse 77 Temp 97.5 °F (36.4 °C) Resp 16 Ht 5' 8\" (1.727 m) Wt 136 lb (61.7 kg) SpO2 96% BMI 20.68 kg/m² Physical Exam: Abdomen: soft, non-tender. Bowel sounds normal.  
Extremities: no cyanosis or edema Heart: regular rate and rhythm, S1, S2 normal, no murmur, click, rub or gallop Lungs: clear to auscultation bilaterally Neurologic: pleasantly confused Data Review:  
Labs:   
Recent Results (from the past 24 hour(s)) EKG, 12 LEAD, INITIAL Collection Time: 12/04/18  3:32 PM  
Result Value Ref Range Ventricular Rate 73 BPM  
 Atrial Rate 73 BPM  
 P-R Interval 212 ms QRS Duration 80 ms  
 Q-T Interval 376 ms QTC Calculation (Bezet) 414 ms Calculated P Axis 79 degrees Calculated R Axis 61 degrees Calculated T Axis 88 degrees Diagnosis Sinus rhythm with sinus arrhythmia with 1st degree AV block When compared with ECG of 28-NOV-2018 20:18, No significant change was found CBC WITH AUTOMATED DIFF Collection Time: 12/05/18  5:44 AM  
Result Value Ref Range WBC 7.7 4.1 - 11.1 K/uL  
 RBC 4.46 4.10 - 5.70 M/uL  
 HGB 12.0 (L) 12.1 - 17.0 g/dL HCT 37.7 36.6 - 50.3 % MCV 84.5 80.0 - 99.0 FL  
 MCH 26.9 26.0 - 34.0 PG  
 MCHC 31.8 30.0 - 36.5 g/dL  
 RDW 15.4 (H) 11.5 - 14.5 % PLATELET 135 296 - 642 K/uL MPV 9.5 8.9 - 12.9 FL  
 NRBC 0.0 0  WBC ABSOLUTE NRBC 0.00 0.00 - 0.01 K/uL NEUTROPHILS 76 (H) 32 - 75 % LYMPHOCYTES 13 12 - 49 % MONOCYTES 8 5 - 13 % EOSINOPHILS 1 0 - 7 % BASOPHILS 0 0 - 1 % IMMATURE GRANULOCYTES 2 (H) 0.0 - 0.5 % ABS. NEUTROPHILS 5.8 1.8 - 8.0 K/UL  
 ABS. LYMPHOCYTES 1.0 0.8 - 3.5 K/UL  
 ABS. MONOCYTES 0.6 0.0 - 1.0 K/UL  
 ABS. EOSINOPHILS 0.1 0.0 - 0.4 K/UL  
 ABS. BASOPHILS 0.0 0.0 - 0.1 K/UL  
 ABS. IMM. GRANS. 0.2 (H) 0.00 - 0.04 K/UL  
 DF AUTOMATED METABOLIC PANEL, BASIC  Collection Time: 12/05/18  5:44 AM  
 Result Value Ref Range Sodium 136 136 - 145 mmol/L Potassium 3.8 3.5 - 5.1 mmol/L Chloride 99 97 - 108 mmol/L  
 CO2 31 21 - 32 mmol/L Anion gap 6 5 - 15 mmol/L Glucose 135 (H) 65 - 100 mg/dL BUN 22 (H) 6 - 20 MG/DL Creatinine 0.66 (L) 0.70 - 1.30 MG/DL  
 BUN/Creatinine ratio 33 (H) 12 - 20 GFR est AA >60 >60 ml/min/1.73m2 GFR est non-AA >60 >60 ml/min/1.73m2 Calcium 8.7 8.5 - 10.1 MG/DL MAGNESIUM Collection Time: 12/05/18  5:44 AM  
Result Value Ref Range Magnesium 2.2 1.6 - 2.4 mg/dL PHOSPHORUS Collection Time: 12/05/18  5:44 AM  
Result Value Ref Range Phosphorus 3.2 2.6 - 4.7 MG/DL  
AMMONIA Collection Time: 12/05/18  5:44 AM  
Result Value Ref Range Ammonia 42 (H) <32 UMOL/L  
T4, FREE Collection Time: 12/05/18  5:44 AM  
Result Value Ref Range T4, Free 1.2 0.8 - 1.5 NG/DL  
TSH 3RD GENERATION Collection Time: 12/05/18  5:44 AM  
Result Value Ref Range TSH 1.07 0.36 - 3.74 uIU/mL Telemetry: normal sinus rhythm Assessment:  
 
Active Problems: 
  Acute respiratory failure with hypoxia and hypercapnia ([address] (11/29/2018) SOB (shortness of breath) (11/30/2018) Delirium (11/30/2018) Counseling regarding advanced care planning and goals of care (11/30/2018) Plan: No further significant pauses since discontinuing BB. Monitor. No further work up for now.

## 2018-12-05 NOTE — PROGRESS NOTES
[facility], Critical Care, and Sleep Medicine Name: Yosef Juan MRN: [id number] : 1930 Hospital: [address] Date: 2018 IMPRESSION:  
· Acute hypoxic/hypercapnic respiratory failure- still on O2 
· Bronchitis (can not exclude undiagnosed exacerbated COPD)- less congested · Mucus plugging - mild and related to bronchitis · LE edema with pain · Dementia- pleasant and follows commands · H/O tobacco use RECOMMENDATIONS:  
· D/c planning? Family wants to know how long to hold his bed · O2 - wean · Diuresis? · Bronchodilators · Systemic steroids- taper · Pulmonary toilet · Mucolytics · Empiric antibiotics · DVT prophylaxis · Nothing to add, will sign off .glad he has improved Subjective:  
 
 OOB in chair. Birthday today. Less congestion . Still on some O2. Less edema.  pt still has leg edema- no calf pain. No cramps. On NC O2. Congested cough. 12/3- d/w nursing. Destiny De La O Friday, weak. Telesitter. From  Care. REady for activity. Congested cough with deep breathes. No fever. Wheezes musical. Sister calls daily. 18: agitation/confusion this morning, now more calm and eating breakfast 
 
Initial history: This patient has been seen and evaluated at the request of Dr. Jing Flores for mucus plugging. Patient is a 80 y.o. male former smoker, presented yesterday with acute on chronic respiratory distress. This had been going on for an extended period of time, but got worse yesterday. He was treated with BiPAP, steroids, and bronchodilators, and he reports he is feeling much better today. Has a cough productive of only minimal amounts of clear sputum. No f/c. Past Medical History:  
Diagnosis Date  Anemia  BPH (benign prostatic hypertrophy)  Dementia  Gout  High cholesterol  taken off meds  Hypertension  was taken off BP meds  Left inguinal hernia 2018 Past Surgical History:  
Procedure Laterality Date  HX HERNIA REPAIR  9-10-13 [facility] with mesh-[facility]-Dr. Estela Jennings  
 HX HERNIA REPAIR  07/31/2018  
 open [facility] with mesh Prior to Admission medications Medication Sig Start Date End Date Taking? Authorizing Provider  
dutasteride (AVODART) 0.5 mg capsule Take 0.5 mg by mouth daily. Yes Provider, Historical  
bisacodyl (DULCOLAX) 10 mg suppository Insert 10 mg into rectum daily as needed (constipation unrelieved by miralax). Yes Provider, Historical  
dilTIAZem (CARDIZEM) 30 mg tablet Take 30 mg by mouth every twelve (12) hours. Yes Provider, Historical  
sodium phosphate (ENEMA) 19-7 gram/118 mL enema Insert 1 Enema into rectum daily as needed (constipation unrelieved by bisacodyl suppository). Yes Provider, Historical  
senna-docusate (SENNA WITH DOCUSATE SODIUM) 8.6-50 mg per tablet Take 1 Tab by mouth daily as needed for Constipation (try first if constipation). Yes Provider, Historical  
acetaminophen (TYLENOL) 325 mg tablet Take 650 mg by mouth every eight (8) hours as needed for Pain or Fever. Yes Provider, Historical  
polyethylene glycol (MIRALAX) 17 gram packet Take 17 g by mouth as needed (constipation w/ no BM x 3 days). Yes Provider, Historical  
ibuprofen (MOTRIN) 200 mg tablet Take 2 Tabs by mouth every six (6) hours as needed for Pain. 8/3/18  Yes David Herring MD  
albuterol (PROVENTIL VENTOLIN) 2.5 mg /3 mL (0.083 %) nebulizer solution 3 mL by Nebulization route every four (4) hours as needed for Wheezing or Shortness of Breath. 7/24/18  Yes Jennifer Gee NP  
tamsulosin (FLOMAX) 0.4 mg capsule TAKE 1 CAPSULE BY MOUTH EVERY DAY 5/22/18  Yes Paty Bustillos NP  
ferrous sulfate 325 mg (65 mg iron) tablet Take 1 Tab by mouth two (2) times daily (with meals). On an empty stomach with Vitamin C (like OJ) 7/20/17  Yes Paty Bustillos NP Nebulizer & Compressor machine 1 Each by Does Not Apply route as needed for Cough (wheezing). Use nebulizer every 4-6 hours as needed for shortness of breath or wheezing 7/26/18   Luann Arcos NP  
food supplemt, lactose-reduced (BOOST HIGH PROTEIN) 0.06 gram- 1 kcal/mL liqd Drink 2-3 containers DAILY. 3/23/18   Luann Arcos NP No Known Allergies Social History Tobacco Use  Smoking status: Former smoker, ~30-40 p-y  Smokeless tobacco: Never Used Substance Use Topics  Alcohol use: Yes Comment: occas Family History Problem Relation Age of Onset  Stroke Mother  Stroke Sister  Diabetes Sister  Hypertension Sister  Heart Disease Brother  Hypertension Brother Current Facility-Administered Medications Medication Dose Route Frequency  polyethylene glycol (MIRALAX) packet 17 g  17 g Oral DAILY  docusate sodium (COLACE) capsule 100 mg  100 mg Oral BID  dilTIAZem CD (CARDIZEM CD) capsule 120 mg  120 mg Oral DAILY  guaiFENesin (ROBITUSSIN) 100 mg/5 mL oral liquid 200 mg  200 mg Oral QID  acetylcysteine (MUCOMYST) 200 mg/mL (20 %) solution 400 mg  400 mg Nebulization TID RT  
 levalbuterol (XOPENEX) nebulizer soln 0.63 mg/3 mL  0.63 mg Nebulization TID RT  
 QUEtiapine (SEROquel) tablet 12.5 mg  12.5 mg Oral QHS  finasteride (PROSCAR) tablet 5 mg  5 mg Oral DAILY  tamsulosin (FLOMAX) capsule 0.4 mg  0.4 mg Oral DAILY  sodium chloride (NS) flush 5-10 mL  5-10 mL IntraVENous Q8H  
 enoxaparin (LOVENOX) injection 40 mg  40 mg SubCUTAneous Q24H Review of Systems: A comprehensive review of systems was negative except for: Musculoskeletal: positive for leg pain Objective: 
Vital Signs:   
Visit Vitals /75 (BP 1 Location: Right arm, BP Patient Position: At rest) Pulse 95 Temp 98 °F (36.7 °C) Resp 18 Ht 5' 8\" (1.727 m) Wt 61.7 kg (136 lb) SpO2 97% BMI 20.68 kg/m² O2 Device: Nasal cannula O2 Flow Rate (L/min): 2 l/min Temp (24hrs), Av.8 °F (36.6 °C), Min:97.5 °F (36.4 °C), Max:98.1 °F (36.7 °C) Intake/Output:  
Last shift:      701 - 1900 In: 720 [P.O.:720] Out: 450 [Urine:450] Last 3 shifts: 1901 - 700 In: 900 [P.O.:900] Out: 1500 [Urine:1500] Intake/Output Summary (Last 24 hours) at 2018 1253 Last data filed at 2018 1138 Gross per 24 hour Intake 1200 ml Output 1150 ml Net 50 ml Physical Exam:  
General:  Alert, no distress  AAM   
Head:  Normocephalic, without obvious abnormality, atraumatic. Eyes:  Conjunctivae/corneas clear. Nose: Nares normal. Septum midline. Mucosa normal.    
Throat: Lips, mucosa, and tongue normal.    
Neck: Supple, symmetrical, trachea midline Back:   Symmetric, no curvature. ROM normal.  
Lungs:   Diminished bilateral air movement; no ronchi or wheezes Chest wall:  No tenderness or deformity. Heart:  Irregular, normal rate Abdomen:   Soft, non-tender. Bowel sounds normal.   
Extremities: Extremities normal, atraumatic, no cyanosis, +LE edema Skin: Skin color, texture, turgor normal. No rashes or lesions Neurologic: Grossly nonfocal, conversant and polite Data:  
Labs: 
Recent Labs 18 
0544 WBC 7.7 HGB 12.0*  
HCT 37.7  Recent Labs 18 
0544   
K 3.8 CL 99  
CO2 31 * BUN 22* CREA 0.66* CA 8.7 MG 2.2 PHOS 3.2 No results for input(s): PH, PCO2, PO2, HCO3, FIO2 in the last 72 hours. Imaging: 
I have personally reviewed the patients radiographs: 
None today Martin Marie MD

## 2018-12-06 ENCOUNTER — APPOINTMENT (OUTPATIENT)
Dept: GENERAL RADIOLOGY | Age: 83
DRG: 189 | End: 2018-12-06
Attending: EMERGENCY MEDICINE
Payer: MEDICARE

## 2018-12-06 VITALS
DIASTOLIC BLOOD PRESSURE: 86 MMHG | WEIGHT: 136 LBS | HEART RATE: 82 BPM | TEMPERATURE: 97.7 F | HEIGHT: 68 IN | RESPIRATION RATE: 18 BRPM | OXYGEN SATURATION: 99 % | SYSTOLIC BLOOD PRESSURE: 147 MMHG | BODY MASS INDEX: 20.61 KG/M2

## 2018-12-06 PROBLEM — F03.90 DEMENTIA (HCC): Status: ACTIVE | Noted: 2018-12-06

## 2018-12-06 PROCEDURE — 74230 X-RAY XM SWLNG FUNCJ C+: CPT

## 2018-12-06 PROCEDURE — 74011250637 HC RX REV CODE- 250/637: Performed by: INTERNAL MEDICINE

## 2018-12-06 PROCEDURE — 74011250637 HC RX REV CODE- 250/637: Performed by: NURSE PRACTITIONER

## 2018-12-06 PROCEDURE — 74011000250 HC RX REV CODE- 250: Performed by: NURSE PRACTITIONER

## 2018-12-06 PROCEDURE — 94760 N-INVAS EAR/PLS OXIMETRY 1: CPT

## 2018-12-06 PROCEDURE — 94640 AIRWAY INHALATION TREATMENT: CPT

## 2018-12-06 PROCEDURE — 77010033678 HC OXYGEN DAILY

## 2018-12-06 PROCEDURE — 74011000250 HC RX REV CODE- 250: Performed by: GENERAL ACUTE CARE HOSPITAL

## 2018-12-06 PROCEDURE — 92611 MOTION FLUOROSCOPY/SWALLOW: CPT

## 2018-12-06 PROCEDURE — 74011250636 HC RX REV CODE- 250/636: Performed by: INTERNAL MEDICINE

## 2018-12-06 RX ORDER — GUAIFENESIN 100 MG/5ML
200 SOLUTION ORAL 4 TIMES DAILY
Qty: 280 ML | Refills: 0 | Status: SHIPPED | OUTPATIENT
Start: 2018-12-06 | End: 2018-12-13

## 2018-12-06 RX ORDER — PREDNISONE 20 MG/1
40 TABLET ORAL
Qty: 7 TAB | Refills: 0 | Status: SHIPPED | OUTPATIENT
Start: 2018-12-06 | End: 2019-04-30

## 2018-12-06 RX ORDER — DILTIAZEM HYDROCHLORIDE 120 MG/1
120 CAPSULE, COATED, EXTENDED RELEASE ORAL DAILY
Qty: 30 CAP | Refills: 0 | Status: SHIPPED | OUTPATIENT
Start: 2018-12-06

## 2018-12-06 RX ORDER — QUETIAPINE FUMARATE 25 MG/1
12.5 TABLET, FILM COATED ORAL
Qty: 30 TAB | Refills: 0 | Status: SHIPPED | OUTPATIENT
Start: 2018-12-06

## 2018-12-06 RX ADMIN — DILTIAZEM HYDROCHLORIDE 120 MG: 120 CAPSULE, COATED, EXTENDED RELEASE ORAL at 09:35

## 2018-12-06 RX ADMIN — ACETYLCYSTEINE 400 MG: 200 SOLUTION ORAL; RESPIRATORY (INHALATION) at 08:16

## 2018-12-06 RX ADMIN — Medication 10 ML: at 05:59

## 2018-12-06 RX ADMIN — DOCUSATE SODIUM 100 MG: 100 CAPSULE, LIQUID FILLED ORAL at 09:35

## 2018-12-06 RX ADMIN — GUAIFENESIN 200 MG: 200 SOLUTION ORAL at 09:38

## 2018-12-06 RX ADMIN — ENOXAPARIN SODIUM 40 MG: 40 INJECTION, SOLUTION INTRAVENOUS; SUBCUTANEOUS at 00:23

## 2018-12-06 RX ADMIN — LEVALBUTEROL HYDROCHLORIDE 0.63 MG: 0.63 SOLUTION RESPIRATORY (INHALATION) at 08:16

## 2018-12-06 RX ADMIN — POLYETHYLENE GLYCOL 3350 17 G: 17 POWDER, FOR SOLUTION ORAL at 09:39

## 2018-12-06 RX ADMIN — FINASTERIDE 5 MG: 5 TABLET, FILM COATED ORAL at 09:36

## 2018-12-06 RX ADMIN — GUAIFENESIN 200 MG: 200 SOLUTION ORAL at 12:26

## 2018-12-06 RX ADMIN — TAMSULOSIN HYDROCHLORIDE 0.4 MG: 0.4 CAPSULE ORAL at 09:35

## 2018-12-06 NOTE — DISCHARGE INSTRUCTIONS
Patient Discharge Instructions     Pt Name  Luisa Bonds   Date of Birth 12/5/1930   Age  80 y.o. Medical Record Number  [id number]   PCP None    Admit date:  11/28/2018 @    [address] [place] [address]    Room Number  2221/01   Date of Discharge 12/6/2018     Admission Diagnoses:     <principal problem not specified>        No Known Allergies     You were admitted to [address] [place] [address] for  <principal problem not specified>    YOUR OTHER MEDICAL DIAGNOSES INCLUDE (BUT NOT LIMITED TO ):  Present on Admission:   Acute respiratory failure with hypoxia and hypercapnia (HCC)   SOB (shortness of breath)   Delirium   Dementia   Benign prostatic hyperplasia   Gout   Hypertension   Benign prostatic hyperplasia without lower urinary tract symptoms      DIET: DIET DYSPHAGIA MECH ALTERED       Oral Nutritional Supplements: Ensure Clear or Ensure Active Clear  Supplement Frequency: Twice daily    Recommended activity: Activity as tolerated  Follow up : Follow-up Information     Follow up With Specialties Details Why Contact Info    primary care doctor  Schedule an appointment as soon as possible for a visit in one week or physician at site    [address] [place] [address]  [phone]          Wean O2 as tolerate; as long as O2 sat is greater than or equal to 93%     Skilled nursing facility MD responsible for above upon discharge. · It is important that you take the medication exactly as they are prescribed. · Keep your medication in the bottles provided by the pharmacist and keep a list of the medication names, dosages, and times to be taken in your wallet. · Do not take other medications without consulting your doctor.        ADDITIONAL INFORMATION: If you experience any of the following symptoms or have any health problem not listed below, then please call your primary care physician or return to the emergency room if you cannot get hold of your doctor: Fever, chills, nausea, vomiting, diarrhea, change in mentation, falling, bleeding, shortness of breath. I understand that if any problems occur once I am discharged, I am supposed to call my Primary care physician for further care or seek help in the Emergency Department at the nearest Healthcare facility. I have had an opportunity to discuss my clinical issues with my doctor and nursing staff. I understand and acknowledge receipt of the above instructions.                                                                                                                                            Physician's or R.N.'s Signature                                                            Date/Time                                                                                                                                              Patient or Representative Signature                                                 Date/Time

## 2018-12-06 NOTE — PROGRESS NOTES
Problem: Falls - Risk of 
Goal: *Absence of Falls Document Joyaanjelica Bueno Fall Risk and appropriate interventions in the flowsheet. Outcome: Progressing Towards Goal 
Fall Risk Interventions: 
Mobility Interventions: Bed/chair exit alarm Mentation Interventions: Door open when patient unattended, Bed/chair exit alarm, Reorient patient Medication Interventions: Bed/chair exit alarm Elimination Interventions: Bed/chair exit alarm, Call light in reach, Urinal in reach History of Falls Interventions: Bed/chair exit alarm, Door open when patient unattended, Room close to nurse's station Problem: Pressure Injury - Risk of 
Goal: *Prevention of pressure injury Document Lane Scale and appropriate interventions in the flowsheet. Outcome: Progressing Towards Goal 
Pressure Injury Interventions: 
Sensory Interventions: Assess changes in LOC, Chair cushion, Keep linens dry and wrinkle-free, Maintain/enhance activity level, Minimize linen layers Moisture Interventions: Absorbent underpads, Check for incontinence Q2 hours and as needed Activity Interventions: Increase time out of bed Mobility Interventions: Chair cushion, Turn and reposition approx. every two hours(pillow and wedges) Nutrition Interventions: Document food/fluid/supplement intake Friction and Shear Interventions: Lift sheet, Minimize layers

## 2018-12-06 NOTE — PROGRESS NOTES
[address] 80 y.o.   male [address] [facility]   Room: 2221/01    [facility] 2 CARDIOPULMONARY CARE  Unit Phone# : [phone] [address] 
[facility] [address] [address] Dept: [phone] Loc: [id number] SITUATION Admitted:  11/28/2018         Attending Provider:  Iftikhar Nogueira MD    
 
Consultations:  IP CONSULT TO PULMONOLOGY 
IP CONSULT TO CARDIOLOGY 
IP CONSULT TO NEUROLOGY 
 
PCP:  None   None Treatment Team: Attending Provider: Iftikhar Nogueira MD; Consulting Provider: Mo Carson NP; Consulting Provider: Juan Carlos Michael MD; Consulting Provider: Corrine Camilo MD; Utilization Review: Nani Navarrete RN; Care Manager: Teagan Merrill RN Admitting Dx:  Acute respiratory failure with hypoxia and hypercapnia ([facility] [address] Principal Problem: <principal problem not specified> * No surgery found * of  
  
BY: * Surgery not found *             ON: * No surgery found * Code Status: DNR Advance Directives:  
Advance Care Planning 11/30/2018 Patient's Healthcare Decision Maker is: Legal Next of Kin Primary Decision Maker Name -  
Primary Decision Maker Phone Number -  
Primary Decision Maker Relationship to Patient -  
Confirm Advance Directive None Patient Would Like to Complete Advance Directive Unable Does the patient have other document types MOST/MOLST/POST/POLST (Send w/patient) No Doesnt Have Isolation:  There are currently no Active Isolations       MDRO: No current active infections Pain Medications given:  N/A    Last dose: N/A at  N/A Special Equipment needed: yes  Type of equipment: oxygen (Not currently on dialysis) (Not currently on dialysis) (Not currently on dialysis) BACKGROUND Allergies: No Known Allergies Past Medical History:  
Diagnosis Date  Anemia  BPH (benign prostatic hypertrophy)  Dementia  Gout  High cholesterol 06/18 taken off meds  Hypertension 6/18 was taken off BP meds  Left inguinal hernia 07/2018 Past Surgical History:  
Procedure Laterality Date  HX HERNIA REPAIR  9-10-13 [facility] with mesh-[facility]-Dr. Richie Meier  
 HX HERNIA REPAIR  07/31/2018  
 open [facility] with mesh Medications Prior to Admission Medication Sig  dutasteride (AVODART) 0.5 mg capsule Take 0.5 mg by mouth daily.  bisacodyl (DULCOLAX) 10 mg suppository Insert 10 mg into rectum daily as needed (constipation unrelieved by miralax).  dilTIAZem (CARDIZEM) 30 mg tablet Take 30 mg by mouth every twelve (12) hours.  sodium phosphate (ENEMA) 19-7 gram/118 mL enema Insert 1 Enema into rectum daily as needed (constipation unrelieved by bisacodyl suppository).  senna-docusate (SENNA WITH DOCUSATE SODIUM) 8.6-50 mg per tablet Take 1 Tab by mouth daily as needed for Constipation (try first if constipation).  acetaminophen (TYLENOL) 325 mg tablet Take 650 mg by mouth every eight (8) hours as needed for Pain or Fever.  polyethylene glycol (MIRALAX) 17 gram packet Take 17 g by mouth as needed (constipation w/ no BM x 3 days).  ibuprofen (MOTRIN) 200 mg tablet Take 2 Tabs by mouth every six (6) hours as needed for Pain.  albuterol (PROVENTIL VENTOLIN) 2.5 mg /3 mL (0.083 %) nebulizer solution 3 mL by Nebulization route every four (4) hours as needed for Wheezing or Shortness of Breath.  tamsulosin (FLOMAX) 0.4 mg capsule TAKE 1 CAPSULE BY MOUTH EVERY DAY  ferrous sulfate 325 mg (65 mg iron) tablet Take 1 Tab by mouth two (2) times daily (with meals). On an empty stomach with Vitamin C (like OJ)  Nebulizer & Compressor machine 1 Each by Does Not Apply route as needed for Cough (wheezing).  Use nebulizer every 4-6 hours as needed for shortness of breath or wheezing  food supplemt, lactose-reduced (BOOST HIGH PROTEIN) 0.06 gram- 1 kcal/mL liqd Drink 2-3 containers DAILY. Hard scripts included in transfer packet yes Vaccinations:   
Immunization History Administered Date(s) Administered  Influenza High Dose Vaccine PF 11/05/2014, 10/10/2016, 10/11/2017  Influenza Vaccine 10/01/2018  Pneumococcal Conjugate (PCV-13) 04/03/2018  Pneumococcal Polysaccharide (PPSV-23) 10/10/2016  Tdap 10/10/2016  Zoster Vaccine, Live 04/03/2018, 06/27/2018 Readmission Risks:    Known Risks: Falls, Aspiration The Charlson CoMorbitiy Index tool is an evidenced based tool that has more automatic generated information. The tool looks at many different items such as the age of the patient, how many times they were admitted in the last calendar year, current length of stay in the hospital and their diagnosis. All of these items are pulled automatically from information documented in the chart from various places and will generate a score that predicts whether a patient is at low (less than 13), medium (13-20) or high (21 or greater) risk of being readmitted. ASSESSMENT Temp: 97.7 °F (36.5 °C) (12/06/18 1103) Pulse (Heart Rate): 82 (12/06/18 1103) Resp Rate: 18 (12/06/18 1103)           BP: 147/86 (12/06/18 1103) O2 Sat (%): 99 % (12/06/18 1103) Weight: 61.7 kg (136 lb)    Height: 5' 8\" (172.7 cm) (11/28/18 2017) If above not within 1 hour of discharge: 
 
BP:_____  P:____  R:____ T:_____ O2 Sat: ___%  O2: ______ Active Orders Diet DIET DYSPHAGIA MECH ALTERED (NDD2) Orientation: only aware of  person Active Behaviors: None Active Lines/Drains:  (Peg Tube / [place] / CL or S/L?): no 
 
Urinary Status: Voiding     Last BM: Last Bowel Movement Date: 12/06/18 Skin Integrity: Scars (comment) Mobility: Slightly limited Weight Bearing Status: WBAT (Weight Bearing as Tolerated) Gait Training Assistive Device: Gait belt, Walker, rolling Ambulation - Level of Assistance: Contact guard assistance Distance (ft): 60 Feet (ft) Lab Results Component Value Date/Time Glucose 135 (H) 12/05/2018 05:44 AM  
 INR 1.1 11/28/2018 08:25 PM  
 INR 1.0 11/05/2009 08:40 AM  
 HGB 12.0 (L) 12/05/2018 05:44 AM  
 HGB 12.3 12/01/2018 04:22 AM  
  
  RECOMMENDATION See After Visit Summary (AVS) for: · Discharge instructions · After [address] · Special equipment needed (entered pre-discharge by Care Management) · Medication Reconciliation · Follow up Appointment(s) Report given/sent by:  Maggie Nicole Verbal report given to: Aixa Smith LPN  FAXED to:     
    
Estimated discharge time:  12/6/2018 at 1400

## 2018-12-06 NOTE — PROGRESS NOTES
Problem: Breathing Pattern - Ineffective Goal: *Absence of hypoxia Outcome: Progressing Towards Goal 
Pt now with 1L NC. Sats remain greater than 93%. Pt instructed to breathe in through nose and out through mouth when feeling SOB.

## 2018-12-06 NOTE — PROGRESS NOTES
Chart review. CM acknowledged discharge order. [facility] has accepted. Patient to be discharged today to [facility]. Rm assgn 313 RN to call report to:  [phone] [facility] transport requested and confirmed. for 1400 today. BLS. 1LNC O2, Fall risk, decreased endurance, confused, dimentia. PCS, H&P and facesheet on chart. Care Management Interventions PCP Verified by CM: No(Resident Physician) Mode of Transport at Discharge: BLS([facility] at 1400 today) Transition of Care Consult (CM Consult): Long Term Care([facility]) Discharge Durable Medical Equipment: No 
Physical Therapy Consult: Yes Occupational Therapy Consult: Yes Speech Therapy Consult: Yes Current Support Network: [address] [place] [address] Confirm Follow Up Transport: Other (see comment)(Ambulance transport ) Plan discussed with Pt/Family/Caregiver: Yes Discharge Location Discharge Placement: Skilled nursing facility CM tasks are complete. Talon Bhakta RN CM Ext [phone]

## 2018-12-06 NOTE — PROGRESS NOTES
0700: Bedside report received from Tracey Mcfadden RN. 
 
0845: Pt off unit for testing. 0930: Pt returned from testing. 1110: O2 weaned to RA. Sats 93%. Will continue to monitor. 1119: Pt placed on 1L NC per order from LUC[facility] Ye.  
 
1400: DISCHARGE SUMMARY FROM NURSE: 
 
Patient is stable for discharge. I have reviewed the discharge instructions with the patient and verbalized understanding. All questions were fully answered and denies any complaints. There were no personal belongings, valuables or home medications left at patients bedside,  or safe. Hard scripts and medication handouts were given and reviewed with the patient. Appropriate educational materials and side effects teaching were provided. Cardiac monitor and IV line(s) removed. Pt going to [facility].

## 2018-12-06 NOTE — PROGRESS NOTES
12/6/2018 9:07 AM 
 
Admit Date: 11/28/2018 Admit Diagnosis: Acute respiratory failure with hypoxia and hypercapnia (HCC) Subjective:  
 
David Wallace   denies chest pain, chest pressure/discomfort, dyspnea, palpitations, irregular heart beats, near-syncope, syncope. Visit Vitals /79 (BP 1 Location: Right arm, BP Patient Position: At rest) Pulse 85 Temp 97.5 °F (36.4 °C) Resp 16 Ht 5' 8\" (1.727 m) Wt 136 lb (61.7 kg) SpO2 93% BMI 20.68 kg/m² Current Facility-Administered Medications Medication Dose Route Frequency  polyethylene glycol (MIRALAX) packet 17 g  17 g Oral DAILY  docusate sodium (COLACE) capsule 100 mg  100 mg Oral BID  
 bisacodyl (DULCOLAX) suppository 10 mg  10 mg Rectal DAILY PRN  
 bisacodyl (DULCOLAX) tablet 5 mg  5 mg Oral DAILY PRN  
 dilTIAZem CD (CARDIZEM CD) capsule 120 mg  120 mg Oral DAILY  guaiFENesin (ROBITUSSIN) 100 mg/5 mL oral liquid 200 mg  200 mg Oral QID  acetylcysteine (MUCOMYST) 200 mg/mL (20 %) solution 400 mg  400 mg Nebulization TID RT  
 levalbuterol (XOPENEX) nebulizer soln 0.63 mg/3 mL  0.63 mg Nebulization TID RT  
 levalbuterol (XOPENEX) nebulizer soln 0.63 mg/3 mL  0.63 mg Nebulization Q4H PRN  
 QUEtiapine (SEROquel) tablet 12.5 mg  12.5 mg Oral QHS  finasteride (PROSCAR) tablet 5 mg  5 mg Oral DAILY  haloperidol lactate (HALDOL) injection 2 mg  2 mg IntraVENous Q6H PRN  
 tamsulosin (FLOMAX) capsule 0.4 mg  0.4 mg Oral DAILY  sodium chloride (NS) flush 5-10 mL  5-10 mL IntraVENous Q8H  
 sodium chloride (NS) flush 5-10 mL  5-10 mL IntraVENous PRN  
 acetaminophen (TYLENOL) tablet 650 mg  650 mg Oral Q6H PRN  
 ondansetron (ZOFRAN) injection 4 mg  4 mg IntraVENous Q4H PRN  
 enoxaparin (LOVENOX) injection 40 mg  40 mg SubCUTAneous Q24H  
 sodium chloride (NS) flush 5-10 mL  5-10 mL IntraVENous PRN Objective:  
  
Visit Vitals /79 (BP 1 Location: Right arm, BP Patient Position: At rest) Pulse 85 Temp 97.5 °F (36.4 °C) Resp 16 Ht 5' 8\" (1.727 m) Wt 136 lb (61.7 kg) SpO2 93% BMI 20.68 kg/m² Physical Exam: Abdomen: soft, non-tender. Bowel sounds normal. 
Extremities: no cyanosis or edema Heart: regular rate and rhythm, S1, S2 normal, no murmur, click, rub or gallop Lungs: clear to auscultation bilaterally Neurologic: Grossly normal 
 
Data Review:  
 
 
Telemetry: normal sinus rhythm Assessment:  
 
Active Problems: Hypertension () Benign prostatic hyperplasia () Gout () Benign prostatic hyperplasia without lower urinary tract symptoms (10/11/2017) Acute respiratory failure with hypoxia and hypercapnia ([address] (11/29/2018) SOB (shortness of breath) (11/30/2018) Delirium (11/30/2018) Counseling regarding advanced care planning and goals of care (11/30/2018) Dementia (12/6/2018) Plan: No further significant pauses since discontinuing BB. Monitor. No further work up for now. will s/o. Please call if can be of any further assistance.

## 2018-12-06 NOTE — DISCHARGE SUMMARY
Hospitalist Discharge Summary     Patient ID:  Kranthi Virk  [id number]  80 y.o.  12/5/1930    PCP on record: None    Admit date: 11/28/2018  Discharge date and time: 12/6/2018      DISCHARGE DIAGNOSIS:  Acute respiratory failure with hypoxia and hypercapnia POA  Due to mucous plugging with suspect due to Aspiration  AMS  Delirium vs Lewy body Dementia  Hypertension  Sinus arrhythmia  Urinary retension (resolved)  BMP  Hypokalemia      CONSULTATIONS:  IP CONSULT TO PULMONOLOGY  IP CONSULT TO CARDIOLOGY  IP CONSULT TO NEUROLOGY    Excerpted HPI from H&P of Yolanda Ramsay MD:  Milvia Louise is a 80 y.o.  male who presents with difficulty breathing. I spoke to sister over the phone who reported pt has been having breathing difficulty for a while and today she got called from [facility] about pt been sent to ED for difficulty breathing. Pt is poor historian and unable to provide any meaningful history so history is limited. In ED pt noted to be hypoxic and hypercapnic so placed on biPAP. But later taken of BiPAP by ED after Acidosis looked better.           ______________________________________________________________________  DISCHARGE SUMMARY/HOSPITAL COURSE:  for full details see H&P, daily progress notes, labs, consult notes. Acute respiratory failure with hypoxia and hypercapnia POA  Due to mucous plugging with suspect due to Aspiration  - CTA chest: No PE, bibasilar atelectasis and mucous plugging in the lower lobes right  greater than left.  borderline enlarged mediastinal and hilar lymph nodes most likely reactive. - Pt was on BiPAP at ER, but no need for BiPAP since the admission.  -  O2 sat 96% @ 1L via n/c  - appreciate pulmonology input; wean O2 as long as O2 sat is greater than equal to 92%    Continue with duoneb, mucinex  - complete steroid as directed  - received IV levo and Zosyn, completed total of 7 days of ABX with Augmentin.   - tolerating dysphagia diet without difficulty  - weak congested cough;    CXR:  Low lung volumes with bibasilar atelectasis. Pulmonary toilet     AMS  Delirium vs Lewy body Dementia  - alert and orient to self and place. - continue with seroquel to 12.5mg qhs  - pt was aggressive, confused, not following direction on 11/30. Initially, his presentation was mistaken with alcohol withdrew, but it is very unlikely. Spoke with the nurse Abhilash Messer) from [facility] on 11/30/2018, who informed me that he is very appropriate, orient x 3, always like to eat at same place.  - contacted neurology for any additional recommendation  [facility] CT: Chronic left subdural fluid collection. Prominent atrophy and white matter disease.       Hypertension  Sinus arrhythmia  - Echo: Systolic function was normal. Ejection fraction was estimated to be 55 %. There were no regional wall motion abnormalities. Moderate mitral valve regurgitation  - continue with Cardizem CD.   - appreciate cardiology input; had 3 second pause today. Pt was asymptomatic. BB was d/c'd by cardiology. Poor candidate for pacemaker. Recommend medical management        Urinary retension (resolved)  BMP  - continue with flomax & Proscar      Hypokalemia  - replete; will monitor                 _______________________________________________________________________  Patient seen and examined by me on discharge day. Pertinent Findings:  Gen:    Not in distress  Chest: Coarse breath sound in apex with decreased breath sounds at bases  CVS:   Regular rhythm. No edema  Abd:  Soft, not distended, not tender  Neuro:  Alert, orient x self and place  _______________________________________________________________________  DISCHARGE MEDICATIONS:   Current Discharge Medication List      START taking these medications    Details   dilTIAZem CD (CARDIZEM CD) 120 mg ER capsule Take 1 Cap by mouth daily.   Qty: 30 Cap, Refills: 0      guaiFENesin (ROBITUSSIN) 100 mg/5 mL liquid Take 10 mL by mouth four (4) times daily for 7 days. Qty: 280 mL, Refills: 0      QUEtiapine (SEROQUEL) 25 mg tablet Take 0.5 Tabs by mouth nightly. Qty: 30 Tab, Refills: 0      predniSONE (DELTASONE) 20 mg tablet Take 40 mg by mouth daily (with breakfast). 2 tablet once a day for 2 days  1 tablet once a day for 2 days   Then 1/2 tab once a day for 2 days  Qty: 7 Tab, Refills: 0         CONTINUE these medications which have NOT CHANGED    Details   dutasteride (AVODART) 0.5 mg capsule Take 0.5 mg by mouth daily. bisacodyl (DULCOLAX) 10 mg suppository Insert 10 mg into rectum daily as needed (constipation unrelieved by miralax). senna-docusate (SENNA WITH DOCUSATE SODIUM) 8.6-50 mg per tablet Take 1 Tab by mouth daily as needed for Constipation (try first if constipation). acetaminophen (TYLENOL) 325 mg tablet Take 650 mg by mouth every eight (8) hours as needed for Pain or Fever. polyethylene glycol (MIRALAX) 17 gram packet Take 17 g by mouth as needed (constipation w/ no BM x 3 days). albuterol (PROVENTIL VENTOLIN) 2.5 mg /3 mL (0.083 %) nebulizer solution 3 mL by Nebulization route every four (4) hours as needed for Wheezing or Shortness of Breath. Qty: 30 Each, Refills: 2      tamsulosin (FLOMAX) 0.4 mg capsule TAKE 1 CAPSULE BY MOUTH EVERY DAY  Qty: 90 Cap, Refills: 0      ferrous sulfate 325 mg (65 mg iron) tablet Take 1 Tab by mouth two (2) times daily (with meals). On an empty stomach with Vitamin C (like OJ)  Qty: 60 Tab, Refills: 11      Nebulizer & Compressor machine 1 Each by Does Not Apply route as needed for Cough (wheezing). Use nebulizer every 4-6 hours as needed for shortness of breath or wheezing  Qty: 1 Each, Refills: 0      food supplemt, lactose-reduced (BOOST HIGH PROTEIN) 0.06 gram- 1 kcal/mL liqd Drink 2-3 containers DAILY.   Qty: 90 Container, Refills: 11    Associated Diagnoses: Mild protein-calorie malnutrition ([address]         STOP taking these medications       dilTIAZem (CARDIZEM) 30 mg tablet Comments: Reason for Stopping:         sodium phosphate (ENEMA) 19-7 gram/118 mL enema Comments:   Reason for Stopping:         ibuprofen (MOTRIN) 200 mg tablet Comments:   Reason for Stopping:               My Recommended Diet, Activity, Wound Care, and follow-up labs are listed in the patient's Discharge Insturctions which I have personally completed and reviewed.     _______________________________________________________________________  DISPOSITION:    Home with Family:    Home with HH/PT/OT/RN:    SNF/LTC: y   MALINI:    OTHER:        Condition at Discharge:  Stable  _______________________________________________________________________  Follow up with:   PCP : None  Follow-up Information     Follow up With Specialties Details Why Contact Info    primary care doctor  Schedule an appointment as soon as possible for a visit in one week or physician at site    [address] [place] [address] [phone]                Total time in minutes spent coordinating this discharge (includes going over instructions, follow-up, prescriptions, and preparing report for sign off to her PCP) :  30 minutes    Signed:  Hyunsun Aaron Boast, NP

## 2018-12-06 NOTE — PROGRESS NOTES
Problem: Dysphagia (Adult) Goal: *Acute Goals and Plan of Care (Insert Text) 
11/29/2018 Speech path goals: 1. Pt will tolerate dys 3/thins with no overt s/s of aspiration. MET for dysphagia 2/thin Speech Pathology Modified barium swallow Study/discharge Patient: Yosef Juan (6 y.o. male) Date: 12/6/2018 Primary Diagnosis: Acute respiratory failure with hypoxia and hypercapnia ([facility]) Precautions: swallow  Fall, DNR 
 
ASSESSMENT : 
**MBS did not record due to technology malfunction** Based on the objective data described below, the patient presents with mild-moderate oropharyngeal dysphagia. Oral dysphagia is characterized by prolonged mastication and delayed posterior propulsion. Patient with mildly delayed swallow initiation at the level of the pyriform sinuses with successive sips of thin liquid via straw. Patient also with collection of residue in vallecula and pyriform sinuses with thin liquids, however no significant residue observed with purees or solid. Patient with penetration of thin liquids during the swallow secondary to delayed initiation and reduced laryngeal elevation/closure. Patient also with penetration of thin liquids after the swallow secondary to vallecular residue. All penetration cleared with the force of the swallow or subsequent swallows. No aspiration observed. No coughing noted during PO intake, however patient did cough before and after MBS. Skilled therapy provided by a speech-language pathologist is not indicated at this time. PLAN : 
Recommendations and Planned Interventions: 
--Dysphagia 2/thin liquid diet 
--Straws ok 
--Meds as tolerated Discharge Recommendations: None SUBJECTIVE:  
Patient agreeable to MBS. OBJECTIVE:  
 
Past Medical History:  
Diagnosis Date  Anemia  BPH (benign prostatic hypertrophy)  Dementia  Gout  High cholesterol 06/18 taken off meds  Hypertension 6/18 was taken off BP meds  Left inguinal hernia 07/2018 Past Surgical History:  
Procedure Laterality Date  HX HERNIA REPAIR  9-10-13 RIHR with mesh-[facility]-Dr. Josi Ny  
 HX HERNIA REPAIR  07/31/2018  
 open LIHR with mesh Prior Level of Function/Home Situation:  
Home Situation Home Environment: Long term care Care Facility Name: [facility] One/Two Story Residence: One story Living Alone: No 
Support Systems: Assisted living Patient Expects to be Discharged to[de-identified] Skilled nursing facility Current DME Used/Available at Home: None Diet prior to admission:  
Current Diet:  Dysphagia 2/thin Radiologist: Dr. Janice Rothman Film Views: Lateral;Fluoro Patient Position: upright in chair Trial 1:  
Consistency Presented: Thin liquid;Puree; Solid How Presented: SLP-fed/presented;Straw;Successive swallows;Spoon Bolus Acceptance: No impairment Bolus Formation/Control: Impaired: Delayed;Mastication Propulsion: Delayed (# of seconds) Initiation of Swallow: Triggered at pyriform sinus(es) Timing: Pooling 1-5 sec Penetration: During swallow; After swallow; To cords;From residual(cleared with force of swallow/second swallow) Aspiration/Timing: No evidence of aspiration Pharyngeal Clearance: Vallecular residue;Pyriform residue ; Less than 10%;10-50%(with thin, none puree or solid) Decreased Tongue Base Retraction?: Yes Laryngeal Elevation: Incomplete laryngeal closure Aspiration/Penetration Score: 2 (Penetration/No residue-Contrast enters the airway penetrates, remains above the folds/cords, and is cleared) Pharyngeal Symmetry: Not assessed Pharyngeal-Esophageal Segment: No impairment Pharyngeal Dysfunction: Decreased elevation/closure Oral Phase Severity: Mild-moderate Pharyngeal Phase Severity: Mild moderate NOMS:  
The NOMS functional outcome measure was used to quantify this patient's level of swallowing impairment.   Based on the NOMS, the patient was determined to be at level 4 for swallow function G Codes: In compliance with CMSs Claims Based Outcome Reporting, the following G-code set was chosen for this patient based the use of the NOMS functional outcome to quantify this patient's level of swallowing impairment. Using the NOMS, the patient was determined to be at level 4 for swallow function which correlates with the CK= 40-59% level of severity. Based on the objective assessment provided within this note, the current, goal, and discharge g-codes are as follows: 
 
Swallow  Swallowing: 
 Swallow Current Status CK= 40-59%  Swallow Goal Status CK= 40-59%  Swallow D/C Status CK= 40-59% NOMS Swallowing Levels: 
Level 1 (CN): NPO Level 2 (CM): NPO but takes consistency in therapy Level 3 (CL): Takes less than 50% of nutrition p.o. and continues with nonoral feedings; and/or safe with mod cues; and/or max diet restriction Level 4 (CK): Safe swallow but needs mod cues; and/or mod diet restriction; and/or still requires some nonoral feeding/supplements Level 5 (CJ): Safe swallow with min diet restriction; and/or needs min cues Level 6 (CI): Independent with p.o.; rare cues; usually self cues; may need to avoid some foods or needs extra time Level 7 (CH): Independent for all p.o. NAEL. (2003). National Outcomes Measurement System (NOMS): Adult Speech-Language Pathology User's Guide. COMMUNICATION/EDUCATION:  
The patients plan of care including findings from MBS, recommendations, and recommended diet changes were discussed with: Registered Nurse. 
 
[]   Patient/family have participated as able and agree with findings and recommendations. [x]   Patient is unable to participate in plan of care at this time. Thank you for this referral. 
Sky Marc, SLP Time Calculation: 20 mins

## 2018-12-06 NOTE — PROGRESS NOTES
Pharmacist Discharge Medication Reconciliation Significant PMH:  
Past Medical History:  
Diagnosis Date  Anemia  BPH (benign prostatic hypertrophy)  Dementia  Gout  High cholesterol 06/18 taken off meds  Hypertension 6/18 was taken off BP meds  Left inguinal hernia 07/2018 Chief Complaint for this Admission: Chief Complaint Patient presents with  Shortness of Breath Pt arrived via EMS for sudden onset of shortness of breath after walking to his room drom dinner. pt gasping for breath on room air with SPO2 of 87% placed on non rebreather. Pt found to be in afib belieed to be new onset but takes ardizem. Pt vomited x2 Allergies: Patient has no known allergies. Discharge Medications:  
Current Discharge Medication List  
  
 
START taking these medications Details  
dilTIAZem CD (CARDIZEM CD) 120 mg ER capsule Take 1 Cap by mouth daily. Qty: 30 Cap, Refills: 0  
  
guaiFENesin (ROBITUSSIN) 100 mg/5 mL liquid Take 10 mL by mouth four (4) times daily for 7 days. Qty: 280 mL, Refills: 0 QUEtiapine (SEROQUEL) 25 mg tablet Take 0.5 Tabs by mouth nightly. Qty: 30 Tab, Refills: 0  
  
predniSONE (DELTASONE) 20 mg tablet Take 40 mg by mouth daily (with breakfast). 2 tablet once a day for 2 days 1 tablet once a day for 2 days Then 1/2 tab once a day for 2 days Qty: 7 Tab, Refills: 0 CONTINUE these medications which have NOT CHANGED Details  
dutasteride (AVODART) 0.5 mg capsule Take 0.5 mg by mouth daily. bisacodyl (DULCOLAX) 10 mg suppository Insert 10 mg into rectum daily as needed (constipation unrelieved by miralax). senna-docusate (SENNA WITH DOCUSATE SODIUM) 8.6-50 mg per tablet Take 1 Tab by mouth daily as needed for Constipation (try first if constipation). acetaminophen (TYLENOL) 325 mg tablet Take 650 mg by mouth every eight (8) hours as needed for Pain or Fever. polyethylene glycol (MIRALAX) 17 gram packet Take 17 g by mouth as needed (constipation w/ no BM x 3 days). albuterol (PROVENTIL VENTOLIN) 2.5 mg /3 mL (0.083 %) nebulizer solution 3 mL by Nebulization route every four (4) hours as needed for Wheezing or Shortness of Breath. Qty: 30 Each, Refills: 2  
  
tamsulosin (FLOMAX) 0.4 mg capsule TAKE 1 CAPSULE BY MOUTH EVERY DAY Qty: 90 Cap, Refills: 0  
  
ferrous sulfate 325 mg (65 mg iron) tablet Take 1 Tab by mouth two (2) times daily (with meals). On an empty stomach with Vitamin C (like OJ) Qty: 60 Tab, Refills: 11 Nebulizer & Compressor machine 1 Each by Does Not Apply route as needed for Cough (wheezing). Use nebulizer every 4-6 hours as needed for shortness of breath or wheezing 
Qty: 1 Each, Refills: 0  
  
food supplemt, lactose-reduced (BOOST HIGH PROTEIN) 0.06 gram- 1 kcal/mL liqd Drink 2-3 containers DAILY. Qty: 90 Container, Refills: 11  
 Associated Diagnoses: Mild protein-calorie malnutrition ([address] STOP taking these medications  
  
 dilTIAZem (CARDIZEM) 30 mg tablet Comments:  
Reason for Stopping:   
   
 sodium phosphate (ENEMA) 19-7 gram/118 mL enema Comments:  
Reason for Stopping:   
   
 ibuprofen (MOTRIN) 200 mg tablet Comments:  
Reason for Stopping:   
   
  
 
 
The patient's chart, MAR and AVS were reviewed by Ashlyn Naidu RPH. Discharging Provider: Dr. Regenia Schirmer Recommendations/Clarifications: none Time spent: 20 min Thank Sola Alvarez [facility]

## 2018-12-06 NOTE — PROGRESS NOTES
CM acknowledged consult for f/u in one week. Patient is LTC resident at [facility]. Resident physician follows. Brittaney Chery RN CM Ext [phone]

## 2019-04-30 ENCOUNTER — APPOINTMENT (OUTPATIENT)
Dept: CT IMAGING | Age: 84
DRG: 177 | End: 2019-04-30
Attending: EMERGENCY MEDICINE
Payer: MEDICARE

## 2019-04-30 ENCOUNTER — APPOINTMENT (OUTPATIENT)
Dept: GENERAL RADIOLOGY | Age: 84
DRG: 177 | End: 2019-04-30
Attending: EMERGENCY MEDICINE
Payer: MEDICARE

## 2019-04-30 ENCOUNTER — HOSPITAL ENCOUNTER (INPATIENT)
Age: 84
LOS: 3 days | Discharge: HOME | DRG: 177 | End: 2019-05-03
Attending: EMERGENCY MEDICINE | Admitting: EMERGENCY MEDICINE
Payer: MEDICARE

## 2019-04-30 DIAGNOSIS — J96.02 ACUTE RESPIRATORY FAILURE WITH HYPOXIA AND HYPERCAPNIA (HCC): Primary | ICD-10-CM

## 2019-04-30 DIAGNOSIS — J18.9 PNEUMONIA DUE TO INFECTIOUS ORGANISM, UNSPECIFIED LATERALITY, UNSPECIFIED PART OF LUNG: ICD-10-CM

## 2019-04-30 DIAGNOSIS — J96.01 ACUTE RESPIRATORY FAILURE WITH HYPOXIA AND HYPERCAPNIA (HCC): Primary | ICD-10-CM

## 2019-04-30 PROBLEM — J96.90 RESPIRATORY FAILURE (HCC): Status: ACTIVE | Noted: 2019-04-30
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
## 2019-04-30 LAB
ALBUMIN SERPL-MCNC: 3.6 G/DL (ref 3.5–5)
ALBUMIN/GLOB SERPL: 0.9 {RATIO} (ref 1.1–2.2)
ALP SERPL-CCNC: 69 U/L (ref 45–117)
ALT SERPL-CCNC: 22 U/L (ref 12–78)
ANION GAP SERPL CALC-SCNC: 4 MMOL/L (ref 5–15)
ARTERIAL PATENCY WRIST A: YES
ARTERIAL PATENCY WRIST A: YES
AST SERPL-CCNC: 18 U/L (ref 15–37)
ATRIAL RATE: 93 BPM
BASE EXCESS BLD CALC-SCNC: 2 MMOL/L
BASE EXCESS BLD CALC-SCNC: 3 MMOL/L
BASOPHILS # BLD: 0 K/UL (ref 0–0.1)
BASOPHILS NFR BLD: 1 % (ref 0–1)
BDY SITE: ABNORMAL
BDY SITE: ABNORMAL
BILIRUB SERPL-MCNC: 0.4 MG/DL (ref 0.2–1)
BNP SERPL-MCNC: 110 PG/ML
BUN SERPL-MCNC: 13 MG/DL (ref 6–20)
BUN/CREAT SERPL: 17 (ref 12–20)
CALCIUM SERPL-MCNC: 8.6 MG/DL (ref 8.5–10.1)
CALCULATED P AXIS, ECG09: 85 DEGREES
CALCULATED R AXIS, ECG10: 55 DEGREES
CALCULATED T AXIS, ECG11: 79 DEGREES
CHLORIDE SERPL-SCNC: 102 MMOL/L (ref 97–108)
CK MB CFR SERPL CALC: 4.3 % (ref 0–2.5)
CK MB SERPL-MCNC: 3.2 NG/ML (ref 5–25)
CK SERPL-CCNC: 75 U/L (ref 39–308)
CO2 SERPL-SCNC: 32 MMOL/L (ref 21–32)
CREAT SERPL-MCNC: 0.75 MG/DL (ref 0.7–1.3)
DIAGNOSIS, 93000: NORMAL
DIFFERENTIAL METHOD BLD: NORMAL
EOSINOPHIL # BLD: 0.4 K/UL (ref 0–0.4)
EOSINOPHIL NFR BLD: 6 % (ref 0–7)
ERYTHROCYTE [DISTWIDTH] IN BLOOD BY AUTOMATED COUNT: 12.9 % (ref 11.5–14.5)
GAS FLOW.O2 O2 DELIVERY SYS: ABNORMAL L/MIN
GAS FLOW.O2 O2 DELIVERY SYS: ABNORMAL L/MIN
GLOBULIN SER CALC-MCNC: 3.8 G/DL (ref 2–4)
GLUCOSE BLD STRIP.AUTO-MCNC: 147 MG/DL (ref 65–100)
GLUCOSE BLD STRIP.AUTO-MCNC: 195 MG/DL (ref 65–100)
GLUCOSE SERPL-MCNC: 105 MG/DL (ref 65–100)
HCO3 BLD-SCNC: 28.7 MMOL/L (ref 22–26)
HCO3 BLD-SCNC: 29.2 MMOL/L (ref 22–26)
HCT VFR BLD AUTO: 39.1 % (ref 36.6–50.3)
HGB BLD-MCNC: 12.5 G/DL (ref 12.1–17)
IMM GRANULOCYTES # BLD AUTO: 0 K/UL (ref 0–0.04)
IMM GRANULOCYTES NFR BLD AUTO: 0 % (ref 0–0.5)
INR PPP: 1 (ref 0.9–1.1)
LACTATE BLD-SCNC: 0.76 MMOL/L (ref 0.4–2)
LYMPHOCYTES # BLD: 0.9 K/UL (ref 0.8–3.5)
LYMPHOCYTES NFR BLD: 16 % (ref 12–49)
MCH RBC QN AUTO: 28.5 PG (ref 26–34)
MCHC RBC AUTO-ENTMCNC: 32 G/DL (ref 30–36.5)
MCV RBC AUTO: 89.3 FL (ref 80–99)
MONOCYTES # BLD: 0.6 K/UL (ref 0–1)
MONOCYTES NFR BLD: 10 % (ref 5–13)
NEUTS SEG # BLD: 4 K/UL (ref 1.8–8)
NEUTS SEG NFR BLD: 67 % (ref 32–75)
NRBC # BLD: 0 K/UL (ref 0–0.01)
NRBC BLD-RTO: 0 PER 100 WBC
O2/TOTAL GAS SETTING VFR VENT: 40 %
P-R INTERVAL, ECG05: 262 MS
PCO2 BLD: 59.4 MMHG (ref 35–45)
PCO2 BLD: 60.2 MMHG (ref 35–45)
PEEP RESPIRATORY: 5 CMH2O
PEEP RESPIRATORY: 6 CMH2O
PH BLD: 7.29 [PH] (ref 7.35–7.45)
PH BLD: 7.3 [PH] (ref 7.35–7.45)
PIP ISTAT,IPIP: 16
PLATELET # BLD AUTO: 235 K/UL (ref 150–400)
PMV BLD AUTO: 9.9 FL (ref 8.9–12.9)
PO2 BLD: 81 MMHG (ref 80–100)
PO2 BLD: 82 MMHG (ref 80–100)
POTASSIUM SERPL-SCNC: 4.1 MMOL/L (ref 3.5–5.1)
PROT SERPL-MCNC: 7.4 G/DL (ref 6.4–8.2)
PROTHROMBIN TIME: 10.5 SEC (ref 9–11.1)
Q-T INTERVAL, ECG07: 342 MS
QRS DURATION, ECG06: 74 MS
QTC CALCULATION (BEZET), ECG08: 425 MS
RBC # BLD AUTO: 4.38 M/UL (ref 4.1–5.7)
SAO2 % BLD: 94 % (ref 92–97)
SAO2 % BLD: 94 % (ref 92–97)
SERVICE CMNT-IMP: 15 L/MIN
SERVICE CMNT-IMP: ABNORMAL
SERVICE CMNT-IMP: ABNORMAL
SODIUM SERPL-SCNC: 138 MMOL/L (ref 136–145)
SPECIMEN TYPE: ABNORMAL
SPECIMEN TYPE: ABNORMAL
TOTAL RESP. RATE, ITRR: 20
TROPONIN I SERPL-MCNC: <0.05 NG/ML
VENTRICULAR RATE, ECG03: 93 BPM
WBC # BLD AUTO: 5.9 K/UL (ref 4.1–11.1)

## 2019-04-30 PROCEDURE — 82550 ASSAY OF CK (CPK): CPT

## 2019-04-30 PROCEDURE — 82803 BLOOD GASES ANY COMBINATION: CPT

## 2019-04-30 PROCEDURE — 82962 GLUCOSE BLOOD TEST: CPT

## 2019-04-30 PROCEDURE — 74011250636 HC RX REV CODE- 250/636: Performed by: EMERGENCY MEDICINE

## 2019-04-30 PROCEDURE — 74011636320 HC RX REV CODE- 636/320: Performed by: EMERGENCY MEDICINE

## 2019-04-30 PROCEDURE — 77030013033 HC MSK BPAP/CPAP MMKA -B

## 2019-04-30 PROCEDURE — 74011000250 HC RX REV CODE- 250: Performed by: EMERGENCY MEDICINE

## 2019-04-30 PROCEDURE — 74011000258 HC RX REV CODE- 258: Performed by: EMERGENCY MEDICINE

## 2019-04-30 PROCEDURE — 83605 ASSAY OF LACTIC ACID: CPT

## 2019-04-30 PROCEDURE — 94660 CPAP INITIATION&MGMT: CPT

## 2019-04-30 PROCEDURE — 80053 COMPREHEN METABOLIC PANEL: CPT

## 2019-04-30 PROCEDURE — 74011250637 HC RX REV CODE- 250/637: Performed by: EMERGENCY MEDICINE

## 2019-04-30 PROCEDURE — 83880 ASSAY OF NATRIURETIC PEPTIDE: CPT

## 2019-04-30 PROCEDURE — 36600 WITHDRAWAL OF ARTERIAL BLOOD: CPT

## 2019-04-30 PROCEDURE — 85610 PROTHROMBIN TIME: CPT

## 2019-04-30 PROCEDURE — 85025 COMPLETE CBC W/AUTO DIFF WBC: CPT

## 2019-04-30 PROCEDURE — 96374 THER/PROPH/DIAG INJ IV PUSH: CPT

## 2019-04-30 PROCEDURE — 36415 COLL VENOUS BLD VENIPUNCTURE: CPT

## 2019-04-30 PROCEDURE — 82553 CREATINE MB FRACTION: CPT

## 2019-04-30 PROCEDURE — 87040 BLOOD CULTURE FOR BACTERIA: CPT

## 2019-04-30 PROCEDURE — 96375 TX/PRO/DX INJ NEW DRUG ADDON: CPT

## 2019-04-30 PROCEDURE — 74011636637 HC RX REV CODE- 636/637: Performed by: EMERGENCY MEDICINE

## 2019-04-30 PROCEDURE — 93005 ELECTROCARDIOGRAM TRACING: CPT

## 2019-04-30 PROCEDURE — 84484 ASSAY OF TROPONIN QUANT: CPT

## 2019-04-30 PROCEDURE — 94640 AIRWAY INHALATION TREATMENT: CPT

## 2019-04-30 PROCEDURE — 94644 CONT INHLJ TX 1ST HOUR: CPT

## 2019-04-30 PROCEDURE — 71045 X-RAY EXAM CHEST 1 VIEW: CPT

## 2019-04-30 PROCEDURE — 71275 CT ANGIOGRAPHY CHEST: CPT

## 2019-04-30 PROCEDURE — 65660000000 HC RM CCU STEPDOWN

## 2019-04-30 PROCEDURE — 99285 EMERGENCY DEPT VISIT HI MDM: CPT

## 2019-04-30 RX ORDER — DILTIAZEM HYDROCHLORIDE 120 MG/1
120 CAPSULE, COATED, EXTENDED RELEASE ORAL DAILY
Status: DISCONTINUED | OUTPATIENT
Start: 2019-05-01 | End: 2019-05-03 | Stop reason: HOSPADM

## 2019-04-30 RX ORDER — DUTASTERIDE 0.5 MG/1
0.5 CAPSULE, LIQUID FILLED ORAL DAILY
Status: DISCONTINUED | OUTPATIENT
Start: 2019-05-01 | End: 2019-05-03 | Stop reason: HOSPADM

## 2019-04-30 RX ORDER — CARVEDILOL 3.12 MG/1
3.12 TABLET ORAL 2 TIMES DAILY
COMMUNITY

## 2019-04-30 RX ORDER — LANOLIN ALCOHOL/MO/W.PET/CERES
400 CREAM (GRAM) TOPICAL 2 TIMES DAILY
COMMUNITY

## 2019-04-30 RX ORDER — ONDANSETRON 2 MG/ML
4 INJECTION INTRAMUSCULAR; INTRAVENOUS
Status: DISCONTINUED | OUTPATIENT
Start: 2019-04-30 | End: 2019-05-03 | Stop reason: HOSPADM

## 2019-04-30 RX ORDER — SODIUM CHLORIDE 0.9 % (FLUSH) 0.9 %
5-40 SYRINGE (ML) INJECTION EVERY 8 HOURS
Status: DISCONTINUED | OUTPATIENT
Start: 2019-04-30 | End: 2019-05-03 | Stop reason: HOSPADM

## 2019-04-30 RX ORDER — SODIUM CHLORIDE 0.9 % (FLUSH) 0.9 %
5-40 SYRINGE (ML) INJECTION AS NEEDED
Status: DISCONTINUED | OUTPATIENT
Start: 2019-04-30 | End: 2019-05-03 | Stop reason: HOSPADM

## 2019-04-30 RX ORDER — TAMSULOSIN HYDROCHLORIDE 0.4 MG/1
0.4 CAPSULE ORAL
Status: DISCONTINUED | OUTPATIENT
Start: 2019-04-30 | End: 2019-05-03 | Stop reason: HOSPADM

## 2019-04-30 RX ORDER — HEPARIN SODIUM 5000 [USP'U]/ML
5000 INJECTION, SOLUTION INTRAVENOUS; SUBCUTANEOUS EVERY 8 HOURS
Status: DISCONTINUED | OUTPATIENT
Start: 2019-04-30 | End: 2019-05-03 | Stop reason: HOSPADM

## 2019-04-30 RX ORDER — SODIUM CHLORIDE 9 MG/ML
75 INJECTION, SOLUTION INTRAVENOUS CONTINUOUS
Status: DISCONTINUED | OUTPATIENT
Start: 2019-04-30 | End: 2019-05-03 | Stop reason: HOSPADM

## 2019-04-30 RX ORDER — SODIUM CHLORIDE 0.9 % (FLUSH) 0.9 %
10 SYRINGE (ML) INJECTION
Status: COMPLETED | OUTPATIENT
Start: 2019-04-30 | End: 2019-04-30

## 2019-04-30 RX ORDER — LANOLIN ALCOHOL/MO/W.PET/CERES
400 CREAM (GRAM) TOPICAL 2 TIMES DAILY
Status: DISCONTINUED | OUTPATIENT
Start: 2019-04-30 | End: 2019-05-03 | Stop reason: HOSPADM

## 2019-04-30 RX ORDER — INSULIN LISPRO 100 [IU]/ML
INJECTION, SOLUTION INTRAVENOUS; SUBCUTANEOUS
Status: DISCONTINUED | OUTPATIENT
Start: 2019-04-30 | End: 2019-05-03 | Stop reason: HOSPADM

## 2019-04-30 RX ORDER — DEXTROSE 50 % IN WATER (D50W) INTRAVENOUS SYRINGE
12.5-25 AS NEEDED
Status: DISCONTINUED | OUTPATIENT
Start: 2019-04-30 | End: 2019-05-03 | Stop reason: HOSPADM

## 2019-04-30 RX ORDER — POTASSIUM CHLORIDE 750 MG/1
10 TABLET, FILM COATED, EXTENDED RELEASE ORAL DAILY
COMMUNITY

## 2019-04-30 RX ORDER — ALBUTEROL SULFATE 0.83 MG/ML
10 SOLUTION RESPIRATORY (INHALATION)
Status: COMPLETED | OUTPATIENT
Start: 2019-04-30 | End: 2019-04-30

## 2019-04-30 RX ORDER — QUETIAPINE FUMARATE 25 MG/1
12.5 TABLET, FILM COATED ORAL
Status: DISCONTINUED | OUTPATIENT
Start: 2019-04-30 | End: 2019-05-03 | Stop reason: HOSPADM

## 2019-04-30 RX ORDER — ALBUTEROL SULFATE 2.5 MG/.5ML
5 SOLUTION RESPIRATORY (INHALATION)
Status: COMPLETED | OUTPATIENT
Start: 2019-04-30 | End: 2019-04-30

## 2019-04-30 RX ORDER — IPRATROPIUM BROMIDE AND ALBUTEROL SULFATE 2.5; .5 MG/3ML; MG/3ML
3 SOLUTION RESPIRATORY (INHALATION)
Status: DISCONTINUED | OUTPATIENT
Start: 2019-04-30 | End: 2019-05-03 | Stop reason: HOSPADM

## 2019-04-30 RX ORDER — VANCOMYCIN/0.9 % SOD CHLORIDE 1.5G/250ML
1500 PLASTIC BAG, INJECTION (ML) INTRAVENOUS
Status: COMPLETED | OUTPATIENT
Start: 2019-04-30 | End: 2019-04-30

## 2019-04-30 RX ORDER — MAGNESIUM SULFATE 100 %
4 CRYSTALS MISCELLANEOUS AS NEEDED
Status: DISCONTINUED | OUTPATIENT
Start: 2019-04-30 | End: 2019-05-03 | Stop reason: HOSPADM

## 2019-04-30 RX ORDER — ACETAMINOPHEN 325 MG/1
650 TABLET ORAL
Status: DISCONTINUED | OUTPATIENT
Start: 2019-04-30 | End: 2019-05-03 | Stop reason: HOSPADM

## 2019-04-30 RX ORDER — DOCUSATE SODIUM 100 MG/1
100 CAPSULE, LIQUID FILLED ORAL 2 TIMES DAILY
Status: DISCONTINUED | OUTPATIENT
Start: 2019-04-30 | End: 2019-05-03 | Stop reason: HOSPADM

## 2019-04-30 RX ADMIN — SODIUM CHLORIDE 75 ML/HR: 900 INJECTION, SOLUTION INTRAVENOUS at 17:44

## 2019-04-30 RX ADMIN — Medication 10 ML: at 17:34

## 2019-04-30 RX ADMIN — ALBUTEROL SULFATE 5 MG: 2.5 SOLUTION RESPIRATORY (INHALATION) at 14:17

## 2019-04-30 RX ADMIN — INSULIN LISPRO 2 UNITS: 100 INJECTION, SOLUTION INTRAVENOUS; SUBCUTANEOUS at 17:33

## 2019-04-30 RX ADMIN — METHYLPREDNISOLONE SODIUM SUCCINATE 40 MG: 40 INJECTION, POWDER, FOR SOLUTION INTRAMUSCULAR; INTRAVENOUS at 17:33

## 2019-04-30 RX ADMIN — Medication 400 MG: at 17:33

## 2019-04-30 RX ADMIN — QUETIAPINE FUMARATE 12.5 MG: 25 TABLET ORAL at 21:52

## 2019-04-30 RX ADMIN — Medication 10 ML: at 17:03

## 2019-04-30 RX ADMIN — PIPERACILLIN SODIUM,TAZOBACTAM SODIUM 4.5 G: 4; .5 INJECTION, POWDER, FOR SOLUTION INTRAVENOUS at 20:52

## 2019-04-30 RX ADMIN — IPRATROPIUM BROMIDE AND ALBUTEROL SULFATE 3 ML: .5; 3 SOLUTION RESPIRATORY (INHALATION) at 18:03

## 2019-04-30 RX ADMIN — IOPAMIDOL 100 ML: 755 INJECTION, SOLUTION INTRAVENOUS at 17:03

## 2019-04-30 RX ADMIN — DOCUSATE SODIUM 100 MG: 100 CAPSULE, LIQUID FILLED ORAL at 17:33

## 2019-04-30 RX ADMIN — VANCOMYCIN HYDROCHLORIDE 1500 MG: 10 INJECTION, POWDER, LYOPHILIZED, FOR SOLUTION INTRAVENOUS at 14:48

## 2019-04-30 RX ADMIN — HEPARIN SODIUM 5000 UNITS: 5000 INJECTION INTRAVENOUS; SUBCUTANEOUS at 23:43

## 2019-04-30 RX ADMIN — METHYLPREDNISOLONE SODIUM SUCCINATE 125 MG: 125 INJECTION, POWDER, FOR SOLUTION INTRAMUSCULAR; INTRAVENOUS at 11:01

## 2019-04-30 RX ADMIN — TAMSULOSIN HYDROCHLORIDE 0.4 MG: 0.4 CAPSULE ORAL at 21:52

## 2019-04-30 RX ADMIN — METHYLPREDNISOLONE SODIUM SUCCINATE 40 MG: 40 INJECTION, POWDER, FOR SOLUTION INTRAMUSCULAR; INTRAVENOUS at 23:43

## 2019-04-30 RX ADMIN — HEPARIN SODIUM 5000 UNITS: 5000 INJECTION INTRAVENOUS; SUBCUTANEOUS at 17:33

## 2019-04-30 RX ADMIN — IPRATROPIUM BROMIDE AND ALBUTEROL SULFATE 3 ML: .5; 3 SOLUTION RESPIRATORY (INHALATION) at 23:13

## 2019-04-30 RX ADMIN — ALBUTEROL SULFATE 10 MG: 2.5 SOLUTION RESPIRATORY (INHALATION) at 10:41

## 2019-04-30 RX ADMIN — CEFEPIME HYDROCHLORIDE 1 G: 1 INJECTION, POWDER, FOR SOLUTION INTRAMUSCULAR; INTRAVENOUS at 14:11

## 2019-04-30 NOTE — PROGRESS NOTES
Pharmacy Automatic Renal Dosing Protocol - Antimicrobials Indication for Antimicrobials: HAP Current Regimen of Each Antimicrobial: 
Zosyn 3.375g IV q8h - started  day 1 of 7 Vancomycin consult - started  day 1 of 7 Previous Antimicrobial Therapy:  none Significant Cultures:  none Vancomycin trough goal level:  15-20 Date Dose & Interval Measured (mcg/mL) Extrapolated (mcg/mL) 5/ am 900mg IV q12h Radiology / Imaging results: (X-ray, CT scan or MRI):  
 CXR - Mild linear bibasilar opacity likely represents atelectasis. No other acute process. Paralysis, amputations, malnutrition: none Labs: 
Recent Labs 19 
1039 CREA 0.75 BUN 13 WBC 5.9 Temp (24hrs), Av.6 °F (37 °C), Min:98.1 °F (36.7 °C), Max:99.1 °F (37.3 °C) Creatinine Clearance (mL/min) or Dialysis:  
Estimated Creatinine Clearance: 63.7 mL/min (based on SCr of 0.75 mg/dL). Estimated Creatinine Clearance (using IBW):63.7 mL/min Impression/Plan:  
Continue Zosyn 4.5g q8h (added total 7 day stop date to current order per original order; Cefepime was just completed from 14:00, thus discussed w/ Dr. Charlee Bernardo and will begin Zosy in 6 hours  20:00 30min dose, then extended interval 7 day regimen) Continue Vancomycin 900mg IV q12h for a projected trough at Css of 16.4 (1500mg loading dose previously ordered) BMP daily already ordered, CBC daily x 3 ordered Antimicrobial stop date- Zosyn and Vancomycin 7 days Pharmacy will follow daily and adjust medications as appropriate for renal function and/or serum levels.  
 
Thank you, 
Cyrus Quigley, [facility]

## 2019-04-30 NOTE — H&P
Hospitalist Admission NoteNAME: Mitzi Martinez :  1930 MRN:  [id number] Date/Time:  2019 3:22 PM 
 
Patient PCP: None 
______________________________________________________________________ Given the patient's current clinical presentation, I have a high level of concern for decompensation if discharged from the emergency department. Complex decision making was performed, which includes reviewing the patient's available past medical records, laboratory results, and x-ray films. My assessment of this patient's clinical condition and my plan of care is as follows. Assessment / Plan: 
 
Acute hypoxemic and hypercarbic respiratory failure (required bipap in ER) Suspected pneumonia, hospital associated/possible aspiration, present on admission (Last hospitalization  with respiratory failure secondary to aspiration and dysphagia) Suspected acute on chronic COPD exacerbation, present on admission 
-Patient and his last admission had mucous plugging and suspected aspiration in December 
-During that hospitalization he had a modified barium swallow that showed no aspiration. 
-Patient received cefepime and vancomycin in the ER will broaden coverage with Zosyn instead of cefepime to cover for possible aspiration 
-We will keep patient n.p.o. except for meds and have a speech evaluation to evaluate for possible aspiration, gentle hydration in the meantie 
-Chest x-ray shows bilateral infiltrates in the bases more extensive than previous, will get a CT scan to evaluate more clearly and consider pulmonary consult if needed 
-Will have the BiPAP available as needed in the PCU and consider pulmonary evaluation for further recommendations if needed 
-We will continue pulmonary toilet with nebulizers, flutter valve 
-will attempt to get sputum specimen for culture 
-will continue IV steroids History of chronic systolic and diastolic heart failure per the records sinus arrhythmia during his last hospitalization, but cardiology did not feel needed any further work-up 
-Patient does not appear to be in CHF, BNP is 110 
-Last echocardiogram in December showed an EF of 55% with moderate mitral regurgitation 
-Current troponin is negative, he did have some chest pain on admission when he was having respiratory failure now resolved 
-We will follow closely for CHF and recurrent pain and consider cardiology evaluation if needed 
-We will hold his beta-blocker for now with his acute bronchospasm and resume if tolerated in the future 
-confirm code status with POA (was DNR, now full) History of dysphasia  
-speech eval 
 
Underlying dementia, etiology unclear possible Lewy body per records Behavioral disturbance 
supportive care 
-cont home meds BPH 
-watch for retention 
-cont home meds Hypertension 
-We will continue his diltiazem but hold his Coreg for now Hyperlipidemia 
-Does not appear to be on medications for this at this time History of anemia History of gout History of chronic kidney disease, creat 0.75 todat CODE STATUS-documentation from [facility] shows he is full code, he was DNR in December, will have palliative care reevaluate. I have left a message for his sister, healthcare power of  phone #[phone]. Will need to clarify his CODE STATUS. Addendum: Pt with POST form from 11/18 is DNR. Discussed case with palliative care who confirmed with SNF that no changes were made and they were unaware of the POST form. He is DNR. PO[facility] has not returned my call yet, but per my discussion with palliative, will change code status to DNR. They will follow him as well and have reached out the the poa as well. Addendum#2: spoke with Geraldine VELAZQUEZ. She confirms DNR status as well. Order in EMR. DVT prophylaxis we will place patient on heparin subcu and physical therapy to mobilize Surrogate Decision Maker: sister Randall Bustillos 
 
 GI Prophylaxis: not indicated Baseline: LTC [facility] Subjective: CHIEF COMPLAINT:  Respiratory distress HISTORY OF PRESENT ILLNESS:    
Ted Quick is a 80 y.o. man who resides in long-term care at [facility] with underlying unspecified dementia. Apparently he has had PRN O2 at the facility but in the last couple weeks has had progressive episodes of hypoxia requiring oxygen. He is a very poor historian but seems to indicate that he has had shortness of breath for 5 or 6 months, possibly worsening over the last couple weeks. This morning he says he \"could not talk or breathe \". Per the EMS report and ER patient had labored breathing and was hypoxic at 85% was started on CPAP and brought to the ER. Here he received several nebulizers and steroids and ended up on BiPAP for approximately an hour ABGs are still abnormal but patient's mentation improved significantly according to the ER physician. Currently patient feels his breathing is much improved. he has a general sense of what is been going on. He says he has been coughing some \"cold\" but unclear if that is from his nose or his lungs. he did admit to having some chest discomfort points to his left chest and he said that was this morning, denies any currently. I am not sure if he is somewhat hard of hearing but has some difficulty answering my questions but seems to deny any current symptoms. He seems to indicate that he is able to walk. But he could not tell me that he lived at [facility]. He denies any changes in his appetite. He says he has a good appetite. I tried to ask him if he had some episodes of choking and he did not seem to understand what I was saying and said \"yes I like turkey. \"  He did say that he had no teeth and seemed to indicate had trouble with harder foods. He also reports that he has been having some swelling in his legs cannot specify how long but he says that is much better. In the ER patient was found to be hypoxic placed on BiPAP. Lactic acid was normal, troponin negative, . ABG showed a pH of 7.2 8/60/81 without significant improvement after an hour of BiPAP. Chest x-ray shows bilateral atelectasis. He was treated empirically for pneumonia and we were asked to admit for further evaluation and management. We were asked to admit for work up and evaluation of the above problems. Past Medical History:  
Diagnosis Date  Anemia  BPH (benign prostatic hypertrophy)  Dementia  Gout  High cholesterol  taken off meds  Hypertension  was taken off BP meds  Left inguinal hernia 2018 Past Surgical History:  
Procedure Laterality Date  HX HERNIA REPAIR  9-10-13 RIHR with mesh-[facility]-Dr. Asif Flair  
 HX HERNIA REPAIR  2018  
 open LIHR with mesh Social History Tobacco Use  Smoking status: Unknown If Ever Smoked  Smokeless tobacco: Never Used Substance Use Topics  Alcohol use: Yes Comment: occas Family History Problem Relation Age of Onset  Stroke Mother  Stroke Sister  Diabetes Sister  Hypertension Sister  Heart Disease Brother  Hypertension Brother Social history patient resides in long-term care at [facility] care Patient says he used to smoke as a young yuriy but denies any currently he also says he used to drink when he was young but no longer and denies any drug use Family history says both his parents  but cannot give me any specifics No Known Allergies Prior to Admission medications Medication Sig Start Date End Date Taking? Authorizing Provider  
potassium chloride SR (KLOR-CON 10) 10 mEq tablet Take 10 mEq by mouth daily. Yes Provider, Historical  
dext 70/polycarbophil/peg/NaCl (ARTIFICIAL TEAR SOLUTION OP) Administer 2 Drops to both eyes two (2) times a day.    Yes Provider, Historical  
 carvedilol (COREG) 3.125 mg tablet Take 3.125 mg by mouth two (2) times a day. Yes Provider, Historical  
magnesium oxide (MAG-OX) 400 mg tablet Take 400 mg by mouth two (2) times a day. Yes Provider, Historical  
dilTIAZem CD (CARDIZEM CD) 120 mg ER capsule Take 1 Cap by mouth daily. 12/6/18  Yes July Ye, LUCÍA  
QUEtiapine (SEROQUEL) 25 mg tablet Take 0.5 Tabs by mouth nightly. 12/6/18  Yes July Ye NP  
dutasteride (AVODART) 0.5 mg capsule Take 0.5 mg by mouth daily. Yes Provider, Historical  
senna-docusate (SENNA WITH DOCUSATE SODIUM) 8.6-50 mg per tablet Take 1 Tab by mouth daily as needed for Constipation (try first if constipation). Yes Provider, Historical  
albuterol (PROVENTIL VENTOLIN) 2.5 mg /3 mL (0.083 %) nebulizer solution 3 mL by Nebulization route every four (4) hours as needed for Wheezing or Shortness of Breath. 7/24/18  Yes Reji Gee NP  
tamsulosin (FLOMAX) 0.4 mg capsule TAKE 1 CAPSULE BY MOUTH EVERY DAY 5/22/18  Yes Reji Gee NP  
food supplemt, lactose-reduced (BOOST HIGH PROTEIN) 0.06 gram- 1 kcal/mL liqd Drink 2-3 containers DAILY. 3/23/18  Yes Reji Gee NP  
ferrous sulfate 325 mg (65 mg iron) tablet Take 1 Tab by mouth two (2) times daily (with meals). On an empty stomach with Vitamin C (like OJ) 7/20/17  Yes Héctor Mae NP  
 
 
REVIEW OF SYSTEMS:    
complete review of systems was attempted however patient is not a reliable historian and other than was noted above in HPI was negative. Objective: VITALS:   
Visit Vitals /68 Pulse 82 Temp 99.1 °F (37.3 °C) Resp 16 Ht 5' 7\" (1.702 m) Wt 66.7 kg (147 lb 0.8 oz) SpO2 94% BMI 23.03 kg/m² PHYSICAL EXAM: 
 
Patient is awake and alert nontoxic-appearing does not appear in any respiratory distress is on nasal cannula.   He is oriented to self and circumstances of his admission to the hospital but cannot tell me the month of year where he lived. Extraocular movements are intact no facial asymmetries. Oropharynx is clear no thrush or lesions are noted. Conjunctiva pink mucous membranes are tacky neck is supple nontender no lymphadenopathy no JVD no carotid bruits no thyromegaly no nodules are appreciated. Cardiovascular regular rate no obvious murmurs rubs or gallops no reproducible chest discomfort. Somewhat distant sounds. Lungs with poor air movement with diffuse expiratory wheezing and rhonchi bilaterally no crackles abdomen bowel sounds present soft nontender no hepatospleno effusion no bladder distention or tenderness. Extremities no clubbing cyanosis some mild ankle edema palpable pedal pulses warm to touch. Neuro exam is nonfocal moves all extremities follows commands gait was not observed. Skin no rashes or lesions or decubitus ulcers are present on admission. _______________________________________________________________________ Care Plan discussed with: 
  Comments Patient y Family RN y   
Care Manager Consultant:     
_______________________________________________________________________ Expected  Disposition:  
Home with Family HH/PT/OT/RN   
SNF/LTC   
MALINI   
________________________________________________________________________ TOTAL TIME:   Minutes Critical Care Provided     Minutes non procedure based Comments Reviewed previous records  
>50% of visit spent in counseling and coordination of care  Discussion with patient and/or family and questions answered 
  
 
________________________________________________________________________ Signed: Kellee Dela Cruz MD 
 
Procedures: see electronic medical records for all procedures/Xrays and details which were not copied into this note but were reviewed prior to creation of Plan. LAB DATA REVIEWED:   
Recent Results (from the past 24 hour(s)) EKG, 12 LEAD, INITIAL Collection Time: 04/30/19 10:16 AM  
Result Value Ref Range Ventricular Rate 93 BPM  
 Atrial Rate 93 BPM  
 P-R Interval 262 ms QRS Duration 74 ms Q-T Interval 342 ms QTC Calculation (Bezet) 425 ms Calculated P Axis 85 degrees Calculated R Axis 55 degrees Calculated T Axis 79 degrees Diagnosis Sinus rhythm with 1st degree AV block When compared with ECG of 04-DEC-2018 15:32, No significant change was found Confirmed by MEDArchonquiline Brain ([id number]) on 4/30/2019 11:40:21 AM 
  
POC G3 - PUL Collection Time: 04/30/19 10:21 AM  
Result Value Ref Range pH (POC) 7.299 (L) 7.35 - 7.45    
 pCO2 (POC) 59.4 (H) 35.0 - 45.0 MMHG  
 pO2 (POC) 82 80 - 100 MMHG  
 HCO3 (POC) 29.2 (H) 22 - 26 MMOL/L  
 sO2 (POC) 94 92 - 97 % Base excess (POC) 3 mmol/L Site RIGHT RADIAL Device: CPAP    
 PEEP/CPAP (POC) 5 cmH2O Allens test (POC) YES Bleed In 15 L/min Specimen type (POC) ARTERIAL    
POC LACTIC ACID Collection Time: 04/30/19 10:29 AM  
Result Value Ref Range Lactic Acid (POC) 0.76 0.40 - 2.00 mmol/L  
CBC WITH AUTOMATED DIFF Collection Time: 04/30/19 10:39 AM  
Result Value Ref Range WBC 5.9 4.1 - 11.1 K/uL  
 RBC 4.38 4.10 - 5.70 M/uL  
 HGB 12.5 12.1 - 17.0 g/dL HCT 39.1 36.6 - 50.3 % MCV 89.3 80.0 - 99.0 FL  
 MCH 28.5 26.0 - 34.0 PG  
 MCHC 32.0 30.0 - 36.5 g/dL  
 RDW 12.9 11.5 - 14.5 % PLATELET 089 699 - 551 K/uL MPV 9.9 8.9 - 12.9 FL  
 NRBC 0.0 0  WBC ABSOLUTE NRBC 0.00 0.00 - 0.01 K/uL NEUTROPHILS 67 32 - 75 % LYMPHOCYTES 16 12 - 49 % MONOCYTES 10 5 - 13 % EOSINOPHILS 6 0 - 7 % BASOPHILS 1 0 - 1 % IMMATURE GRANULOCYTES 0 0.0 - 0.5 % ABS. NEUTROPHILS 4.0 1.8 - 8.0 K/UL  
 ABS. LYMPHOCYTES 0.9 0.8 - 3.5 K/UL  
 ABS. MONOCYTES 0.6 0.0 - 1.0 K/UL  
 ABS. EOSINOPHILS 0.4 0.0 - 0.4 K/UL  
 ABS. BASOPHILS 0.0 0.0 - 0.1 K/UL  
 ABS. IMM. GRANS. 0.0 0.00 - 0.04 K/UL  
 DF AUTOMATED PROTHROMBIN TIME + INR  
 Collection Time: 04/30/19 10:39 AM  
Result Value Ref Range INR 1.0 0.9 - 1.1 Prothrombin time 10.5 9.0 - 11.1 sec TROPONIN I Collection Time: 04/30/19 10:39 AM  
Result Value Ref Range Troponin-I, Qt. <0.05 <0.05 ng/mL NT-PRO BNP Collection Time: 04/30/19 10:39 AM  
Result Value Ref Range NT pro- <450 PG/ML  
CK-MB,QUANT. Collection Time: 04/30/19 10:39 AM  
Result Value Ref Range CK - MB 3.2 <3.6 NG/ML  
 CK-MB Index 4.3 (H) 0.0 - 2.5 CK Collection Time: 04/30/19 10:39 AM  
Result Value Ref Range CK 75 39 - 422 U/L  
METABOLIC PANEL, COMPREHENSIVE Collection Time: 04/30/19 10:39 AM  
Result Value Ref Range Sodium 138 136 - 145 mmol/L Potassium 4.1 3.5 - 5.1 mmol/L Chloride 102 97 - 108 mmol/L  
 CO2 32 21 - 32 mmol/L Anion gap 4 (L) 5 - 15 mmol/L Glucose 105 (H) 65 - 100 mg/dL BUN 13 6 - 20 MG/DL Creatinine 0.75 0.70 - 1.30 MG/DL  
 BUN/Creatinine ratio 17 12 - 20 GFR est AA >60 >60 ml/min/1.73m2 GFR est non-AA >60 >60 ml/min/1.73m2 Calcium 8.6 8.5 - 10.1 MG/DL Bilirubin, total 0.4 0.2 - 1.0 MG/DL  
 ALT (SGPT) 22 12 - 78 U/L  
 AST (SGOT) 18 15 - 37 U/L Alk. phosphatase 69 45 - 117 U/L Protein, total 7.4 6.4 - 8.2 g/dL Albumin 3.6 3.5 - 5.0 g/dL Globulin 3.8 2.0 - 4.0 g/dL A-G Ratio 0.9 (L) 1.1 - 2.2 POC G3 - PUL Collection Time: 04/30/19 12:10 PM  
Result Value Ref Range FIO2 (POC) 40 % pH (POC) 7.287 (L) 7.35 - 7.45    
 pCO2 (POC) 60.2 (H) 35.0 - 45.0 MMHG  
 pO2 (POC) 81 80 - 100 MMHG  
 HCO3 (POC) 28.7 (H) 22 - 26 MMOL/L  
 sO2 (POC) 94 92 - 97 % Base excess (POC) 2 mmol/L Site RIGHT RADIAL Device: BIPAP    
 PEEP/CPAP (POC) 6 cmH2O  
 PIP (POC) 16 Allens test (POC) YES Specimen type (POC) ARTERIAL Total resp. rate 20

## 2019-04-30 NOTE — ED NOTES
Pt turned s/s, perineal care done. Placed in clean brief and on clean linens. Positioned up in bed for comfort. Wheezes auscultated. Audible wheezing noted with exertion.

## 2019-04-30 NOTE — PROGRESS NOTES
Reason for Admission:   Pt was admitted 4/30/19 d/t SOB with audible wheezing and increase work of breathing. EMS reported initial sats of 85% on NC at [facility]. Now on CPAP with Sats in 90%. RRAT Score:     23 Resources/supports as identified by patient/family:   Pt has [address] [facility] Medicare and Carpio Wallace. Sister, Camron Cadet is NOK: Phone: [phone] Top Challenges facing patient (as identified by patient/family and CM): Finances/Medication cost?   Pt has prescription drug coverage Transportation? Pt is transported by stretcher when not at [facility] (/) Support system or lack thereof? NOK: Sister: Camron Cadet Living arrangements? Lives at [facility] (/): Summer Unit: Room 515 Self-care/ADLs/Cognition? Alert but disoriented d/t Dementia. Pt knew his birthday and his old address. But he thought it was 1950 and that he still lived on [address] [place] [address]. Pt did not remember that he lived at [facility]. Current Advanced Directive/Advance Care Plan:  Full Code but Pt has a POST form on file. NO AMD but NOK is Sister: [facility] [address] for utilizing home health:    N/A since Pt [place] [facility] [address] Likelihood of readmission:   HIGH Transition of Care Plan:      Pt is currently in ER waiting for a room to be admitted. CM attempted to do eval with Pt but unable to give information d/t Dementia. CM called SisterBrian and had to leave a VM to discuss and confirm plan to DC back to [facility] at discharge. This Eval was done based on interview with Pt and Chart Review. CM also talked to [facility] who indicated that Pt could return to [facility] (DP/) once medically stable. Referral sent to [facility]. Accepted and Selected. 1.  Plan to return to LTC at [facility] Unit: Room 515 when medically stable. -CM will need to communicate plan to Abena Pleitez, Sister prior to DC 2. CM will need to arrange transport to [address] [place] [address] Floor via [facility]. 3.  CM will need to verify that Second IM Letter is given to Pt prior to DC. 4.  RN number to call report to [address] [place] [address] Floor [phone]. CM will continue to monitor discharge plan. Care Management Interventions PCP Verified by CM: Yes 
Palliative Care Criteria Met (RRAT>21 & CHF Dx)?: No 
Mode of Transport at Discharge: BLS Transition of Care Consult (CM Consult): [facility] MyChart Signup: No 
Discharge Durable Medical Equipment: No 
Physical Therapy Consult: No 
Occupational Therapy Consult: No 
Speech Therapy Consult: No 
Current Support Network: [address] [place] [address] Confirm Follow Up Transport: Other (see comment) Plan discussed with Pt/Family/Caregiver: Yes Freedom of Choice Offered: Yes Discharge Location Discharge Placement: [address] Royer Jenkins CM Ext [phone]

## 2019-04-30 NOTE — ED NOTES
Audible wheezes noted when on NC. Placed on cpap 40% - 8 by RT; will give hourly neb Pt unable to give definite number for level of pain. Pt points to left chest and reports pain

## 2019-04-30 NOTE — ED NOTES
Second set of blood cultures drawn and sent prior to AB dose  Gertrudis Gomez   is a   37   year old  female   here today with complaints of abdominal pain. The patient notes a 3-4 week history of right upper quadrant pain that radiates around to her right flank region.  She describes this is a pressure and aching sensation that is triggered by eating.  She has intermittent nausea and vomiting. She is not aware of anything that may have initially triggered her symptoms and denies any recent NSAID use.  She continues to take pantoprazole twice daily.  She does note some recurrent dysphagia to solids only and this significantly improved previously with her esophageal dilatation in January.  She denies any significant change in her bowel habits, melena or hematochezia.  She did present to the emergency room at [facility] and had negative laboratory studies done there were no radiology procedures done at that time.

## 2019-04-30 NOTE — PROGRESS NOTES
Bedside shift change report given to [address] [place] [address] (oncoming nurse) by Margot Thompson (offgoing nurse). Report included the following information SBAR.

## 2019-04-30 NOTE — PROGRESS NOTES
[facility]: [phone] ([phone]) [facility]: [phone] ([phone]) Consult for Palliative noted, mainly to clarify Code status as patient came in with documents noting Full Code however patient has a POST that is scanned that patient's sister, Jina Sandoval completed with Palliative team on 11/30/18 stating No CPR. Contacted Dr Claudette Owens to clarify that patient should be No Code based on this POST. Called [facility] and spoke to Donna Lucero, Nursing Supervisor regarding patient's POST. [facility] did not have a copy of POST in their charts, copy faxed to them (Fax# [phone]) so they can have it for their records and change patient's code status to No Code. They do not have any other newer documentation in their charts to reflect a change in Code status. Per Dr Claudette Owens, she has placed a call to patient's sister Jovana Melendrez to confirm whether POST still stands as No Code or if she has changed her mind. Palliative team will follow up as needed.

## 2019-04-30 NOTE — PROGRESS NOTES
Pharmacy Automatic Renal Dosing Protocol - Antimicrobials Indication for Antimicrobials: HAP Current Regimen of Each Antimicrobial: 
Zosyn 3.375g IV q8h - started  day 1 of 7 Vancomycin consult - started  day 1 of 7 Previous Antimicrobial Therapy:  none Significant Cultures:  none Vancomycin trough goal level:  15-20 Date Dose & Interval Measured (mcg/mL) Extrapolated (mcg/mL) 5/ am 900mg IV q12h Radiology / Imaging results: (X-ray, CT scan or MRI):  
 CXR - Mild linear bibasilar opacity likely represents atelectasis. No other acute process. Paralysis, amputations, malnutrition: none Labs: 
Recent Labs 19 
1039 CREA 0.75 BUN 13 WBC 5.9 Temp (24hrs), Av.6 °F (37 °C), Min:98.1 °F (36.7 °C), Max:99.1 °F (37.3 °C) Creatinine Clearance (mL/min) or Dialysis:  
Estimated Creatinine Clearance: 63.7 mL/min (based on SCr of 0.75 mg/dL). Estimated Creatinine Clearance (using IBW):63.7 mL/min Impression/Plan:  
Continue Zosyn 4.5g q8h (added total 7 day stop date to current order per original order Continue Vancomycin 900mg IV q12h for a projected trough at Css of 16.4 (1500mg loading dose previously ordered BMP daily already ordered, CBC daily x 3 ordered Antimicrobial stop date- Zosyn and Vancomycin 7 days Pharmacy will follow daily and adjust medications as appropriate for renal function and/or serum levels.  
 
Thank you, 
Gretel Barron, [facility]

## 2019-04-30 NOTE — PROGRESS NOTES
Pharmacy Clarification of Prior to Admission Medication Regimen The patient was not interviewed regarding clarification of the prior to admission medication regimen. Patient was transferred from [address] [place] [address],[address], to [facility], with a current med list. Pharmacy called [address] [place] [address],[address], [phone], and spoke with Qasim Gonzales, who was able to verify the patient's last administered doses. Information Obtained From: Transfer Papers Pertinent Pharmacy Findings: 
Updated patient?s preferred outpatient pharmacy to: [facility] did not update the outpatient pharmacy due to the patient living at a SNF  
 
PTA medication list was corrected to the following:  
 
Prior to Admission Medications Prescriptions Last Dose Informant Patient Reported? Taking? QUEtiapine (SEROQUEL) 25 mg tablet 4/29/2019 at 2100 Transfer Papers No Yes Sig: Take 0.5 Tabs by mouth nightly. albuterol (PROVENTIL VENTOLIN) 2.5 mg /3 mL (0.083 %) nebulizer solution 4/30/2019 at 0800 Transfer Papers No Yes Sig: 3 mL by Nebulization route every four (4) hours as needed for Wheezing or Shortness of Breath. carvedilol (COREG) 3.125 mg tablet 4/30/2019 at 0900 Transfer Papers Yes Yes Sig: Take 3.125 mg by mouth two (2) times a day. dext 70/polycarbophil/peg/NaCl (ARTIFICIAL TEAR SOLUTION OP) 4/11/2019 at 0900 Transfer Papers Yes Yes Sig: Administer 2 Drops to both eyes two (2) times a day. dilTIAZem CD (CARDIZEM CD) 120 mg ER capsule 4/30/2019 at 0900 Transfer Papers No Yes Sig: Take 1 Cap by mouth daily. dutasteride (AVODART) 0.5 mg capsule 4/30/2019 at 0900 Transfer Papers Yes Yes Sig: Take 0.5 mg by mouth daily. ferrous sulfate 325 mg (65 mg iron) tablet 4/30/2019 at 0900 Transfer Papers No Yes Sig: Take 1 Tab by mouth two (2) times daily (with meals). On an empty stomach with Vitamin C (like OJ)  
food supplemt, lactose-reduced (BOOST HIGH PROTEIN) 0.06 gram- 1 kcal/mL liqd 4/30/2019 at 0900 Transfer Papers No Yes Sig: Drink 2-3 containers DAILY. magnesium oxide (MAG-OX) 400 mg tablet 4/30/2019 at 0900 Transfer Papers Yes Yes Sig: Take 400 mg by mouth two (2) times a day. potassium chloride SR (KLOR-CON 10) 10 mEq tablet 4/27/2019 at 0900 Transfer Papers Yes Yes Sig: Take 10 mEq by mouth daily. senna-docusate (SENNA WITH DOCUSATE SODIUM) 8.6-50 mg per tablet 4/30/2019 at 0900 Transfer Papers Yes Yes Sig: Take 1 Tab by mouth daily as needed for Constipation (try first if constipation). tamsulosin (FLOMAX) 0.4 mg capsule 4/30/2019 at 0900 Transfer Papers No Yes Sig: TAKE 1 CAPSULE BY MOUTH EVERY DAY Facility-Administered Medications: None Thank you, 
Diogo Cheek Medication History Pharmacy Technician

## 2019-04-30 NOTE — ED NOTES
RT placed on bipap 40% - 4 - 14/6. [FreeTextEntry1] : 37+4 female born to a 29 year old  mother via . Maternal hx unremarkable. Pregnancy uncomplicated. Maternal blood type B+. PNL negative with rpr NR and rubella immune. GBS unknown and not treated. SROM at 0930 with clear fluids. Baby emerged vigorous and crying. Warm, dried, stimulated and suctioned. Apgars 9/9.\par \par Phototherapy started at 3AM on  for a bilirubin of 10.2 @ 35HOL, HIR, with a threshold to treat of 11.6. Phototherapy was continued for 12 hrs, and the discharge bilirubin was 9.3 at 49 hours of life, which is low intermediate risk zone. \par BW 3070g. Discharge weight was 2920g\par \par Passed CCHD, hearing passed, received HepB. \par \par Home since Monday evening. Since then, no issues, baby and parents have been doing well. \par Diet: BF, EHM and formula. BF about 20min each side, start feeds with EHM and then supplements with Similac. 2.5oz every 2-3 hours. Tolerating, spits up sometimes, no vomiting. No tired during feeds. \par Elimination: voids >6x/d, stools 3-4x/d. Stools soft, yellowish \par Sleep: bassinet at the bedside, waking up to feed\par Umblical cord: off today, dry, nonerythematous, only sponge baths \par Tummy time: not yet

## 2019-05-01 LAB
ANION GAP SERPL CALC-SCNC: 4 MMOL/L (ref 5–15)
BASOPHILS # BLD: 0 K/UL (ref 0–0.1)
BASOPHILS NFR BLD: 0 % (ref 0–1)
BUN SERPL-MCNC: 13 MG/DL (ref 6–20)
BUN/CREAT SERPL: 17 (ref 12–20)
CALCIUM SERPL-MCNC: 8.6 MG/DL (ref 8.5–10.1)
CHLORIDE SERPL-SCNC: 103 MMOL/L (ref 97–108)
CO2 SERPL-SCNC: 29 MMOL/L (ref 21–32)
CREAT SERPL-MCNC: 0.76 MG/DL (ref 0.7–1.3)
DIFFERENTIAL METHOD BLD: ABNORMAL
EOSINOPHIL # BLD: 0 K/UL (ref 0–0.4)
EOSINOPHIL NFR BLD: 0 % (ref 0–7)
ERYTHROCYTE [DISTWIDTH] IN BLOOD BY AUTOMATED COUNT: 12.7 % (ref 11.5–14.5)
GLUCOSE BLD STRIP.AUTO-MCNC: 155 MG/DL (ref 65–100)
GLUCOSE BLD STRIP.AUTO-MCNC: 186 MG/DL (ref 65–100)
GLUCOSE BLD STRIP.AUTO-MCNC: 187 MG/DL (ref 65–100)
GLUCOSE BLD STRIP.AUTO-MCNC: 195 MG/DL (ref 65–100)
GLUCOSE SERPL-MCNC: 172 MG/DL (ref 65–100)
HCT VFR BLD AUTO: 36.7 % (ref 36.6–50.3)
HGB BLD-MCNC: 12.1 G/DL (ref 12.1–17)
IMM GRANULOCYTES # BLD AUTO: 0 K/UL (ref 0–0.04)
IMM GRANULOCYTES NFR BLD AUTO: 0 % (ref 0–0.5)
LYMPHOCYTES # BLD: 0.4 K/UL (ref 0.8–3.5)
LYMPHOCYTES NFR BLD: 11 % (ref 12–49)
MCH RBC QN AUTO: 28.6 PG (ref 26–34)
MCHC RBC AUTO-ENTMCNC: 33 G/DL (ref 30–36.5)
MCV RBC AUTO: 86.8 FL (ref 80–99)
MONOCYTES # BLD: 0.1 K/UL (ref 0–1)
MONOCYTES NFR BLD: 2 % (ref 5–13)
NEUTS SEG # BLD: 2.9 K/UL (ref 1.8–8)
NEUTS SEG NFR BLD: 87 % (ref 32–75)
NRBC # BLD: 0 K/UL (ref 0–0.01)
NRBC BLD-RTO: 0 PER 100 WBC
PLATELET # BLD AUTO: 227 K/UL (ref 150–400)
PMV BLD AUTO: 10 FL (ref 8.9–12.9)
POTASSIUM SERPL-SCNC: 4.2 MMOL/L (ref 3.5–5.1)
RBC # BLD AUTO: 4.23 M/UL (ref 4.1–5.7)
RBC MORPH BLD: ABNORMAL
SERVICE CMNT-IMP: ABNORMAL
SODIUM SERPL-SCNC: 136 MMOL/L (ref 136–145)
WBC # BLD AUTO: 3.4 K/UL (ref 4.1–11.1)

## 2019-05-01 PROCEDURE — 74011000258 HC RX REV CODE- 258: Performed by: EMERGENCY MEDICINE

## 2019-05-01 PROCEDURE — 74011000250 HC RX REV CODE- 250: Performed by: EMERGENCY MEDICINE

## 2019-05-01 PROCEDURE — 80048 BASIC METABOLIC PNL TOTAL CA: CPT

## 2019-05-01 PROCEDURE — 74011250636 HC RX REV CODE- 250/636: Performed by: EMERGENCY MEDICINE

## 2019-05-01 PROCEDURE — 92610 EVALUATE SWALLOWING FUNCTION: CPT

## 2019-05-01 PROCEDURE — 36415 COLL VENOUS BLD VENIPUNCTURE: CPT

## 2019-05-01 PROCEDURE — 74011250637 HC RX REV CODE- 250/637: Performed by: NURSE PRACTITIONER

## 2019-05-01 PROCEDURE — 85025 COMPLETE CBC W/AUTO DIFF WBC: CPT

## 2019-05-01 PROCEDURE — 94640 AIRWAY INHALATION TREATMENT: CPT

## 2019-05-01 PROCEDURE — 74011636637 HC RX REV CODE- 636/637: Performed by: EMERGENCY MEDICINE

## 2019-05-01 PROCEDURE — 97535 SELF CARE MNGMENT TRAINING: CPT | Performed by: OCCUPATIONAL THERAPIST

## 2019-05-01 PROCEDURE — 82962 GLUCOSE BLOOD TEST: CPT

## 2019-05-01 PROCEDURE — 74011250636 HC RX REV CODE- 250/636: Performed by: INTERNAL MEDICINE

## 2019-05-01 PROCEDURE — 74011250637 HC RX REV CODE- 250/637: Performed by: EMERGENCY MEDICINE

## 2019-05-01 PROCEDURE — 65660000000 HC RM CCU STEPDOWN

## 2019-05-01 PROCEDURE — 77010033678 HC OXYGEN DAILY

## 2019-05-01 PROCEDURE — 97161 PT EVAL LOW COMPLEX 20 MIN: CPT

## 2019-05-01 PROCEDURE — 97116 GAIT TRAINING THERAPY: CPT

## 2019-05-01 PROCEDURE — 97165 OT EVAL LOW COMPLEX 30 MIN: CPT | Performed by: OCCUPATIONAL THERAPIST

## 2019-05-01 RX ORDER — GUAIFENESIN 600 MG/1
600 TABLET, EXTENDED RELEASE ORAL DAILY
Status: DISCONTINUED | OUTPATIENT
Start: 2019-05-01 | End: 2019-05-03 | Stop reason: HOSPADM

## 2019-05-01 RX ADMIN — METHYLPREDNISOLONE SODIUM SUCCINATE 40 MG: 40 INJECTION, POWDER, FOR SOLUTION INTRAMUSCULAR; INTRAVENOUS at 05:03

## 2019-05-01 RX ADMIN — IPRATROPIUM BROMIDE AND ALBUTEROL SULFATE 3 ML: .5; 3 SOLUTION RESPIRATORY (INHALATION) at 03:45

## 2019-05-01 RX ADMIN — Medication 400 MG: at 09:03

## 2019-05-01 RX ADMIN — PIPERACILLIN SODIUM,TAZOBACTAM SODIUM 4.5 G: 4; .5 INJECTION, POWDER, FOR SOLUTION INTRAVENOUS at 22:44

## 2019-05-01 RX ADMIN — IPRATROPIUM BROMIDE AND ALBUTEROL SULFATE 3 ML: .5; 3 SOLUTION RESPIRATORY (INHALATION) at 11:53

## 2019-05-01 RX ADMIN — QUETIAPINE FUMARATE 12.5 MG: 25 TABLET ORAL at 22:45

## 2019-05-01 RX ADMIN — DOCUSATE SODIUM 100 MG: 100 CAPSULE, LIQUID FILLED ORAL at 17:48

## 2019-05-01 RX ADMIN — Medication 10 ML: at 15:30

## 2019-05-01 RX ADMIN — IPRATROPIUM BROMIDE AND ALBUTEROL SULFATE 3 ML: .5; 3 SOLUTION RESPIRATORY (INHALATION) at 20:13

## 2019-05-01 RX ADMIN — IPRATROPIUM BROMIDE AND ALBUTEROL SULFATE 3 ML: .5; 3 SOLUTION RESPIRATORY (INHALATION) at 15:05

## 2019-05-01 RX ADMIN — GUAIFENESIN 600 MG: 600 TABLET, EXTENDED RELEASE ORAL at 10:45

## 2019-05-01 RX ADMIN — DOCUSATE SODIUM 100 MG: 100 CAPSULE, LIQUID FILLED ORAL at 09:03

## 2019-05-01 RX ADMIN — METHYLPREDNISOLONE SODIUM SUCCINATE 40 MG: 40 INJECTION, POWDER, FOR SOLUTION INTRAMUSCULAR; INTRAVENOUS at 22:44

## 2019-05-01 RX ADMIN — VANCOMYCIN HYDROCHLORIDE 900 MG: 1 INJECTION, POWDER, LYOPHILIZED, FOR SOLUTION INTRAVENOUS at 17:48

## 2019-05-01 RX ADMIN — VANCOMYCIN HYDROCHLORIDE 900 MG: 1 INJECTION, POWDER, LYOPHILIZED, FOR SOLUTION INTRAVENOUS at 03:35

## 2019-05-01 RX ADMIN — DUTASTERIDE 0.5 MG: 0.5 CAPSULE, LIQUID FILLED ORAL at 09:03

## 2019-05-01 RX ADMIN — HEPARIN SODIUM 5000 UNITS: 5000 INJECTION INTRAVENOUS; SUBCUTANEOUS at 23:00

## 2019-05-01 RX ADMIN — PIPERACILLIN SODIUM,TAZOBACTAM SODIUM 4.5 G: 4; .5 INJECTION, POWDER, FOR SOLUTION INTRAVENOUS at 15:14

## 2019-05-01 RX ADMIN — Medication 10 ML: at 22:58

## 2019-05-01 RX ADMIN — IPRATROPIUM BROMIDE AND ALBUTEROL SULFATE 3 ML: .5; 3 SOLUTION RESPIRATORY (INHALATION) at 08:08

## 2019-05-01 RX ADMIN — METHYLPREDNISOLONE SODIUM SUCCINATE 40 MG: 40 INJECTION, POWDER, FOR SOLUTION INTRAMUSCULAR; INTRAVENOUS at 12:39

## 2019-05-01 RX ADMIN — Medication 10 ML: at 05:02

## 2019-05-01 RX ADMIN — INSULIN LISPRO 2 UNITS: 100 INJECTION, SOLUTION INTRAVENOUS; SUBCUTANEOUS at 09:02

## 2019-05-01 RX ADMIN — INSULIN LISPRO 2 UNITS: 100 INJECTION, SOLUTION INTRAVENOUS; SUBCUTANEOUS at 12:39

## 2019-05-01 RX ADMIN — INSULIN LISPRO 2 UNITS: 100 INJECTION, SOLUTION INTRAVENOUS; SUBCUTANEOUS at 17:48

## 2019-05-01 RX ADMIN — HEPARIN SODIUM 5000 UNITS: 5000 INJECTION INTRAVENOUS; SUBCUTANEOUS at 15:29

## 2019-05-01 RX ADMIN — TAMSULOSIN HYDROCHLORIDE 0.4 MG: 0.4 CAPSULE ORAL at 22:46

## 2019-05-01 RX ADMIN — Medication 400 MG: at 17:48

## 2019-05-01 RX ADMIN — DILTIAZEM HYDROCHLORIDE 120 MG: 120 CAPSULE, COATED, EXTENDED RELEASE ORAL at 09:03

## 2019-05-01 RX ADMIN — HEPARIN SODIUM 5000 UNITS: 5000 INJECTION INTRAVENOUS; SUBCUTANEOUS at 09:02

## 2019-05-01 RX ADMIN — SODIUM CHLORIDE 75 ML/HR: 900 INJECTION, SOLUTION INTRAVENOUS at 14:09

## 2019-05-01 NOTE — ED PROVIDER NOTES
EMERGENCY DEPARTMENT HISTORY AND PHYSICAL EXAM 
 
 
Date: 4/30/2019 Patient Name: Sung Aden History of Presenting Illness Chief Complaint Patient presents with  Shortness of Breath SOB  with audible wheezing and increase work of breathing. EMS report initial sats 85% on NC at [facility]. Now on cpap with sats in 90%. History Provided By: Patient, Patient's Son and EMS 
 
HPI: Sung Aden, 80 y.o. male with PMHx significant for HTN, high cholesterol, presents by EMS to the ED with cc of resp distress. [facility]. Patient is developed some respiratory symptoms over the last week or 2 and has been having daily chest x-rays to follow changes. Patient this morning however was unable to catch his breath and EMS was called out. EMS states patient with severe respiratory distress with significant diminished ventilation and altered mental status and patient was placed on CPAP. At the time of arrival patient was receiving a Xopenex and treatment however he was not ventilating well and therefore not in taking the medication into the respiratory tract. After placing patient on CPAP they were able to connect Xopenex treatment and then subsequently a DuoNeb in route to the hospital.  Upon arrival to the hospital patient still altered with significant amount of respiratory distress patient was placed on BiPAP and an hour-long nebulizer treatment was ordered all the history limited secondary to acuity of condition time patient arrival. 
 
There are no other complaints, changes, or physical findings at this time. PCP: None No current facility-administered medications on file prior to encounter. Current Outpatient Medications on File Prior to Encounter Medication Sig Dispense Refill  potassium chloride SR (KLOR-CON 10) 10 mEq tablet Take 10 mEq by mouth daily.     
 dext 70/polycarbophil/peg/NaCl (ARTIFICIAL TEAR SOLUTION OP) Administer 2 Drops to both eyes two (2) times a day.  carvedilol (COREG) 3.125 mg tablet Take 3.125 mg by mouth two (2) times a day.  magnesium oxide (MAG-OX) 400 mg tablet Take 400 mg by mouth two (2) times a day.  dilTIAZem CD (CARDIZEM CD) 120 mg ER capsule Take 1 Cap by mouth daily. 30 Cap 0  
 QUEtiapine (SEROQUEL) 25 mg tablet Take 0.5 Tabs by mouth nightly. 30 Tab 0  
 dutasteride (AVODART) 0.5 mg capsule Take 0.5 mg by mouth daily.  senna-docusate (SENNA WITH DOCUSATE SODIUM) 8.6-50 mg per tablet Take 1 Tab by mouth daily as needed for Constipation (try first if constipation).  albuterol (PROVENTIL VENTOLIN) 2.5 mg /3 mL (0.083 %) nebulizer solution 3 mL by Nebulization route every four (4) hours as needed for Wheezing or Shortness of Breath. 30 Each 2  
 tamsulosin (FLOMAX) 0.4 mg capsule TAKE 1 CAPSULE BY MOUTH EVERY DAY 90 Cap 0  
 food supplemt, lactose-reduced (BOOST HIGH PROTEIN) 0.06 gram- 1 kcal/mL liqd Drink 2-3 containers DAILY. 90 Container 11  
 ferrous sulfate 325 mg (65 mg iron) tablet Take 1 Tab by mouth two (2) times daily (with meals). On an empty stomach with Vitamin C (like OJ) 60 Tab 11 Past History Past Medical History: 
Past Medical History:  
Diagnosis Date  Anemia  BPH (benign prostatic hypertrophy)  Dementia  Gout  High cholesterol 06/18 taken off meds  Hypertension 6/18 was taken off BP meds  Left inguinal hernia 07/2018 Past Surgical History: 
Past Surgical History:  
Procedure Laterality Date  HX HERNIA REPAIR  9-10-13 RI with mesh-[facility]-Dr. Rogelio Mondragon  
 HX HERNIA REPAIR  07/31/2018  
 open [facility] with mesh Family History: 
Family History Problem Relation Age of Onset  Stroke Mother  Stroke Sister  Diabetes Sister  Hypertension Sister  Heart Disease Brother  Hypertension Brother Social History: 
Social History Tobacco Use  Smoking status: Unknown If Ever Smoked  Smokeless tobacco: Never Used Substance Use Topics  Alcohol use: Yes Comment: occas  Drug use: No  
 
 
Allergies: 
No Known Allergies Review of Systems Review of Systems Unable to perform ROS: Acuity of condition Physical Exam  
Physical Exam  
Constitutional: He appears well-developed and well-nourished. He appears distressed. Thin elderly male, chronically ill-appearing in significant respiratory distress HENT:  
Head: Normocephalic and atraumatic. Mouth/Throat: Oropharynx is clear and moist. No oropharyngeal exudate. Eyes: Pupils are equal, round, and reactive to light. Conjunctivae and EOM are normal.  
Neck: Normal range of motion. Neck supple. No JVD present. No tracheal deviation present. Cardiovascular: Normal rate, regular rhythm, normal heart sounds and intact distal pulses. No murmur heard. Pulmonary/Chest: No stridor. He is in respiratory distress. He has wheezes. He has no rales. He exhibits no tenderness. Very diminished breath sounds throughout with inspiratory and expiratory wheeze with diffuse rhonchi in upper lung fields. Unable to speak in more than 1 word sentences Abdominal: Soft. He exhibits no distension. There is no tenderness. There is no rebound and no guarding. Musculoskeletal: Normal range of motion. He exhibits no edema or tenderness. Neurological: He is alert. No cranial nerve deficit. No gross motor or sensory deficits, awake but lethargic and somnolent Skin: Skin is warm and dry. He is not diaphoretic. Psychiatric:  
Anxious Nursing note and vitals reviewed. Diagnostic Study Results Labs - Recent Results (from the past 12 hour(s)) CBC WITH AUTOMATED DIFF Collection Time: 04/30/19 10:39 AM  
Result Value Ref Range WBC 5.9 4.1 - 11.1 K/uL  
 RBC 4.38 4.10 - 5.70 M/uL  
 HGB 12.5 12.1 - 17.0 g/dL HCT 39.1 36.6 - 50.3 % MCV 89.3 80.0 - 99.0 FL  
 MCH 28.5 26.0 - 34.0 PG  
 MCHC 32.0 30.0 - 36.5 g/dL RDW 12.9 11.5 - 14.5 % PLATELET 180 022 - 787 K/uL MPV 9.9 8.9 - 12.9 FL  
 NRBC 0.0 0  WBC ABSOLUTE NRBC 0.00 0.00 - 0.01 K/uL NEUTROPHILS 67 32 - 75 % LYMPHOCYTES 16 12 - 49 % MONOCYTES 10 5 - 13 % EOSINOPHILS 6 0 - 7 % BASOPHILS 1 0 - 1 % IMMATURE GRANULOCYTES 0 0.0 - 0.5 % ABS. NEUTROPHILS 4.0 1.8 - 8.0 K/UL  
 ABS. LYMPHOCYTES 0.9 0.8 - 3.5 K/UL  
 ABS. MONOCYTES 0.6 0.0 - 1.0 K/UL  
 ABS. EOSINOPHILS 0.4 0.0 - 0.4 K/UL  
 ABS. BASOPHILS 0.0 0.0 - 0.1 K/UL  
 ABS. IMM. GRANS. 0.0 0.00 - 0.04 K/UL  
 DF AUTOMATED PROTHROMBIN TIME + INR Collection Time: 04/30/19 10:39 AM  
Result Value Ref Range INR 1.0 0.9 - 1.1 Prothrombin time 10.5 9.0 - 11.1 sec TROPONIN I Collection Time: 04/30/19 10:39 AM  
Result Value Ref Range Troponin-I, Qt. <0.05 <0.05 ng/mL NT-PRO BNP Collection Time: 04/30/19 10:39 AM  
Result Value Ref Range NT pro- <450 PG/ML  
CK-MB,QUANT. Collection Time: 04/30/19 10:39 AM  
Result Value Ref Range CK - MB 3.2 <3.6 NG/ML  
 CK-MB Index 4.3 (H) 0.0 - 2.5 CK Collection Time: 04/30/19 10:39 AM  
Result Value Ref Range CK 75 39 - 671 U/L  
METABOLIC PANEL, COMPREHENSIVE Collection Time: 04/30/19 10:39 AM  
Result Value Ref Range Sodium 138 136 - 145 mmol/L Potassium 4.1 3.5 - 5.1 mmol/L Chloride 102 97 - 108 mmol/L  
 CO2 32 21 - 32 mmol/L Anion gap 4 (L) 5 - 15 mmol/L Glucose 105 (H) 65 - 100 mg/dL BUN 13 6 - 20 MG/DL Creatinine 0.75 0.70 - 1.30 MG/DL  
 BUN/Creatinine ratio 17 12 - 20 GFR est AA >60 >60 ml/min/1.73m2 GFR est non-AA >60 >60 ml/min/1.73m2 Calcium 8.6 8.5 - 10.1 MG/DL Bilirubin, total 0.4 0.2 - 1.0 MG/DL  
 ALT (SGPT) 22 12 - 78 U/L  
 AST (SGOT) 18 15 - 37 U/L Alk. phosphatase 69 45 - 117 U/L Protein, total 7.4 6.4 - 8.2 g/dL Albumin 3.6 3.5 - 5.0 g/dL Globulin 3.8 2.0 - 4.0 g/dL A-G Ratio 0.9 (L) 1.1 - 2.2 POC G3 - PUL Collection Time: 04/30/19 12:10 PM  
Result Value Ref Range FIO2 (POC) 40 % pH (POC) 7.287 (L) 7.35 - 7.45    
 pCO2 (POC) 60.2 (H) 35.0 - 45.0 MMHG  
 pO2 (POC) 81 80 - 100 MMHG  
 HCO3 (POC) 28.7 (H) 22 - 26 MMOL/L  
 sO2 (POC) 94 92 - 97 % Base excess (POC) 2 mmol/L Site RIGHT RADIAL Device: BIPAP    
 PEEP/CPAP (POC) 6 cmH2O  
 PIP (POC) 16 Allens test (POC) YES Specimen type (POC) ARTERIAL Total resp. rate 20 GLUCOSE, POC Collection Time: 04/30/19  4:06 PM  
Result Value Ref Range Glucose (POC) 147 (H) 65 - 100 mg/dL Performed by Connie Gonzalez GLUCOSE, POC Collection Time: 04/30/19  9:51 PM  
Result Value Ref Range Glucose (POC) 195 (H) 65 - 100 mg/dL Performed by Satya Ozuna Radiologic Studies -  
CTA CHEST W OR W WO CONT Final Result IMPRESSION:   
1. No pulmonary embolism to the segmental arterial level. 2. Persistent right lower lobe mucus plugging represents bronchitis versus  
aspiration. Decreased bilateral lower lobe pneumonia. 3. Increased mediastinal lymphadenopathy. New left hilar lymphadenopathy. Recommend followup PA and lateral chest views in 8-10 weeks to ensure  
resolution. XR CHEST SNGL V Final Result IMPRESSION:  
  
Mild linear bibasilar opacity likely represents atelectasis. No other acute  
process. Follow-up PA and lateral chest radiographs are recommended when the  
patient is able. CT Results  (Last 48 hours) 04/30/19 1703  CTA 1144 [facility] CONT Final result Impression:  IMPRESSION:   
1. No pulmonary embolism to the segmental arterial level. 2. Persistent right lower lobe mucus plugging represents bronchitis versus  
aspiration. Decreased bilateral lower lobe pneumonia. 3. Increased mediastinal lymphadenopathy. New left hilar lymphadenopathy. Recommend followup PA and lateral chest views in 8-10 weeks to ensure  
resolution. Narrative:  EXAM:  CTA CHEST W OR W WO CONT INDICATION: Hypoxia. Hospitalization for respiratory failure, pneumonia hospital  
acquired. COMPARISON: Portable chest earlier today at 10:26 AM. CT angiography chest on  
11/28/2018. TECHNIQUE: Helical thin section chest CT following uneventful intravenous  
administration of nonionic contrast 100 mL of isovue 370 according to  
departmental PE protocol. Coronal and sagittal reformats were performed. 3D post  
processing was performed. CT dose reduction was achieved through the use of a  
standardized protocol tailored for this examination and automatic exposure  
control for dose modulation. FINDINGS: This is a good quality study for the evaluation of pulmonary embolism  
to the segmental arterial level. There is no pulmonary embolism to this level. Gynecomastia is asymmetric to the left. THYROID: Small left thyroid lobe nodules are unchanged and of no clinical  
significance in this age group. MEDIASTINUM: Mediastinal lymph nodes are increased. Inferior left precarinal  
lymph node measures 1.6 x 1.1 cm (series 2, image 72). GILBERTO: Left hilar lymphadenopathy is new and measures 2.8 x 1.5 cm (2, 60). THORACIC AORTA: No aneurysm. HEART: Mild cardiomegaly is unchanged. Trace pericardial effusion is unchanged. ESOPHAGUS: No wall thickening or dilatation. TRACHEA/BRONCHI: Limited evaluation due to respiratory motion artifact. PLEURA: No effusion or pneumothorax. LUNGS: Mucous plugging in the right lower lobe persists. Right lower lobe  
airspace opacities are slightly decreased but hypoenhancing. Airspace opacities  
in the left lower lobe are partially resolved. No new area of pneumonia. UPPER ABDOMEN: Partially imaged. No acute pathology. BONES: Partial compression fractures of T6 and T11 are unchanged. Bilateral rib  
fractures are old. CXR Results  (Last 48 hours) 04/30/19 1040  XR CHEST SNGL V Final result Impression:  IMPRESSION:  
   
Mild linear bibasilar opacity likely represents atelectasis. No other acute  
process. Follow-up PA and lateral chest radiographs are recommended when the  
patient is able. Narrative:  EXAM:  XR CHEST SNGL V  
   
INDICATION: Chest pain. COMPARISON: 12/4/2018 TECHNIQUE: Portable AP upright chest view at 1027 hours FINDINGS: Cardiac monitoring wires overlie the thorax. The cardiomediastinal  
contours are stable. The pulmonary vasculature is within normal limits. There is mild linear bibasilar opacity. There is no pleural effusion or  
pneumothorax. The bones and upper abdomen are stable. Medical Decision Making I am the first provider for this patient. I reviewed the vital signs, available nursing notes, past medical history, past surgical history, family history and social history. Vital Signs-Reviewed the patient's vital signs. Patient Vitals for the past 12 hrs: 
 Temp Pulse Resp BP SpO2  
04/30/19 2000 97 °F (36.1 °C) 84 16 130/89 95 % 04/30/19 1959  85   95 % 04/30/19 1803     95 % 04/30/19 1549 98.6 °F (37 °C) 81 16 125/75 95 % 04/30/19 1500 99.1 °F (37.3 °C) 82 16 126/68 94 % 04/30/19 1417  82  119/68   
04/30/19 1400  82 22 119/68 93 % 04/30/19 1330  85 19 123/72 97 % 04/30/19 1300  91 21 131/78 96 % 04/30/19 1230  92 18 132/80 96 % 04/30/19 1220     98 % 04/30/19 1218     96 % 04/30/19 1200  91 21 135/86 96 % 04/30/19 1130  98 22 134/86 96 % 04/30/19 1100  91 21 126/82 97 % 04/30/19 1041     96 % Pulse Oximetry Analysis - 96% on Bipap Cardiac Monitor:  
Rate: 92 bpm 
Rhythm: Normal Sinus Rhythm EKG interpretation: (Preliminary) Sinus rhythm first-degree AV block, rate 93, normal axis, prolonged MI, normal QRS, no acute ST changes.  
 
Records Reviewed: Nursing Notes, Old Medical Records, Previous electrocardiograms, Ambulance Run Sheet, Previous Radiology Studies and Previous Laboratory Studies Provider Notes (Medical Decision Making): DDX-pneumonia, bronchitis, new onset heart failure, respiratory failure, ACS, electrolyte abnormality ED Course:  
Initial assessment performed. The patients presenting problems have been discussed, and they are in agreement with the care plan formulated and outlined with them. I have encouraged them to ask questions as they arise throughout their visit. Critical Care Time: CRITICAL CARE NOTE : 
 
 
IMPENDING DETERIORATION -Respiratory, Cardiovascular and CNS 
ASSOCIATED RISK FACTORS - Hypoxia, Dysrhythmia and CNS Decompensation MANAGEMENT- Bedside Assessment and Supervision of Care INTERPRETATION -  Xrays, Blood Gases, ECG, Blood Pressure, Cardiac Output Measures  and SpO2, Labs INTERVENTIONS - hemodynamic mngmt and BiPAP, Nebulizer tx, Steroids, IV Ab 
CASE REVIEW - Hospitalist, Medical Sub-Specialist, Nursing and Family TREATMENT RESPONSE -Improved PERFORMED BY - Self NOTES   : 
I have spent 40 minutes of critical care time involved in lab review, consultations with specialist, family decision- making, bedside attention and documentation. During this entire length of time I was immediately available to the patient . Maria Del Carmen Prieto DO Consult Note:  
Discussed with Dr. Matt Nagy who will see and evaluate pt for admission, Maria Del Carmen Prieto DO Disposition: 
Admit for further evaluation and treatment PLAN: 
1. Admit, Continue Albuterol tx, steroids, IV Ab, pt initially on BiPAP with improvement in mental status and resp effort. Pt taken off BiPAP and placed on O2 by NC and tolerated well in ED. Diagnosis Clinical Impression: 1. Acute respiratory failure with hypoxia and hypercapnia (HCC) 2. Pneumonia due to infectious organism, unspecified laterality, unspecified part of lung

## 2019-05-01 NOTE — PROGRESS NOTES
ADULT PROTOCOL: JET AEROSOL ASSESSMENT Patient  Arpan Henry     80 y.o.   male     4/30/2019  11:38 PM 
 
Breath Sounds Pre Procedure: Right Breath Sounds: Scattered wheezing Left Breath Sounds: Scattered wheezing Breath Sounds Post Procedure: Right Breath Sounds: Scattered wheezing Left Breath Sounds: Scattered wheezing Breathing pattern: Pre procedure Breathing Pattern: Tachypneic Post procedure Breathing Pattern: Tachypneic Heart Rate: Pre procedure Pulse: 85 
         Post procedure Pulse: 91 
 
Resp Rate: Pre procedure Respirations: 26 Post procedure Respirations: 26 
 
 
Oxygen: O2 Device: Nasal cannula   Flow rate (L/min) 3 Changed: NO SpO2: Pre procedure SpO2: 93 %   with oxygen Post procedure SpO2: 95 %  with oxygen Nebulizer Therapy: Current medications Aerosolized Medications: DuoNeb Changed: NO Smoking History:  Smoking status: Unknown If Ever Smoked Problem List:  
Patient Active Problem List  
Diagnosis Code  Incarcerated inguinal hernia K40.30  Hypertension I10  Benign prostatic hyperplasia N40.0  Gout M10.9  High cholesterol E78.00  Benign prostatic hyperplasia without lower urinary tract symptoms N40.0  Left inguinal hernia K40.90  
 BMI less than 19,adult Z68.1  Inguinal hernia K40.90  
 Urinary retention R33.9  Acute respiratory failure with hypoxia and hypercapnia (HCC) J96.01, J96.02  
 SOB (shortness of breath) R06.02  
 Delirium R41.0  Counseling regarding advanced care planning and goals of care Z71.89  Dementia F03.90  Respiratory failure ([address] J96.90  
[facility] acquired PNA J18.9 Respiratory Therapist: Chivo Bazan RT

## 2019-05-01 NOTE — PROGRESS NOTES
Eval complete and note to follow. Pt is at baseline for ADLs. Will sign off. Recommend return to LTC at discharge and continue to mobilize to and from bathroom with nursing (superivsion for safety due to cognition). Pt can perform ADLs at a supervision level. Needs O2 at this time. O2 sat at rest on room air 94% O2 sat on room air with mobility 86% O2 sat on 2 liters NC with activity 91% O2 sat on 2 liters NC at rest 98%

## 2019-05-01 NOTE — PROGRESS NOTES
CM met with pt and he was resting in bed with no distress. Pt is a LTC resident from [facility] since August 2018. Pt's NOK is his sister, Vanna Keating ([phone]). Referral has been sent to [facility] and they have accepted. Pt will need medical transport at discharge. CM will continue to follow for discharge planning needs. 1:30pm - CM met with pt's sister at the bedside and informed her that [facility] accepted pt. Jorge Rush, [address] [place] [address]

## 2019-05-01 NOTE — PROGRESS NOTES
Susyal complete and note to follow. Pt is at baseline for mobility. Will sign off. Recommend return to LTC at discharge and continue to mobilize to and from bathroom with nursing (superivsion for safety due to cognition). Needs O2 at this time. O2 sat at rest on room air 94% O2 sat on room air with mobility 86% O2 sat on 2 liters NC with activity 91% O2 sat on 2 liters NC at rest 98% Recommend return to [facility] without services.

## 2019-05-01 NOTE — PROGRESS NOTES
Bedside shift change report given to [address] [place] [address] (oncoming nurse) by Morena Pabon (offgoing nurse). Report included the following information SBAR, Kardex, MAR and Recent Results. 2000- patient pleasantly confused. Resting comfortably. Eating dinner tray. VSS. Unable to completed admission database, no family present. Will continue to monitor. 0600- patient pulled IV out. Patient continues to be confused, baseline. Patient has attempted to get out of bed numerous times to urinate. Bed alarm activated. Will continue to monitor.

## 2019-05-01 NOTE — PROGRESS NOTES
Speech Pathology bedside swallow evaluation/discharge Patient: Pasha Alegre (89 y.o. male) Date: 5/1/2019 Primary Diagnosis: Respiratory failure ([address] [J96.90] Hospital acquired PNA [J18.9] Precautions: DNR, Bed Alarm ASSESSMENT : 
Patient known to this discipline from 12/2018 admission. At that time, patient underwent MBS study. Patient had penetration that cleared with the force of the swallow and no aspiration. Presentation today is consistent with prior consult. Oral dysphagia characterized by prolonged mastication of solid due to edentulous state. Suspect delayed swallow initiation which was also seen on prior mbs. No overt s/s aspiration with successive straw sips of thin or solids. Mechanically altered (minced/ground), was recommended in December and agree with continuation of this diet. Skilled acute therapy provided by a speech-language pathologist is not indicated at this time. PLAN : 
Recommendations: 
Continue dysphagia 2 (minced/ground)/ thin liquids. Straw ok Strict upright- preferably in chair for meaqls 
meds as tolerated Discharge Recommendations: None SUBJECTIVE:  
Patient stated I will have another cookie. OBJECTIVE:  
 
Past Medical History:  
Diagnosis Date  Anemia  BPH (benign prostatic hypertrophy)  Dementia  Gout  High cholesterol 06/18 taken off meds  Hypertension 6/18 was taken off BP meds  Left inguinal hernia 07/2018 Past Surgical History:  
Procedure Laterality Date  HX HERNIA REPAIR  9-10-13 [facility] with mesh-[facility]-Dr. Cinthya Zhong  
 HX HERNIA REPAIR  07/31/2018  
 open [facility] with mesh Prior Level of Function/Home Situation:  
Home Situation Home Environment: Long term care([facility]) Current DME Used/Available at Home: Dorn Olszewski, Wheelchair Diet prior to admission: regular \"ground\" at [facility] Current Diet:  Dysphagia 2 mechanically altered Cognitive and Communication Status: 
Neurologic State: Alert Orientation Level: Oriented to person, Oriented to place Cognition: Follows commands Perception: Appears intact Perseveration: No perseveration noted Safety/Judgement: Fall prevention, Decreased awareness of environment, Decreased insight into deficits Oral Assessment: 
Oral Assessment Labial: No impairment Dentition: Edentulous Oral Hygiene: (clean, moist) Lingual: No impairment Velum: Unable to visualize Mandible: No impairment P.O. Trials: 
Patient Position: (up in chair) Vocal quality prior to P.O.: No impairment Consistency Presented: Thin liquid; Solid;Puree How Presented: Successive swallows;Straw;Self-fed/presented;Spoon Bolus Acceptance: No impairment Bolus Formation/Control: Impaired Type of Impairment: Delayed;Mastication Propulsion: Delayed (# of seconds) Oral Residue: None Initiation of Swallow: Delayed (# of seconds) Laryngeal Elevation: Functional 
Aspiration Signs/Symptoms: None Pharyngeal Phase Characteristics: No impairment, issues, or problems Oral Phase Severity: Mild Pharyngeal Phase Severity : Mild NOMS:  
The NOMS functional outcome measure was used to quantify this patient's level of swallowing impairment. Based on the NOMS, the patient was determined to be at level 4 for swallow function NOMS Swallowing Levels: 
Level 1 (CN): NPO Level 2 (CM): NPO but takes consistency in therapy Level 3 (CL): Takes less than 50% of nutrition p.o. and continues with nonoral feedings; and/or safe with mod cues; and/or max diet restriction Level 4 (CK): Safe swallow but needs mod cues; and/or mod diet restriction; and/or still requires some nonoral feeding/supplements Level 5 (CJ): Safe swallow with min diet restriction; and/or needs min cues Level 6 (CI): Independent with p.o.; rare cues; usually self cues; may need to avoid some foods or needs extra time Level 7 (CH): Independent for all p.o. NAEL. (2003).  National Outcomes Measurement System (NOMS): Adult Speech-Language Pathology User's Guide. Pain: 
Pain Scale 1: Numeric (0 - 10) Pain Intensity 1: 0 After treatment:  
[x] Patient left in no apparent distress sitting up in chair 
[] Patient left in no apparent distress in bed 
[x] Call bell left within reach [x] Nursing notified 
[] Caregiver present 
[] Bed alarm activated COMMUNICATION/EDUCATION:  
The patients plan of care including findings, recommendations, and recommended diet changes were discussed with: Registered Nurse. 
[] Posted safety precautions in patient's room. [x] Patient/family have participated as able and agree with findings and recommendations. [] Patient is unable to participate in plan of care at this time. Thank you for this referral. 
Stephen Cuello, SLP Time Calculation: 18 mins

## 2019-05-01 NOTE — PROGRESS NOTES
physical Therapy EVALUATION/DISCHARGE Patient: Selena Hebert (34 y.o. male) Date: 5/1/2019 Primary Diagnosis: Respiratory failure ([address] [J96.90] Hospital acquired PNA [J18.9] Precautions:  DNR, Bed Alarm ASSESSMENT : 
Based on the objective data described below, the patient presents pleasantly confused, but at his baseline for functional mobility. Pt cleared by RN for PT evaluation. Pt in bed upon arrival, agreeable to PT evaluation. Pt admitted due to PNA and acute RF. No deficits noted that warrant skilled PT intervention at this time. Will sign off.  Recommend return to LTC at discharge and continue to mobilize to and from bathroom with nursing (superivsion for safety due to cognition). Needs O2 at this time. 
  
O2 sat at rest on room air 94% O2 sat on room air with mobility 86% O2 sat on 2 liters NC with activity 91% O2 sat on 2 liters NC at rest 98% 
  
Recommend return to [address] [place] [address] without services. Further skilled acute physical therapy is not indicated at this time. PLAN : 
[facility] Further Equipment Recommendations for Discharge: none SUBJECTIVE:  
Patient stated I see that house over there.  OBJECTIVE DATA SUMMARY:  
HISTORY:   
Past Medical History:  
Diagnosis Date  Anemia  BPH (benign prostatic hypertrophy)  Dementia  Gout  High cholesterol 06/18 taken off meds  Hypertension 6/18 was taken off BP meds  Left inguinal hernia 07/2018 Past Surgical History:  
Procedure Laterality Date  HX HERNIA REPAIR  9-10-13 [facility] with [id number]-Dr. Manjit Chandler  
 HX HERNIA REPAIR  07/31/2018  
 open [facility] with mesh Prior Level of Function/Home Situation:  S with mobility and adls due to dementia. Ambulatory and use of w/c. Pt lives in a LTC facility and has been a resident there since 2018. Personal factors and/or comorbidities impacting plan of care:  cognition Home Situation Home Environment: Long term care([facility]) Current DME Used/Available at Home: Darrius Walls, Wheelchair EXAMINATION/PRESENTATION/DECISION MAKING:  
Critical Behavior: 
Neurologic State: Alert Orientation Level: Oriented to person, Oriented to place Cognition: Follows commands Safety/Judgement: Fall prevention, Decreased awareness of environment, Decreased insight into deficits Hearing: wnl 
 Range Of Motion: 
AROM: Within functional limits PROM: Within functional limits Strength:   
Strength: Generally decreased, functional 
  
   
  
Tone & Sensation:  
Tone: Normal 
  
  
  
  
Sensation: Intact Coordination: 
Coordination: Within functional limits Vision:  
Acuity: Within Defined Limits Functional Mobility: 
Bed Mobility: 
Rolling: Independent Supine to Sit: Independent Sit to Supine: Independent Scooting: Independent Transfers: 
Sit to Stand: Supervision Stand to Sit: Supervision Bed to Chair: Supervision Balance:  
Sitting: Intact Standing: Intact Ambulation/Gait Training: 
Distance (ft): 40 Feet (ft) Assistive Device: Gait belt Ambulation - Level of Assistance: Supervision;Stand-by assistance Gait Abnormalities: Decreased step clearance Base of Support: (wnl) Stance: (equal) Speed/Johanna: Pace decreased (<100 feet/min) Step Length: Left shortened;Right shortened Functional Measure: 
Barthel Index: 
 
Bathin Bladder: 5 Bowels: 5 Groomin Dressing: 10 Feeding: 10 Mobility: 10 Stairs: 5 Toilet Use: 5 Transfer (Bed to Chair and Back): 10 Total: 65/100 Percentage of impairment  
0% 1-19% 20-39% 40-59% 60-79% 80-99% 100% Barthel Score 0-100 100 99-80 79-60 59-40 20-39 1-19 
 0 The Barthel ADL Index: Guidelines 1. The index should be used as a record of what a patient does, not as a record of what a patient could do. 2. The main aim is to establish degree of independence from any help, physical or verbal, however minor and for whatever reason. 3. The need for supervision renders the patient not independent. 4. A patient's performance should be established using the best available evidence. Asking the patient, friends/relatives and nurses are the usual sources, but direct observation and common sense are also important. However direct testing is not needed. 5. Usually the patient's performance over the preceding 24-48 hours is important, but occasionally longer periods will be relevant. 6. Middle categories imply that the patient supplies over 50 per cent of the effort. 7. Use of aids to be independent is allowed. Alonso Mirza., Barthel, D.W. (4609). Functional evaluation: the Barthel Index. [facility] (14)2. JHONY Gastelum, Westley Reaves., Lynn Howard., [facility], [facility] (1999). Measuring the change indisability after inpatient rehabilitation; comparison of the responsiveness of the Barthel Index and Functional [place] Measure. Journal of Neurology, Neurosurgery, and Psychiatry, 66(4), 377-525. Naldo Hassan, N.J.A, FERNANDO Hammond.MICHELLE, & Margy Forde, M.A. (2004.) Assessment of post-stroke quality of life in cost-effectiveness studies: The usefulness of the Barthel Index and the EuroQoL-5D. [facility], 13, 544-49 Physical Therapy Evaluation Charge Determination History Examination Presentation Decision-Making LOW Complexity : Zero comorbidities / personal factors that will impact the outcome / POC LOW Complexity : 1-2 Standardized tests and measures addressing body structure, function, activity limitation and / or participation in recreation  LOW Complexity : Stable, uncomplicated  LOW Complexity : FOTO score of  Based on the above components, the patient evaluation is determined to be of the following complexity level: LOW Pain: 
Pain Scale 1: Numeric (0 - 10) Pain Intensity 1: 0 Activity Tolerance:  
good Please refer to the flowsheet for vital signs taken during this treatment. After treatment:  
[x]   Patient left in no apparent distress sitting up in chair 
[]   Patient left in no apparent distress in bed 
[x]   Call bell left within reach [x]   Nursing notified 
[]   Caregiver present [x]   Bed alarm activated COMMUNICATION/EDUCATION:  
Communication/Collaboration: 
[x]   Fall prevention education was provided and the patient/caregiver indicated understanding. [x]   Patient/family have participated as able and agree with findings and recommendations. []   Patient is unable to participate in plan of care at this time. Findings and recommendations were discussed with: Occupational Therapist and Registered Nurse Thank you for this referral. 
Rika Ovalles, PT Time Calculation: 26 mins

## 2019-05-01 NOTE — PROGRESS NOTES
Hospitalist Progress Note NAME: Olivia Lunsford :  1930 MRN:  [id number] Assessment / Plan: 
Acute hypoxemic and hypercarbic respiratory failure (required bipap in ER) Suspected pneumonia, hospital associated/possible aspiration, present on admission (Last hospitalization  with respiratory failure secondary to aspiration and dysphagia) Suspected acute on chronic COPD exacerbation, present on admission · Patient and his last admission had mucous plugging and suspected aspiration in December · During that hospitalization he had a modified barium swallow that showed no aspiration. · Continue IV Zosyn and Vancomycin · We will keep patient NPO except for meds and have a speech evaluation to evaluate for possible aspiration, gentle hydration in the meantime Chest x-ray (2019) 1. Mild linear bibasilar opacity likely represents atelectasis. No other acute process CTA chest (2019) 1. No pulmonary embolism to the segmental arterial level. 2. Persistent right lower lobe mucus plugging represents bronchitis versus 
aspiration. Decreased bilateral lower lobe pneumonia. 3. Increased mediastinal lymphadenopathy. New left hilar lymphadenopathy. · Will have the BiPAP available as needed in the PCU and consider pulmonary evaluation for further recommendations if needed. · We will continue IV steroids, add mucolytic's, nebs q 4, and RT. · Will get sputum culture 
  
History of chronic systolic and diastolic heart failure per the records 
sinus arrhythmia during his last hospitalization, but cardiology did not feel needed any further work-up · Patient does not appear to be in CHF, BNP is 110 · Last echocardiogram in December showed an EF of 55% with moderate mitral regurgitation · Current troponin is negative, he did have some chest pain on admission when he was having respiratory failure now resolved · Closely being watched for CHF and recurrent CP and consider cardiology evaluation if needed ·  Beta-blocker for now with his acute bronchospasm and resume if tolerated in the future History of dysphasia · Speech evaluation is ordered. NPO except meds now. 
  
Underlying dementia, etiology unclear possible Lewy body per records Behavioral disturbance · Supportive care · Continue home meds 
  
BPH · Monitor for retention · Continue home meds 
  
Hypertension · Continue his diltiazem but hold his Coreg for now 
  
CODE STATUS- DNR. 
  
DVT prophylaxis- Heparin SQ  
  
Surrogate Decision Maker: sister Master Merrill 
  
GI Prophylaxis: not indicated Recommended Disposition: Return to [address] [place] [address],[address] Subjective: Chief Complaint / Reason for Physician Visit \"I have this rattling in my throat. \". Discussed with RN events overnight. Review of Systems: 
Symptom Y/N Comments  Symptom Y/N Comments Fever/Chills N   Chest Pain N Poor Appetite  NPO  Edema N   
Cough Y   Abdominal Pain Sputum Y Minimal  Joint Pain SOB/WU N   Pruritis/Rash Nausea/vomit    Tolerating PT/OT Diarrhea    Tolerating Diet  NPO Constipation    Other Could NOT obtain due to:   
 
Objective: VITALS:  
Last 24hrs VS reviewed since prior progress note. Most recent are: 
Patient Vitals for the past 24 hrs: 
 Temp Pulse Resp BP SpO2  
05/01/19 0813 (P) 98.2 °F (36.8 °C) (P) 95 (P) 20 (P) 144/87   
05/01/19 0808     94 % 05/01/19 0325 98.4 °F (36.9 °C) 94 21 141/84 92 % 04/30/19 2230 98 °F (36.7 °C) 82 16 132/89 93 % 04/30/19 2000 97 °F (36.1 °C) 84 16 130/89 95 % 04/30/19 1959  85   95 % 04/30/19 1803     95 % 04/30/19 1549 98.6 °F (37 °C) 81 16 125/75 95 % 04/30/19 1500 99.1 °F (37.3 °C) 82 16 126/68 94 % 04/30/19 1417  82  119/68   
04/30/19 1400  82 22 119/68 93 % 04/30/19 1330  85 19 123/72 97 % 04/30/19 1300  91 21 131/78 96 % 04/30/19 1230  92 18 132/80 96 % 04/30/19 1220     98 % 04/30/19 1218     96 % 04/30/19 1200  91 21 135/86 96 % 04/30/19 1130  98 22 134/86 96 % 04/30/19 1100  91 21 126/82 97 % 04/30/19 1041     96 % 04/30/19 1030  91 20 132/76 97 % 04/30/19 1022 98.1 °F (36.7 °C) 92 22 134/79 96 % Intake/Output Summary (Last 24 hours) at 5/1/2019 0638 Last data filed at 5/1/2019 9012 Gross per 24 hour Intake  Output 650 ml Net -650 ml PHYSICAL EXAM: 
General:          Alert, cooperative, no distress, appears stated age. HEENT:           Atraumatic, anicteric sclerae, pink conjunctivae Neck:               Supple, symmetrical,  Thyroid not enlarged Lungs:             Wheezing throughout, +rhonchi bilaterally. Chest wall:      No tenderness  No Accessory muscle use. Heart:              Regular  rhythm,  No  murmur   No edema Abdomen:        Soft, non-tender. Not distended. Bowel sounds normal 
Extremities:     No cyanosis. No clubbing,   
                        Skin turgor normal, Capillary refill normal 
Skin:                Not pale. Not Jaundiced  No rashes Psych:             Poor insight. Not depressed. Not anxious or agitated. Neurologic:      Oriented to person and place. States the year is 80. Reviewed most current lab test results and cultures  YES Reviewed most current radiology test results   YES Review and summation of old records today    NO Reviewed patient's current orders and MAR    YES 
PMH/ reviewed - no change compared to H&P 
________________________________________________________________________ Care Plan discussed with: 
  Comments Patient X Family RN X   
Care Manager Consultant Multidiciplinary team rounds were held today with , nursing, pharmacist and clinical coordinator. Patient's plan of care was discussed; medications were reviewed and discharge planning was addressed. ________________________________________________________________________ Total NON critical care TIME:  25   Minutes Total CRITICAL CARE TIME Spent:   Minutes non procedure based Comments >50% of visit spent in counseling and coordination of care    
________________________________________________________________________ True Links, NP Procedures: see electronic medical records for all procedures/Xrays and details which were not copied into this note but were reviewed prior to creation of Plan. LABS: 
I reviewed today's most current labs and imaging studies. Pertinent labs include: 
Recent Labs 05/01/19 
0325 04/30/19 
1039 WBC 3.4* 5.9 HGB 12.1 12.5 HCT 36.7 39.1  235 Recent Labs 05/01/19 
0325 04/30/19 
1039  138  
K 4.2 4.1  102 CO2 29 32 * 105* BUN 13 13 CREA 0.76 0.75 CA 8.6 8.6 ALB  --  3.6 TBILI  --  0.4 SGOT  --  18 ALT  --  22 INR  --  1.0 Signed: True Links, NP

## 2019-05-01 NOTE — PROGRESS NOTES
Occupational Therapy EVALUATION/discharge Patient: Bandar Romo (7 y.o. male) Date: 5/1/2019 Primary Diagnosis: Respiratory failure ([address] [J96.90] Hospital acquired PNA [J18.9] Precautions:   DNR, Bed Alarm ASSESSMENT:  
Based on the objective data described below, the patient presents with pleasant confusion and was only oriented to self (did not know birthday). Pt does need O2 at this time to maintain O2 sats and significant wheezing noted on exertion. Pt was able to don socks bending forward method with supervision, perform ADL mobility and obtain objects in standing with supervision. . 
Further skilled acute occupational therapy is not indicated at this time as pt is at his baseline for ADLs and will need supervision due to mental status. O2 sat at rest on room air 94% O2 sat on room air with mobility 86% O2 sat on 2 liters NC with activity 91% O2 sat on 2 liters NC at rest 98% Discharge Recommendations: Back to LTC Further Equipment Recommendations for Discharge: none needed SUBJECTIVE:  
Patient stated My house is right out there.  OBJECTIVE DATA SUMMARY:  
HISTORY:  
Past Medical History:  
Diagnosis Date  Anemia  BPH (benign prostatic hypertrophy)  Dementia  Gout  High cholesterol 06/18 taken off meds  Hypertension 6/18 was taken off BP meds  Left inguinal hernia 07/2018 Past Surgical History:  
Procedure Laterality Date  HX HERNIA REPAIR  9-10-13 [facility] with [facility]-Dr. Belen Druan  
 HX HERNIA REPAIR  07/31/2018  
 open [facility] with mesh Prior Level of Function/Environment/Context: lives at [facility] resident since last year, was not getting therapy services, not on O2, gets up on his own and ambulates but should ask for help, does not use assist devices to ambulate or uses wheelchair, supervision for ADLs due to dementia Expanded or extensive additional review of patient history:  
 
Home Situation Home Environment: Long term care([facility]) Current DME Used/Available at Home: [place] D[employer], Wheelchair Hand dominance: Right EXAMINATION OF PERFORMANCE DEFICITS: 
Cognitive/Behavioral Status: 
Neurologic State: Alert Orientation Level: Oriented to person;Oriented to place Cognition: Follows commands Perception: Appears intact Perseveration: No perseveration noted Safety/Judgement: Fall prevention;Decreased awareness of environment;Decreased insight into deficits Vision/Perceptual:   
    
    
    
  
    
Acuity: Within Defined Limits Range of Motion: 
 
AROM: Within functional limits PROM: Within functional limits Strength: 
 
Strength: Generally decreased, functional 
  
  
  
  
 
Coordination: 
Coordination: Within functional limits Fine Motor Skills-Upper: Right Intact; Left Intact Gross Motor Skills-Upper: Left Intact; Right Intact Tone & Sensation: 
 
Tone: Normal 
Sensation: Intact Balance: 
Sitting: Intact Standing: Intact Functional Mobility and Transfers for ADLs:Bed Mobility: 
Rolling: Independent Supine to Sit: Independent Sit to Supine: Independent Scooting: Independent Transfers: 
Sit to Stand: Supervision Stand to Sit: Supervision Bed to Chair: Supervision Bathroom Mobility: (supervision) Toilet Transfer : Supervision ADL Assessment: 
Feeding: Independent Oral Facial Hygiene/Grooming: Supervision Bathing: Supervision Upper Body Dressing: Supervision Lower Body Dressing: Supervision Toileting: Supervision ADL Intervention and task modifications: 
See assessment Cognitive Retraining Safety/Judgement: Fall prevention;Decreased awareness of environment;Decreased insight into deficits Functional Measure: 
Barthel Index: 
 
Bathin Bladder: 5 Bowels: 5 Groomin Dressing: 10 Feeding: 10 Mobility: 10 Stairs: 5 Toilet Use: 5 Transfer (Bed to Chair and Back): 10 Total: 65/100 Percentage of impairment  
0% 1-19% 20-39% 40-59% 60-79% 80-99% 100% Barthel Score 0-100 100 99-80 79-60 59-40 20-39 1-19 
 0 The Barthel ADL Index: Guidelines 1. The index should be used as a record of what a patient does, not as a record of what a patient could do. 2. The main aim is to establish degree of independence from any help, physical or verbal, however minor and for whatever reason. 3. The need for supervision renders the patient not independent. 4. A patient's performance should be established using the best available evidence. Asking the patient, friends/relatives and nurses are the usual sources, but direct observation and common sense are also important. However direct testing is not needed. 5. Usually the patient's performance over the preceding 24-48 hours is important, but occasionally longer periods will be relevant. 6. Middle categories imply that the patient supplies over 50 per cent of the effort. 7. Use of aids to be independent is allowed. Harlee Cowden., Barthel, D.W. (1931). Functional evaluation: the Barthel Index. [address] [facility] (14)2. Rordigo Saldaña jenifer JHONY Alfaro, Meng Carrera, Jamshid Lance., [place], [address] [place] [address] (1999). Measuring the change indisability after inpatient rehabilitation; comparison of the responsiveness of the Barthel Index and Functional [place] Measure. Journal of Neurology, Neurosurgery, and Psychiatry, 66(4), 912-820. Emili Mayfield, N.J.A, BAILEY Hammond, & Douglas Boucher M.A. (2004.) Assessment of post-stroke quality of life in cost-effectiveness studies: The usefulness of the Barthel Index and the EuroQoL-5D. [facility], 13, 657-48 Occupational Therapy Evaluation Charge Determination History Examination Decision-Making MEDIUM Complexity : Expanded review of history including physical, cognitive and psychosocial  history  MEDIUM Complexity : 3-5 performance deficits relating to physical, cognitive , or psychosocial skils that result in activity limitations and / or participation restrictions LOW Complexity : No comorbidities that affect functional and no verbal or physical assistance needed to complete eval tasks Based on the above components, the patient evaluation is determined to be of the following complexity level: LOW Pain: 
Pain Scale 1: Numeric (0 - 10) Pain Intensity 1: 0 Activity Tolerance:  
 
Please refer to the flowsheet for vital signs taken during this treatment. After treatment:  
[x]  Patient left in no apparent distress sitting up in chair 
[]  Patient left in no apparent distress in bed 
[x]  Call bell left within reach [x]  Nursing notified 
[]  Caregiver present [x]  Bed alarm activated COMMUNICATION/EDUCATION:  
Communication/Collaboration: 
[]      Home safety education was provided and the patient/caregiver indicated understanding. []      Patient/family have participated as able and agree with findings and recommendations. [x]      Patient is unable to participate in plan of care at this time. Findings and recommendations were discussed with: Physical Therapist, Registered Nurse,  and patient GERALDO Yanez/EMERSON Time Calculation: 23 mins

## 2019-05-01 NOTE — CONSULTS
Palliative Medicine  [phone]    Reviewed chart and spoke with   Consult was to review CODE status, which has been addressed at this point  Will sign off    Idalmis Zhou NP

## 2019-05-01 NOTE — PROGRESS NOTES
Problem: Breathing Pattern - Ineffective Goal: *Absence of hypoxia Outcome: Progressing Towards Goal 
Goal: *Use of effective breathing techniques Outcome: Progressing Towards Goal 
  
Problem: Falls - Risk of 
Goal: *Absence of Falls Description Document Usama Stanley Fall Risk and appropriate interventions in the flowsheet. Outcome: Progressing Towards Goal 
  
Problem: Patient Education: Go to Patient Education Activity Goal: Patient/Family Education Outcome: Progressing Towards Goal 
  
Problem: Pressure Injury - Risk of 
Goal: *Prevention of pressure injury Description Document Lane Scale and appropriate interventions in the flowsheet. Outcome: Progressing Towards Goal 
  
Problem: Patient Education: Go to Patient Education Activity Goal: Patient/Family Education Outcome: Progressing Towards Goal

## 2019-05-01 NOTE — PROGRESS NOTES
Bedside shift change report given to [address] [place] [address] (oncoming nurse) by Sabrina Mejía (offgoing nurse). Report included the following information SBAR.

## 2019-05-01 NOTE — PROGRESS NOTES
Problem: Breathing Pattern - Ineffective Goal: *Absence of hypoxia Outcome: Progressing Towards Goal 
Goal: *Use of effective breathing techniques Outcome: Progressing Towards Goal 
  
Problem: Falls - Risk of 
Goal: *Absence of Falls Description Document Catherene Bunting Fall Risk and appropriate interventions in the flowsheet. Outcome: Progressing Towards Goal 
  
Problem: Patient Education: Go to Patient Education Activity Goal: Patient/Family Education Outcome: Progressing Towards Goal 
  
Problem: Pressure Injury - Risk of 
Goal: *Prevention of pressure injury Description Document Lane Scale and appropriate interventions in the flowsheet. Outcome: Progressing Towards Goal 
  
Problem: Patient Education: Go to Patient Education Activity Goal: Patient/Family Education Outcome: Progressing Towards Goal 
  
Problem: Patient Education: Go to Patient Education Activity Goal: Patient/Family Education Outcome: Progressing Towards Goal 
  
Problem: Pneumonia: Day 1 Goal: Off Pathway (Use only if patient is Off Pathway) Outcome: Progressing Towards Goal 
Goal: Activity/Safety Outcome: Progressing Towards Goal 
Goal: Consults, if ordered Outcome: Progressing Towards Goal 
Goal: Diagnostic Test/Procedures Outcome: Progressing Towards Goal 
Goal: Nutrition/Diet Outcome: Progressing Towards Goal 
Goal: Medications Outcome: Progressing Towards Goal 
Goal: Respiratory Outcome: Progressing Towards Goal 
Goal: Treatments/Interventions/Procedures Outcome: Progressing Towards Goal 
Goal: Psychosocial 
Outcome: Progressing Towards Goal 
Goal: *Oxygen saturation within defined limits Outcome: Progressing Towards Goal 
Goal: *Influenza vaccine administered (October-March) Outcome: Progressing Towards Goal 
Goal: *Pneumoccocal vaccine administered Outcome: Progressing Towards Goal 
Goal: *Hemodynamically stable Outcome: Progressing Towards Goal 
Goal: *Demonstrates progressive activity Outcome: Progressing Towards Goal 
Goal: *Tolerating diet Outcome: Progressing Towards Goal 
  
Problem: Pneumonia: Day 2 Goal: Off Pathway (Use only if patient is Off Pathway) Outcome: Progressing Towards Goal 
Goal: Activity/Safety Outcome: Progressing Towards Goal 
Goal: Consults, if ordered Outcome: Progressing Towards Goal 
Goal: Diagnostic Test/Procedures Outcome: Progressing Towards Goal 
Goal: Nutrition/Diet Outcome: Progressing Towards Goal 
Goal: Discharge Planning Outcome: Progressing Towards Goal 
Goal: Medications Outcome: Progressing Towards Goal 
Goal: Respiratory Outcome: Progressing Towards Goal 
Goal: Treatments/Interventions/Procedures Outcome: Progressing Towards Goal 
Goal: Psychosocial 
Outcome: Progressing Towards Goal 
Goal: *Oxygen saturation within defined limits Outcome: Progressing Towards Goal 
Goal: *Hemodynamically stable Outcome: Progressing Towards Goal 
Goal: *Demonstrates progressive activity Outcome: Progressing Towards Goal 
Goal: *Tolerating diet Outcome: Progressing Towards Goal 
Goal: *Optimal pain control at patient's stated goal 
Outcome: Progressing Towards Goal 
  
Problem: Chronic Obstructive Pulmonary Disease (COPD) Goal: *Oxygen saturation during activity within specified parameters Outcome: Progressing Towards Goal 
Goal: *Able to remain out of bed as prescribed Outcome: Progressing Towards Goal 
Goal: *Absence of hypoxia Outcome: Progressing Towards Goal 
  
Problem: Patient Education: Go to Patient Education Activity Goal: Patient/Family Education Outcome: Progressing Towards Goal

## 2019-05-01 NOTE — PROGRESS NOTES
Bedside shift change report given to [facility] (oncoming nurse) by Bhavani Fields (offgoing nurse). Report included the following information SBAR, Kardex, MAR and Recent Results. 2000- patient resting comfortably. Sitting in chair, pleasantly confused. Activity blanket given to patient. Unable to complete admission database as patient is confused and family is not present at this time. Will continue to monitor. 2300- Patient not wanting to get back in bed, PM meds administered, including seroquel. Placed patient in a recliner with bed alarm activated for patient safety. Will continue to monitor. 0100- Patient agitated and out of bed. Patient pulled tele, o2 probe and NC off and refusing to wear. Yelling at nursing staff and trying to walk out of the room. Patient is very unsteady on feet and leaning over garbage can. Patient hallucinating and seeing people. RN guarding door to prevent patient from leaving room and falling in hallway. Patient threw cup of ice out in hallway. Patient agreeable to sit in chair. Patient complains of N/V, PRN medication administered. See eMAR. MD paged, awaiting return call. Will continue to monitor. 0125- No new orders, paged MD d/t no return call. MD called within minutes and new orders received. 0145- patient back in bed. Resting. Patient continues to say, \"I feel bad. \" Sats on RA 83%. Tele monitor applied. O2 2L NC replaced. Sats WNL at this time. Patient appears to be resting comfortably. Will continue to monitor. 0210- Labs drawn, patient tolerated well. Will continue to monitor. 0600- patient appears to be resting comfortably. Will continue to monitor.

## 2019-05-02 LAB
ANION GAP SERPL CALC-SCNC: 8 MMOL/L (ref 5–15)
BASOPHILS # BLD: 0 K/UL (ref 0–0.1)
BASOPHILS NFR BLD: 0 % (ref 0–1)
BUN SERPL-MCNC: 17 MG/DL (ref 6–20)
BUN/CREAT SERPL: 19 (ref 12–20)
CALCIUM SERPL-MCNC: 8.4 MG/DL (ref 8.5–10.1)
CHLORIDE SERPL-SCNC: 103 MMOL/L (ref 97–108)
CO2 SERPL-SCNC: 26 MMOL/L (ref 21–32)
CREAT SERPL-MCNC: 0.88 MG/DL (ref 0.7–1.3)
DIFFERENTIAL METHOD BLD: ABNORMAL
EOSINOPHIL # BLD: 0 K/UL (ref 0–0.4)
EOSINOPHIL NFR BLD: 0 % (ref 0–7)
ERYTHROCYTE [DISTWIDTH] IN BLOOD BY AUTOMATED COUNT: 12.9 % (ref 11.5–14.5)
GLUCOSE BLD STRIP.AUTO-MCNC: 145 MG/DL (ref 65–100)
GLUCOSE BLD STRIP.AUTO-MCNC: 151 MG/DL (ref 65–100)
GLUCOSE BLD STRIP.AUTO-MCNC: 167 MG/DL (ref 65–100)
GLUCOSE BLD STRIP.AUTO-MCNC: 185 MG/DL (ref 65–100)
GLUCOSE SERPL-MCNC: 229 MG/DL (ref 65–100)
HCT VFR BLD AUTO: 37 % (ref 36.6–50.3)
HGB BLD-MCNC: 12 G/DL (ref 12.1–17)
IMM GRANULOCYTES # BLD AUTO: 0 K/UL (ref 0–0.04)
IMM GRANULOCYTES NFR BLD AUTO: 0 % (ref 0–0.5)
LYMPHOCYTES # BLD: 0.5 K/UL (ref 0.8–3.5)
LYMPHOCYTES NFR BLD: 10 % (ref 12–49)
MCH RBC QN AUTO: 28.4 PG (ref 26–34)
MCHC RBC AUTO-ENTMCNC: 32.4 G/DL (ref 30–36.5)
MCV RBC AUTO: 87.7 FL (ref 80–99)
MONOCYTES # BLD: 0.2 K/UL (ref 0–1)
MONOCYTES NFR BLD: 4 % (ref 5–13)
NEUTS SEG # BLD: 4.5 K/UL (ref 1.8–8)
NEUTS SEG NFR BLD: 85 % (ref 32–75)
NRBC # BLD: 0 K/UL (ref 0–0.01)
NRBC BLD-RTO: 0 PER 100 WBC
PLATELET # BLD AUTO: 221 K/UL (ref 150–400)
PMV BLD AUTO: 9.9 FL (ref 8.9–12.9)
POTASSIUM SERPL-SCNC: 3.8 MMOL/L (ref 3.5–5.1)
RBC # BLD AUTO: 4.22 M/UL (ref 4.1–5.7)
SERVICE CMNT-IMP: ABNORMAL
SODIUM SERPL-SCNC: 137 MMOL/L (ref 136–145)
VANCOMYCIN TROUGH SERPL-MCNC: 11.9 UG/ML (ref 5–10)
WBC # BLD AUTO: 5.2 K/UL (ref 4.1–11.1)

## 2019-05-02 PROCEDURE — 80048 BASIC METABOLIC PNL TOTAL CA: CPT

## 2019-05-02 PROCEDURE — 65660000000 HC RM CCU STEPDOWN

## 2019-05-02 PROCEDURE — 94640 AIRWAY INHALATION TREATMENT: CPT

## 2019-05-02 PROCEDURE — 82962 GLUCOSE BLOOD TEST: CPT

## 2019-05-02 PROCEDURE — 36415 COLL VENOUS BLD VENIPUNCTURE: CPT

## 2019-05-02 PROCEDURE — 80202 ASSAY OF VANCOMYCIN: CPT

## 2019-05-02 PROCEDURE — 74011250636 HC RX REV CODE- 250/636: Performed by: EMERGENCY MEDICINE

## 2019-05-02 PROCEDURE — 74011636637 HC RX REV CODE- 636/637: Performed by: EMERGENCY MEDICINE

## 2019-05-02 PROCEDURE — 74011000250 HC RX REV CODE- 250: Performed by: EMERGENCY MEDICINE

## 2019-05-02 PROCEDURE — 74011250637 HC RX REV CODE- 250/637: Performed by: EMERGENCY MEDICINE

## 2019-05-02 PROCEDURE — 74011000258 HC RX REV CODE- 258: Performed by: EMERGENCY MEDICINE

## 2019-05-02 PROCEDURE — 85025 COMPLETE CBC W/AUTO DIFF WBC: CPT

## 2019-05-02 PROCEDURE — 74011250637 HC RX REV CODE- 250/637: Performed by: NURSE PRACTITIONER

## 2019-05-02 PROCEDURE — 74011250636 HC RX REV CODE- 250/636: Performed by: NURSE PRACTITIONER

## 2019-05-02 PROCEDURE — 74011250636 HC RX REV CODE- 250/636: Performed by: INTERNAL MEDICINE

## 2019-05-02 PROCEDURE — 77010033678 HC OXYGEN DAILY

## 2019-05-02 RX ORDER — METHYLPREDNISOLONE 4 MG/1
4 TABLET ORAL ONCE
Status: DISCONTINUED | OUTPATIENT
Start: 2019-05-02 | End: 2019-05-02 | Stop reason: CLARIF

## 2019-05-02 RX ORDER — METHYLPREDNISOLONE 4 MG/1
8 TABLET ORAL
Status: DISCONTINUED | OUTPATIENT
Start: 2019-05-02 | End: 2019-05-02 | Stop reason: CLARIF

## 2019-05-02 RX ORDER — METHYLPREDNISOLONE 4 MG/1
8 TABLET ORAL
Status: DISCONTINUED | OUTPATIENT
Start: 2019-05-03 | End: 2019-05-03 | Stop reason: HOSPADM

## 2019-05-02 RX ORDER — METHYLPREDNISOLONE 4 MG/1
8 TABLET ORAL
Status: COMPLETED | OUTPATIENT
Start: 2019-05-02 | End: 2019-05-02

## 2019-05-02 RX ORDER — METHYLPREDNISOLONE 4 MG/1
4 TABLET ORAL
Status: DISCONTINUED | OUTPATIENT
Start: 2019-05-04 | End: 2019-05-03 | Stop reason: HOSPADM

## 2019-05-02 RX ORDER — METHYLPREDNISOLONE 4 MG/1
4 TABLET ORAL
Status: DISCONTINUED | OUTPATIENT
Start: 2019-05-02 | End: 2019-05-02 | Stop reason: CLARIF

## 2019-05-02 RX ORDER — METHYLPREDNISOLONE 4 MG/1
4 TABLET ORAL
Status: DISCONTINUED | OUTPATIENT
Start: 2019-05-03 | End: 2019-05-03 | Stop reason: HOSPADM

## 2019-05-02 RX ORDER — METHYLPREDNISOLONE 4 MG/1
8 TABLET ORAL ONCE
Status: DISCONTINUED | OUTPATIENT
Start: 2019-05-02 | End: 2019-05-02 | Stop reason: CLARIF

## 2019-05-02 RX ORDER — LORAZEPAM 2 MG/ML
2 INJECTION INTRAMUSCULAR
Status: ACTIVE | OUTPATIENT
Start: 2019-05-02 | End: 2019-05-03

## 2019-05-02 RX ORDER — METHYLPREDNISOLONE 4 MG/1
4 TABLET ORAL
Status: DISCONTINUED | OUTPATIENT
Start: 2019-05-03 | End: 2019-05-02 | Stop reason: CLARIF

## 2019-05-02 RX ORDER — METHYLPREDNISOLONE 4 MG/1
8 TABLET ORAL ONCE
Status: COMPLETED | OUTPATIENT
Start: 2019-05-02 | End: 2019-05-02

## 2019-05-02 RX ADMIN — HEPARIN SODIUM 5000 UNITS: 5000 INJECTION INTRAVENOUS; SUBCUTANEOUS at 05:50

## 2019-05-02 RX ADMIN — PIPERACILLIN SODIUM,TAZOBACTAM SODIUM 4.5 G: 4; .5 INJECTION, POWDER, FOR SOLUTION INTRAVENOUS at 13:07

## 2019-05-02 RX ADMIN — VANCOMYCIN HYDROCHLORIDE 900 MG: 1 INJECTION, POWDER, LYOPHILIZED, FOR SOLUTION INTRAVENOUS at 02:14

## 2019-05-02 RX ADMIN — INSULIN LISPRO 2 UNITS: 100 INJECTION, SOLUTION INTRAVENOUS; SUBCUTANEOUS at 09:08

## 2019-05-02 RX ADMIN — DUTASTERIDE 0.5 MG: 0.5 CAPSULE, LIQUID FILLED ORAL at 09:09

## 2019-05-02 RX ADMIN — IPRATROPIUM BROMIDE AND ALBUTEROL SULFATE 3 ML: .5; 3 SOLUTION RESPIRATORY (INHALATION) at 19:36

## 2019-05-02 RX ADMIN — METHYLPREDNISOLONE SODIUM SUCCINATE 40 MG: 40 INJECTION, POWDER, FOR SOLUTION INTRAMUSCULAR; INTRAVENOUS at 05:44

## 2019-05-02 RX ADMIN — Medication 10 ML: at 22:20

## 2019-05-02 RX ADMIN — Medication 10 ML: at 05:44

## 2019-05-02 RX ADMIN — HEPARIN SODIUM 5000 UNITS: 5000 INJECTION INTRAVENOUS; SUBCUTANEOUS at 22:15

## 2019-05-02 RX ADMIN — PIPERACILLIN SODIUM,TAZOBACTAM SODIUM 4.5 G: 4; .5 INJECTION, POWDER, FOR SOLUTION INTRAVENOUS at 05:44

## 2019-05-02 RX ADMIN — HEPARIN SODIUM 5000 UNITS: 5000 INJECTION INTRAVENOUS; SUBCUTANEOUS at 15:13

## 2019-05-02 RX ADMIN — METHYLPREDNISOLONE 8 MG: 4 TABLET ORAL at 17:43

## 2019-05-02 RX ADMIN — IPRATROPIUM BROMIDE AND ALBUTEROL SULFATE 3 ML: .5; 3 SOLUTION RESPIRATORY (INHALATION) at 23:18

## 2019-05-02 RX ADMIN — INSULIN LISPRO 2 UNITS: 100 INJECTION, SOLUTION INTRAVENOUS; SUBCUTANEOUS at 13:07

## 2019-05-02 RX ADMIN — IPRATROPIUM BROMIDE AND ALBUTEROL SULFATE 3 ML: .5; 3 SOLUTION RESPIRATORY (INHALATION) at 09:40

## 2019-05-02 RX ADMIN — TAMSULOSIN HYDROCHLORIDE 0.4 MG: 0.4 CAPSULE ORAL at 22:15

## 2019-05-02 RX ADMIN — Medication 400 MG: at 17:43

## 2019-05-02 RX ADMIN — IPRATROPIUM BROMIDE AND ALBUTEROL SULFATE 3 ML: .5; 3 SOLUTION RESPIRATORY (INHALATION) at 00:11

## 2019-05-02 RX ADMIN — INSULIN LISPRO 2 UNITS: 100 INJECTION, SOLUTION INTRAVENOUS; SUBCUTANEOUS at 17:43

## 2019-05-02 RX ADMIN — METHYLPREDNISOLONE 8 MG: 4 TABLET ORAL at 15:13

## 2019-05-02 RX ADMIN — DOCUSATE SODIUM 100 MG: 100 CAPSULE, LIQUID FILLED ORAL at 09:08

## 2019-05-02 RX ADMIN — Medication 400 MG: at 09:08

## 2019-05-02 RX ADMIN — IPRATROPIUM BROMIDE AND ALBUTEROL SULFATE 3 ML: .5; 3 SOLUTION RESPIRATORY (INHALATION) at 16:01

## 2019-05-02 RX ADMIN — QUETIAPINE FUMARATE 12.5 MG: 25 TABLET ORAL at 22:15

## 2019-05-02 RX ADMIN — Medication 10 ML: at 13:07

## 2019-05-02 RX ADMIN — PIPERACILLIN SODIUM,TAZOBACTAM SODIUM 4.5 G: 4; .5 INJECTION, POWDER, FOR SOLUTION INTRAVENOUS at 22:14

## 2019-05-02 RX ADMIN — DOCUSATE SODIUM 100 MG: 100 CAPSULE, LIQUID FILLED ORAL at 17:43

## 2019-05-02 RX ADMIN — DILTIAZEM HYDROCHLORIDE 120 MG: 120 CAPSULE, COATED, EXTENDED RELEASE ORAL at 09:09

## 2019-05-02 RX ADMIN — ONDANSETRON 4 MG: 2 INJECTION INTRAMUSCULAR; INTRAVENOUS at 01:06

## 2019-05-02 RX ADMIN — SODIUM CHLORIDE 75 ML/HR: 900 INJECTION, SOLUTION INTRAVENOUS at 19:15

## 2019-05-02 RX ADMIN — IPRATROPIUM BROMIDE AND ALBUTEROL SULFATE 3 ML: .5; 3 SOLUTION RESPIRATORY (INHALATION) at 11:44

## 2019-05-02 RX ADMIN — METHYLPREDNISOLONE SODIUM SUCCINATE 40 MG: 40 INJECTION, POWDER, FOR SOLUTION INTRAMUSCULAR; INTRAVENOUS at 13:07

## 2019-05-02 RX ADMIN — METHYLPREDNISOLONE 8 MG: 4 TABLET ORAL at 22:15

## 2019-05-02 RX ADMIN — GUAIFENESIN 600 MG: 600 TABLET, EXTENDED RELEASE ORAL at 09:08

## 2019-05-02 NOTE — PROGRESS NOTES
CM called [address] [facility] and spoke with [facility] in admissions about pt's bed. Pt's sister voiced concern that pt could lose his LTC bed. [facility] stated pt won't lose his bed and they don't charge a fee while pt is in the hospital. [facility] will call pt's sister and inform her. Jocelyn Tenorio, [address] [place] [address]

## 2019-05-02 NOTE — ADVANCED PRACTICE NURSE
Bedside shift change report given to Ambar Brown  (oncoming nurse) by Raul Beach RN  (offgoing nurse). Report included the following information SBAR, Kardex, ED Summary, Intake/Output, MAR, Recent Results, Med Rec Status and Cardiac Rhythm NSR.  
 
 
1910--Bedside shift change report given to Raul Beach RN  (oncoming nurse) by Jyotsna Malik RN  (offgoing nurse). Report included the following information SBAR, Kardex, ED Summary, Intake/Output, MAR, Recent Results, Cardiac Rhythm ST and Alarm Parameters .

## 2019-05-02 NOTE — PROGRESS NOTES
Pharmacy Automatic Renal Dosing Protocol - Antimicrobials Indication for Antimicrobials: HAP Current Regimen of Each Antimicrobial: 
Zosyn 3.375g IV q8h - started  day 2 of 7 Vancomycin consult - started  day 2 of 7 Previous Antimicrobial Therapy:  none Significant Cultures:  none Vancomycin trough goal level:  15-20 Date Dose & Interval Measured (mcg/mL) Extrapolated (mcg/mL) 5/ am 900mg IV q12h 11.9 8.3 Radiology / Imaging results: (X-ray, CT scan or MRI):  
 CXR - Mild linear bibasilar opacity likely represents atelectasis. No other acute process. Paralysis, amputations, malnutrition: none Labs: 
Recent Labs 19 
0207 19 
0325 19 
1039 CREA 0.88 0.76 0.75 BUN 17 13 13 WBC 5.2 3.4* 5.9 Temp (24hrs), Av.8 °F (36.6 °C), Min:97.4 °F (36.3 °C), Max:98.3 °F (36.8 °C) Creatinine Clearance (mL/min) or Dialysis:  
Estimated Creatinine Clearance (using IBW):54.2 mL/min Impression/Plan:  
Continue Zosyn 4.5g q8h Vancomycin trough 11.9 mcg/ml eight hours post dose. Slight increase in serum creatinine. Will increase to 1000 mg Q12H as level looks abnormally low for current dose and frequency. Repeat trough on 5/3. WBC 3.4 Scr stable Afebrile BMP daily already ordered, CBC daily x 3 ordered Antimicrobial stop date- Zosyn and Vancomycin 7 days Pharmacy will follow daily and adjust medications as appropriate for renal function and/or serum levels. Thank you, 
Letitia Romberg, [facility] Recommended duration of therapy 
http://[facility]/[place]/[facility]/Pharmacy/Clinical%20Companion/Duration%20of%20ABX%20therapy. docx Renal Dosing 
http://[facility]/[place]/[facility]/Pharmacy/Clinical%20Companion/Renal%20Dosing%[id number]. pdf

## 2019-05-02 NOTE — PROGRESS NOTES
Hospitalist Progress Note NAME: Benjie Mohamud :  1930 MRN:  [id number] Assessment / Plan: 
 
Acute hypoxemic and hypercarbic respiratory failure (required bipap in ER) Suspected pneumonia, hospital associated/possible aspiration, present on admission (Last hospitalization  with respiratory failure secondary to aspiration and dysphagia) Suspected acute on chronic COPD exacerbation, present on admission · Patient and his last admission had mucous plugging and suspected aspiration in December · During that hospitalization he had a modified barium swallow that showed no aspiration. · Continue IV Zosyn and d/c IV Vancomycin · Blood cultures with no growth after 2 days · Swallow evaluation (2019) recommends dysphagia 2 diet 
  
Chest x-ray (2019) 1. Mild linear bibasilar opacity likely represents atelectasis. No other acute process CTA chest (2019) 1. No pulmonary embolism to the segmental arterial level. 2. Persistent right lower lobe mucus plugging represents bronchitis versus 
aspiration. Decreased bilateral lower lobe pneumonia. 3. Increased mediastinal lymphadenopathy. New left hilar lymphadenopathy. 
  
· Will have the BiPAP available as needed in the PCU and consider pulmonary evaluation for further recommendations if needed. · Continue mucolytic's and PRN nebs · Transition from IV to PO steroid taper · Will get sputum culture 
  
History of chronic systolic and diastolic heart failure per the records 
sinus arrhythmia during his last hospitalization, but cardiology did not feel needed any further work-up · Patient does not appear to be in CHF, BNP is 110 · Last echocardiogram in December showed an EF of 55% with moderate mitral regurgitation · Current troponin is negative, he did have some chest pain on admission when he was having respiratory failure now resolved · Closely being watched for CHF and recurrent CP and consider cardiology evaluation if needed ·  Beta-blocker for now with his acute bronchospasm and resume if tolerated in the future 
  
History of dysphasia  
· Speech therapy recommends dysphasia 2 diet.  
  
Underlying dementia, etiology unclear possible Lewy body per records Behavioral disturbance · Supportive care · Continue home meds · Behavioral issues on night shift 05/02/2019. PRN Geodon and ativan ordered. Not given. Patient settled down and went to sleep. · QTc 425 ms on EKG 04/30/2019  
  
BPH · Monitor for retention · Continue home meds 
  
Hypertension · Continue his diltiazem but hold his Coreg for now 
  
CODE STATUS- DNR. 
  
DVT prophylaxis- Heparin SQ  
  
Surrogate Decision Maker: sister Angela Mendez 
  
GI Prophylaxis: not indicated 
  
Recommended Disposition:  Goal to discharge back to [facility] tomorrow. Subjective: Chief Complaint / Reason for Physician Visit \"I feel fine. I have these little balls in my chest\". Patient reports producing small amount of sputum. Discussed with RN events overnight. Per nursing staff, patient became agitated during the night. He threw a cup of ice in the arambula and was up in room not obeying nursing requests. Nursing paged hospitalist.  PRN Geodon and ativan were ordered. Not given. Patient finally got back into bed and went to sleep on his own. When questioned about last nights events, patient does not remember. Review of Systems: 
Symptom Y/N Comments  Symptom Y/N Comments Fever/Chills N   Chest Pain N Poor Appetite N   Edema Cough N   Abdominal Pain Sputum Y Scant  Joint Pain SOB/WU N   Pruritis/Rash Nausea/vomit    Tolerating PT/OT Y Diarrhea    Tolerating Diet Y Constipation    Other Could NOT obtain due to:   
 
Objective: VITALS:  
Last 24hrs VS reviewed since prior progress note. Most recent are: 
Patient Vitals for the past 24 hrs: 
 Temp Pulse Resp BP SpO2 05/02/19 0551 97.4 °F (36.3 °C) 94 20 (!) 138/97 98 % 05/02/19 0550  94 19  98 % 05/02/19 0011     95 % 05/01/19 2250 98.3 °F (36.8 °C) 95 16 (!) 131/91 98 % 05/01/19 1905 97.5 °F (36.4 °C) (!) 103 20 138/88 97 % 05/01/19 1531 97.5 °F (36.4 °C) (!) 106 20 129/79 94 % 05/01/19 1505     94 % 05/01/19 1154     97 % 05/01/19 1151 98 °F (36.7 °C) (!) 108 21 137/84 95 % 05/01/19 0946    (!) 146/100 93 % Intake/Output Summary (Last 24 hours) at 5/2/2019 5805 Last data filed at 5/2/2019 6413 Gross per 24 hour Intake 1690 ml Output 1125 ml Net 565 ml PHYSICAL EXAM: 
General: WD, WN. Alert, cooperative, no acute distress   
EENT:  EOMI. Anicteric sclerae. MMM Resp:  Coarse lung sounds CV:  Regular  rhythm,  No edema GI:  Soft, Non distended, Non tender.  +Bowel sounds Neurologic:  Alert and oriented to person and place, normal speech, Psych:   Poor insight. Not anxious nor agitated Skin:  No rashes. No jaundice Reviewed most current lab test results and cultures  YES Reviewed most current radiology test results   YES Review and summation of old records today    NO Reviewed patient's current orders and MAR    YES 
PMH/ reviewed - no change compared to H&P 
________________________________________________________________________ Care Plan discussed with: 
  Comments Patient X Family RN X   
Care Manager Consultant Multidiciplinary team rounds were held today with , nursing, pharmacist and clinical coordinator. Patient's plan of care was discussed; medications were reviewed and discharge planning was addressed. ________________________________________________________________________ Total NON critical care TIME:  25   Minutes Total CRITICAL CARE TIME Spent:   Minutes non procedure based Comments >50% of visit spent in counseling and coordination of care ________________________________________________________________________ Prieto Chang NP Procedures: see electronic medical records for all procedures/Xrays and details which were not copied into this note but were reviewed prior to creation of Plan. LABS: 
I reviewed today's most current labs and imaging studies. Pertinent labs include: 
Recent Labs 05/02/19 0207 05/01/19 0325 04/30/19 
1039 WBC 5.2 3.4* 5.9 HGB 12.0* 12.1 12.5 HCT 37.0 36.7 39.1  227 235 Recent Labs 05/02/19 0207 05/01/19 0325 04/30/19 
1039  136 138  
K 3.8 4.2 4.1  103 102 CO2 26 29 32 * 172* 105* BUN 17 13 13 CREA 0.88 0.76 0.75 CA 8.4* 8.6 8.6 ALB  --   --  3.6 TBILI  --   --  0.4 SGOT  --   --  18 ALT  --   --  22 INR  --   --  1.0 Signed: Prieto Chang NP

## 2019-05-02 NOTE — PROGRESS NOTES
Telehospitalist: Paged for patient with Dementia and Behavioral issues. Will try IM Geodon and consider Ativan

## 2019-05-03 VITALS
OXYGEN SATURATION: 95 % | DIASTOLIC BLOOD PRESSURE: 93 MMHG | HEIGHT: 67 IN | SYSTOLIC BLOOD PRESSURE: 146 MMHG | HEART RATE: 90 BPM | RESPIRATION RATE: 20 BRPM | WEIGHT: 149.69 LBS | TEMPERATURE: 97.5 F | BODY MASS INDEX: 23.49 KG/M2

## 2019-05-03 LAB
ANION GAP SERPL CALC-SCNC: 4 MMOL/L (ref 5–15)
BASOPHILS # BLD: 0 K/UL (ref 0–0.1)
BASOPHILS NFR BLD: 0 % (ref 0–1)
BUN SERPL-MCNC: 16 MG/DL (ref 6–20)
BUN/CREAT SERPL: 23 (ref 12–20)
CALCIUM SERPL-MCNC: 8.3 MG/DL (ref 8.5–10.1)
CHLORIDE SERPL-SCNC: 103 MMOL/L (ref 97–108)
CO2 SERPL-SCNC: 30 MMOL/L (ref 21–32)
CREAT SERPL-MCNC: 0.71 MG/DL (ref 0.7–1.3)
DIFFERENTIAL METHOD BLD: ABNORMAL
EOSINOPHIL # BLD: 0 K/UL (ref 0–0.4)
EOSINOPHIL NFR BLD: 0 % (ref 0–7)
ERYTHROCYTE [DISTWIDTH] IN BLOOD BY AUTOMATED COUNT: 13.1 % (ref 11.5–14.5)
GLUCOSE BLD STRIP.AUTO-MCNC: 143 MG/DL (ref 65–100)
GLUCOSE SERPL-MCNC: 153 MG/DL (ref 65–100)
HCT VFR BLD AUTO: 38 % (ref 36.6–50.3)
HGB BLD-MCNC: 12.4 G/DL (ref 12.1–17)
IMM GRANULOCYTES # BLD AUTO: 0.1 K/UL (ref 0–0.04)
IMM GRANULOCYTES NFR BLD AUTO: 1 % (ref 0–0.5)
LYMPHOCYTES # BLD: 0.6 K/UL (ref 0.8–3.5)
LYMPHOCYTES NFR BLD: 9 % (ref 12–49)
MCH RBC QN AUTO: 28.9 PG (ref 26–34)
MCHC RBC AUTO-ENTMCNC: 32.6 G/DL (ref 30–36.5)
MCV RBC AUTO: 88.6 FL (ref 80–99)
MONOCYTES # BLD: 0.2 K/UL (ref 0–1)
MONOCYTES NFR BLD: 2 % (ref 5–13)
NEUTS SEG # BLD: 5.7 K/UL (ref 1.8–8)
NEUTS SEG NFR BLD: 88 % (ref 32–75)
NRBC # BLD: 0 K/UL (ref 0–0.01)
NRBC BLD-RTO: 0 PER 100 WBC
PLATELET # BLD AUTO: 247 K/UL (ref 150–400)
PMV BLD AUTO: 10.3 FL (ref 8.9–12.9)
POTASSIUM SERPL-SCNC: 3.9 MMOL/L (ref 3.5–5.1)
RBC # BLD AUTO: 4.29 M/UL (ref 4.1–5.7)
SERVICE CMNT-IMP: ABNORMAL
SODIUM SERPL-SCNC: 137 MMOL/L (ref 136–145)
WBC # BLD AUTO: 6.5 K/UL (ref 4.1–11.1)

## 2019-05-03 PROCEDURE — 74011250637 HC RX REV CODE- 250/637: Performed by: NURSE PRACTITIONER

## 2019-05-03 PROCEDURE — 77010033678 HC OXYGEN DAILY

## 2019-05-03 PROCEDURE — 74011250637 HC RX REV CODE- 250/637: Performed by: EMERGENCY MEDICINE

## 2019-05-03 PROCEDURE — 36415 COLL VENOUS BLD VENIPUNCTURE: CPT

## 2019-05-03 PROCEDURE — 82962 GLUCOSE BLOOD TEST: CPT

## 2019-05-03 PROCEDURE — 74011000250 HC RX REV CODE- 250: Performed by: EMERGENCY MEDICINE

## 2019-05-03 PROCEDURE — 74011250636 HC RX REV CODE- 250/636

## 2019-05-03 PROCEDURE — 85025 COMPLETE CBC W/AUTO DIFF WBC: CPT

## 2019-05-03 PROCEDURE — 94640 AIRWAY INHALATION TREATMENT: CPT

## 2019-05-03 PROCEDURE — 74011636637 HC RX REV CODE- 636/637: Performed by: EMERGENCY MEDICINE

## 2019-05-03 PROCEDURE — 80048 BASIC METABOLIC PNL TOTAL CA: CPT

## 2019-05-03 PROCEDURE — 74011250636 HC RX REV CODE- 250/636: Performed by: NURSE PRACTITIONER

## 2019-05-03 PROCEDURE — 74011000258 HC RX REV CODE- 258: Performed by: EMERGENCY MEDICINE

## 2019-05-03 PROCEDURE — 74011250636 HC RX REV CODE- 250/636: Performed by: EMERGENCY MEDICINE

## 2019-05-03 RX ORDER — AMOXICILLIN AND CLAVULANATE POTASSIUM 875; 125 MG/1; MG/1
1 TABLET, FILM COATED ORAL 2 TIMES DAILY
Qty: 20 TAB | Refills: 0 | Status: SHIPPED | OUTPATIENT
Start: 2019-05-03 | End: 2019-05-13

## 2019-05-03 RX ORDER — MORPHINE SULFATE 2 MG/ML
INJECTION, SOLUTION INTRAMUSCULAR; INTRAVENOUS
Status: DISCONTINUED
Start: 2019-05-03 | End: 2019-05-03 | Stop reason: HOSPADM

## 2019-05-03 RX ORDER — METHYLPREDNISOLONE 4 MG/1
TABLET ORAL
Qty: 1 DOSE PACK | Refills: 0 | Status: SHIPPED | OUTPATIENT
Start: 2019-05-03

## 2019-05-03 RX ORDER — GUAIFENESIN 600 MG/1
600 TABLET, EXTENDED RELEASE ORAL DAILY
Qty: 30 TAB | Refills: 0 | Status: SHIPPED | OUTPATIENT
Start: 2019-05-03

## 2019-05-03 RX ORDER — ACETAMINOPHEN 325 MG/1
650 TABLET ORAL
Status: SHIPPED | COMMUNITY
Start: 2019-05-03

## 2019-05-03 RX ADMIN — HEPARIN SODIUM 5000 UNITS: 5000 INJECTION INTRAVENOUS; SUBCUTANEOUS at 08:18

## 2019-05-03 RX ADMIN — Medication 400 MG: at 08:18

## 2019-05-03 RX ADMIN — INSULIN LISPRO 2 UNITS: 100 INJECTION, SOLUTION INTRAVENOUS; SUBCUTANEOUS at 08:18

## 2019-05-03 RX ADMIN — DUTASTERIDE 0.5 MG: 0.5 CAPSULE, LIQUID FILLED ORAL at 08:18

## 2019-05-03 RX ADMIN — IPRATROPIUM BROMIDE AND ALBUTEROL SULFATE 3 ML: .5; 3 SOLUTION RESPIRATORY (INHALATION) at 08:11

## 2019-05-03 RX ADMIN — DOCUSATE SODIUM 100 MG: 100 CAPSULE, LIQUID FILLED ORAL at 08:18

## 2019-05-03 RX ADMIN — GUAIFENESIN 600 MG: 600 TABLET, EXTENDED RELEASE ORAL at 08:18

## 2019-05-03 RX ADMIN — ONDANSETRON 4 MG: 2 INJECTION INTRAMUSCULAR; INTRAVENOUS at 01:21

## 2019-05-03 RX ADMIN — Medication 10 ML: at 05:34

## 2019-05-03 RX ADMIN — PIPERACILLIN SODIUM,TAZOBACTAM SODIUM 4.5 G: 4; .5 INJECTION, POWDER, FOR SOLUTION INTRAVENOUS at 05:33

## 2019-05-03 RX ADMIN — METHYLPREDNISOLONE 4 MG: 4 TABLET ORAL at 08:18

## 2019-05-03 RX ADMIN — DILTIAZEM HYDROCHLORIDE 120 MG: 120 CAPSULE, COATED, EXTENDED RELEASE ORAL at 08:18

## 2019-05-03 RX ADMIN — IPRATROPIUM BROMIDE AND ALBUTEROL SULFATE 3 ML: .5; 3 SOLUTION RESPIRATORY (INHALATION) at 03:28

## 2019-05-03 NOTE — PROGRESS NOTES
Pt is discharged and will be transported by [facility] to [facility]. CM sent referral to [facility] via allscripts and they can  pt at 10am. CM called pt's sister and informed her of the discharge. CM discussed the 2nd  Medicare letter with pt's sister by phone and she understood. Copy given to pt. CM called [facility] and spoke with Gina Lo in admissions and informed her of the transport time. Pt is going back to his room 515A in LTC. CM informed pt's nurse and provided the contact number to call report, [phone]. Care Management Interventions PCP Verified by CM: Yes 
Palliative Care Criteria Met (RRAT>21 & CHF Dx)?: No 
Mode of Transport at Discharge: BLS([facility]) Transition of Care Consult (CM Consult): SNF([facility]) Partner SNF: Yes MyChart Signup: No 
Discharge Durable Medical Equipment: No 
Physical Therapy Consult: Yes Occupational Therapy Consult: Yes Speech Therapy Consult: Yes Current Support Network: [address] [place] [address] Confirm Follow Up Transport: Other (see comment)([facility] medical transport ) Plan discussed with Pt/Family/Caregiver: Yes Freedom of Choice Offered: Yes Discharge Location Discharge Placement: Skilled nursing facility [facility], [address]

## 2019-05-03 NOTE — PROGRESS NOTES
Problem: Breathing Pattern - Ineffective Goal: *Absence of hypoxia Outcome: Progressing Towards Goal 
Goal: *Use of effective breathing techniques Outcome: Progressing Towards Goal 
  
Problem: Falls - Risk of 
Goal: *Absence of Falls Description Document Conchis Griffin Fall Risk and appropriate interventions in the flowsheet. Outcome: Progressing Towards Goal 
  
Problem: Patient Education: Go to Patient Education Activity Goal: Patient/Family Education Outcome: Progressing Towards Goal 
  
Problem: Pressure Injury - Risk of 
Goal: *Prevention of pressure injury Description Document Lane Scale and appropriate interventions in the flowsheet. Outcome: Progressing Towards Goal 
  
Problem: Patient Education: Go to Patient Education Activity Goal: Patient/Family Education Outcome: Progressing Towards Goal 
  
Problem: Patient Education: Go to Patient Education Activity Goal: Patient/Family Education Outcome: Progressing Towards Goal 
  
Problem: Pneumonia: Day 1 Goal: Off Pathway (Use only if patient is Off Pathway) Outcome: Progressing Towards Goal 
Goal: Activity/Safety Outcome: Progressing Towards Goal 
Goal: Consults, if ordered Outcome: Progressing Towards Goal 
Goal: Diagnostic Test/Procedures Outcome: Progressing Towards Goal 
Goal: Nutrition/Diet Outcome: Progressing Towards Goal 
Goal: Medications Outcome: Progressing Towards Goal 
Goal: Respiratory Outcome: Progressing Towards Goal 
Goal: Treatments/Interventions/Procedures Outcome: Progressing Towards Goal 
Goal: Psychosocial 
Outcome: Progressing Towards Goal 
Goal: *Oxygen saturation within defined limits Outcome: Progressing Towards Goal 
Goal: *Influenza vaccine administered (October-March) Outcome: Progressing Towards Goal 
Goal: *Pneumoccocal vaccine administered Outcome: Progressing Towards Goal 
Goal: *Hemodynamically stable Outcome: Progressing Towards Goal 
Goal: *Demonstrates progressive activity Outcome: Progressing Towards Goal 
Goal: *Tolerating diet Outcome: Progressing Towards Goal 
  
Problem: Pneumonia: Day 2 Goal: Off Pathway (Use only if patient is Off Pathway) Outcome: Progressing Towards Goal 
Goal: Activity/Safety Outcome: Progressing Towards Goal 
Goal: Consults, if ordered Outcome: Progressing Towards Goal 
Goal: Diagnostic Test/Procedures Outcome: Progressing Towards Goal 
Goal: Nutrition/Diet Outcome: Progressing Towards Goal 
Goal: Discharge Planning Outcome: Progressing Towards Goal 
Goal: Medications Outcome: Progressing Towards Goal 
Goal: Respiratory Outcome: Progressing Towards Goal 
Goal: Treatments/Interventions/Procedures Outcome: Progressing Towards Goal 
Goal: Psychosocial 
Outcome: Progressing Towards Goal 
Goal: *Oxygen saturation within defined limits Outcome: Progressing Towards Goal 
Goal: *Hemodynamically stable Outcome: Progressing Towards Goal 
Goal: *Demonstrates progressive activity Outcome: Progressing Towards Goal 
Goal: *Tolerating diet Outcome: Progressing Towards Goal 
Goal: *Optimal pain control at patient's stated goal 
Outcome: Progressing Towards Goal 
  
Problem: Chronic Obstructive Pulmonary Disease (COPD) Goal: *Oxygen saturation during activity within specified parameters Outcome: Progressing Towards Goal 
Goal: *Able to remain out of bed as prescribed Outcome: Progressing Towards Goal 
Goal: *Absence of hypoxia Outcome: Progressing Towards Goal 
  
Problem: Patient Education: Go to Patient Education Activity Goal: Patient/Family Education Outcome: Progressing Towards Goal

## 2019-05-03 NOTE — DISCHARGE SUMMARY
Hospitalist Discharge Summary     Patient ID:  Tarsha Perez  [id number]  80 y.o.  12/5/1930 4/30/2019    PCP on record: None    Admit date: 4/30/2019  Discharge date and time: 5/3/2019    DISCHARGE DIAGNOSIS:    Hospital acquired pneumonia  Acute respiratory failure  Dementia     CONSULTATIONS:  IP CONSULT TO PALLIATIVE CARE - PROVIDER    Excerpted HPI from H&P of Carol Thacker MD:  Bogdan Solis is a 80 y.o. man who resides in long-term care at [facility] with underlying unspecified dementia. Apparently he has had PRN O2 at the facility but in the last couple weeks has had progressive episodes of hypoxia requiring oxygen. He is a very poor historian but seems to indicate that he has had shortness of breath for 5 or 6 months, possibly worsening over the last couple weeks. This morning he says he \"could not talk or breathe \". Per the EMS report and ER patient had labored breathing and was hypoxic at 85% was started on CPAP and brought to the ER. Here he received several nebulizers and steroids and ended up on BiPAP for approximately an hour ABGs are still abnormal but patient's mentation improved significantly according to the ER physician. Currently patient feels his breathing is much improved. he has a general sense of what is been going on. He says he has been coughing some \"cold\" but unclear if that is from his nose or his lungs. he did admit to having some chest discomfort points to his left chest and he said that was this morning, denies any currently. In the ER patient was found to be hypoxic placed on BiPAP. Lactic acid was normal, troponin negative, . ABG showed a pH of 7.2 8/60/81 without significant improvement after an hour of BiPAP. Chest x-ray shows bilateral atelectasis. He was treated empirically for pneumonia and we were asked to admit for further evaluation and management. \"    ______________________________________________________________________  DISCHARGE SUMMARY/HOSPITAL COURSE:  for full details see H&P, daily progress notes, labs, consult notes. Acute hypoxemic and hypercarbic respiratory failure (required bipap in ER)  Suspected pneumonia, hospital associated/possible aspiration, present on admission  (Last hospitalization 12/18 with respiratory failure secondary to aspiration and dysphagia)  Suspected acute on chronic COPD exacerbation, present on admission  · At his last admission had mucous plugging and suspected aspiration in December  · During that hospitalization he had a modified barium swallow that showed no aspiration. · Received IV Zoysn and Vancomycin during admission. Will discharge home with PO Augmentin   · Blood cultures with no growth after 2 days   · Swallow evaluation (05/01/2019) recommends dysphagia 2 diet     Chest x-ray (04/30/2019)  1.  Mild linear bibasilar opacity likely represents atelectasis. No other acute process   CTA chest (04/30/2019)  1. No pulmonary embolism to the segmental arterial level. 2. Persistent right lower lobe mucus plugging represents bronchitis versus  aspiration. Decreased bilateral lower lobe pneumonia. 3. Increased mediastinal lymphadenopathy. New left hilar lymphadenopathy.     · BiPAP was available if needed. Patient did not required BiPAP on unit.     · Continue mucolytic's and PRN nebs   · Transitioned from IV to PO steroid taper      History of chronic systolic and diastolic heart failure per the records  sinus arrhythmia during his last hospitalization, but cardiology did not feel needed any further work-up  · Patient does not appear to be in CHF, BNP is 110  · Last echocardiogram in December showed an EF of 55% with moderate mitral regurgitation  · Current troponin is negative, he did have some chest pain on admission when he was having respiratory failure now resolved  · Closely being watched for CHF and recurrent CP and consider cardiology evaluation if needed     History of dysphasia   · Speech therapy recommends dysphasia 2 diet.      Underlying dementia, etiology unclear possible Lewy body per records  Behavioral disturbance  · Supportive care  · Continue home meds  · Behavioral issues on night shift 05/02/2019. PRN Geodon and ativan ordered. Not given. Patient settled down and went to sleep. · QTc 425 ms on EKG 04/30/2019      BPH  · Monitored for retention  · Continue home meds     Hypertension  · Continue home meds      _______________________________________________________________________  Patient seen and examined by me on discharge day. Pertinent Findings:  Gen:    Not in distress  Chest: Coarse lung sounds  CVS:   Regular rhythm. No edema  Abd:  Soft, not distended, not tender  Neuro:  Alert and oriented to person and place.  _______________________________________________________________________  DISCHARGE MEDICATIONS:   Current Discharge Medication List      START taking these medications    Details   acetaminophen (TYLENOL) 325 mg tablet Take 2 Tabs by mouth every four (4) hours as needed. guaiFENesin ER (MUCINEX) 600 mg ER tablet Take 1 Tab by mouth daily. Qty: 30 Tab, Refills: 0      methylPREDNISolone (MEDROL DOSEPACK) 4 mg tablet Use as directed  Qty: 1 Dose Pack, Refills: 0      amoxicillin-clavulanate (AUGMENTIN) 875-125 mg per tablet Take 1 Tab by mouth two (2) times a day for 10 days. Qty: 20 Tab, Refills: 0         CONTINUE these medications which have NOT CHANGED    Details   potassium chloride SR (KLOR-CON 10) 10 mEq tablet Take 10 mEq by mouth daily. dext 70/polycarbophil/peg/NaCl (ARTIFICIAL TEAR SOLUTION OP) Administer 2 Drops to both eyes two (2) times a day. carvedilol (COREG) 3.125 mg tablet Take 3.125 mg by mouth two (2) times a day. magnesium oxide (MAG-OX) 400 mg tablet Take 400 mg by mouth two (2) times a day. dilTIAZem CD (CARDIZEM CD) 120 mg ER capsule Take 1 Cap by mouth daily.   Qty: 30 Cap, Refills: 0      QUEtiapine (SEROQUEL) 25 mg tablet Take 0.5 Tabs by mouth nightly. Qty: 30 Tab, Refills: 0      dutasteride (AVODART) 0.5 mg capsule Take 0.5 mg by mouth daily. senna-docusate (SENNA WITH DOCUSATE SODIUM) 8.6-50 mg per tablet Take 1 Tab by mouth daily as needed for Constipation (try first if constipation). albuterol (PROVENTIL VENTOLIN) 2.5 mg /3 mL (0.083 %) nebulizer solution 3 mL by Nebulization route every four (4) hours as needed for Wheezing or Shortness of Breath. Qty: 30 Each, Refills: 2      tamsulosin (FLOMAX) 0.4 mg capsule TAKE 1 CAPSULE BY MOUTH EVERY DAY  Qty: 90 Cap, Refills: 0      food supplemt, lactose-reduced (BOOST HIGH PROTEIN) 0.06 gram- 1 kcal/mL liqd Drink 2-3 containers DAILY. Qty: 90 Container, Refills: 11    Associated Diagnoses: Mild protein-calorie malnutrition (HCC)      ferrous sulfate 325 mg (65 mg iron) tablet Take 1 Tab by mouth two (2) times daily (with meals). On an empty stomach with Vitamin C (like OJ)  Qty: 60 Tab, Refills: 11               Patient Follow Up Instructions: Activity: Activity as tolerated  Diet: Dysphagia diet-pureed  Wound Care: None needed    Follow-up with PCP in 1 week.     Follow-up Information     Follow up With Specialties Details Why Contact Info    [address]  [phone]      None    None (395) Patient stated that they have no PCP      None  Schedule an appointment as soon as possible for a visit in 1 week  None (395) Patient stated that they have no PCP          ________________________________________________________________    Risk of deterioration: Moderate    Condition at Discharge:  Stable  __________________________________________________________________    Disposition  SNF/LTC    ____________________________________________________________________    Code Status: DNR/DNI  ___________________________________________________________________      Total time in minutes spent coordinating this discharge (includes going over instructions, follow-up, prescriptions, and preparing report for sign off to her PCP) :  25 minutes    Signed:  Doris Zamorano NP

## 2019-05-03 NOTE — DISCHARGE INSTRUCTIONS
HOSPITALIST DISCHARGE INSTRUCTIONS    NAME: Debo Rdz   :  1930   MRN:  [id number]     Date/Time:  5/3/2019 7:57 AM    ADMIT DATE: 2019   DISCHARGE DATE: 5/3/2019     Attending Physician: Emmanuel Roman NP    DISCHARGE DIAGNOSIS:  HAP  Acute respiratory failure  Dementia       Medications: Per above medication reconciliation. Pain Management: per above medications    Recommended diet: Dysphagia diet-pureed    Recommended activity: Activity as tolerated    Wound care: None    Indwelling devices:  None    Supplemental Oxygen: 2 LNC,  wean as tolerated    Code status: DNR        Outside physician follow up: Follow-up Information     Follow up With Specialties Details Why Contact Info    [address] [place] [address],[address] [facility]. [phone]      None    None (395) Patient stated that they have no PCP      None  Schedule an appointment as soon as possible for a visit in 1 week  None (395) Patient stated that they have no PCP                 Skilled nursing facility/ SNF MD responsible for above on discharge. Information obtained by :  I understand that if any problems occur once I am at home I am to contact my physician. I understand and acknowledge receipt of the instructions indicated above.                                                                                                                                            Physician's or R.N.'s Signature                                                                  Date/Time                                                                                                                                              Patient or Repres

## 2019-05-03 NOTE — PROGRESS NOTES
ADULT PROTOCOL: JET AEROSOL  REASSESSMENT Patient  Bandar Romo     80 y.o.   male     5/2/2019  11:23 PM 
 
Breath Sounds Pre Procedure: Right Breath Sounds: Coarse, Diminished Left Breath Sounds: Coarse, Diminished Breath Sounds Post Procedure: Right Breath Sounds: Expiratory wheezing Left Breath Sounds: Expiratory wheezing Breathing pattern: Pre procedure Breathing Pattern: Regular Post procedure Breathing Pattern: Regular Heart Rate: Pre procedure Pulse: 98 
         Post procedure Pulse: 103 Resp Rate: Pre procedure Respirations: 18 Post procedure Respirations: 18 Peak Flow: Pre bronchodilator Post bronchodilator FVC/FEV1:  n/a Incentive Spirometry:    
     
 
Cough: Pre procedure Cough: Non-productive Post procedure Suctioned: NO Sputum: Pre procedure Post procedure Oxygen: O2 Device: Nasal cannula   Flow rate (L/min) 2LPM 
   Changed: NO SpO2: Pre procedure SpO2: 93 %   with oxygen Post procedure SpO2: 93 %  with oxygen Nebulizer Therapy: Current medications Aerosolized Medications: DuoNeb Changed: NO Smoking History: n/a Problem List:  
Patient Active Problem List  
Diagnosis Code  Incarcerated inguinal hernia K40.30  Hypertension I10  Benign prostatic hyperplasia N40.0  Gout M10.9  High cholesterol E78.00  Benign prostatic hyperplasia without lower urinary tract symptoms N40.0  Left inguinal hernia K40.90  
 BMI less than 19,adult Z68.1  Inguinal hernia K40.90  
 Urinary retention R33.9  Acute respiratory failure with hypoxia and hypercapnia (HCC) J96.01, J96.02  
 SOB (shortness of breath) R06.02  
 Delirium R41.0  Counseling regarding advanced care planning and goals of care Z71.89  Dementia F03.90  Respiratory failure ([address] J96.90  
[facility] acquired PNA J18.9 Respiratory Therapist: Haylie Corbin, RT

## 2019-05-03 NOTE — PROGRESS NOTES
Bedside shift change report given to [address] [place] [address] (oncoming nurse) by Nicole Adame (offgoing nurse). Report included the following information SBAR, Kardex, MAR and Recent Results. 2000- Patient pleasantly confused at this time. Resting comfortably in bed. Will continue to monitor. 0200- patient awake, has not slept yet. Pleasantly confused. Will continue to monitor. 0530- patient resting with eyes close. No distress noted. Will continue to monitor.

## 2019-05-06 LAB
BACTERIA SPEC CULT: NORMAL
BACTERIA SPEC CULT: NORMAL
SERVICE CMNT-IMP: NORMAL
SERVICE CMNT-IMP: NORMAL

## 2020-01-27 NOTE — PROGRESS NOTES
Admit Date: 2018  POD 6 Days Post-Op  Status/Post:  Procedure(s):  OPEN LEFT INGUIINAL HERNIA REPAIR WITH MESH    Assessment:     Active Problems:    Inguinal hernia (2018)      Urinary retention (2018)        Plan/Recommendations/Medical Decision Making:     Stable over the weekend  chidi PO  +BM  Episodic tachycardia. 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 Will check an EKG    Uinary retention: some sediment in wan. discussed with urology. Discharge with leg bag and outpt follow up.      -------------------------------------------------------------------------------------------------------------------      Subjective:     Patient has no new complaints. no nausea  Positive Flatus  Positive Bowel Movement      Objective:     Blood pressure 125/83, pulse (!) 101, temperature 98.6 °F (37 °C), resp. rate 16, weight 52.1 kg (114 lb 13.8 oz), SpO2 95 %. Temp (24hrs), Av.2 °F (36.8 °C), Min:98 °F (36.7 °C), Max:98.6 °F (37 °C)      Physical Exam:   Abdominal exam: soft  non-distended  appropriatly tender.   Wound: clean, dry, no drainage    Labs:   Recent Results (from the past 24 hour(s))   METABOLIC PANEL, BASIC    Collection Time: 18  4:09 AM   Result Value Ref Range    Sodium 135 (L) 136 - 145 mmol/L    Potassium 4.1 3.5 - 5.1 mmol/L    Chloride 101 97 - 108 mmol/L    CO2 26 21 - 32 mmol/L    Anion gap 8 5 - 15 mmol/L    Glucose 174 (H) 65 - 100 mg/dL    BUN 21 (H) 6 - 20 MG/DL    Creatinine 0.77 0.70 - 1.30 MG/DL    BUN/Creatinine ratio 27 (H) 12 - 20      GFR est AA >60 >60 ml/min/1.73m2    GFR est non-AA >60 >60 ml/min/1.73m2    Calcium 8.4 (L) 8.5 - 10.1 MG/DL       Data Review Brief Postoperative Note  ______________________________________________________________    Patient: Neftali Prado  YOB: 1978  MRN: [id number]  Date of Procedure: 1/27/2020    Pre-Op Diagnosis: Left CARPAL TUNNEL SYNDROME    Post-Op Diagnosis: Same       Procedure(s):  Left CARPAL TUNNEL RELEASE    Anesthesia: General    Surgeon(s):  Justyn Steve MD    Assistant: Fidelia Baxter DO    Estimated Blood Loss (mL): <5    TT: 11OES    Complications: None    Specimens:   * No specimens in log *    Implants:  * No implants in log *      Drains: * No LDAs found *    Findings: See op note for details.     Violet Ramirez DO  Date: 1/27/2020  Time: 8:34 AM

## 2021-04-20 NOTE — PROGRESS NOTES
Pharmacy Automatic Renal Dosing Protocol - Antimicrobials Indication for Antimicrobials: CAP, aspiration Current Regimen of Each Antimicrobial: 
Levaquin 750 mg IV q24h - started  day 2 Zosyn 3.375gm IV q8h - started  day 2 Significant Cultures: none Labs: 
Recent Labs 18 
0245 18 CREA 1.05 0.78 BUN 19 13 WBC 8.4 8.4 Temp (24hrs), Av.9 °F (36.6 °C), Min:97.4 °F (36.3 °C), Max:98.3 °F (36.8 °C) Creatinine Clearance (mL/min) or Dialysis:  
Estimated Creatinine Clearance: 43.3 mL/min (based on SCr of 1.05 mg/dL). Estimated Creatinine Clearance (using IBW):48.0 mL/min Impression/Plan: · Afebrile, WBC < 10k, SCr rise · Continue Zosyn regimen · Adjusted Levofloxacin to 750mg IV q48h · Antimicrobial stop date - pending Pharmacy will follow daily and adjust medications as appropriate for renal function and/or serum levels.  
 
Thank you, 
Rochelle Marcelo, [facility] 
 
 Abdominal Pain, N/V/D

## 2021-12-08 NOTE — PROGRESS NOTES
Problem: Mobility Impaired (Adult and Pediatric) Goal: *Acute Goals and Plan of Care (Insert Text) Physical Therapy Goals Initiated 8/3/2018 1. Patient will move from supine to sit and sit to supine  in bed with modified independence within 7 day(s). 2.  Patient will transfer from bed to chair and chair to bed with modified independence using the least restrictive device within 7 day(s). 3.  Patient will perform sit to stand with modified independence within 7 day(s). 4.  Patient will ambulate with modified independence for 200 feet with the least restrictive device within 7 day(s). physical Therapy EVALUATION Patient: Olivia Lunsford (8 y.o. male) Date: 8/3/2018 Primary Diagnosis: LEFT INGUINAL HERNIA Inguinal hernia Urinary retention Procedure(s) (LRB): OPEN LEFT INGUIINAL HERNIA REPAIR WITH MESH (Left) 3 Days Post-Op Precautions:   Fall ASSESSMENT : 
Based on the objective data described below, the patient presents with decreased functional mobility from baseline level of function. Prior to admit patient was living in [address] [facility] ([address] [place] [address]). Per patient he was living alone and his sister helped him with groceries etc.  Patient currently Oriented to self only. States that he is in [facility], it is [address] [facility] is the current president. Currently needing CGA for bed mobility and transfers. Amb approx 120 feet without assistive device with narrow TARI and frequent path deviations. Overall needing Brandi secondary to LOB and path deviations. Given patient's disorientation, decreased safety awareness, fair dynamic balance and gait impairment recommend SNF level rehab with potential transition to [facility] after rehab. He is not safe to be alone at this time. Will continue to follow for mobility progression. Patient will benefit from skilled intervention to address the above impairments.  
Patients rehabilitation potential is considered to be Good Factors which may influence rehabilitation potential include:  
[x]         None noted 
[]         Mental ability/status []         Medical condition 
[]         Home/family situation and support systems 
[]         Safety awareness 
[]         Pain tolerance/management 
[]         Other: PLAN : 
Recommendations and Planned Interventions: 
[x]           Bed Mobility Training             [x]    Neuromuscular Re-Education 
[x]           Transfer Training                   []    Orthotic/Prosthetic Training 
[x]           Gait Training                         []    Modalities [x]           Therapeutic Exercises           []    Edema Management/Control 
[x]           Therapeutic Activities            [x]    Patient and Family Training/Education 
[]           Other (comment): Frequency/Duration: Patient will be followed by physical therapy  3 times a week to address goals. Discharge Recommendations: Placido Cesar Further Equipment Recommendations for Discharge: TBD SUBJECTIVE:  
Patient stated I think [facility] is the president.   When told it was Ernestine Santana he stated: \"Oh, I didn't know that\" OBJECTIVE DATA SUMMARY:  
HISTORY:   
Past Medical History:  
Diagnosis Date  Anemia  BPH (benign prostatic hypertrophy)  DEMENTIA  Gout  High cholesterol 06/18 taken off meds  Hypertension 6/18 was taken off BP meds  Left inguinal hernia 07/2018 Past Surgical History:  
Procedure Laterality Date  HX HERNIA REPAIR  9-10-13 [facility] with mesh-[facility]-Dr. Cara Santos  
 
Prior Level of Function/Home Situation: Independent with mobility at baseline. Sister assists him but otherwise he lives alone Personal factors and/or comorbidities impacting plan of care:  
 
Home Situation Home Environment: Apartment ([employer]) # Steps to Enter: 0 One/Two Story Residence: One story Interior Rails: Both Lift Chair Available: No 
Living Alone: Yes Support Systems: Family member(s) Patient Expects to be Discharged to[de-identified] UBNEDDOMP Current DME Used/Available at Home: Cane, straight, Nebulizer EXAMINATION/PRESENTATION/DECISION MAKING:  
Critical Behavior: 
Neurologic State: Alert Orientation Level: Oriented X4 Cognition: Follows commands Safety/Judgement: Awareness of environment, Decreased awareness of need for safety, Decreased insight into deficits Hearing: Auditory Auditory Impairment: None Range Of Motion: 
AROM: Generally decreased, functional 
  
  
  
  
  
  
  
Strength:   
Strength: Generally decreased, functional 
  
  
  
 
  
Functional Mobility: 
Bed Mobility: 
  
Supine to Sit: Supervision Sit to Supine: Supervision Transfers: 
Sit to Stand: Contact guard assistance Stand to Sit: Contact guard assistance Balance:  
Sitting: Intact Standing: Impaired Standing - Static: Constant support;Good Standing - Dynamic : Fair Ambulation/Gait Training: 
Distance (ft): 120 Feet (ft) Assistive Device: Gait belt Ambulation - Level of Assistance: Minimal assistance;Assist x1 Gait Description (WDL): Exceptions to [facility] Gait Abnormalities: Decreased step clearance; Path deviations; Shuffling gait Base of Support: Narrowed Speed/Johanna: Pace decreased (<100 feet/min); Shuffled; Slow Step Length: Left shortened;Right shortened Functional Measure: 
Barthel Index: 
 
Bathin Bladder: 0 Bowels: 10 
Groomin Dressin Feeding: 10 Mobility: 10 Stairs: 0 Toilet Use: 5 Transfer (Bed to Chair and Back): 10 Total: 55 Barthel and G-code impairment scale: 
Percentage of impairment CH 
0% CI 
1-19% CJ 
20-39% CK 
40-59% CL 
60-79% CM 
80-99% CN 
100% Barthel Score 0-100 100 99-80 79-60 59-40 20-39 1-19 
 0 Barthel Score 0-20 20 17-19 13-16 9-12 5-8 1-4 0 The Barthel ADL Index: Guidelines 1.  The index should be used as a record of what a patient does, not as a record of what a patient could do. 2. The main aim is to establish degree of independence from any help, physical or verbal, however minor and for whatever reason. 3. The need for supervision renders the patient not independent. 4. A patient's performance should be established using the best available evidence. Asking the patient, friends/relatives and nurses are the usual sources, but direct observation and common sense are also important. However direct testing is not needed. 5. Usually the patient's performance over the preceding 24-48 hours is important, but occasionally longer periods will be relevant. 6. Middle categories imply that the patient supplies over 50 per cent of the effort. 7. Use of aids to be independent is allowed. Guy Duncan., Barthel, DShaynaW. (0718). Functional evaluation: the Barthel Index. [address] [facility] (14)2. Ely Juarez jenifer JHONY Alfaro, Tresa Maldonado., Brigida Toledo., [place], [facility] (1999). Measuring the change indisability after inpatient rehabilitation; comparison of the responsiveness of the Barthel Index and Functional [place] Measure. Journal of Neurology, Neurosurgery, and Psychiatry, 66(4), 493-973. Catalina Montoya, N.J.A, BAILEY Hammond, & Ernesto Núñez, M.A. (2004.) Assessment of post-stroke quality of life in cost-effectiveness studies: The usefulness of the Barthel Index and the EuroQoL-5D. [facility], 13, 313-86 G codes: In compliance with CMSs Claims Based Outcome Reporting, the following G-code set was chosen for this patient based on their primary functional limitation being treated: The outcome measure chosen to determine the severity of the functional limitation was the Barthel with a score of 55/100 which was correlated with the impairment scale. ? Mobility - Walking and Moving Around:  
  - CURRENT STATUS: CK - 40%-59% impaired, limited or restricted  - GOAL STATUS: CJ - 20%-39% impaired, limited or restricted   - D/C STATUS:  ---------------To be determined---------------  
  
 
Pain: 
Pain Scale 1: Visual 
Pain Intensity 1: 0 Activity Tolerance: VSS Please refer to the flowsheet for vital signs taken during this treatment. After treatment:  
[]         Patient left in no apparent distress sitting up in chair 
[x]         Patient left in no apparent distress in bed 
[x]         Call bell left within reach [x]         Nursing notified 
[]         Caregiver present [x]         Bed alarm activated COMMUNICATION/EDUCATION:  
The patients plan of care was discussed with: Physical Therapist, Occupational Therapist and Registered Nurse. [x]         Fall prevention education was provided and the patient/caregiver indicated understanding. []         Patient/family have participated as able in goal setting and plan of care. []         Patient/family agree to work toward stated goals and plan of care. []         Patient understands intent and goals of therapy, but is neutral about his/her participation. [x]         Patient is unable to participate in goal setting and plan of care. Thank you for this referral. 
Eusebio Huff, PT, DPT Time Calculation: 21 mins Rhombic Flap Text: The defect edges were debeveled with a #15 scalpel blade.  Given the location of the defect and the proximity to free margins a rhombic flap was deemed most appropriate.  Using a sterile surgical marker, an appropriate rhombic flap was drawn incorporating the defect.    The area thus outlined was incised deep to adipose tissue with a #15 scalpel blade.  The skin margins were undermined to an appropriate distance in all directions utilizing iris scissors.

## 2022-03-16 NOTE — ANESTHESIA POSTPROCEDURE EVALUATION
Post-Anesthesia Evaluation and Assessment Patient: Adriane Stratton MRN: [id number]  SSN: [id number] YOB: 1930  Age: 80 y.o. Sex: male Cardiovascular Function/Vital Signs Visit Vitals  /75  Pulse (!) 101  Temp 36.5 °C (97.7 °F)  Resp 13  Wt 52.1 kg (114 lb 13.8 oz)  SpO2 93%  BMI 18.54 kg/m2 Patient is status post general anesthesia for Procedure(s): OPEN LEFT INGUIINAL HERNIA REPAIR WITH MESH. Nausea/Vomiting: None Postoperative hydration reviewed and adequate. Pain: 
Pain Scale 1: Numeric (0 - 10) (07/31/18 5904) Pain Intensity 1: 0 (07/31/18 0923) Managed Neurological Status:  
Neuro (WDL): Exceptions to WDL (07/31/18 0745) Neuro Neurologic State: Drowsy; Eyes open to voice; Eyes open spontaneously (07/31/18 0910) Orientation Level: Oriented to person (07/31/18 0910) Cognition: Follows commands (07/31/18 0910) Speech: Clear (07/31/18 0910) LUE Motor Response: Purposeful (07/31/18 0910) LLE Motor Response: Purposeful (07/31/18 0910) RUE Motor Response: Purposeful (07/31/18 0910) RLE Motor Response: Purposeful (07/31/18 0910) At baseline Mental Status and Level of Consciousness: Arousable Pulmonary Status:  
O2 Device: Nasal cannula (07/31/18 0923) Adequate oxygenation and airway patent Complications related to anesthesia: None Post-anesthesia assessment completed. No concerns Signed By: Robyn Macedo MD   
 July 31, 2018 Doxepin Pregnancy And Lactation Text: This medication is Pregnancy Category C and it isn't known if it is safe during pregnancy. It is also excreted in breast milk and breast feeding isn't recommended.

## 2022-03-18 PROBLEM — J96.90 RESPIRATORY FAILURE (HCC): Status: ACTIVE | Noted: 2019-04-30

## 2022-03-19 PROBLEM — F03.90 DEMENTIA (HCC): Status: ACTIVE | Noted: 2018-12-06

## 2022-03-19 PROBLEM — K40.90 LEFT INGUINAL HERNIA: Status: ACTIVE | Noted: 2018-07-11

## 2022-03-19 PROBLEM — K40.90 INGUINAL HERNIA: Status: ACTIVE | Noted: 2018-07-31

## 2022-03-19 PROBLEM — J18.9 HOSPITAL ACQUIRED PNA: Status: ACTIVE | Noted: 2019-04-30

## 2022-03-19 PROBLEM — N40.0 BENIGN PROSTATIC HYPERPLASIA WITHOUT LOWER URINARY TRACT SYMPTOMS: Status: ACTIVE | Noted: 2017-10-11
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
## 2022-03-19 PROBLEM — R33.9 URINARY RETENTION: Status: ACTIVE | Noted: 2018-08-02

## 2022-03-20 PROBLEM — R41.0 DELIRIUM: Status: ACTIVE | Noted: 2018-11-30

## 2022-03-20 PROBLEM — R06.02 SOB (SHORTNESS OF BREATH): Status: ACTIVE | Noted: 2018-11-30

## 2022-03-20 PROBLEM — Z71.89 COUNSELING REGARDING ADVANCED CARE PLANNING AND GOALS OF CARE: Status: ACTIVE | Noted: 2018-11-30

## 2022-03-20 PROBLEM — J96.02 ACUTE RESPIRATORY FAILURE WITH HYPOXIA AND HYPERCAPNIA (HCC): Status: ACTIVE | Noted: 2018-11-29

## 2022-03-20 PROBLEM — J96.01 ACUTE RESPIRATORY FAILURE WITH HYPOXIA AND HYPERCAPNIA (HCC): Status: ACTIVE | Noted: 2018-11-29

## 2022-10-07 NOTE — PROGRESS NOTES
ADULT PROTOCOL: JET AEROSOL  REASSESSMENT Patient  Jean Claude Hawley     80 y.o.   male     5/1/2019  8:39 PM 
 
Breath Sounds Pre Procedure: Right Breath Sounds: Coarse, Expiratory wheezing Left Breath Sounds: Coarse, Expiratory wheezing Breath Sounds Post Procedure: Right Breath Sounds: Coarse, Expiratory wheezing Left Breath Sounds: Coarse, Expiratory wheezing Breathing pattern: Pre procedure Breathing Pattern: Regular Post procedure Breathing Pattern: Regular Heart Rate: Pre procedure Pulse: 105 Post procedure Pulse: 103 Resp Rate: Pre procedure Respirations: 22 Post procedure Respirations: 20 
 
 
Oxygen: O2 Device: Nasal cannula   Flow rate (L/min) 2 Changed: NO SpO2: Pre procedure SpO2: 93 %   with oxygen Post procedure SpO2: 94 %  with oxygen Nebulizer Therapy: Current medications Aerosolized Medications: DuoNeb Changed: NO Smoking History:  Smoking status: Unknown If Ever Smoked Problem List:  
Patient Active Problem List  
Diagnosis Code  Incarcerated inguinal hernia K40.30  Hypertension I10  Benign prostatic hyperplasia N40.0  Gout M10.9  High cholesterol E78.00  Benign prostatic hyperplasia without lower urinary tract symptoms N40.0  Left inguinal hernia K40.90  
 BMI less than 19,adult Z68.1  Inguinal hernia K40.90  
 Urinary retention R33.9  Acute respiratory failure with hypoxia and hypercapnia (HCC) J96.01, J96.02  
 SOB (shortness of breath) R06.02  
 Delirium R41.0  Counseling regarding advanced care planning and goals of care Z71.89  Dementia F03.90  Respiratory failure ([address] J96.90  
[facility] acquired PNA J18.9 Respiratory Therapist: Zeferino Martin RT 
 [Follow-up Visit ___] : a follow-up visit  for [unfilled]

## 2022-11-11 ENCOUNTER — APPOINTMENT (OUTPATIENT)
Dept: GENERAL RADIOLOGY | Age: 87
End: 2022-11-11
Attending: EMERGENCY MEDICINE
Payer: MEDICAID

## 2022-11-11 ENCOUNTER — HOSPITAL ENCOUNTER (EMERGENCY)
Age: 87
Discharge: HOME | End: 2022-11-11
Attending: EMERGENCY MEDICINE
Payer: MEDICAID

## 2022-11-11 VITALS
DIASTOLIC BLOOD PRESSURE: 70 MMHG | WEIGHT: 150 LBS | BODY MASS INDEX: 23.54 KG/M2 | RESPIRATION RATE: 16 BRPM | OXYGEN SATURATION: 100 % | TEMPERATURE: 98.1 F | HEART RATE: 70 BPM | HEIGHT: 67 IN | SYSTOLIC BLOOD PRESSURE: 121 MMHG

## 2022-11-11 DIAGNOSIS — R07.9 ACUTE CHEST PAIN: Primary | ICD-10-CM

## 2022-11-11 LAB
ANION GAP SERPL CALC-SCNC: 3 MMOL/L (ref 5–15)
ATRIAL RATE: 76 BPM
BASOPHILS # BLD: 0 K/UL (ref 0–0.1)
BASOPHILS NFR BLD: 1 % (ref 0–1)
BUN SERPL-MCNC: 22 MG/DL (ref 6–20)
BUN/CREAT SERPL: 23 (ref 12–20)
CALCIUM SERPL-MCNC: 9.1 MG/DL (ref 8.5–10.1)
CALCULATED P AXIS, ECG09: 100 DEGREES
CALCULATED R AXIS, ECG10: 82 DEGREES
CALCULATED T AXIS, ECG11: 91 DEGREES
CHLORIDE SERPL-SCNC: 104 MMOL/L (ref 97–108)
CO2 SERPL-SCNC: 31 MMOL/L (ref 21–32)
CREAT SERPL-MCNC: 0.97 MG/DL (ref 0.7–1.3)
DIAGNOSIS, 93000: NORMAL
DIFFERENTIAL METHOD BLD: ABNORMAL
EOSINOPHIL # BLD: 0.2 K/UL (ref 0–0.4)
EOSINOPHIL NFR BLD: 3 % (ref 0–7)
ERYTHROCYTE [DISTWIDTH] IN BLOOD BY AUTOMATED COUNT: 12.7 % (ref 11.5–14.5)
GLUCOSE SERPL-MCNC: 142 MG/DL (ref 65–100)
HCT VFR BLD AUTO: 37.8 % (ref 36.6–50.3)
HGB BLD-MCNC: 12.4 G/DL (ref 12.1–17)
IMM GRANULOCYTES # BLD AUTO: 0 K/UL (ref 0–0.04)
IMM GRANULOCYTES NFR BLD AUTO: 1 % (ref 0–0.5)
LYMPHOCYTES # BLD: 1.5 K/UL (ref 0.8–3.5)
LYMPHOCYTES NFR BLD: 26 % (ref 12–49)
MCH RBC QN AUTO: 29.6 PG (ref 26–34)
MCHC RBC AUTO-ENTMCNC: 32.8 G/DL (ref 30–36.5)
MCV RBC AUTO: 90.2 FL (ref 80–99)
MONOCYTES # BLD: 0.4 K/UL (ref 0–1)
MONOCYTES NFR BLD: 7 % (ref 5–13)
NEUTS SEG # BLD: 3.5 K/UL (ref 1.8–8)
NEUTS SEG NFR BLD: 62 % (ref 32–75)
NRBC # BLD: 0 K/UL (ref 0–0.01)
NRBC BLD-RTO: 0 PER 100 WBC
P-R INTERVAL, ECG05: 236 MS
PLATELET # BLD AUTO: 246 K/UL (ref 150–400)
PMV BLD AUTO: 9.6 FL (ref 8.9–12.9)
POTASSIUM SERPL-SCNC: 4.2 MMOL/L (ref 3.5–5.1)
Q-T INTERVAL, ECG07: 390 MS
QRS DURATION, ECG06: 80 MS
QTC CALCULATION (BEZET), ECG08: 438 MS
RBC # BLD AUTO: 4.19 M/UL (ref 4.1–5.7)
SODIUM SERPL-SCNC: 138 MMOL/L (ref 136–145)
TROPONIN-HIGH SENSITIVITY: 5 NG/L (ref 0–76)
TROPONIN-HIGH SENSITIVITY: 5 NG/L (ref 0–76)
VENTRICULAR RATE, ECG03: 76 BPM
WBC # BLD AUTO: 5.6 K/UL (ref 4.1–11.1)

## 2022-11-11 PROCEDURE — 96360 HYDRATION IV INFUSION INIT: CPT

## 2022-11-11 PROCEDURE — 80048 BASIC METABOLIC PNL TOTAL CA: CPT

## 2022-11-11 PROCEDURE — 93005 ELECTROCARDIOGRAM TRACING: CPT

## 2022-11-11 PROCEDURE — 96361 HYDRATE IV INFUSION ADD-ON: CPT

## 2022-11-11 PROCEDURE — 85025 COMPLETE CBC W/AUTO DIFF WBC: CPT

## 2022-11-11 PROCEDURE — 71045 X-RAY EXAM CHEST 1 VIEW: CPT

## 2022-11-11 PROCEDURE — 99285 EMERGENCY DEPT VISIT HI MDM: CPT

## 2022-11-11 PROCEDURE — 74011250636 HC RX REV CODE- 250/636: Performed by: EMERGENCY MEDICINE

## 2022-11-11 PROCEDURE — 36415 COLL VENOUS BLD VENIPUNCTURE: CPT

## 2022-11-11 PROCEDURE — 84484 ASSAY OF TROPONIN QUANT: CPT

## 2022-11-11 RX ADMIN — SODIUM CHLORIDE, POTASSIUM CHLORIDE, SODIUM LACTATE AND CALCIUM CHLORIDE 500 ML: 600; 310; 30; 20 INJECTION, SOLUTION INTRAVENOUS at 05:06

## 2022-11-11 NOTE — ED NOTES
Pt transported now by [facility] to [facility]. NAD noted with patient.  Discharge instructions sent with [facility]

## 2022-11-11 NOTE — ED NOTES
Pt episode of incontinence, depends change and pants removed due to be wet. IVF infusing as ordered. Waiting till 0630 to call hospital to home for ride back to facility. Pt remains AOX2-calm and cooperative. VSS.

## 2022-11-11 NOTE — ED NOTES
Assumed care of pt. Per EMS was called out for chest pain, when asked where pt hurts pt states \"my heart hurts and both my feet\". Pt resting comfortable, SR on monitor, 2L nasal cannula-baseline for COPD, AOX2-calm and cooperative.

## 2022-11-11 NOTE — ED PROVIDER NOTES
EMERGENCY DEPARTMENT HISTORY AND PHYSICAL EXAM       Please note that this dictation was completed with [employer], the computer voice recognition software. Quite often unanticipated grammatical, syntax, homophones, and other interpretive errors are inadvertently transcribed by the computer software. Please disregard these errors. Please excuse any errors that have escaped final proofreading. Date: 11/11/2022  Patient Name: Ping Sims  Patient Age and Sex: 80 y.o. male    History of Presenting Illness     Chief Complaint   Patient presents with    Chest Pain     Per EMS called out for chest pain, pt reports \"heart pain and bilat foot pain\" pt AOX2-unsure of time and place, when asked what is hurting pt holds left side of chest and states \"my heart hurts and my feet, I think my feet is my problem\". Pt at baseline has some garbled speech and wears 2L due to COPD. History Provided By: Patient , medical record, nursing home record    Chief Complaint: chest pain      HPI: Ping Sims, is a 80 y.o. male whose medical history is listed below and includes advanced dementia, COPD on 2 L at baseline, presents to the ED via ems from nursing home for evaluation of chest pain. The nursing staff called 911 because the patient was complaining of the chest pain. When EMS arrived at the scene and in route to the emergency department patient stated that his chest is fine but his feet hurt. On arrival to the emergency room and during my evaluation of the patient he says \"I do not even know what I am doing here\". He denies having any complaints at this time. He is oriented to self and he knows that he is in the hospital, which is baseline mental status. He is unable to reliably provide any further history. When asked any questions about pain or medical issues he reports no issues, no pain, no complaints. Pt denies any other alleviating or exacerbating factors. No other associated signs or symptoms.  There are no other complaints, changes or physical findings at this time. PCP: None    Past History   [facility] and [facility] me. -Laverne Romano MD    Past Medical History:  Past Medical History:   Diagnosis Date    Anemia     BPH (benign prostatic hypertrophy)     Dementia     Gout     High cholesterol     06/18 taken off meds    Hypertension     6/18 was taken off BP meds    Left inguinal hernia 07/2018       Past Surgical History:  Past Surgical History:   Procedure Laterality Date    HX HERNIA REPAIR  9-10-13    [facility] with mesh-[facility]-Dr. Jeniffer Tobias    HX HERNIA REPAIR  07/31/2018    open [facility] with mesh       Family History:   Family History   Problem Relation Age of Onset    Stroke Mother     Stroke Sister     Diabetes Sister     Hypertension Sister     Heart Disease Brother     Hypertension Brother        Social History:  Social History     Tobacco Use    Smoking status: Unknown    Smokeless tobacco: Never   Substance Use Topics    Alcohol use: Yes     Comment: occas    Drug use: No       Current Medications:  No current facility-administered medications on file prior to encounter. Current Outpatient Medications on File Prior to Encounter   Medication Sig Dispense Refill    acetaminophen (TYLENOL) 325 mg tablet Take 2 Tabs by mouth every four (4) hours as needed. guaiFENesin ER (MUCINEX) 600 mg ER tablet Take 1 Tab by mouth daily. 30 Tab 0    methylPREDNISolone (MEDROL DOSEPACK) 4 mg tablet Use as directed 1 Dose Pack 0    potassium chloride SR (KLOR-CON 10) 10 mEq tablet Take 10 mEq by mouth daily. dext 70/polycarbophil/peg/NaCl (ARTIFICIAL TEAR SOLUTION OP) Administer 2 Drops to both eyes two (2) times a day. carvedilol (COREG) 3.125 mg tablet Take 3.125 mg by mouth two (2) times a day. magnesium oxide (MAG-OX) 400 mg tablet Take 400 mg by mouth two (2) times a day. dilTIAZem CD (CARDIZEM CD) 120 mg ER capsule Take 1 Cap by mouth daily.  30 Cap 0    QUEtiapine (SEROQUEL) 25 mg tablet Take 0.5 Tabs by mouth nightly. 30 Tab 0    dutasteride (AVODART) 0.5 mg capsule Take 0.5 mg by mouth daily. senna-docusate (SENNA WITH DOCUSATE SODIUM) 8.6-50 mg per tablet Take 1 Tab by mouth daily as needed for Constipation (try first if constipation). albuterol (PROVENTIL VENTOLIN) 2.5 mg /3 mL (0.083 %) nebulizer solution 3 mL by Nebulization route every four (4) hours as needed for Wheezing or Shortness of Breath. 30 Each 2    tamsulosin (FLOMAX) 0.4 mg capsule TAKE 1 CAPSULE BY MOUTH EVERY DAY 90 Cap 0    food supplemt, lactose-reduced (BOOST HIGH PROTEIN) 0.06 gram- 1 kcal/mL liqd Drink 2-3 containers DAILY. 90 Container 11    ferrous sulfate 325 mg (65 mg iron) tablet Take 1 Tab by mouth two (2) times daily (with meals). On an empty stomach with Vitamin C (like OJ) 60 Tab 11       Allergies:  No Known Allergies    Review of Systems   All other systems reviewed and negative    Review of Systems   Unable to perform ROS: Dementia     Physical Exam   Reviewed patients vital signs and nursing note    Physical Exam  Vitals and nursing note reviewed. Constitutional:       General: He is awake. Appearance: He is not diaphoretic. HENT:      Head: Atraumatic. Nose: Nose normal.      Mouth/Throat:      Mouth: Mucous membranes are moist.   Eyes:      Extraocular Movements: Extraocular movements intact. Conjunctiva/sclera: Conjunctivae normal.      Pupils: Pupils are equal, round, and reactive to light. Neck:      Vascular: No JVD. Cardiovascular:      Rate and Rhythm: Normal rate and regular rhythm. Pulses: Normal pulses. Heart sounds: Normal heart sounds. Pulmonary:      Effort: Pulmonary effort is normal.      Breath sounds: Normal breath sounds. Chest:      Chest wall: No tenderness. Abdominal:      General: Bowel sounds are normal.      Palpations: Abdomen is soft. Tenderness: There is no abdominal tenderness.    Musculoskeletal: General: No tenderness. Normal range of motion. Cervical back: Normal range of motion and neck supple. No rigidity. Right lower leg: No tenderness. No edema. Left lower leg: No tenderness. No edema. Skin:     General: Skin is warm and dry. Capillary Refill: Capillary refill takes less than 2 seconds. Neurological:      General: No focal deficit present. Mental Status: Mental status is at baseline. Psychiatric:         Mood and Affect: Mood normal.         Behavior: Behavior normal.       Diagnostic Study Results     Labs - I have personally reviewed and interpreted all laboratory results. Interpretation of available and pertinent results detailed below in MDM. Miles Holland MD, MSc  Recent Results (from the past 24 hour(s))   CBC WITH AUTOMATED DIFF    Collection Time: 11/11/22  1:01 AM   Result Value Ref Range    WBC 5.6 4.1 - 11.1 K/uL    RBC 4.19 4. 10 - 5.70 M/uL    HGB 12.4 12.1 - 17.0 g/dL    HCT 37.8 36.6 - 50.3 %    MCV 90.2 80.0 - 99.0 FL    MCH 29.6 26.0 - 34.0 PG    MCHC 32.8 30.0 - 36.5 g/dL    RDW 12.7 11.5 - 14.5 %    PLATELET 280 600 - 485 K/uL    MPV 9.6 8.9 - 12.9 FL    NRBC 0.0 0  WBC    ABSOLUTE NRBC 0.00 0.00 - 0.01 K/uL    NEUTROPHILS 62 32 - 75 %    LYMPHOCYTES 26 12 - 49 %    MONOCYTES 7 5 - 13 %    EOSINOPHILS 3 0 - 7 %    BASOPHILS 1 0 - 1 %    IMMATURE GRANULOCYTES 1 (H) 0.0 - 0.5 %    ABS. NEUTROPHILS 3.5 1.8 - 8.0 K/UL    ABS. LYMPHOCYTES 1.5 0.8 - 3.5 K/UL    ABS. MONOCYTES 0.4 0.0 - 1.0 K/UL    ABS. EOSINOPHILS 0.2 0.0 - 0.4 K/UL    ABS. BASOPHILS 0.0 0.0 - 0.1 K/UL    ABS. IMM.  GRANS. 0.0 0.00 - 0.04 K/UL    DF AUTOMATED     METABOLIC PANEL, BASIC    Collection Time: 11/11/22  1:01 AM   Result Value Ref Range    Sodium 138 136 - 145 mmol/L    Potassium 4.2 3.5 - 5.1 mmol/L    Chloride 104 97 - 108 mmol/L    CO2 31 21 - 32 mmol/L    Anion gap 3 (L) 5 - 15 mmol/L    Glucose 142 (H) 65 - 100 mg/dL    BUN 22 (H) 6 - 20 MG/DL    Creatinine 0.97 0.70 - 1.30 MG/DL    BUN/Creatinine ratio 23 (H) 12 - 20      eGFR >60 >60 ml/min/1.73m2    Calcium 9.1 8.5 - 10.1 MG/DL   TROPONIN-HIGH SENSITIVITY    Collection Time: 11/11/22  1:01 AM   Result Value Ref Range    Troponin-High Sensitivity 5 0 - 76 ng/L   TROPONIN-HIGH SENSITIVITY    Collection Time: 11/11/22  2:44 AM   Result Value Ref Range    Troponin-High Sensitivity 5 0 - 76 ng/L       Radiologic Studies - I have personally reviewed and interpreted (see MDM for brief interpreation of available results) all imaging studies and agree with radiology interpretation and report. - Sha Servin MD, MSc  XR CHEST PORT   Final Result      No acute process on portable chest.               Medical Decision Making   I am the first provider for this patient. Records Reviewed:   I reviewed our electronic medical record system for any past medical records that were available that may contribute to the patient's current condition, including their PMH, surgical history, social and family history. This includes most recent ED visits, any available discharge summaries and prior ECGs, which I have reviewed and interpreted personally. I have summarized most salient findings in my HPI and MDM. Sha Servin MD, MSc    I also reviewed the nursing notes and vital signs from today's visit. Nursing notes will be reviewed as they become available in realtime while the pt has been in the ED. Sha Servin MD, MSc      Vital Signs-Reviewed the patient's vital signs. Patient Vitals for the past 24 hrs:   Temp Pulse Resp BP SpO2   11/11/22 0107 98.1 °F (36.7 °C) 87 17 108/74 97 %       Billable EKG reviewed by ED Physician in the absence of a cardiologist: Yes  Rhythm: sinus; Rate (approx.): 76; QRS interval: nml; ST/T wave: nml; Other intervals: first degree av block with pacs. No new changes concerning for acute ischemia. This and prior available ECGs have been viewed and interpreted by me personally.  Sha Servin MD, Msc      Cardiac Monitor: The cardiac monitor revealed normal sinus rhythm. The cardiac monitor was ordered to monitor patient for signs of cardiac dysrhythmia, which they are at risk for based on their history or risk for cardiovascular disease and/or metabolic abnormalities. Cardiac monitor interpreted by me. Candace Leonardo MD MSc    Pulse ox interpretation: 97% on 3L with good pleth. Interpreted by me. Candace Leonardo MD MSc        Provider Notes and Medical Decision Making:   DDX, assessment and plan:  Patient presents with CP. While the spectrum of DDx includes ACS, Aortic dissection, PNA, PE, PTX, pericarditis, myocarditis, GERD, costochondritis, anxiety, most concerned for ACS with others being less likely given the history and course of illness, gestalt and detailed physical exam.  Given patient's risk factors for cardiovascular disease, plan is to obtain labs including cardiac markers, CXR, EKG. Further labs and imaging will be considered based on patient's course, symptoms and findings of the initial workup. Cardiology Consult PRN. In meantime, will provide appropriate analgesia and reassess. Reassessment  - I have re-examined the patient and the patient reports significant improvement in chest pain on re-examination. Symptoms now none to minimal and no other complaints. The patient has had 2 negative sets of cardiac enzymes in the ER during this visit. ACS therefore unlikely. ED Course: The patients presenting problems have been discussed, and they are in agreement with the care plan formulated and outlined with them. I have encouraged them to ask questions as they arise throughout their visit. Progress note:  Patient has been reassessed and reports feeling considerably better, has normal vital signs and feels comfortable going home. I think this is reasonable as no findings today suggest a life-threatening condition. He has poor skin turgor and dry mucus membrane suggestive of some level of dehydration. Given his age this is not surprising. We have given him a small liter of IV fluids while here in the emergency department for this. DISPOSITION: DISCHARGE  The patient's results have been reviewed with patient and available family and/or caregiver. They verbally convey their understanding and agreement of the patient's signs, symptoms, diagnosis, treatment and prognosis and additionally agree to follow up as recommended in the discharge instructions or to return to the Emergency Department should the patient's condition change prior to their follow-up appointment. The patient and available family and/or caregiver verbally agree with the care plan and all of their questions have been answered. The discharge instructions have also been provided to the them with educational information regarding the patient's diagnosis as well a list of reasons why the patient would want to return to the ER prior to their follow-up appointment should any concerns arise, the patient's condition change or symptoms worsen. Miles Holland MD, Msc    PLAN:  Current Discharge Medication List        2. Follow-up Information       Follow up With Specialties Details Why Contact Info    primary care doctor  Schedule an appointment as soon as possible for a visit in 2 days for follow up           3. Return to ED if worse       ILeyla MD, am the attending of record for this patient encounter. Diagnosis     Final Clinical Impression:   1. Acute chest pain        Attestation:  I personally performed the services described in this documentation on this date 11/11/2022 for patient Daksha Mosley.   Miles Holland MD

## 2022-11-11 NOTE — DISCHARGE INSTRUCTIONS
It was a pleasure taking care of you in our Emergency Department today. We know that when you come to [facility], you are entrusting us with your health, comfort, and safety. Our physicians and nurses honor that trust, and truly appreciate the opportunity to care for you and your loved ones. We also value your feedback. If you receive a survey about your Emergency Department experience today, please fill it out. We care about our patients' feedback, and we listen to what you have to say.   Thank you!       --- Dr. Shashank Briones MD

## 2022-11-11 NOTE — ED NOTES
Pt resting in room, denies any current needs. Additional blood drawn and sent to lab. Call light within reach.

## (undated) DEVICE — DRAIN WND PENRS RADPQ 0.25X18 -- CONVERT TO ITEM 360112

## (undated) DEVICE — SYR 10ML LUER LOK 1/5ML GRAD --

## (undated) DEVICE — SUTURE ABSORBABLE BRAIDED 3-0 SHB 18 IN UD VICRYL + VCPB864D

## (undated) DEVICE — REM POLYHESIVE ADULT PATIENT RETURN ELECTRODE: Brand: VALLEYLAB

## (undated) DEVICE — SPONGE: SPECIALTY PEANUT XR 100/CS: Brand: MEDICAL ACTION INDUSTRIES

## (undated) DEVICE — KENDALL SCD EXPRESS SLEEVES, KNEE LENGTH, MEDIUM: Brand: KENDALL SCD

## (undated) DEVICE — INFECTION CONTROL KIT SYS

## (undated) DEVICE — (D)PREP SKN CHLRAPRP APPL 26ML -- CONVERT TO ITEM 371833

## (undated) DEVICE — DBD-PACK,LAPAROTOMY,2 REINFORCED GOWNS: Brand: MEDLINE

## (undated) DEVICE — APPLICATOR BNDG 1MM ADH PREMIERPRO EXOFIN

## (undated) DEVICE — SURGICAL PROCEDURE PACK BASIN MAJ SET CUST NO CAUT

## (undated) DEVICE — HANDLE LT SNAP ON ULT DURABLE LENS FOR TRUMPF ALC DISPOSABLE

## (undated) DEVICE — GLOVE SURG SZ 7.5 L11.2IN THK9.8MIL STRW LTX POLYMER BEAD

## (undated) DEVICE — DEVON™ KNEE AND BODY STRAP 60" X 3" (1.5 M X 7.6 CM): Brand: DEVON

## (undated) DEVICE — GOWN,SIRUS,NONRNF,SETINSLV,XL,20/CS: Brand: MEDLINE

## (undated) DEVICE — ROCKER SWITCH PENCIL BLADE ELECTRODE, HOLSTER: Brand: EDGE

## (undated) DEVICE — SUT SLK 2-0SH 30IN BLK --

## (undated) DEVICE — NEEDLE HYPO 22GA L1.5IN BLK S STL HUB POLYPR SHLD REG BVL

## (undated) DEVICE — TOWEL SURG W17XL27IN STD BLU COT NONFENESTRATED PREWASHED

## (undated) DEVICE — SUTURE STRATAFIX SPRL MCRYL + SZ 4-0 L12IN ABSRB UD PS-2 SXMP1B117

## (undated) DEVICE — 3M™ IOBAN™ 2 ANTIMICROBIAL INCISE DRAPE 6640EZ: Brand: IOBAN™ 2